# Patient Record
Sex: FEMALE | Race: WHITE | URBAN - NONMETROPOLITAN AREA
[De-identification: names, ages, dates, MRNs, and addresses within clinical notes are randomized per-mention and may not be internally consistent; named-entity substitution may affect disease eponyms.]

---

## 2019-09-30 ENCOUNTER — APPOINTMENT (RX ONLY)
Dept: URBAN - NONMETROPOLITAN AREA CLINIC 7 | Facility: CLINIC | Age: 69
Setting detail: DERMATOLOGY
End: 2019-09-30

## 2019-09-30 DIAGNOSIS — Z71.89 OTHER SPECIFIED COUNSELING: ICD-10-CM

## 2019-09-30 DIAGNOSIS — L82.1 OTHER SEBORRHEIC KERATOSIS: ICD-10-CM

## 2019-09-30 PROCEDURE — ? COUNSELING

## 2019-09-30 PROCEDURE — 99202 OFFICE O/P NEW SF 15 MIN: CPT

## 2019-09-30 PROCEDURE — ? EDUCATIONAL RESOURCES PROVIDED

## 2019-09-30 PROCEDURE — ? OBSERVATION

## 2019-09-30 PROCEDURE — ? SUNSCREEN RECOMMENDATIONS

## 2019-09-30 ASSESSMENT — LOCATION DETAILED DESCRIPTION DERM: LOCATION DETAILED: LEFT MEDIAL UPPER BACK

## 2019-09-30 ASSESSMENT — PAIN INTENSITY VAS: HOW INTENSE IS YOUR PAIN 0 BEING NO PAIN, 10 BEING THE MOST SEVERE PAIN POSSIBLE?: NO PAIN

## 2019-09-30 ASSESSMENT — LOCATION ZONE DERM: LOCATION ZONE: TRUNK

## 2019-09-30 ASSESSMENT — LOCATION SIMPLE DESCRIPTION DERM: LOCATION SIMPLE: LEFT UPPER BACK

## 2019-09-30 NOTE — PROCEDURE: MIPS QUALITY
Detail Level: Detailed
Quality 111:Pneumonia Vaccination Status For Older Adults: Pneumococcal Vaccination Previously Received
Quality 474: Zoster Vaccination Status: Shingrix Vaccination Administered or Previously Received
Quality 110: Preventive Care And Screening: Influenza Immunization: Influenza Immunization not Administered because Patient Refused.
Quality 137: Melanoma: Continuity Of Care - Recall System: Patient information entered into a recall system that includes: target date for the next exam specified AND a process to follow up with patients regarding missed or unscheduled appointments

## 2020-09-03 ENCOUNTER — APPOINTMENT (RX ONLY)
Dept: URBAN - NONMETROPOLITAN AREA CLINIC 7 | Facility: CLINIC | Age: 70
Setting detail: DERMATOLOGY
End: 2020-09-03

## 2020-09-03 DIAGNOSIS — D22 MELANOCYTIC NEVI: ICD-10-CM

## 2020-09-03 DIAGNOSIS — Z85.820 PERSONAL HISTORY OF MALIGNANT MELANOMA OF SKIN: ICD-10-CM

## 2020-09-03 DIAGNOSIS — Z71.89 OTHER SPECIFIED COUNSELING: ICD-10-CM

## 2020-09-03 PROBLEM — D48.5 NEOPLASM OF UNCERTAIN BEHAVIOR OF SKIN: Status: ACTIVE | Noted: 2020-09-03

## 2020-09-03 PROCEDURE — 99213 OFFICE O/P EST LOW 20 MIN: CPT | Mod: 25

## 2020-09-03 PROCEDURE — ? COUNSELING

## 2020-09-03 PROCEDURE — 11300 SHAVE SKIN LESION 0.5 CM/<: CPT

## 2020-09-03 PROCEDURE — ? EDUCATIONAL RESOURCES PROVIDED

## 2020-09-03 PROCEDURE — ? OBSERVATION

## 2020-09-03 PROCEDURE — ? SUNSCREEN RECOMMENDATIONS

## 2020-09-03 PROCEDURE — ? SHAVE REMOVAL

## 2020-09-03 ASSESSMENT — LOCATION SIMPLE DESCRIPTION DERM
LOCATION SIMPLE: RIGHT SHOULDER
LOCATION SIMPLE: RIGHT FOREARM

## 2020-09-03 ASSESSMENT — LOCATION DETAILED DESCRIPTION DERM
LOCATION DETAILED: RIGHT POSTERIOR SHOULDER
LOCATION DETAILED: RIGHT PROXIMAL ULNAR DORSAL FOREARM

## 2020-09-03 ASSESSMENT — LOCATION ZONE DERM: LOCATION ZONE: ARM

## 2020-09-03 NOTE — PROCEDURE: MIPS QUALITY
Detail Level: Detailed
Quality 111:Pneumonia Vaccination Status For Older Adults: Pneumococcal Vaccination Previously Received
Quality 474: Zoster Vaccination Status: Shingrix Vaccination Administered or Previously Received
Quality 110: Preventive Care And Screening: Influenza Immunization: Influenza Immunization not Administered because Patient Refused.

## 2021-11-15 ENCOUNTER — APPOINTMENT (RX ONLY)
Dept: URBAN - NONMETROPOLITAN AREA CLINIC 7 | Facility: CLINIC | Age: 71
Setting detail: DERMATOLOGY
End: 2021-11-15

## 2021-11-15 DIAGNOSIS — D18.0 HEMANGIOMA: ICD-10-CM

## 2021-11-15 DIAGNOSIS — Z71.89 OTHER SPECIFIED COUNSELING: ICD-10-CM

## 2021-11-15 DIAGNOSIS — L82.1 OTHER SEBORRHEIC KERATOSIS: ICD-10-CM

## 2021-11-15 DIAGNOSIS — Z85.820 PERSONAL HISTORY OF MALIGNANT MELANOMA OF SKIN: ICD-10-CM

## 2021-11-15 DIAGNOSIS — L65.8 OTHER SPECIFIED NONSCARRING HAIR LOSS: ICD-10-CM

## 2021-11-15 PROBLEM — D18.01 HEMANGIOMA OF SKIN AND SUBCUTANEOUS TISSUE: Status: ACTIVE | Noted: 2021-11-15

## 2021-11-15 PROCEDURE — ? EDUCATIONAL RESOURCES PROVIDED

## 2021-11-15 PROCEDURE — ? PRESCRIPTION

## 2021-11-15 PROCEDURE — ? MEDICATION COUNSELING

## 2021-11-15 PROCEDURE — ? TREATMENT REGIMEN

## 2021-11-15 PROCEDURE — ? COUNSELING

## 2021-11-15 PROCEDURE — ? SUNSCREEN RECOMMENDATIONS

## 2021-11-15 PROCEDURE — ? OBSERVATION

## 2021-11-15 PROCEDURE — 99213 OFFICE O/P EST LOW 20 MIN: CPT

## 2021-11-15 RX ORDER — PHARMACY COMPOUNDING ACCESSORY
1 EACH MISCELLANEOUS BID
Qty: 60 | Refills: 5 | Status: ERX | COMMUNITY
Start: 2021-11-15

## 2021-11-15 RX ADMIN — Medication 1: at 00:00

## 2021-11-15 ASSESSMENT — LOCATION DETAILED DESCRIPTION DERM
LOCATION DETAILED: LEFT MEDIAL UPPER BACK
LOCATION DETAILED: INFERIOR THORACIC SPINE
LOCATION DETAILED: RIGHT PROXIMAL ULNAR DORSAL FOREARM
LOCATION DETAILED: POSTERIOR MID-PARIETAL SCALP

## 2021-11-15 ASSESSMENT — LOCATION SIMPLE DESCRIPTION DERM
LOCATION SIMPLE: RIGHT FOREARM
LOCATION SIMPLE: POSTERIOR SCALP
LOCATION SIMPLE: UPPER BACK
LOCATION SIMPLE: LEFT UPPER BACK

## 2021-11-15 ASSESSMENT — LOCATION ZONE DERM
LOCATION ZONE: TRUNK
LOCATION ZONE: ARM
LOCATION ZONE: SCALP

## 2021-11-15 NOTE — PROCEDURE: TREATMENT REGIMEN
Detail Level: Zone
Initiate Treatment: Alopecia\\nMinoxidil 7% Finasteride 0.1%, Niacinaminde 5%, Azelaic Acid 5% solution bid

## 2021-11-15 NOTE — PROCEDURE: OBSERVATION
Size Of Lesion In Cm (Optional): 0
Body Location Override (Optional - Billing Will Still Be Based On Selected Body Map Location If Applicable): back
Detail Level: Zone
Detail Level: Simple

## 2021-11-15 NOTE — PROCEDURE: MIPS QUALITY
Quality 110: Preventive Care And Screening: Influenza Immunization: Influenza Immunization Administered during Influenza season
Quality 431: Preventive Care And Screening: Unhealthy Alcohol Use - Screening: Patient not identified as an unhealthy alcohol user when screened for unhealthy alcohol use using a systematic screening method
Detail Level: Detailed
Quality 111:Pneumonia Vaccination Status For Older Adults: Pneumococcal Vaccination Previously Received
Additional Notes: Has had covid shot

## 2021-11-15 NOTE — PROCEDURE: MEDICATION COUNSELING
Minocycline Counseling: Patient advised regarding possible photosensitivity and discoloration of the teeth, skin, lips, tongue and gums.  Patient instructed to avoid sunlight, if possible.  When exposed to sunlight, patients should wear protective clothing, sunglasses, and sunscreen.  The patient was instructed to call the office immediately if the following severe adverse effects occur:  hearing changes, easy bruising/bleeding, severe headache, or vision changes.  The patient verbalized understanding of the proper use and possible adverse effects of minocycline.  All of the patient's questions and concerns were addressed.
Ketoconazole Pregnancy And Lactation Text: This medication is Pregnancy Category C and it isn't know if it is safe during pregnancy. It is also excreted in breast milk and breast feeding isn't recommended.
Cosentyx Pregnancy And Lactation Text: This medication is Pregnancy Category B and is considered safe during pregnancy. It is unknown if this medication is excreted in breast milk.
Dapsone Pregnancy And Lactation Text: This medication is Pregnancy Category C and is not considered safe during pregnancy or breast feeding.
Topical Sulfur Applications Pregnancy And Lactation Text: This medication is Pregnancy Category C and has an unknown safety profile during pregnancy. It is unknown if this topical medication is excreted in breast milk.
Mirvaso Counseling: Mirvaso is a topical medication which can decrease superficial blood flow where applied. Side effects are uncommon and include stinging, redness and allergic reactions.
Carac Counseling:  I discussed with the patient the risks of Carac including but not limited to erythema, scaling, itching, weeping, crusting, and pain.
Include Pregnancy/Lactation Warning?: No
Thalidomide Counseling: I discussed with the patient the risks of thalidomide including but not limited to birth defects, anxiety, weakness, chest pain, dizziness, cough and severe allergy.
Cyclosporine Counseling:  I discussed with the patient the risks of cyclosporine including but not limited to hypertension, gingival hyperplasia,myelosuppression, immunosuppression, liver damage, kidney damage, neurotoxicity, lymphoma, and serious infections. The patient understands that monitoring is required including baseline blood pressure, CBC, CMP, lipid panel and uric acid, and then 1-2 times monthly CMP and blood pressure.
Niacinamide Counseling: I recommended taking niacin or niacinamide, also know as vitamin B3, twice daily. Recent evidence suggests that taking vitamin B3 (500 mg twice daily) can reduce the risk of actinic keratoses and non-melanoma skin cancers. Side effects of vitamin B3 include flushing and headache.
Skyrizi Pregnancy And Lactation Text: The risk during pregnancy and breastfeeding is uncertain with this medication.
Topical Sulfur Applications Counseling: Topical Sulfur Counseling: Patient counseled that this medication may cause skin irritation or allergic reactions.  In the event of skin irritation, the patient was advised to reduce the amount of the drug applied or use it less frequently.   The patient verbalized understanding of the proper use and possible adverse effects of topical sulfur application.  All of the patient's questions and concerns were addressed.
Minoxidil Pregnancy And Lactation Text: This medication has not been assigned a Pregnancy Risk Category but animal studies failed to show danger with the topical medication. It is unknown if the medication is excreted in breast milk.
Benzoyl Peroxide Pregnancy And Lactation Text: This medication is Pregnancy Category C. It is unknown if benzoyl peroxide is excreted in breast milk.
Sski Pregnancy And Lactation Text: This medication is Pregnancy Category D and isn't considered safe during pregnancy. It is excreted in breast milk.
Minocycline Pregnancy And Lactation Text: This medication is Pregnancy Category D and not consider safe during pregnancy. It is also excreted in breast milk.
Terbinafine Counseling: Patient counseling regarding adverse effects of terbinafine including but not limited to headache, diarrhea, rash, upset stomach, liver function test abnormalities, itching, taste/smell disturbance, nausea, abdominal pain, and flatulence.  There is a rare possibility of liver failure that can occur when taking terbinafine.  The patient understands that a baseline LFT and kidney function test may be required. The patient verbalized understanding of the proper use and possible adverse effects of terbinafine.  All of the patient's questions and concerns were addressed.
Skyrizi Counseling: I discussed with the patient the risks of risankizumab-rzaa including but not limited to immunosuppression, and serious infections.  The patient understands that monitoring is required including a PPD at baseline and must alert us or the primary physician if symptoms of infection or other concerning signs are noted.
Libtayo Pregnancy And Lactation Text: This medication is contraindicated in pregnancy and when breast feeding.
Cyclophosphamide Pregnancy And Lactation Text: This medication is Pregnancy Category D and it isn't considered safe during pregnancy. This medication is excreted in breast milk.
Dapsone Counseling: I discussed with the patient the risks of dapsone including but not limited to hemolytic anemia, agranulocytosis, rashes, methemoglobinemia, kidney failure, peripheral neuropathy, headaches, GI upset, and liver toxicity.  Patients who start dapsone require monitoring including baseline LFTs and weekly CBCs for the first month, then every month thereafter.  The patient verbalized understanding of the proper use and possible adverse effects of dapsone.  All of the patient's questions and concerns were addressed.
Cosentyx Counseling:  I discussed with the patient the risks of Cosentyx including but not limited to worsening of Crohn's disease, immunosuppression, allergic reactions and infections.  The patient understands that monitoring is required including a PPD at baseline and must alert us or the primary physician if symptoms of infection or other concerning signs are noted.
Topical Clindamycin Pregnancy And Lactation Text: This medication is Pregnancy Category B and is considered safe during pregnancy. It is unknown if it is excreted in breast milk.
Minoxidil Counseling: Minoxidil is a topical medication which can increase blood flow where it is applied. It is uncertain how this medication increases hair growth. Side effects are uncommon and include stinging and allergic reactions.
Quinolones Counseling:  I discussed with the patient the risks of fluoroquinolones including but not limited to GI upset, allergic reaction, drug rash, diarrhea, dizziness, photosensitivity, yeast infections, liver function test abnormalities, tendonitis/tendon rupture.
Cyclophosphamide Counseling:  I discussed with the patient the risks of cyclophosphamide including but not limited to hair loss, hormonal abnormalities, decreased fertility, abdominal pain, diarrhea, nausea and vomiting, bone marrow suppression and infection. The patient understands that monitoring is required while taking this medication.
Benzoyl Peroxide Counseling: Patient counseled that medicine may cause skin irritation and bleach clothing.  In the event of skin irritation, the patient was advised to reduce the amount of the drug applied or use it less frequently.   The patient verbalized understanding of the proper use and possible adverse effects of benzoyl peroxide.  All of the patient's questions and concerns were addressed.
Colchicine Pregnancy And Lactation Text: This medication is Pregnancy Category C and isn't considered safe during pregnancy. It is excreted in breast milk.
Terbinafine Pregnancy And Lactation Text: This medication is Pregnancy Category B and is considered safe during pregnancy. It is also excreted in breast milk and breast feeding isn't recommended.
Cimzia Pregnancy And Lactation Text: This medication crosses the placenta but can be considered safe in certain situations. Cimzia may be excreted in breast milk.
SSKI Counseling:  I discussed with the patient the risks of SSKI including but not limited to thyroid abnormalities, metallic taste, GI upset, fever, headache, acne, arthralgias, paraesthesias, lymphadenopathy, easy bleeding, arrhythmias, and allergic reaction.
Libtayo Counseling- I discussed with the patient the risks of Libtayo including but not limited to nausea, vomiting, diarrhea, and bone or muscle pain.  The patient verbalized understanding of the proper use and possible adverse effects of Libtayo.  All of the patient's questions and concerns were addressed.
Imiquimod Pregnancy And Lactation Text: This medication is Pregnancy Category C. It is unknown if this medication is excreted in breast milk.
Quinolones Pregnancy And Lactation Text: This medication is Pregnancy Category C and it isn't know if it is safe during pregnancy. It is also excreted in breast milk.
Cellcept Pregnancy And Lactation Text: This medication is Pregnancy Category D and isn't considered safe during pregnancy. It is unknown if this medication is excreted in breast milk.
Cimzia Counseling:  I discussed with the patient the risks of Cimzia including but not limited to immunosuppression, allergic reactions and infections.  The patient understands that monitoring is required including a PPD at baseline and must alert us or the primary physician if symptoms of infection or other concerning signs are noted.
Colchicine Counseling:  Patient counseled regarding adverse effects including but not limited to stomach upset (nausea, vomiting, stomach pain, or diarrhea).  Patient instructed to limit alcohol consumption while taking this medication.  Colchicine may reduce blood counts especially with prolonged use.  The patient understands that monitoring of kidney function and blood counts may be required, especially at baseline. The patient verbalized understanding of the proper use and possible adverse effects of colchicine.  All of the patient's questions and concerns were addressed.
Simponi Counseling:  I discussed with the patient the risks of golimumab including but not limited to myelosuppression, immunosuppression, autoimmune hepatitis, demyelinating diseases, lymphoma, and serious infections.  The patient understands that monitoring is required including a PPD at baseline and must alert us or the primary physician if symptoms of infection or other concerning signs are noted.
Spironolactone Pregnancy And Lactation Text: This medication can cause feminization of the male fetus and should be avoided during pregnancy. The active metabolite is also found in breast milk.
Hydroxychloroquine Pregnancy And Lactation Text: This medication has been shown to cause fetal harm but it isn't assigned a Pregnancy Risk Category. There are small amounts excreted in breast milk.
Imiquimod Counseling:  I discussed with the patient the risks of imiquimod including but not limited to erythema, scaling, itching, weeping, crusting, and pain.  Patient understands that the inflammatory response to imiquimod is variable from person to person and was educated regarded proper titration schedule.  If flu-like symptoms develop, patient knows to discontinue the medication and contact us.
Azithromycin Counseling:  I discussed with the patient the risks of azithromycin including but not limited to GI upset, allergic reaction, drug rash, diarrhea, and yeast infections.
Rifampin Counseling: I discussed with the patient the risks of rifampin including but not limited to liver damage, kidney damage, red-orange body fluids, nausea/vomiting and severe allergy.
High Dose Vitamin A Pregnancy And Lactation Text: High dose vitamin A therapy is contraindicated during pregnancy and breast feeding.
Cellcept Counseling:  I discussed with the patient the risks of mycophenolate mofetil including but not limited to infection/immunosuppression, GI upset, hypokalemia, hypercholesterolemia, bone marrow suppression, lymphoproliferative disorders, malignancy, GI ulceration/bleed/perforation, colitis, interstitial lung disease, kidney failure, progressive multifocal leukoencephalopathy, and birth defects.  The patient understands that monitoring is required including a baseline creatinine and regular CBC testing. In addition, patient must alert us immediately if symptoms of infection or other concerning signs are noted.
Cimetidine Counseling:  I discussed with the patient the risks of Cimetidine including but not limited to gynecomastia, headache, diarrhea, nausea, drowsiness, arrhythmias, pancreatitis, skin rashes, psychosis, bone marrow suppression and kidney toxicity.
Topical Clindamycin Counseling: Patient counseled that this medication may cause skin irritation or allergic reactions.  In the event of skin irritation, the patient was advised to reduce the amount of the drug applied or use it less frequently.   The patient verbalized understanding of the proper use and possible adverse effects of clindamycin.  All of the patient's questions and concerns were addressed.
Spironolactone Counseling: Patient advised regarding risks of diarrhea, abdominal pain, hyperkalemia, birth defects (for female patients), liver toxicity and renal toxicity. The patient may need blood work to monitor liver and kidney function and potassium levels while on therapy. The patient verbalized understanding of the proper use and possible adverse effects of spironolactone.  All of the patient's questions and concerns were addressed.
Hydroxychloroquine Counseling:  I discussed with the patient that a baseline ophthalmologic exam is needed at the start of therapy and every year thereafter while on therapy. A CBC may also be warranted for monitoring.  The side effects of this medication were discussed with the patient, including but not limited to agranulocytosis, aplastic anemia, seizures, rashes, retinopathy, and liver toxicity. Patient instructed to call the office should any adverse effect occur.  The patient verbalized understanding of the proper use and possible adverse effects of Plaquenil.  All the patient's questions and concerns were addressed.
Azithromycin Pregnancy And Lactation Text: This medication is considered safe during pregnancy and is also secreted in breast milk.
Rifampin Pregnancy And Lactation Text: This medication is Pregnancy Category C and it isn't know if it is safe during pregnancy. It is also excreted in breast milk and should not be used if you are breast feeding.
High Dose Vitamin A Counseling: Side effects reviewed, pt to contact office should one occur.
Clofazimine Counseling:  I discussed with the patient the risks of clofazimine including but not limited to skin and eye pigmentation, liver damage, nausea/vomiting, gastrointestinal bleeding and allergy.
Siliq Counseling:  I discussed with the patient the risks of Siliq including but not limited to new or worsening depression, suicidal thoughts and behavior, immunosuppression, malignancy, posterior leukoencephalopathy syndrome, and serious infections.  The patient understands that monitoring is required including a PPD at baseline and must alert us or the primary physician if symptoms of infection or other concerning signs are noted. There is also a special program designed to monitor depression which is required with Siliq.
Tazorac Pregnancy And Lactation Text: This medication is not safe during pregnancy. It is unknown if this medication is excreted in breast milk.
Birth Control Pills Pregnancy And Lactation Text: This medication should be avoided if pregnant and for the first 30 days post-partum.
Glycopyrrolate Pregnancy And Lactation Text: This medication is Pregnancy Category B and is considered safe during pregnancy. It is unknown if it is excreted breast milk.
Bactrim Counseling:  I discussed with the patient the risks of sulfa antibiotics including but not limited to GI upset, allergic reaction, drug rash, diarrhea, dizziness, photosensitivity, and yeast infections.  Rarely, more serious reactions can occur including but not limited to aplastic anemia, agranulocytosis, methemoglobinemia, blood dyscrasias, liver or kidney failure, lung infiltrates or desquamative/blistering drug rashes.
Isotretinoin Pregnancy And Lactation Text: This medication is Pregnancy Category X and is considered extremely dangerous during pregnancy. It is unknown if it is excreted in breast milk.
Sarecycline Counseling: Patient advised regarding possible photosensitivity and discoloration of the teeth, skin, lips, tongue and gums.  Patient instructed to avoid sunlight, if possible.  When exposed to sunlight, patients should wear protective clothing, sunglasses, and sunscreen.  The patient was instructed to call the office immediately if the following severe adverse effects occur:  hearing changes, easy bruising/bleeding, severe headache, or vision changes.  The patient verbalized understanding of the proper use and possible adverse effects of sarecycline.  All of the patient's questions and concerns were addressed.
Doxepin Counseling:  Patient advised that the medication is sedating and not to drive a car after taking this medication. Patient informed of potential adverse effects including but not limited to dry mouth, urinary retention, and blurry vision.  The patient verbalized understanding of the proper use and possible adverse effects of doxepin.  All of the patient's questions and concerns were addressed.
Tazorac Counseling:  Patient advised that medication is irritating and drying.  Patient may need to apply sparingly and wash off after an hour before eventually leaving it on overnight.  The patient verbalized understanding of the proper use and possible adverse effects of tazorac.  All of the patient's questions and concerns were addressed.
Hydroquinone Counseling:  Patient advised that medication may result in skin irritation, lightening (hypopigmentation), dryness, and burning.  In the event of skin irritation, the patient was advised to reduce the amount of the drug applied or use it less frequently.  Rarely, spots that are treated with hydroquinone can become darker (pseudoochronosis).  Should this occur, patient instructed to stop medication and call the office. The patient verbalized understanding of the proper use and possible adverse effects of hydroquinone.  All of the patient's questions and concerns were addressed.
Birth Control Pills Counseling: Birth Control Pill Counseling: I discussed with the patient the potential side effects of OCPs including but not limited to increased risk of stroke, heart attack, thrombophlebitis, deep venous thrombosis, hepatic adenomas, breast changes, GI upset, headaches, and depression.  The patient verbalized understanding of the proper use and possible adverse effects of OCPs. All of the patient's questions and concerns were addressed.
Glycopyrrolate Counseling:  I discussed with the patient the risks of glycopyrrolate including but not limited to skin rash, drowsiness, dry mouth, difficulty urinating, and blurred vision.
Azathioprine Counseling:  I discussed with the patient the risks of azathioprine including but not limited to myelosuppression, immunosuppression, hepatotoxicity, lymphoma, and infections.  The patient understands that monitoring is required including baseline LFTs, Creatinine, possible TPMP genotyping and weekly CBCs for the first month and then every 2 weeks thereafter.  The patient verbalized understanding of the proper use and possible adverse effects of azathioprine.  All of the patient's questions and concerns were addressed.
Arava Pregnancy And Lactation Text: This medication is Pregnancy Category X and is absolutely contraindicated during pregnancy. It is unknown if it is excreted in breast milk.
Rituxan Pregnancy And Lactation Text: This medication is Pregnancy Category C and it isn't know if it is safe during pregnancy. It is unknown if this medication is excreted in breast milk but similar antibodies are known to be excreted.
Doxepin Pregnancy And Lactation Text: This medication is Pregnancy Category C and it isn't known if it is safe during pregnancy. It is also excreted in breast milk and breast feeding isn't recommended.
Ivermectin Pregnancy And Lactation Text: This medication is Pregnancy Category C and it isn't known if it is safe during pregnancy. It is also excreted in breast milk.
Bactrim Pregnancy And Lactation Text: This medication is Pregnancy Category D and is known to cause fetal risk.  It is also excreted in breast milk.
Propranolol Pregnancy And Lactation Text: This medication is Pregnancy Category C and it isn't known if it is safe during pregnancy. It is excreted in breast milk.
Isotretinoin Counseling: Patient should get monthly blood tests, not donate blood, not drive at night if vision affected, not share medication, and not undergo elective surgery for 6 months after tx completed. Side effects reviewed, pt to contact office should one occur.
Rituxan Counseling:  I discussed with the patient the risks of Rituxan infusions. Side effects can include infusion reactions, severe drug rashes including mucocutaneous reactions, reactivation of latent hepatitis and other infections and rarely progressive multifocal leukoencephalopathy.  All of the patient's questions and concerns were addressed.
Arava Counseling:  Patient counseled regarding adverse effects of Arava including but not limited to nausea, vomiting, abnormalities in liver function tests. Patients may develop mouth sores, rash, diarrhea, and abnormalities in blood counts. The patient understands that monitoring is required including LFTs and blood counts.  There is a rare possibility of scarring of the liver and lung problems that can occur when taking methotrexate. Persistent nausea, loss of appetite, pale stools, dark urine, cough, and shortness of breath should be reported immediately. Patient advised to discontinue Arava treatment and consult with a physician prior to attempting conception. The patient will have to undergo a treatment to eliminate Arava from the body prior to conception.
Topical Retinoid counseling:  Patient advised to apply a pea-sized amount only at bedtime and wait 30 minutes after washing their face before applying.  If too drying, patient may add a non-comedogenic moisturizer. The patient verbalized understanding of the proper use and possible adverse effects of retinoids.  All of the patient's questions and concerns were addressed.
Cephalexin Counseling: I counseled the patient regarding use of cephalexin as an antibiotic for prophylactic and/or therapeutic purposes. Cephalexin (commonly prescribed under brand name Keflex) is a cephalosporin antibiotic which is active against numerous classes of bacteria, including most skin bacteria. Side effects may include nausea, diarrhea, gastrointestinal upset, rash, hives, yeast infections, and in rare cases, hepatitis, kidney disease, seizures, fever, confusion, neurologic symptoms, and others. Patients with severe allergies to penicillin medications are cautioned that there is about a 10% incidence of cross-reactivity with cephalosporins. When possible, patients with penicillin allergies should use alternatives to cephalosporins for antibiotic therapy.
Eucrisa Counseling: Patient may experience a mild burning sensation during topical application. Eucrisa is not approved in children less than 2 years of age.
Xolair Pregnancy And Lactation Text: This medication is Pregnancy Category B and is considered safe during pregnancy. This medication is excreted in breast milk.
Tetracycline Counseling: Patient counseled regarding possible photosensitivity and increased risk for sunburn.  Patient instructed to avoid sunlight, if possible.  When exposed to sunlight, patients should wear protective clothing, sunglasses, and sunscreen.  The patient was instructed to call the office immediately if the following severe adverse effects occur:  hearing changes, easy bruising/bleeding, severe headache, or vision changes.  The patient verbalized understanding of the proper use and possible adverse effects of tetracycline.  All of the patient's questions and concerns were addressed. Patient understands to avoid pregnancy while on therapy due to potential birth defects.
Bexarotene Pregnancy And Lactation Text: This medication is Pregnancy Category X and should not be given to women who are pregnant or may become pregnant. This medication should not be used if you are breast feeding.
Ivermectin Counseling:  Patient instructed to take medication on an empty stomach with a full glass of water.  Patient informed of potential adverse effects including but not limited to nausea, diarrhea, dizziness, itching, and swelling of the extremities or lymph nodes.  The patient verbalized understanding of the proper use and possible adverse effects of ivermectin.  All of the patient's questions and concerns were addressed.
Propranolol Counseling:  I discussed with the patient the risks of propranolol including but not limited to low heart rate, low blood pressure, low blood sugar, restlessness and increased cold sensitivity. They should call the office if they experience any of these side effects.
Solaraze Pregnancy And Lactation Text: This medication is Pregnancy Category B and is considered safe. There is some data to suggest avoiding during the third trimester. It is unknown if this medication is excreted in breast milk.
Cephalexin Pregnancy And Lactation Text: This medication is Pregnancy Category B and considered safe during pregnancy.  It is also excreted in breast milk but can be used safely for shorter doses.
Bexarotene Counseling:  I discussed with the patient the risks of bexarotene including but not limited to hair loss, dry lips/skin/eyes, liver abnormalities, hyperlipidemia, pancreatitis, depression/suicidal ideation, photosensitivity, drug rash/allergic reactions, hypothyroidism, anemia, leukopenia, infection, cataracts, and teratogenicity.  Patient understands that they will need regular blood tests to check lipid profile, liver function tests, white blood cell count, thyroid function tests and pregnancy test if applicable.
Infliximab Counseling:  I discussed with the patient the risks of infliximab including but not limited to myelosuppression, immunosuppression, autoimmune hepatitis, demyelinating diseases, lymphoma, and serious infections.  The patient understands that monitoring is required including a PPD at baseline and must alert us or the primary physician if symptoms of infection or other concerning signs are noted.
Xolair Counseling:  Patient informed of potential adverse effects including but not limited to fever, muscle aches, rash and allergic reactions.  The patient verbalized understanding of the proper use and possible adverse effects of Xolair.  All of the patient's questions and concerns were addressed.
Elidel Counseling: Patient may experience a mild burning sensation during topical application. Elidel is not approved in children less than 2 years of age. There have been case reports of hematologic and skin malignancies in patients using topical calcineurin inhibitors although causality is questionable.
Clindamycin Counseling: I counseled the patient regarding use of clindamycin as an antibiotic for prophylactic and/or therapeutic purposes. Clindamycin is active against numerous classes of bacteria, including skin bacteria. Side effects may include nausea, diarrhea, gastrointestinal upset, rash, hives, yeast infections, and in rare cases, colitis.
Xeldamariz Pregnancy And Lactation Text: This medication is Pregnancy Category D and is not considered safe during pregnancy.  The risk during breast feeding is also uncertain.
Acitretin Pregnancy And Lactation Text: This medication is Pregnancy Category X and should not be given to women who are pregnant or may become pregnant in the future. This medication is excreted in breast milk.
Solaraze Counseling:  I discussed with the patient the risks of Solaraze including but not limited to erythema, scaling, itching, weeping, crusting, and pain.
Albendazole Counseling:  I discussed with the patient the risks of albendazole including but not limited to cytopenia, kidney damage, nausea/vomiting and severe allergy.  The patient understands that this medication is being used in an off-label manner.
Oxybutynin Counseling:  I discussed with the patient the risks of oxybutynin including but not limited to skin rash, drowsiness, dry mouth, difficulty urinating, and blurred vision.
Acitretin Counseling:  I discussed with the patient the risks of acitretin including but not limited to hair loss, dry lips/skin/eyes, liver damage, hyperlipidemia, depression/suicidal ideation, photosensitivity.  Serious rare side effects can include but are not limited to pancreatitis, pseudotumor cerebri, bony changes, clot formation/stroke/heart attack.  Patient understands that alcohol is contraindicated since it can result in liver toxicity and significantly prolong the elimination of the drug by many years.
Clindamycin Pregnancy And Lactation Text: This medication can be used in pregnancy if certain situations. Clindamycin is also present in breast milk.
Fluconazole Counseling:  Patient counseled regarding adverse effects of fluconazole including but not limited to headache, diarrhea, nausea, upset stomach, liver function test abnormalities, taste disturbance, and stomach pain.  There is a rare possibility of liver failure that can occur when taking fluconazole.  The patient understands that monitoring of LFTs and kidney function test may be required, especially at baseline. The patient verbalized understanding of the proper use and possible adverse effects of fluconazole.  All of the patient's questions and concerns were addressed.
Xeljanz Counseling: I discussed with the patient the risks of Xeljanz therapy including increased risk of infection, liver issues, headache, diarrhea, or cold symptoms. Live vaccines should be avoided. They were instructed to call if they have any problems.
Opioid Pregnancy And Lactation Text: These medications can lead to premature delivery and should be avoided during pregnancy. These medications are also present in breast milk in small amounts.
Rhofade Pregnancy And Lactation Text: This medication has not been assigned a Pregnancy Risk Category. It is unknown if the medication is excreted in breast milk.
Ilumya Counseling: I discussed with the patient the risks of tildrakizumab including but not limited to immunosuppression, malignancy, posterior leukoencephalopathy syndrome, and serious infections.  The patient understands that monitoring is required including a PPD at baseline and must alert us or the primary physician if symptoms of infection or other concerning signs are noted.
Drysol Pregnancy And Lactation Text: This medication is considered safe during pregnancy and breast feeding.
Otezla Pregnancy And Lactation Text: This medication is Pregnancy Category C and it isn't known if it is safe during pregnancy. It is unknown if it is excreted in breast milk.
Doxycycline Counseling:  Patient counseled regarding possible photosensitivity and increased risk for sunburn.  Patient instructed to avoid sunlight, if possible.  When exposed to sunlight, patients should wear protective clothing, sunglasses, and sunscreen.  The patient was instructed to call the office immediately if the following severe adverse effects occur:  hearing changes, easy bruising/bleeding, severe headache, or vision changes.  The patient verbalized understanding of the proper use and possible adverse effects of doxycycline.  All of the patient's questions and concerns were addressed.
Opioid Counseling: I discussed with the patient the potential side effects of opioids including but not limited to addiction, altered mental status, and depression. I stressed avoiding alcohol, benzodiazepines, muscle relaxants and sleep aids unless specifically okayed by a physician. The patient verbalized understanding of the proper use and possible adverse effects of opioids. All of the patient's questions and concerns were addressed. They were instructed to flush the remaining pills down the toilet if they did not need them for pain.
Rhofade Counseling: Rhofade is a topical medication which can decrease superficial blood flow where applied. Side effects are uncommon and include stinging, redness and allergic reactions.
Hydroxyzine Pregnancy And Lactation Text: This medication is not safe during pregnancy and should not be taken. It is also excreted in breast milk and breast feeding isn't recommended.
Drysol Counseling:  I discussed with the patient the risks of drysol/aluminum chloride including but not limited to skin rash, itching, irritation, burning.
Otezla Counseling: The side effects of Otezla were discussed with the patient, including but not limited to worsening or new depression, weight loss, diarrhea, nausea, upper respiratory tract infection, and headache. Patient instructed to call the office should any adverse effect occur.  The patient verbalized understanding of the proper use and possible adverse effects of Otezla.  All the patient's questions and concerns were addressed.
5-Fu Pregnancy And Lactation Text: This medication is Pregnancy Category X and contraindicated in pregnancy and in women who may become pregnant. It is unknown if this medication is excreted in breast milk.
Protopic Pregnancy And Lactation Text: This medication is Pregnancy Category C. It is unknown if this medication is excreted in breast milk when applied topically.
Doxycycline Pregnancy And Lactation Text: This medication is Pregnancy Category D and not consider safe during pregnancy. It is also excreted in breast milk but is considered safe for shorter treatment courses.
Valtrex Pregnancy And Lactation Text: this medication is Pregnancy Category B and is considered safe during pregnancy. This medication is not directly found in breast milk but it's metabolite acyclovir is present.
Hydroxyzine Counseling: Patient advised that the medication is sedating and not to drive a car after taking this medication.  Patient informed of potential adverse effects including but not limited to dry mouth, urinary retention, and blurry vision.  The patient verbalized understanding of the proper use and possible adverse effects of hydroxyzine.  All of the patient's questions and concerns were addressed.
Tremfya Counseling: I discussed with the patient the risks of guselkumab including but not limited to immunosuppression, serious infections, worsening of inflammatory bowel disease and drug reactions.  The patient understands that monitoring is required including a PPD at baseline and must alert us or the primary physician if symptoms of infection or other concerning signs are noted.
Gabapentin Counseling: I discussed with the patient the risks of gabapentin including but not limited to dizziness, somnolence, fatigue and ataxia.
Griseofulvin Counseling:  I discussed with the patient the risks of griseofulvin including but not limited to photosensitivity, cytopenia, liver damage, nausea/vomiting and severe allergy.  The patient understands that this medication is best absorbed when taken with a fatty meal (e.g., ice cream or french fries).
Prednisone Pregnancy And Lactation Text: This medication is Pregnancy Category C and it isn't know if it is safe during pregnancy. This medication is excreted in breast milk.
Humira Counseling:  I discussed with the patient the risks of adalimumab including but not limited to myelosuppression, immunosuppression, autoimmune hepatitis, demyelinating diseases, lymphoma, and serious infections.  The patient understands that monitoring is required including a PPD at baseline and must alert us or the primary physician if symptoms of infection or other concerning signs are noted.
Protopic Counseling: Patient may experience a mild burning sensation during topical application. Protopic is not approved in children less than 2 years of age. There have been case reports of hematologic and skin malignancies in patients using topical calcineurin inhibitors although causality is questionable.
Prednisone Counseling:  I discussed with the patient the risks of prolonged use of prednisone including but not limited to weight gain, insomnia, osteoporosis, mood changes, diabetes, susceptibility to infection, glaucoma and high blood pressure.  In cases where prednisone use is prolonged, patients should be monitored with blood pressure checks, serum glucose levels and an eye exam.  Additionally, the patient may need to be placed on GI prophylaxis, PCP prophylaxis, and calcium and vitamin D supplementation and/or a bisphosphonate.  The patient verbalized understanding of the proper use and the possible adverse effects of prednisone.  All of the patient's questions and concerns were addressed.
Valtrex Counseling: I discussed with the patient the risks of valacyclovir including but not limited to kidney damage, nausea, vomiting and severe allergy.  The patient understands that if the infection seems to be worsening or is not improving, they are to call.
Erythromycin Counseling:  I discussed with the patient the risks of erythromycin including but not limited to GI upset, allergic reaction, drug rash, diarrhea, increase in liver enzymes, and yeast infections.
5-Fu Counseling: 5-Fluorouracil Counseling:  I discussed with the patient the risks of 5-fluorouracil including but not limited to erythema, scaling, itching, weeping, crusting, and pain.
Odomzo Counseling- I discussed with the patient the risks of Odomzo including but not limited to nausea, vomiting, diarrhea, constipation, weight loss, changes in the sense of taste, decreased appetite, muscle spasms, and hair loss.  The patient verbalized understanding of the proper use and possible adverse effects of Odomzo.  All of the patient's questions and concerns were addressed.
Griseofulvin Pregnancy And Lactation Text: This medication is Pregnancy Category X and is known to cause serious birth defects. It is unknown if this medication is excreted in breast milk but breast feeding should be avoided.
Finasteride Pregnancy And Lactation Text: This medication is absolutely contraindicated during pregnancy. It is unknown if it is excreted in breast milk.
Calcipotriene Pregnancy And Lactation Text: This medication has not been proven safe during pregnancy. It is unknown if this medication is excreted in breast milk.
Methotrexate Pregnancy And Lactation Text: This medication is Pregnancy Category X and is known to cause fetal harm. This medication is excreted in breast milk.
Erythromycin Pregnancy And Lactation Text: This medication is Pregnancy Category B and is considered safe during pregnancy. It is also excreted in breast milk.
Itraconazole Counseling:  I discussed with the patient the risks of itraconazole including but not limited to liver damage, nausea/vomiting, neuropathy, and severe allergy.  The patient understands that this medication is best absorbed when taken with acidic beverages such as non-diet cola or ginger ale.  The patient understands that monitoring is required including baseline LFTs and repeat LFTs at intervals.  The patient understands that they are to contact us or the primary physician if concerning signs are noted.
Finasteride Counseling:  I discussed with the patient the risks of use of finasteride including but not limited to decreased libido, decreased ejaculate volume, gynecomastia, and depression. Women should not handle medication.  All of the patient's questions and concerns were addressed.
Enbrel Counseling:  I discussed with the patient the risks of etanercept including but not limited to myelosuppression, immunosuppression, autoimmune hepatitis, demyelinating diseases, lymphoma, and infections.  The patient understands that monitoring is required including a PPD at baseline and must alert us or the primary physician if symptoms of infection or other concerning signs are noted.
Zyclara Counseling:  I discussed with the patient the risks of imiquimod including but not limited to erythema, scaling, itching, weeping, crusting, and pain.  Patient understands that the inflammatory response to imiquimod is variable from person to person and was educated regarded proper titration schedule.  If flu-like symptoms develop, patient knows to discontinue the medication and contact us.
Taltz Counseling: I discussed with the patient the risks of ixekizumab including but not limited to immunosuppression, serious infections, worsening of inflammatory bowel disease and drug reactions.  The patient understands that monitoring is required including a PPD at baseline and must alert us or the primary physician if symptoms of infection or other concerning signs are noted.
Tranexamic Acid Pregnancy And Lactation Text: It is unknown if this medication is safe during pregnancy or breast feeding.
Nsaids Pregnancy And Lactation Text: These medications are considered safe up to 30 weeks gestation. It is excreted in breast milk.
Calcipotriene Counseling:  I discussed with the patient the risks of calcipotriene including but not limited to erythema, scaling, itching, and irritation.
Detail Level: Simple
Metronidazole Counseling:  I discussed with the patient the risks of metronidazole including but not limited to seizures, nausea/vomiting, a metallic taste in the mouth, nausea/vomiting and severe allergy.
Methotrexate Counseling:  Patient counseled regarding adverse effects of methotrexate including but not limited to nausea, vomiting, abnormalities in liver function tests. Patients may develop mouth sores, rash, diarrhea, and abnormalities in blood counts. The patient understands that monitoring is required including LFT's and blood counts.  There is a rare possibility of scarring of the liver and lung problems that can occur when taking methotrexate. Persistent nausea, loss of appetite, pale stools, dark urine, cough, and shortness of breath should be reported immediately. Patient advised to discontinue methotrexate treatment at least three months before attempting to become pregnant.  I discussed the need for folate supplements while taking methotrexate.  These supplements can decrease side effects during methotrexate treatment. The patient verbalized understanding of the proper use and possible adverse effects of methotrexate.  All of the patient's questions and concerns were addressed.
Dupixent Pregnancy And Lactation Text: This medication likely crosses the placenta but the risk for the fetus is uncertain. This medication is excreted in breast milk.
Picato Counseling:  I discussed with the patient the risks of Picato including but not limited to erythema, scaling, itching, weeping, crusting, and pain.
Tranexamic Acid Counseling:  Patient advised of the small risk of bleeding problems with tranexamic acid. They were also instructed to call if they developed any nausea, vomiting or diarrhea. All of the patient's questions and concerns were addressed.
Nsaids Counseling: NSAID Counseling: I discussed with the patient that NSAIDs should be taken with food. Prolonged use of NSAIDs can result in the development of stomach ulcers.  Patient advised to stop taking NSAIDs if abdominal pain occurs.  The patient verbalized understanding of the proper use and possible adverse effects of NSAIDs.  All of the patient's questions and concerns were addressed.
Metronidazole Pregnancy And Lactation Text: This medication is Pregnancy Category B and considered safe during pregnancy.  It is also excreted in breast milk.
Erivedge Counseling- I discussed with the patient the risks of Erivedge including but not limited to nausea, vomiting, diarrhea, constipation, weight loss, changes in the sense of taste, decreased appetite, muscle spasms, and hair loss.  The patient verbalized understanding of the proper use and possible adverse effects of Erivedge.  All of the patient's questions and concerns were addressed.
Ketoconazole Counseling:   Patient counseled regarding improving absorption with orange juice.  Adverse effects include but are not limited to breast enlargement, headache, diarrhea, nausea, upset stomach, liver function test abnormalities, taste disturbance, and stomach pain.  There is a rare possibility of liver failure that can occur when taking ketoconazole. The patient understands that monitoring of LFTs may be required, especially at baseline. The patient verbalized understanding of the proper use and possible adverse effects of ketoconazole.  All of the patient's questions and concerns were addressed.
Stelara Counseling:  I discussed with the patient the risks of ustekinumab including but not limited to immunosuppression, malignancy, posterior leukoencephalopathy syndrome, and serious infections.  The patient understands that monitoring is required including a PPD at baseline and must alert us or the primary physician if symptoms of infection or other concerning signs are noted.
Wartpeel Counseling:  I discussed with the patient the risks of Wartpeel including but not limited to erythema, scaling, itching, weeping, crusting, and pain.
Dupixent Counseling: I discussed with the patient the risks of dupilumab including but not limited to eye infection and irritation, cold sores, injection site reactions, worsening of asthma, allergic reactions and increased risk of parasitic infection.  Live vaccines should be avoided while taking dupilumab. Dupilumab will also interact with certain medications such as warfarin and cyclosporine. The patient understands that monitoring is required and they must alert us or the primary physician if symptoms of infection or other concerning signs are noted.
Niacinamide Pregnancy And Lactation Text: These medications are considered safe during pregnancy.

## 2021-11-15 NOTE — PROCEDURE: COUNSELING
Detail Level: Detailed
When Should The Patient Follow-Up For Their Next Full-Body Skin Exam?: 1 Year
Quality 137: Melanoma: Continuity Of Care - Recall System: Patient information entered into a recall system that includes: target date for the next exam specified AND a process to follow up with patients regarding missed or unscheduled appointments
Detail Level: Zone

## 2022-04-24 ENCOUNTER — HOSPITAL ENCOUNTER (INPATIENT)
Facility: HOSPITAL | Age: 72
LOS: 7 days | Discharge: HOME | DRG: 326 | End: 2022-05-01
Attending: STUDENT IN AN ORGANIZED HEALTH CARE EDUCATION/TRAINING PROGRAM | Admitting: INTERNAL MEDICINE
Payer: MEDICAID

## 2022-04-24 DIAGNOSIS — Z46.59 ENCOUNTER FOR NASOGASTRIC TUBE PLACEMENT: ICD-10-CM

## 2022-04-24 DIAGNOSIS — R10.13 INTRACTABLE EPIGASTRIC ABDOMINAL PAIN: ICD-10-CM

## 2022-04-24 DIAGNOSIS — R11.2 NON-INTRACTABLE VOMITING WITH NAUSEA, UNSPECIFIED VOMITING TYPE: ICD-10-CM

## 2022-04-24 DIAGNOSIS — R07.9 CHEST PAIN: ICD-10-CM

## 2022-04-24 DIAGNOSIS — R00.0 TACHYCARDIA: ICD-10-CM

## 2022-04-24 DIAGNOSIS — I49.9 ARRHYTHMIA: ICD-10-CM

## 2022-04-24 DIAGNOSIS — K31.89 GASTRIC VOLVULUS: ICD-10-CM

## 2022-04-24 DIAGNOSIS — I48.92 ATRIAL FLUTTER: ICD-10-CM

## 2022-04-24 DIAGNOSIS — K31.1 GASTRIC OUTLET OBSTRUCTION: Primary | ICD-10-CM

## 2022-04-24 PROBLEM — E11.9 DIABETES MELLITUS TYPE II, CONTROLLED: Status: ACTIVE | Noted: 2022-04-24

## 2022-04-24 PROBLEM — K44.9 HIATAL HERNIA: Status: ACTIVE | Noted: 2022-04-24

## 2022-04-24 PROBLEM — I10 HTN (HYPERTENSION), BENIGN: Status: ACTIVE | Noted: 2022-04-24

## 2022-04-24 PROBLEM — E87.6 HYPOKALEMIA: Status: ACTIVE | Noted: 2022-04-24

## 2022-04-24 LAB
ALBUMIN SERPL BCP-MCNC: 4 G/DL (ref 3.5–5.2)
ALP SERPL-CCNC: 113 U/L (ref 55–135)
ALT SERPL W/O P-5'-P-CCNC: 17 U/L (ref 10–44)
ANION GAP SERPL CALC-SCNC: 10 MMOL/L (ref 8–16)
AST SERPL-CCNC: 18 U/L (ref 10–40)
BACTERIA #/AREA URNS HPF: NORMAL /HPF
BASOPHILS # BLD AUTO: 0.03 K/UL (ref 0–0.2)
BASOPHILS NFR BLD: 0.4 % (ref 0–1.9)
BILIRUB SERPL-MCNC: 0.5 MG/DL (ref 0.1–1)
BILIRUB UR QL STRIP: NEGATIVE
BNP SERPL-MCNC: 15 PG/ML (ref 0–99)
BUN SERPL-MCNC: 9 MG/DL (ref 8–23)
CALCIUM SERPL-MCNC: 9.7 MG/DL (ref 8.7–10.5)
CHLORIDE SERPL-SCNC: 106 MMOL/L (ref 95–110)
CLARITY UR: CLEAR
CO2 SERPL-SCNC: 25 MMOL/L (ref 23–29)
COLOR UR: YELLOW
CREAT SERPL-MCNC: 0.8 MG/DL (ref 0.5–1.4)
DIFFERENTIAL METHOD: ABNORMAL
EOSINOPHIL # BLD AUTO: 0.1 K/UL (ref 0–0.5)
EOSINOPHIL NFR BLD: 0.7 % (ref 0–8)
ERYTHROCYTE [DISTWIDTH] IN BLOOD BY AUTOMATED COUNT: 14.4 % (ref 11.5–14.5)
EST. GFR  (AFRICAN AMERICAN): >60 ML/MIN/1.73 M^2
EST. GFR  (NON AFRICAN AMERICAN): >60 ML/MIN/1.73 M^2
GLUCOSE SERPL-MCNC: 156 MG/DL (ref 70–110)
GLUCOSE UR QL STRIP: ABNORMAL
HCT VFR BLD AUTO: 38.1 % (ref 37–48.5)
HGB BLD-MCNC: 12.1 G/DL (ref 12–16)
HGB UR QL STRIP: NEGATIVE
HYALINE CASTS #/AREA URNS LPF: 0 /LPF
IMM GRANULOCYTES # BLD AUTO: 0.03 K/UL (ref 0–0.04)
IMM GRANULOCYTES NFR BLD AUTO: 0.4 % (ref 0–0.5)
KETONES UR QL STRIP: ABNORMAL
LEUKOCYTE ESTERASE UR QL STRIP: NEGATIVE
LIPASE SERPL-CCNC: 19 U/L (ref 4–60)
LYMPHOCYTES # BLD AUTO: 1.1 K/UL (ref 1–4.8)
LYMPHOCYTES NFR BLD: 12.8 % (ref 18–48)
MCH RBC QN AUTO: 26.8 PG (ref 27–31)
MCHC RBC AUTO-ENTMCNC: 31.8 G/DL (ref 32–36)
MCV RBC AUTO: 85 FL (ref 82–98)
MICROSCOPIC COMMENT: NORMAL
MONOCYTES # BLD AUTO: 0.4 K/UL (ref 0.3–1)
MONOCYTES NFR BLD: 4.4 % (ref 4–15)
NEUTROPHILS # BLD AUTO: 6.7 K/UL (ref 1.8–7.7)
NEUTROPHILS NFR BLD: 81.3 % (ref 38–73)
NITRITE UR QL STRIP: NEGATIVE
NRBC BLD-RTO: 0 /100 WBC
PH UR STRIP: 8 [PH] (ref 5–8)
PLATELET # BLD AUTO: 310 K/UL (ref 150–450)
PMV BLD AUTO: 10.5 FL (ref 9.2–12.9)
POCT GLUCOSE: 168 MG/DL (ref 70–110)
POCT GLUCOSE: 181 MG/DL (ref 70–110)
POCT GLUCOSE: 238 MG/DL (ref 70–110)
POTASSIUM SERPL-SCNC: 3.4 MMOL/L (ref 3.5–5.1)
PROT SERPL-MCNC: 8.3 G/DL (ref 6–8.4)
PROT UR QL STRIP: ABNORMAL
RBC # BLD AUTO: 4.51 M/UL (ref 4–5.4)
RBC #/AREA URNS HPF: 2 /HPF (ref 0–4)
SODIUM SERPL-SCNC: 141 MMOL/L (ref 136–145)
SP GR UR STRIP: >1.03 (ref 1–1.03)
TROPONIN I SERPL DL<=0.01 NG/ML-MCNC: <0.006 NG/ML (ref 0–0.03)
URN SPEC COLLECT METH UR: ABNORMAL
UROBILINOGEN UR STRIP-ACNC: NEGATIVE EU/DL
WBC # BLD AUTO: 8.22 K/UL (ref 3.9–12.7)
WBC #/AREA URNS HPF: 0 /HPF (ref 0–5)
YEAST URNS QL MICRO: NORMAL

## 2022-04-24 PROCEDURE — 93010 EKG 12-LEAD: ICD-10-PCS | Mod: ,,, | Performed by: INTERNAL MEDICINE

## 2022-04-24 PROCEDURE — 25500020 PHARM REV CODE 255: Performed by: EMERGENCY MEDICINE

## 2022-04-24 PROCEDURE — 93005 ELECTROCARDIOGRAM TRACING: CPT

## 2022-04-24 PROCEDURE — 96366 THER/PROPH/DIAG IV INF ADDON: CPT

## 2022-04-24 PROCEDURE — 63600175 PHARM REV CODE 636 W HCPCS: Performed by: EMERGENCY MEDICINE

## 2022-04-24 PROCEDURE — 80053 COMPREHEN METABOLIC PANEL: CPT | Performed by: STUDENT IN AN ORGANIZED HEALTH CARE EDUCATION/TRAINING PROGRAM

## 2022-04-24 PROCEDURE — 83690 ASSAY OF LIPASE: CPT | Performed by: STUDENT IN AN ORGANIZED HEALTH CARE EDUCATION/TRAINING PROGRAM

## 2022-04-24 PROCEDURE — 63600175 PHARM REV CODE 636 W HCPCS: Performed by: PHYSICIAN ASSISTANT

## 2022-04-24 PROCEDURE — 21400001 HC TELEMETRY ROOM

## 2022-04-24 PROCEDURE — 93010 ELECTROCARDIOGRAM REPORT: CPT | Mod: ,,, | Performed by: INTERNAL MEDICINE

## 2022-04-24 PROCEDURE — 63600175 PHARM REV CODE 636 W HCPCS: Performed by: INTERNAL MEDICINE

## 2022-04-24 PROCEDURE — 96365 THER/PROPH/DIAG IV INF INIT: CPT

## 2022-04-24 PROCEDURE — 99285 EMERGENCY DEPT VISIT HI MDM: CPT | Mod: 25

## 2022-04-24 PROCEDURE — C9113 INJ PANTOPRAZOLE SODIUM, VIA: HCPCS | Performed by: EMERGENCY MEDICINE

## 2022-04-24 PROCEDURE — 96375 TX/PRO/DX INJ NEW DRUG ADDON: CPT

## 2022-04-24 PROCEDURE — 83036 HEMOGLOBIN GLYCOSYLATED A1C: CPT | Performed by: STUDENT IN AN ORGANIZED HEALTH CARE EDUCATION/TRAINING PROGRAM

## 2022-04-24 PROCEDURE — 96376 TX/PRO/DX INJ SAME DRUG ADON: CPT

## 2022-04-24 PROCEDURE — 25000003 PHARM REV CODE 250: Performed by: INTERNAL MEDICINE

## 2022-04-24 PROCEDURE — 85025 COMPLETE CBC W/AUTO DIFF WBC: CPT | Performed by: STUDENT IN AN ORGANIZED HEALTH CARE EDUCATION/TRAINING PROGRAM

## 2022-04-24 PROCEDURE — 83880 ASSAY OF NATRIURETIC PEPTIDE: CPT | Performed by: STUDENT IN AN ORGANIZED HEALTH CARE EDUCATION/TRAINING PROGRAM

## 2022-04-24 PROCEDURE — 84484 ASSAY OF TROPONIN QUANT: CPT | Performed by: STUDENT IN AN ORGANIZED HEALTH CARE EDUCATION/TRAINING PROGRAM

## 2022-04-24 PROCEDURE — 81000 URINALYSIS NONAUTO W/SCOPE: CPT | Performed by: EMERGENCY MEDICINE

## 2022-04-24 PROCEDURE — 82962 GLUCOSE BLOOD TEST: CPT

## 2022-04-24 PROCEDURE — 25000003 PHARM REV CODE 250: Performed by: EMERGENCY MEDICINE

## 2022-04-24 RX ORDER — ENOXAPARIN SODIUM 100 MG/ML
40 INJECTION SUBCUTANEOUS EVERY 24 HOURS
Status: DISCONTINUED | OUTPATIENT
Start: 2022-04-24 | End: 2022-05-01 | Stop reason: HOSPADM

## 2022-04-24 RX ORDER — INSULIN ASPART 100 [IU]/ML
0-5 INJECTION, SOLUTION INTRAVENOUS; SUBCUTANEOUS EVERY 6 HOURS PRN
Status: DISCONTINUED | OUTPATIENT
Start: 2022-04-24 | End: 2022-05-01 | Stop reason: HOSPADM

## 2022-04-24 RX ORDER — SODIUM CHLORIDE 9 MG/ML
INJECTION, SOLUTION INTRAVENOUS CONTINUOUS
Status: DISCONTINUED | OUTPATIENT
Start: 2022-04-24 | End: 2022-04-28

## 2022-04-24 RX ORDER — HYDROMORPHONE HYDROCHLORIDE 2 MG/ML
0.5 INJECTION, SOLUTION INTRAMUSCULAR; INTRAVENOUS; SUBCUTANEOUS
Status: COMPLETED | OUTPATIENT
Start: 2022-04-24 | End: 2022-04-24

## 2022-04-24 RX ORDER — HYDROMORPHONE HYDROCHLORIDE 2 MG/ML
1 INJECTION, SOLUTION INTRAMUSCULAR; INTRAVENOUS; SUBCUTANEOUS
Status: COMPLETED | OUTPATIENT
Start: 2022-04-24 | End: 2022-04-24

## 2022-04-24 RX ORDER — MORPHINE SULFATE 4 MG/ML
2 INJECTION, SOLUTION INTRAMUSCULAR; INTRAVENOUS EVERY 4 HOURS PRN
Status: DISCONTINUED | OUTPATIENT
Start: 2022-04-24 | End: 2022-04-27

## 2022-04-24 RX ORDER — MAG HYDROX/ALUMINUM HYD/SIMETH 200-200-20
60 SUSPENSION, ORAL (FINAL DOSE FORM) ORAL
Status: COMPLETED | OUTPATIENT
Start: 2022-04-24 | End: 2022-04-24

## 2022-04-24 RX ORDER — ONDANSETRON 2 MG/ML
8 INJECTION INTRAMUSCULAR; INTRAVENOUS EVERY 6 HOURS PRN
Status: DISCONTINUED | OUTPATIENT
Start: 2022-04-24 | End: 2022-04-25

## 2022-04-24 RX ORDER — ONDANSETRON 2 MG/ML
4 INJECTION INTRAMUSCULAR; INTRAVENOUS ONCE
Status: COMPLETED | OUTPATIENT
Start: 2022-04-24 | End: 2022-04-24

## 2022-04-24 RX ORDER — HYDRALAZINE HYDROCHLORIDE 20 MG/ML
10 INJECTION INTRAMUSCULAR; INTRAVENOUS EVERY 6 HOURS PRN
Status: DISCONTINUED | OUTPATIENT
Start: 2022-04-24 | End: 2022-04-29

## 2022-04-24 RX ORDER — ONDANSETRON 2 MG/ML
8 INJECTION INTRAMUSCULAR; INTRAVENOUS
Status: COMPLETED | OUTPATIENT
Start: 2022-04-24 | End: 2022-04-24

## 2022-04-24 RX ORDER — DIPHENHYDRAMINE HYDROCHLORIDE 50 MG/ML
25 INJECTION INTRAMUSCULAR; INTRAVENOUS
Status: COMPLETED | OUTPATIENT
Start: 2022-04-24 | End: 2022-04-24

## 2022-04-24 RX ORDER — PANTOPRAZOLE SODIUM 40 MG/10ML
80 INJECTION, POWDER, LYOPHILIZED, FOR SOLUTION INTRAVENOUS
Status: COMPLETED | OUTPATIENT
Start: 2022-04-24 | End: 2022-04-24

## 2022-04-24 RX ORDER — HYDRALAZINE HYDROCHLORIDE 20 MG/ML
10 INJECTION INTRAMUSCULAR; INTRAVENOUS
Status: COMPLETED | OUTPATIENT
Start: 2022-04-24 | End: 2022-04-24

## 2022-04-24 RX ORDER — GLUCAGON 1 MG
1 KIT INJECTION
Status: DISCONTINUED | OUTPATIENT
Start: 2022-04-24 | End: 2022-05-01 | Stop reason: HOSPADM

## 2022-04-24 RX ADMIN — ONDANSETRON 8 MG: 2 INJECTION INTRAMUSCULAR; INTRAVENOUS at 08:04

## 2022-04-24 RX ADMIN — MORPHINE SULFATE 2 MG: 4 INJECTION, SOLUTION INTRAMUSCULAR; INTRAVENOUS at 08:04

## 2022-04-24 RX ADMIN — PROMETHAZINE HYDROCHLORIDE 12.5 MG: 25 INJECTION INTRAMUSCULAR; INTRAVENOUS at 11:04

## 2022-04-24 RX ADMIN — HYDRALAZINE HYDROCHLORIDE 10 MG: 20 INJECTION, SOLUTION INTRAMUSCULAR; INTRAVENOUS at 11:04

## 2022-04-24 RX ADMIN — HYDROMORPHONE HYDROCHLORIDE 0.5 MG: 2 INJECTION INTRAMUSCULAR; INTRAVENOUS; SUBCUTANEOUS at 11:04

## 2022-04-24 RX ADMIN — ONDANSETRON 8 MG: 2 INJECTION INTRAMUSCULAR; INTRAVENOUS at 04:04

## 2022-04-24 RX ADMIN — ENOXAPARIN SODIUM 40 MG: 40 INJECTION SUBCUTANEOUS at 04:04

## 2022-04-24 RX ADMIN — HYDRALAZINE HYDROCHLORIDE 10 MG: 20 INJECTION INTRAMUSCULAR; INTRAVENOUS at 06:04

## 2022-04-24 RX ADMIN — MORPHINE SULFATE 2 MG: 4 INJECTION, SOLUTION INTRAMUSCULAR; INTRAVENOUS at 04:04

## 2022-04-24 RX ADMIN — MAGNESIUM HYDROXIDE/ALUMINUM HYDROXICE/SIMETHICONE 60 ML: 120; 1200; 1200 SUSPENSION ORAL at 08:04

## 2022-04-24 RX ADMIN — SODIUM CHLORIDE: 0.9 INJECTION, SOLUTION INTRAVENOUS at 03:04

## 2022-04-24 RX ADMIN — IOHEXOL 75 ML: 350 INJECTION, SOLUTION INTRAVENOUS at 12:04

## 2022-04-24 RX ADMIN — DIPHENHYDRAMINE HYDROCHLORIDE 25 MG: 50 INJECTION INTRAMUSCULAR; INTRAVENOUS at 11:04

## 2022-04-24 RX ADMIN — ONDANSETRON 4 MG: 2 INJECTION INTRAMUSCULAR; INTRAVENOUS at 09:04

## 2022-04-24 RX ADMIN — PANTOPRAZOLE SODIUM 80 MG: 40 INJECTION, POWDER, FOR SOLUTION INTRAVENOUS at 08:04

## 2022-04-24 RX ADMIN — HYDROMORPHONE HYDROCHLORIDE 1 MG: 2 INJECTION INTRAMUSCULAR; INTRAVENOUS; SUBCUTANEOUS at 08:04

## 2022-04-24 NOTE — NURSING
Pt admitted to 3rd floor tele. Pt currently has ng tube to right nare. Awaiting Surgery for recommendation. Pt reports pain/nv. MD made aware and new orders placed. NS infusing at 75cc n hr. Plan of care explained, all questions answered. Safety precautions in place.

## 2022-04-24 NOTE — HPI
Mrs. Vazquez is a 71 yo F who presents with a CC of epigastric pain with N/V since this am. Somewhat of a poor historian.  She has a history of a hiatal hernia repair she thinks 10 years ago at [facility]. No problems until this am.  In the ED- CT imaging showed gastric outlet obstruction from large hiatal hernia. NG tube placed in the ED and surgery was consulted.  She is non-ill appearing. No old records here

## 2022-04-24 NOTE — PLAN OF CARE
Surgery note    Full consult to be written by Dr Cummings  For now, plan of care based on review and discussion is for patient to have NGT for decompression and an EGD. GI has been consulted and plan for EGD. In the meantime, the NGT was likely placed in the proximal esophagus/proximal stomach above the diaphragm due to hiatal hernia which is recurrent.  This should be placed by EGD guidance; GI agrees and will plan to do this.  No immediate surgical intervention.    Kirt Carver  General Surgery - [facility]  4/24/2022  6:32 PM

## 2022-04-24 NOTE — NURSING
Per surgery note, NG tube to be placed by EGD. Secure chat sent to PA for possible order to remove ng tube.

## 2022-04-24 NOTE — ED PROVIDER NOTES
Encounter Date: 4/24/2022       History     Chief Complaint   Patient presents with    Chest Pain     Patient comes in with CP, states started last night has gotten worse this morning in left chest into left breast, vomited in triage while obtain EKG     HPI   This is a 72-year-old black female presents to the emergency room complaining of epigastric abdominal pain that radiates to the low sternal chest since last night.  The patient does have pain radiating to the top of the left shoulder and left upper extremity.  There is no shortness of breath.  There is no precordial chest discomfort.  The patient has no history of cardiac disease.  She does have a history of surgical repair of a hiatal hernia and reports that she has gastroesophageal reflux disease.  There is no shortness of breath.  The patient is otherwise well without other injuries or problems.  The pain is severe.  Review of patient's allergies indicates:  No Known Allergies  No past medical history on file.  No past surgical history on file.  No family history on file.     Review of Systems  The patient was questioned specifically with regard to the following.  General: Fever, chills, sweats. Neuro: Headache. Eyes: eye problems. ENT: Ear pain, sore throat. Cardiovascular: Chest pain. Respiratory: Cough, shortness of breath. Gastrointestinal: Abdominal pain, vomiting, diarrhea. Genitourinary: Painful urination.  Musculoskeletal: Arm and leg problems. Skin: Rash.  The review of systems was negative except for the following:  Epigastric abdominal pain, left shoulder left upper extremity pain.  Physical Exam     Initial Vitals   BP Pulse Resp Temp SpO2   04/24/22 0806 04/24/22 0805 04/24/22 0806 04/24/22 0809 04/24/22 0806   (!) 140/86 86 20 98.6 °F (37 °C) 100 %      MAP       --                Physical Exam  The patient was examined specifically for the following:   General:No significant distress, Good color, Warm and dry. Head and neck:Scalp atraumatic,  Neck supple. Neurological:Appropriate conversation, Gross motor deficits. Eyes:Conjugate gaze, Clear corneas. ENT: No epistaxis. Cardiac: Regular rate and rhythm, Grossly normal heart tones. Pulmonary: Wheezing, Rales. Gastrointestinal: Abdominal tenderness, Abdominal distention. Musculoskeletal: Extremity deformity, Apparent pain with range of motion of the joints. Skin: Rash.   The findings on examination were normal except for the following:  Patient has moderate epigastric abdominal tenderness.  There is some minimal vague diffuse abdominal tenderness.  Lungs are clear and free of wheezing rales rubs and rhonchi.  Heart tones are normal.  The patient has a regular rate and rhythm.  There is no lower extremity swelling or tenderness.  There is no clinical evidence of respiratory distress.  Oxygen saturations are 100% the heart rate is 86.    ED Course   Procedures  Labs Reviewed   CBC W/ AUTO DIFFERENTIAL - Abnormal; Notable for the following components:       Result Value    MCH 26.8 (*)     MCHC 31.8 (*)     Gran % 81.3 (*)     Lymph % 12.8 (*)     All other components within normal limits   COMPREHENSIVE METABOLIC PANEL - Abnormal; Notable for the following components:    Potassium 3.4 (*)     Glucose 156 (*)     All other components within normal limits   URINALYSIS, REFLEX TO URINE CULTURE - Abnormal; Notable for the following components:    Specific Gravity, UA >1.030 (*)     Protein, UA 1+ (*)     Glucose, UA 3+ (*)     Ketones, UA 2+ (*)     All other components within normal limits    Narrative:     Specimen Source->Urine   POCT GLUCOSE - Abnormal; Notable for the following components:    POCT Glucose 168 (*)     All other components within normal limits   TROPONIN I   B-TYPE NATRIURETIC PEPTIDE   LIPASE   LIPASE   URINALYSIS MICROSCOPIC    Narrative:     Specimen Source->Urine   HEMOGLOBIN A1C   HEMOGLOBIN A1C   POCT GLUCOSE MONITORING CONTINUOUS     EKG Readings: (Independently Interpreted)   This  patient is in a sinus tachycardia with a heart rate of 104.  There are no significant ST segment or T-wave changes.  There is no evidence of acute myocardial infarction or malignant arrhythmia.  The patient has an occasional PVC.     ECG Results          EKG 12-lead (Final result)  Result time 04/24/22 12:15:52    Final result by Interface, Lab In [facility] (04/24/22 12:15:52)                 Narrative:    Test Reason : R07.9,    Vent. Rate : 104 BPM     Atrial Rate : 104 BPM     P-R Int : 132 ms          QRS Dur : 082 ms      QT Int : 364 ms       P-R-T Axes : 056 070 045 degrees     QTc Int : 478 ms    Sinus tachycardia with occasional Premature ventricular complexes  Nonspecific T wave abnormality  Abnormal ECG  No previous ECGs available  Confirmed by Dionicio Starr MD ([id number]) on 4/24/2022 12:15:46 PM    Referred By: [email or web address]   SELF           Confirmed By:Dionicio Starr MD                            Imaging Results          X-ray Abdomen for NG Tube Placement (Nursing should notify Radiology after placement) (In process)                 CT Abdomen Pelvis With Contrast (Edited Result - FINAL)  Result time 04/24/22 13:09:46    Addendum 1 of 1 by Ayden Garcia MD (04/24/22 13:09:46)      Impression #6: Right lower lobe 6 mm solid nodule.  For a solid nodule 6-8 mm, Fleischner Society 2017 guidelines recommend follow up with non-contrast chest CT at 6-12 months and 18-24 months after discovery.    Impression #7:  left lower lobe 1.6 x 1.8 cm somewhat lobular soft tissue density, while nonspecific is concerning for neoplastic process.  Clinical correlation and with any prior imaging from outside facility to assess for stability if/when available is recommended.  Further evaluation with PET-CT and/or biopsy on a nonemergent basis after acute illness has resolved may be warranted.    This report was flagged in Epic as abnormal.      Electronically signed by: Ayden Garcia MD  Date:    04/24/2022  Time:    13:09                  Final result by Ayden Garcia MD (04/24/22 13:03:23)                 Impression:      1. Findings are highly concerning for gastric outlet obstruction, at least partial, likely secondary to a moderate to large-sized paraesophageal hiatal hernia which likely has associated inflammatory change with mass effect upon the distal portion of the stomach and potential developing closed loop of part of the distal stomach, as outlined above.  A superimposed distal gastritis is also suspected.  Urgent gastric decompression is advised and surgical consultation may be warranted.  2. Left upper quadrant and left pericolic gutter trace ascites, likely reactive.  3. Bibasilar pulmonary ground-glass opacities while nonspecific may represent sequela of mild pulmonary edema potentially cardiogenic given the mass effect upon the base of the heart from moderate to large hiatal hernia.  4. Diverticulosis coli without acute diverticulitis.  5. Few additional findings as above.  This report was flagged in Epic as abnormal.    COMMUNICATION  This critical result was discovered/received at 12:30 hours.  The critical information above was relayed directly by me by telephone to Dr. Serrano in the emergency department on 04/24/2022 at 12:35 hours.      Electronically signed by: Ayden Garcia MD  Date:    04/24/2022  Time:    13:03             Narrative:    EXAMINATION:  CT ABDOMEN PELVIS WITH CONTRAST    CLINICAL HISTORY:  Epigastric pain;    TECHNIQUE:  Low dose axial images, sagittal and coronal reformations were obtained from the lung bases to the pubic symphysis following the IV administration of 75 mL of Omnipaque 350 .  Oral contrast was not given.    COMPARISON:  None.    FINDINGS:  Imaged lung bases show mild dependent atelectasis with some patchy ground-glass opacities throughout both lower lobes, few scattered bandlike opacities consistent with platelike scarring versus atelectasis, 6 mm solid nodule at the lateral right lower  lobe and 1.6 x 1.8 cm slightly lobulated soft tissue density within the left lower lobe.    The base of the heart is upper limits of normal in size without significant pericardial fluid noting mild mass effect upon the posterior aspect of the left atrium from a moderate to large-sized hiatal hernia as well as some non dependent gas within the right ventricle presumably related to air introduced via and indwelling catheter.    Majority of the stomach is markedly distended without wall thickening.  There is trace amount of free fluid within the left upper quadrant about the posterolateral aspect of the stomach.  There is a partially imaged moderate to large-sized paraesophageal hiatal hernia noting its superior most apex not excluded at upper most image of acquisition.  There is mass effect upon the partially imaged distal esophagus which is displaced towards the posterior right aspect of the lower mediastinum.  There is additional overall deformed appearance of the GE junction region which could be related to prominent tethering from the hiatal hernia and/or any potential prior surgery altering the anatomy.  There is mild circumferential wall thickening of the hernia sac with surrounding non loculated fluid and mild fat stranding throughout the imaged lower mediastinum.  The mid to distal portion of presumed gastric body appears tethered superiorly towards the lower right aspect of the hiatal hernia sac  region at the GE junction with small focus of distension at that location and then collapsed appearance of the majority of the distal stomach.  These findings are best appreciated on coronal images 67 through 97 of series 601.  There is mild circumferential wall thickening of the distal stomach/antrum with subtle adjacent fat stranding concerning for a superimposed distal gastritis.  Duodenum and majority of the small bowel loops throughout the remainder of the abdomen are relatively decompressed and otherwise within  normal limits.    Appendix and terminal ileum are within normal limits.  Mild amount of fecal material within the cecum and rectosigmoid colon and rectum.  The remainder of the colon is relatively decompressed.  Several scattered diverticula of the descending and sigmoid colon without acute diverticulitis.  No evidence small or large bowel obstruction or acute inflammation.  No pneumatosis or portal venous gas.    Suspected Phrygian cap formation of the gallbladder without acute cholecystitis.  Main portal vein appears patent.  Liver, spleen and bilateral adrenal glands are within normal limits.  Pancreas appears somewhat atrophic without focal process or adjacent inflammatory change.  No biliary ductal dilatation.    Bilateral kidneys appear normal in overall size, shape and location with relatively symmetric normal enhancement.  No hydronephrosis.  Ureters are nondilated.  Urinary bladder is within normal limits.  Uterus and bilateral adnexal regions are grossly within normal limits.  Pelvic phleboliths noted.    No significant aortic atherosclerosis.  No aortic aneurysm or dissection.    There is trace volume free fluid tracking along the left pericolic gutter, likely reactive.  No free air or lymphadenopathy by CT criteria.    Tiny fat containing umbilical hernia.    5 mm sclerotic focus with angular margins at the right iliac body suggestive of a bone island.  Age-related degenerative change most prominent at the lower thoracic and lower lumbar spine.  Chronic appearing multilevel minimal to mild anterior wedge deformity of several mid to lower thoracic vertebral bodies.  Suspected prominent Schmorl's node at the superior endplate of L4.  There is minimal degenerative related grade 1 anterolisthesis of L4 on 5.  No acute or destructive osseous process seen.                               X-Ray Chest AP Portable (Final result)  Result time 04/24/22 08:20:34    Final result by Nilson Marquez MD (04/24/22  08:20:34)                 Impression:      No convincing evidence acute cardiopulmonary disease.      Electronically signed by: Nilson Marquez  Date:    04/24/2022  Time:    08:20             Narrative:    EXAMINATION:  XR CHEST AP PORTABLE    CLINICAL HISTORY:  Chest Pain;    TECHNIQUE:  Single frontal view of the chest was performed.    COMPARISON:  None    FINDINGS:  Monitoring leads overlie the chest.  Large hiatal hernia suggested.  Cardiomediastinal contours otherwise grossly unremarkable.  Background coarse interstitial increased attenuation may reflect chronic fibrotic change.    No definite pneumothorax or large volume pleural effusion.  No acute findings identified in the visualized abdomen.  Osseous soft tissue structures appear without definite acute abnormality.                              Medical decision making:  Given the above this patient had intractable epigastric abdominal pain as well as vomiting in the emergency room.  CT shows gastric distension with a decompressed small bowel, concerning for gastric outlet obstruction.  Consultation was obtained with Gastroenterology and General surgery, all agree that NG tube would be the place to start.  This patient will be admitted to the hospital for further evaluation and treatment.  Neoplasm peptic ulcer disease and adhesions are considered.  We were not able to get any drainage from an nasogastric tube.  General surgery and gastroenterology were made aware.  The decision was made leave the tube in its current position.  We feel we are just short of the fundus of the stomach but we cannot advanced the tube further.   suggests that we leave the tube in the current position.         Medications   enoxaparin injection 40 mg (has no administration in time range)   0.9%  NaCl infusion (has no administration in time range)   dextrose 50% injection 12.5 g (has no administration in time range)   glucagon (human recombinant) injection 1 mg (has no  administration in time range)   insulin aspart U-100 pen 0-5 Units (has no administration in time range)   hydrALAZINE injection 10 mg (has no administration in time range)   ondansetron injection 8 mg (has no administration in time range)   pantoprazole injection 80 mg (80 mg Intravenous Given 4/24/22 0852)   aluminum-magnesium hydroxide-simethicone 200-200-20 mg/5 mL suspension 60 mL (60 mLs Oral Given 4/24/22 0851)   HYDROmorphone (PF) injection 1 mg (1 mg Intravenous Given 4/24/22 0846)   ondansetron injection 8 mg (8 mg Intravenous Given 4/24/22 0849)   promethazine (PHENERGAN) 12.5 mg in dextrose 5 % 50 mL IVPB (12.5 mg Intravenous New Bag 4/24/22 1146)   diphenhydrAMINE injection 25 mg (25 mg Intravenous Given 4/24/22 1142)   HYDROmorphone (PF) injection 0.5 mg (0.5 mg Intravenous Given 4/24/22 1139)   hydrALAZINE injection 10 mg (10 mg Intravenous Given 4/24/22 1143)   iohexoL (OMNIPAQUE 350) injection 75 mL (75 mLs Intravenous Given 4/24/22 1204)                          Clinical Impression:   Final diagnoses:  [R07.9] Chest pain  [K31.1] Gastric outlet obstruction (Primary)  [R10.13] Intractable epigastric abdominal pain  [R11.2] Non-intractable vomiting with nausea, unspecified vomiting type          ED Disposition Condition    Admit               Panda Serrano MD  04/24/22 1276       Panda Serrano MD  04/24/22 6267

## 2022-04-24 NOTE — ED NOTES
NG tube placed, gastric risidual noted however meeting resistance to advance further. X-ray confirm placement. MD Maggie order to keep NG tube in place until GI consult.

## 2022-04-24 NOTE — H&P
[facility] [place] [facility]  [facility]  History & Physical    Patient Name: Vidya Vazquez  MRN: [id number]  Patient Class: IP- Inpatient  Admission Date: 4/24/2022  Attending Physician: Matthew العراقي MD  Primary Care Provider: No primary care provider on file.         Patient information was obtained from patient and ER records.     Subjective:     Principal Problem:Gastric outlet obstruction    Chief Complaint:   Chief Complaint   Patient presents with    Chest Pain     Patient comes in with CP, states started last night has gotten worse this morning in left chest into left breast, vomited in triage while obtain EKG        HPI: Mrs. Vazquez is a 71 yo F who presents with a CC of epigastric pain with N/V since this am. Somewhat of a poor historian.  She has a history of a hiatal hernia repair she thinks 10 years ago at [facility]. No problems until this am.  In the ED- CT imaging showed gastric outlet obstruction from large hiatal hernia. NG tube placed in the ED and surgery was consulted.  She is non-ill appearing. No old records here       No past medical history on file.    No past surgical history on file.    Review of patient's allergies indicates:  No Known Allergies    No current facility-administered medications on file prior to encounter.     No current outpatient medications on file prior to encounter.     Family History    None       Tobacco Use    Smoking status: Not on file    Smokeless tobacco: Not on file   Substance and Sexual Activity    Alcohol use: Not on file    Drug use: Not on file    Sexual activity: Not on file     Review of Systems   Constitutional:  Negative for activity change, appetite change, chills, diaphoresis and fatigue.   HENT:  Negative for congestion, dental problem and facial swelling.    Eyes:  Negative for discharge and itching.   Respiratory:  Negative for apnea, cough, choking, chest tightness, shortness of breath, wheezing and stridor.    Cardiovascular:   Negative for chest pain and leg swelling.   Gastrointestinal:  Positive for abdominal distention, abdominal pain, nausea and vomiting.   Genitourinary:  Negative for difficulty urinating and dyspareunia.   Musculoskeletal:  Negative for arthralgias.   Skin:  Negative for color change.   Neurological:  Negative for dizziness and facial asymmetry.   Psychiatric/Behavioral:  Negative for agitation and behavioral problems.    Objective:     Vital Signs (Most Recent):  Temp: 98.6 °F (37 °C) (04/24/22 0809)  Pulse: 86 (04/24/22 1302)  Resp: (!) 26 (04/24/22 1302)  BP: (!) 159/84 (04/24/22 1302)  SpO2: 96 % (04/24/22 1302)   Vital Signs (24h Range):  Temp:  [98.6 °F (37 °C)] 98.6 °F (37 °C)  Pulse:  [78-90] 86  Resp:  [18-32] 26  SpO2:  [95 %-100 %] 96 %  BP: (140-178)/(84-98) 159/84     Weight: 85.7 kg (189 lb)  Body mass index is 30.51 kg/m².    Physical Exam  Vitals and nursing note reviewed.   Constitutional:       General: She is not in acute distress.     Appearance: Normal appearance. She is normal weight. She is not ill-appearing, toxic-appearing or diaphoretic.   HENT:      Head: Normocephalic and atraumatic.   Eyes:      Conjunctiva/sclera: Conjunctivae normal.   Cardiovascular:      Rate and Rhythm: Normal rate and regular rhythm.      Heart sounds:     No gallop.   Pulmonary:      Effort: Pulmonary effort is normal. No respiratory distress.      Breath sounds: No wheezing.   Abdominal:      General: Bowel sounds are normal. There is distension.      Tenderness: There is no abdominal tenderness. There is no guarding.   Skin:     General: Skin is warm and dry.   Neurological:      Mental Status: She is alert and oriented to person, place, and time.   Psychiatric:         Mood and Affect: Mood normal.         Behavior: Behavior normal.         Thought Content: Thought content normal.           Significant Labs: All pertinent labs within the past 24 hours have been reviewed.  BMP:   Recent Labs   Lab 04/24/22  4648    *      K 3.4*      CO2 25   BUN 9   CREATININE 0.8   CALCIUM 9.7     CBC:   Recent Labs   Lab 04/24/22  0825   WBC 8.22   HGB 12.1   HCT 38.1          Significant Imaging:  CT abdomen as in HPI       Assessment/Plan:     * Gastric outlet obstruction  From Hiatal hernia.  Surgery consulted. NG tube for now. NPO status and IV fluids       HTN (hypertension), benign  Patient stated she was not aware of this diagnosis.      Diabetes mellitus type II, controlled  Was on Metformin at home. Sliding scale now. A1c  No known manifestations       Hypokalemia  Will follow       Hiatal hernia  As above         VTE Risk Mitigation (From admission, onward)         Ordered     enoxaparin injection 40 mg  Daily         04/24/22 1351                   Matthew العراقي MD  Department of Hospital Medicine   [facility] - Emergency Dept

## 2022-04-24 NOTE — SUBJECTIVE & OBJECTIVE
No past medical history on file.    No past surgical history on file.    Review of patient's allergies indicates:  No Known Allergies    No current facility-administered medications on file prior to encounter.     No current outpatient medications on file prior to encounter.     Family History    None       Tobacco Use    Smoking status: Not on file    Smokeless tobacco: Not on file   Substance and Sexual Activity    Alcohol use: Not on file    Drug use: Not on file    Sexual activity: Not on file     Review of Systems   Constitutional:  Negative for activity change, appetite change, chills, diaphoresis and fatigue.   HENT:  Negative for congestion, dental problem and facial swelling.    Eyes:  Negative for discharge and itching.   Respiratory:  Negative for apnea, cough, choking, chest tightness, shortness of breath, wheezing and stridor.    Cardiovascular:  Negative for chest pain and leg swelling.   Gastrointestinal:  Positive for abdominal distention, abdominal pain, nausea and vomiting.   Genitourinary:  Negative for difficulty urinating and dyspareunia.   Musculoskeletal:  Negative for arthralgias.   Skin:  Negative for color change.   Neurological:  Negative for dizziness and facial asymmetry.   Psychiatric/Behavioral:  Negative for agitation and behavioral problems.    Objective:     Vital Signs (Most Recent):  Temp: 98.6 °F (37 °C) (04/24/22 0809)  Pulse: 86 (04/24/22 1302)  Resp: (!) 26 (04/24/22 1302)  BP: (!) 159/84 (04/24/22 1302)  SpO2: 96 % (04/24/22 1302)   Vital Signs (24h Range):  Temp:  [98.6 °F (37 °C)] 98.6 °F (37 °C)  Pulse:  [78-90] 86  Resp:  [18-32] 26  SpO2:  [95 %-100 %] 96 %  BP: (140-178)/(84-98) 159/84     Weight: 85.7 kg (189 lb)  Body mass index is 30.51 kg/m².    Physical Exam  Vitals and nursing note reviewed.   Constitutional:       General: She is not in acute distress.     Appearance: Normal appearance. She is normal weight. She is not ill-appearing, toxic-appearing or  diaphoretic.   HENT:      Head: Normocephalic and atraumatic.   Eyes:      Conjunctiva/sclera: Conjunctivae normal.   Cardiovascular:      Rate and Rhythm: Normal rate and regular rhythm.      Heart sounds:     No gallop.   Pulmonary:      Effort: Pulmonary effort is normal. No respiratory distress.      Breath sounds: No wheezing.   Abdominal:      General: Bowel sounds are normal. There is distension.      Tenderness: There is no abdominal tenderness. There is no guarding.   Skin:     General: Skin is warm and dry.   Neurological:      Mental Status: She is alert and oriented to person, place, and time.   Psychiatric:         Mood and Affect: Mood normal.         Behavior: Behavior normal.         Thought Content: Thought content normal.           Significant Labs: All pertinent labs within the past 24 hours have been reviewed.  BMP:   Recent Labs   Lab 04/24/22  0825   *      K 3.4*      CO2 25   BUN 9   CREATININE 0.8   CALCIUM 9.7     CBC:   Recent Labs   Lab 04/24/22  0825   WBC 8.22   HGB 12.1   HCT 38.1          Significant Imaging:  CT abdomen as in HPI

## 2022-04-24 NOTE — ED TRIAGE NOTES
"Pt presents to ED via personal transportation with complaints of chest pain. Pt states the pain is located epigastrically but radiates to the left anterior chest through the left arm. Pt states the pain began last night "but kind of went away" then returned around 3 AM. Pt reports not being able to sleep. Pt reports SOB and appears to have labored breathing and tachypnic. Pt states the pain happened 2 weeks ago but she thought "it was the stomach flu because she kept throwing up." Pt did have active vomiting in triage. Pt denies cough, fever, diarrhea. Pt does report "having real real bad acid reflux and had surgery a few years ago." Pt appears stable currently, placed on cardiac monitor and continuous pulse ox. Pt AAOX4, all vitals WNL.   "

## 2022-04-25 ENCOUNTER — ANESTHESIA (OUTPATIENT)
Dept: ENDOSCOPY | Facility: HOSPITAL | Age: 72
DRG: 326 | End: 2022-04-25
Payer: MEDICAID

## 2022-04-25 ENCOUNTER — ANESTHESIA EVENT (OUTPATIENT)
Dept: ENDOSCOPY | Facility: HOSPITAL | Age: 72
DRG: 326 | End: 2022-04-25
Payer: MEDICAID

## 2022-04-25 ENCOUNTER — ANESTHESIA EVENT (OUTPATIENT)
Dept: SURGERY | Facility: HOSPITAL | Age: 72
DRG: 326 | End: 2022-04-25
Payer: MEDICAID

## 2022-04-25 DIAGNOSIS — K44.9 HIATAL HERNIA: Primary | ICD-10-CM

## 2022-04-25 LAB
ANION GAP SERPL CALC-SCNC: 13 MMOL/L (ref 8–16)
BUN SERPL-MCNC: 10 MG/DL (ref 8–23)
CALCIUM SERPL-MCNC: 9.5 MG/DL (ref 8.7–10.5)
CHLORIDE SERPL-SCNC: 104 MMOL/L (ref 95–110)
CO2 SERPL-SCNC: 26 MMOL/L (ref 23–29)
CREAT SERPL-MCNC: 0.8 MG/DL (ref 0.5–1.4)
EST. GFR  (AFRICAN AMERICAN): >60 ML/MIN/1.73 M^2
EST. GFR  (NON AFRICAN AMERICAN): >60 ML/MIN/1.73 M^2
ESTIMATED AVG GLUCOSE: 137 MG/DL (ref 68–131)
GLUCOSE SERPL-MCNC: 141 MG/DL (ref 70–110)
HBA1C MFR BLD: 6.4 % (ref 4–5.6)
POCT GLUCOSE: 117 MG/DL (ref 70–110)
POCT GLUCOSE: 126 MG/DL (ref 70–110)
POCT GLUCOSE: 135 MG/DL (ref 70–110)
POCT GLUCOSE: 145 MG/DL (ref 70–110)
POTASSIUM SERPL-SCNC: 3.1 MMOL/L (ref 3.5–5.1)
SODIUM SERPL-SCNC: 143 MMOL/L (ref 136–145)

## 2022-04-25 PROCEDURE — 25000003 PHARM REV CODE 250: Performed by: STUDENT IN AN ORGANIZED HEALTH CARE EDUCATION/TRAINING PROGRAM

## 2022-04-25 PROCEDURE — 63600175 PHARM REV CODE 636 W HCPCS: Performed by: INTERNAL MEDICINE

## 2022-04-25 PROCEDURE — 43235 PR EGD, FLEX, DIAGNOSTIC: ICD-10-PCS | Mod: 52,,, | Performed by: STUDENT IN AN ORGANIZED HEALTH CARE EDUCATION/TRAINING PROGRAM

## 2022-04-25 PROCEDURE — 99223 PR INITIAL HOSPITAL CARE,LEVL III: ICD-10-PCS | Mod: ,,, | Performed by: SURGERY

## 2022-04-25 PROCEDURE — 99223 1ST HOSP IP/OBS HIGH 75: CPT | Mod: 25,,, | Performed by: STUDENT IN AN ORGANIZED HEALTH CARE EDUCATION/TRAINING PROGRAM

## 2022-04-25 PROCEDURE — 43235 EGD DIAGNOSTIC BRUSH WASH: CPT | Mod: 74 | Performed by: STUDENT IN AN ORGANIZED HEALTH CARE EDUCATION/TRAINING PROGRAM

## 2022-04-25 PROCEDURE — 80048 BASIC METABOLIC PNL TOTAL CA: CPT | Performed by: INTERNAL MEDICINE

## 2022-04-25 PROCEDURE — 94761 N-INVAS EAR/PLS OXIMETRY MLT: CPT

## 2022-04-25 PROCEDURE — D9220A PRA ANESTHESIA: ICD-10-PCS | Mod: CRNA,,, | Performed by: STUDENT IN AN ORGANIZED HEALTH CARE EDUCATION/TRAINING PROGRAM

## 2022-04-25 PROCEDURE — 63600175 PHARM REV CODE 636 W HCPCS: Performed by: NURSE PRACTITIONER

## 2022-04-25 PROCEDURE — D9220A PRA ANESTHESIA: Mod: CRNA,,, | Performed by: STUDENT IN AN ORGANIZED HEALTH CARE EDUCATION/TRAINING PROGRAM

## 2022-04-25 PROCEDURE — 20600001 HC STEP DOWN PRIVATE ROOM

## 2022-04-25 PROCEDURE — D9220A PRA ANESTHESIA: Mod: ANES,,, | Performed by: ANESTHESIOLOGY

## 2022-04-25 PROCEDURE — 99223 1ST HOSP IP/OBS HIGH 75: CPT | Mod: ,,, | Performed by: SURGERY

## 2022-04-25 PROCEDURE — 25000003 PHARM REV CODE 250: Performed by: INTERNAL MEDICINE

## 2022-04-25 PROCEDURE — 37000008 HC ANESTHESIA 1ST 15 MINUTES: Performed by: STUDENT IN AN ORGANIZED HEALTH CARE EDUCATION/TRAINING PROGRAM

## 2022-04-25 PROCEDURE — 37000009 HC ANESTHESIA EA ADD 15 MINS: Performed by: STUDENT IN AN ORGANIZED HEALTH CARE EDUCATION/TRAINING PROGRAM

## 2022-04-25 PROCEDURE — 27000221 HC OXYGEN, UP TO 24 HOURS

## 2022-04-25 PROCEDURE — D9220A PRA ANESTHESIA: ICD-10-PCS | Mod: ANES,,, | Performed by: ANESTHESIOLOGY

## 2022-04-25 PROCEDURE — 36415 COLL VENOUS BLD VENIPUNCTURE: CPT | Performed by: INTERNAL MEDICINE

## 2022-04-25 PROCEDURE — 63600175 PHARM REV CODE 636 W HCPCS: Performed by: STUDENT IN AN ORGANIZED HEALTH CARE EDUCATION/TRAINING PROGRAM

## 2022-04-25 PROCEDURE — 99900035 HC TECH TIME PER 15 MIN (STAT)

## 2022-04-25 PROCEDURE — 43235 EGD DIAGNOSTIC BRUSH WASH: CPT | Mod: 52,,, | Performed by: STUDENT IN AN ORGANIZED HEALTH CARE EDUCATION/TRAINING PROGRAM

## 2022-04-25 PROCEDURE — 99223 PR INITIAL HOSPITAL CARE,LEVL III: ICD-10-PCS | Mod: 25,,, | Performed by: STUDENT IN AN ORGANIZED HEALTH CARE EDUCATION/TRAINING PROGRAM

## 2022-04-25 RX ORDER — MIDAZOLAM HYDROCHLORIDE 1 MG/ML
INJECTION INTRAMUSCULAR; INTRAVENOUS
Status: COMPLETED
Start: 2022-04-25 | End: 2022-04-25

## 2022-04-25 RX ORDER — POTASSIUM CHLORIDE 7.45 MG/ML
10 INJECTION INTRAVENOUS
Status: COMPLETED | OUTPATIENT
Start: 2022-04-25 | End: 2022-04-25

## 2022-04-25 RX ORDER — LIDOCAINE HYDROCHLORIDE 20 MG/ML
INJECTION, SOLUTION EPIDURAL; INFILTRATION; INTRACAUDAL; PERINEURAL
Status: DISPENSED
Start: 2022-04-25 | End: 2022-04-26

## 2022-04-25 RX ORDER — PROPOFOL 10 MG/ML
VIAL (ML) INTRAVENOUS
Status: DISCONTINUED | OUTPATIENT
Start: 2022-04-25 | End: 2022-04-25

## 2022-04-25 RX ORDER — ONDANSETRON 2 MG/ML
8 INJECTION INTRAMUSCULAR; INTRAVENOUS ONCE
Status: COMPLETED | OUTPATIENT
Start: 2022-04-25 | End: 2022-04-25

## 2022-04-25 RX ORDER — PROPOFOL 10 MG/ML
INJECTION, EMULSION INTRAVENOUS
Status: DISPENSED
Start: 2022-04-25 | End: 2022-04-26

## 2022-04-25 RX ORDER — ONDANSETRON 2 MG/ML
8 INJECTION INTRAMUSCULAR; INTRAVENOUS EVERY 4 HOURS PRN
Status: DISCONTINUED | OUTPATIENT
Start: 2022-04-25 | End: 2022-04-26

## 2022-04-25 RX ORDER — ONDANSETRON HYDROCHLORIDE 4 MG/5ML
8 SOLUTION ORAL ONCE
Status: DISCONTINUED | OUTPATIENT
Start: 2022-04-25 | End: 2022-04-25

## 2022-04-25 RX ORDER — LIDOCAINE HYDROCHLORIDE 20 MG/ML
INJECTION INTRAVENOUS
Status: DISCONTINUED | OUTPATIENT
Start: 2022-04-25 | End: 2022-04-25

## 2022-04-25 RX ORDER — SUCCINYLCHOLINE CHLORIDE 20 MG/ML
INJECTION INTRAMUSCULAR; INTRAVENOUS
Status: DISCONTINUED | OUTPATIENT
Start: 2022-04-25 | End: 2022-04-25

## 2022-04-25 RX ORDER — PHENYLEPHRINE HYDROCHLORIDE 10 MG/ML
INJECTION INTRAVENOUS
Status: DISCONTINUED | OUTPATIENT
Start: 2022-04-25 | End: 2022-04-25

## 2022-04-25 RX ORDER — MIDAZOLAM HYDROCHLORIDE 1 MG/ML
INJECTION INTRAMUSCULAR; INTRAVENOUS
Status: DISCONTINUED | OUTPATIENT
Start: 2022-04-25 | End: 2022-04-25

## 2022-04-25 RX ADMIN — MORPHINE SULFATE 2 MG: 4 INJECTION, SOLUTION INTRAMUSCULAR; INTRAVENOUS at 08:04

## 2022-04-25 RX ADMIN — POTASSIUM CHLORIDE 10 MEQ: 7.46 INJECTION, SOLUTION INTRAVENOUS at 03:04

## 2022-04-25 RX ADMIN — ONDANSETRON 8 MG: 2 INJECTION INTRAMUSCULAR; INTRAVENOUS at 06:04

## 2022-04-25 RX ADMIN — POTASSIUM CHLORIDE 10 MEQ: 7.46 INJECTION, SOLUTION INTRAVENOUS at 02:04

## 2022-04-25 RX ADMIN — PROPOFOL 130 MG: 10 INJECTION, EMULSION INTRAVENOUS at 11:04

## 2022-04-25 RX ADMIN — SODIUM CHLORIDE: 0.9 INJECTION, SOLUTION INTRAVENOUS at 03:04

## 2022-04-25 RX ADMIN — SUCCINYLCHOLINE CHLORIDE 120 MG: 20 INJECTION, SOLUTION INTRAMUSCULAR; INTRAVENOUS at 11:04

## 2022-04-25 RX ADMIN — POTASSIUM CHLORIDE 10 MEQ: 7.46 INJECTION, SOLUTION INTRAVENOUS at 04:04

## 2022-04-25 RX ADMIN — PROPOFOL 20 MG: 10 INJECTION, EMULSION INTRAVENOUS at 11:04

## 2022-04-25 RX ADMIN — LIDOCAINE HYDROCHLORIDE 40 MG: 20 INJECTION, SOLUTION INTRAVENOUS at 11:04

## 2022-04-25 RX ADMIN — SODIUM CHLORIDE: 0.9 INJECTION, SOLUTION INTRAVENOUS at 02:04

## 2022-04-25 RX ADMIN — ONDANSETRON 8 MG: 2 INJECTION INTRAMUSCULAR; INTRAVENOUS at 03:04

## 2022-04-25 RX ADMIN — PHENYLEPHRINE HYDROCHLORIDE 200 MCG: 10 INJECTION INTRAVENOUS at 12:04

## 2022-04-25 RX ADMIN — MIDAZOLAM HYDROCHLORIDE 1 MG: 1 INJECTION, SOLUTION INTRAMUSCULAR; INTRAVENOUS at 11:04

## 2022-04-25 RX ADMIN — LIDOCAINE HYDROCHLORIDE 60 MG: 20 INJECTION, SOLUTION INTRAVENOUS at 11:04

## 2022-04-25 RX ADMIN — MORPHINE SULFATE 2 MG: 4 INJECTION, SOLUTION INTRAMUSCULAR; INTRAVENOUS at 03:04

## 2022-04-25 RX ADMIN — POTASSIUM CHLORIDE 10 MEQ: 7.46 INJECTION, SOLUTION INTRAVENOUS at 05:04

## 2022-04-25 RX ADMIN — ONDANSETRON 8 MG: 2 INJECTION INTRAMUSCULAR; INTRAVENOUS at 08:04

## 2022-04-25 NOTE — ANESTHESIA PREPROCEDURE EVALUATION
04/25/2022  Vidya Vazquez is a 72 y.o., female.      Pre-op Assessment     I have reviewed the Nursing Notes.       Review of Systems  Anesthesia Hx:  No problems with previous Anesthesia    Social:  Non-Smoker    Cardiovascular:   Denies Pacemaker. Hypertension     Pulmonary:  Pulmonary Normal    Renal/:  Renal/ Normal     Hepatic/GI:   No Bowel Prep. Denies PUD. Hiatal Hernia, Denies Liver Disease. Denies Hepatitis. Likely gastric outlet obstruction   Neurological:  Neurology Normal    Endocrine:   Diabetes, type 2 Denies Hypothyroidism. Denies Hyperthyroidism.  Obesity / BMI > 30      Physical Exam  General: Well nourished, Cooperative, Alert and Oriented    Airway:  Mallampati: III   Mouth Opening: Normal  TM Distance: Normal  Tongue: Normal  Neck ROM: Normal ROM    Dental:  Edentulous        Anesthesia Plan  Type of Anesthesia, risks & benefits discussed:    Anesthesia Type: Gen Natural Airway  Intra-op Monitoring Plan: Standard ASA Monitors  Induction:  IV  Informed Consent: Informed consent signed with the Patient and all parties understand the risks and agree with anesthesia plan.  All questions answered.   ASA Score: 3  Day of Surgery Review of History & Physical: H&P Update referred to the surgeon/provider.  Anesthesia Plan Notes: Gastric outlet obstruction without NG tube. Will discuss with proceduralist the risk of emesis/aspiration and if high will re-place NG prior to procedure    Ready For Surgery From Anesthesia Perspective.     .

## 2022-04-25 NOTE — NURSING
Received call from [facility] with transfer center. Patient will be transferring to [facility] room 1006. Call report information ([phone]) provided by transfer center. [facility] Primary Nurse, Linsey, provided call report information.

## 2022-04-25 NOTE — CONSULTS
Vidya Vazquez  [id number]    Consultant: ED    CC/Reason for Consultation: Paraesophageal hernia w/ gastric outlet obstruction    HPI:  72 y.o. female that presents with 2 weeks of worsening epigastric abdominal pain associated with nausea vomiting. CT shows significant gastric distention and gastric outlet obstruction 2/2 paraesophageal hernia  Patient reports a history of repair roughly 10 years ago at [place]. I have [place] [facility] [place] can't find the documentation   Surgery consulted for this  Currently she is in significant pain    History reviewed. No pertinent past medical history.    History reviewed. No pertinent surgical history.    Social History     Socioeconomic History    Marital status:        History reviewed. No pertinent family history.    Review of patient's allergies indicates:  No Known Allergies    ROS - 12pt ROS negative except those mentioned in the HPI    Objective    Vitals:    04/25/22 0825   BP: (!) 169/87   Pulse: 86   Resp: 20   Temp: 98.2 °F (36.8 °C)       CBC   Recent Labs   Lab 04/24/22  0825   WBC 8.22   HGB 12.1   HCT 38.1          CHEM   Recent Labs   Lab 04/24/22 0825 04/25/22  0447    143   K 3.4* 3.1*    104   CO2 25 26   BUN 9 10   CREATININE 0.8 0.8   * 141*       GEN: NAD, laying comfortably in bed   HEENT: NCAT  RESP: Normal WOB, no distress  CV: RRR  ABD: Soft, epigastric ttp and fullness  EXT: WWP, no edema  SKIN: warm, dry  NEURO: alert, normal speech  PSYCH: normal mood and affect    Imaging Results          X-Ray Abdomen Portable (Final result)  Result time 04/24/22 15:58:02    Final result by Hien Reese MD (04/24/22 15:58:02)                 Impression:      The side port and tip of an enteric tube are in the intrathoracic portion of a 10 cm hiatal hernia.  There main of the stomach appears markedly distended with gas.      Electronically signed by: Hien Reese  Date:    04/24/2022  Time:    15:58              Narrative:    EXAMINATION:  XR ABDOMEN PORTABLE    CLINICAL HISTORY:  Encounter for fitting and adjustment of other gastrointestinal appliance and device    TECHNIQUE:  AP View(s) of the abdomen was performed.    COMPARISON:  04/24/2022    FINDINGS:  The side port and tip of an enteric tube appear to be within the intrathoracic portion of a 10 cm hiatal hernia.  The distal stomach appears distended with gas.  The heart is normal in size, there is mild venous congestion.                                X-ray Abdomen for NG Tube Placement (Nursing should notify Radiology after placement) (Final result)  Result time 04/24/22 15:24:37    Final result by Chinedu Kang MD (04/24/22 15:24:37)                 Impression:      The esophagogastric catheter loops in the stomach above the diaphragm, and the catheter courses retrograde with the tip out of the field of view.  Side hole projects at about the level of the GE junction which is located at about the level of the heart.    Recommend repositioning, as clinically indicated.    This report was flagged in Epic as abnormal.      Electronically signed by: Chinedu Kang MD  Date:    04/24/2022  Time:    15:24             Narrative:    EXAMINATION:  XR NON-RADIOLOGIST PERFORMED NG/GASTRIC TUBE CHECK    CLINICAL HISTORY:  NG tube placement.;    TECHNIQUE:  AP abdomen    COMPARISON:  CT April 24, 2022    FINDINGS:  There is a hiatal hernia.  The esophagogastric catheter loops in the stomach above the diaphragm, and the catheter courses retrograde with the tip out of the field of view.  The side hole projects in the GE junction at about the level of the heart.                                CT Abdomen Pelvis With Contrast (Edited Result - FINAL)  Result time 04/24/22 13:09:46    Addendum 1 of 1 by Ayden Garcia MD (04/24/22 13:09:46)      Impression #6: Right lower lobe 6 mm solid nodule.  For a solid nodule 6-8 mm, Fleischner Society 2017 guidelines recommend follow up  with non-contrast chest CT at 6-12 months and 18-24 months after discovery.    Impression #7:  left lower lobe 1.6 x 1.8 cm somewhat lobular soft tissue density, while nonspecific is concerning for neoplastic process.  Clinical correlation and with any prior imaging from outside facility to assess for stability if/when available is recommended.  Further evaluation with PET-CT and/or biopsy on a nonemergent basis after acute illness has resolved may be warranted.    This report was flagged in Epic as abnormal.      Electronically signed by: Ayden Garcia MD  Date:    04/24/2022  Time:    13:09                 Final result by Ayden Garcia MD (04/24/22 13:03:23)                 Impression:      1. Findings are highly concerning for gastric outlet obstruction, at least partial, likely secondary to a moderate to large-sized paraesophageal hiatal hernia which likely has associated inflammatory change with mass effect upon the distal portion of the stomach and potential developing closed loop of part of the distal stomach, as outlined above.  A superimposed distal gastritis is also suspected.  Urgent gastric decompression is advised and surgical consultation may be warranted.  2. Left upper quadrant and left pericolic gutter trace ascites, likely reactive.  3. Bibasilar pulmonary ground-glass opacities while nonspecific may represent sequela of mild pulmonary edema potentially cardiogenic given the mass effect upon the base of the heart from moderate to large hiatal hernia.  4. Diverticulosis coli without acute diverticulitis.  5. Few additional findings as above.  This report was flagged in Epic as abnormal.    COMMUNICATION  This critical result was discovered/received at 12:30 hours.  The critical information above was relayed directly by me by telephone to Dr. Serrano in the emergency department on 04/24/2022 at 12:35 hours.      Electronically signed by: Ayden Garcia MD  Date:    04/24/2022  Time:    13:03              Narrative:    EXAMINATION:  CT ABDOMEN PELVIS WITH CONTRAST    CLINICAL HISTORY:  Epigastric pain;    TECHNIQUE:  Low dose axial images, sagittal and coronal reformations were obtained from the lung bases to the pubic symphysis following the IV administration of 75 mL of Omnipaque 350 .  Oral contrast was not given.    COMPARISON:  None.    FINDINGS:  Imaged lung bases show mild dependent atelectasis with some patchy ground-glass opacities throughout both lower lobes, few scattered bandlike opacities consistent with platelike scarring versus atelectasis, 6 mm solid nodule at the lateral right lower lobe and 1.6 x 1.8 cm slightly lobulated soft tissue density within the left lower lobe.    The base of the heart is upper limits of normal in size without significant pericardial fluid noting mild mass effect upon the posterior aspect of the left atrium from a moderate to large-sized hiatal hernia as well as some non dependent gas within the right ventricle presumably related to air introduced via and indwelling catheter.    Majority of the stomach is markedly distended without wall thickening.  There is trace amount of free fluid within the left upper quadrant about the posterolateral aspect of the stomach.  There is a partially imaged moderate to large-sized paraesophageal hiatal hernia noting its superior most apex not excluded at upper most image of acquisition.  There is mass effect upon the partially imaged distal esophagus which is displaced towards the posterior right aspect of the lower mediastinum.  There is additional overall deformed appearance of the GE junction region which could be related to prominent tethering from the hiatal hernia and/or any potential prior surgery altering the anatomy.  There is mild circumferential wall thickening of the hernia sac with surrounding non loculated fluid and mild fat stranding throughout the imaged lower mediastinum.  The mid to distal portion of presumed gastric  body appears tethered superiorly towards the lower right aspect of the hiatal hernia sac  region at the GE junction with small focus of distension at that location and then collapsed appearance of the majority of the distal stomach.  These findings are best appreciated on coronal images 67 through 97 of series 601.  There is mild circumferential wall thickening of the distal stomach/antrum with subtle adjacent fat stranding concerning for a superimposed distal gastritis.  Duodenum and majority of the small bowel loops throughout the remainder of the abdomen are relatively decompressed and otherwise within normal limits.    Appendix and terminal ileum are within normal limits.  Mild amount of fecal material within the cecum and rectosigmoid colon and rectum.  The remainder of the colon is relatively decompressed.  Several scattered diverticula of the descending and sigmoid colon without acute diverticulitis.  No evidence small or large bowel obstruction or acute inflammation.  No pneumatosis or portal venous gas.    Suspected Phrygian cap formation of the gallbladder without acute cholecystitis.  Main portal vein appears patent.  Liver, spleen and bilateral adrenal glands are within normal limits.  Pancreas appears somewhat atrophic without focal process or adjacent inflammatory change.  No biliary ductal dilatation.    Bilateral kidneys appear normal in overall size, shape and location with relatively symmetric normal enhancement.  No hydronephrosis.  Ureters are nondilated.  Urinary bladder is within normal limits.  Uterus and bilateral adnexal regions are grossly within normal limits.  Pelvic phleboliths noted.    No significant aortic atherosclerosis.  No aortic aneurysm or dissection.    There is trace volume free fluid tracking along the left pericolic gutter, likely reactive.  No free air or lymphadenopathy by CT criteria.    Tiny fat containing umbilical hernia.    5 mm sclerotic focus with angular margins at  the right iliac body suggestive of a bone island.  Age-related degenerative change most prominent at the lower thoracic and lower lumbar spine.  Chronic appearing multilevel minimal to mild anterior wedge deformity of several mid to lower thoracic vertebral bodies.  Suspected prominent Schmorl's node at the superior endplate of L4.  There is minimal degenerative related grade 1 anterolisthesis of L4 on 5.  No acute or destructive osseous process seen.                               X-Ray Chest AP Portable (Final result)  Result time 04/24/22 08:20:34    Final result by Nilson Marquez MD (04/24/22 08:20:34)                 Impression:      No convincing evidence acute cardiopulmonary disease.      Electronically signed by: Nilson Marquez  Date:    04/24/2022  Time:    08:20             Narrative:    EXAMINATION:  XR CHEST AP PORTABLE    CLINICAL HISTORY:  Chest Pain;    TECHNIQUE:  Single frontal view of the chest was performed.    COMPARISON:  None    FINDINGS:  Monitoring leads overlie the chest.  Large hiatal hernia suggested.  Cardiomediastinal contours otherwise grossly unremarkable.  Background coarse interstitial increased attenuation may reflect chronic fibrotic change.    No definite pneumothorax or large volume pleural effusion.  No acute findings identified in the visualized abdomen.  Osseous soft tissue structures appear without definite acute abnormality.                                  A/P: 72 y.o. female with recurrent paraesophageal hernia causing obstruction.     - No signs of gastric compromise requiring immediate operative intervention   - Going for EGD today with GI for evaluation and to have NG placed for decompression, will follow up results      Mick Emiliano  Surgery PGY V

## 2022-04-25 NOTE — SUBJECTIVE & OBJECTIVE
Interval History: No new issues     Review of Systems   Constitutional:  Negative for activity change, appetite change, chills, diaphoresis and fatigue.   HENT:  Negative for congestion, dental problem and facial swelling.    Eyes:  Negative for discharge and itching.   Respiratory:  Negative for apnea, cough, choking, chest tightness, shortness of breath, wheezing and stridor.    Cardiovascular:  Negative for chest pain and leg swelling.   Gastrointestinal:  Positive for abdominal distention, abdominal pain, nausea and vomiting.   Genitourinary:  Negative for difficulty urinating and dyspareunia.   Musculoskeletal:  Negative for arthralgias.   Skin:  Negative for color change.   Neurological:  Negative for dizziness and facial asymmetry.   Psychiatric/Behavioral:  Negative for agitation and behavioral problems.    Objective:     Vital Signs (Most Recent):  Temp: 98.8 °F (37.1 °C) (04/25/22 0341)  Pulse: 98 (04/25/22 0341)  Resp: 20 (04/25/22 0825)  BP: (!) 148/91 (04/25/22 0341)  SpO2: 96 % (04/25/22 0341)   Vital Signs (24h Range):  Temp:  [97.7 °F (36.5 °C)-99.6 °F (37.6 °C)] 98.8 °F (37.1 °C)  Pulse:  [86-98] 98  Resp:  [18-26] 20  SpO2:  [94 %-98 %] 96 %  BP: (142-178)/() 148/91     Weight: 86.3 kg (190 lb 4.1 oz)  Body mass index is 30.71 kg/m².    Intake/Output Summary (Last 24 hours) at 4/25/2022 1012  Last data filed at 4/24/2022 1433  Gross per 24 hour   Intake 50 ml   Output --   Net 50 ml      Physical Exam  Vitals and nursing note reviewed.   Constitutional:       General: She is not in acute distress.     Appearance: Normal appearance. She is normal weight. She is not ill-appearing, toxic-appearing or diaphoretic.   HENT:      Head: Normocephalic and atraumatic.   Eyes:      Conjunctiva/sclera: Conjunctivae normal.   Cardiovascular:      Rate and Rhythm: Normal rate and regular rhythm.      Heart sounds:     No gallop.   Pulmonary:      Effort: Pulmonary effort is normal. No respiratory distress.       Breath sounds: No wheezing.   Abdominal:      General: Bowel sounds are normal. There is distension.      Tenderness: There is no abdominal tenderness. There is no guarding.   Skin:     General: Skin is warm and dry.   Neurological:      Mental Status: She is alert and oriented to person, place, and time.   Psychiatric:         Mood and Affect: Mood normal.         Behavior: Behavior normal.         Thought Content: Thought content normal.       Significant Labs: All pertinent labs within the past 24 hours have been reviewed.  BMP:   Recent Labs   Lab 04/25/22  0447   *      K 3.1*      CO2 26   BUN 10   CREATININE 0.8   CALCIUM 9.5     CBC:   Recent Labs   Lab 04/24/22  0825   WBC 8.22   HGB 12.1   HCT 38.1          Significant Imaging:

## 2022-04-25 NOTE — PLAN OF CARE
Problem: Adult Inpatient Plan of Care  Goal: Plan of Care Review  Outcome: Ongoing, Progressing  Flowsheets (Taken 4/25/2022 7986)  Plan of Care Reviewed With:   patient   sibling  Goal: Patient-Specific Goal (Individualized)  Outcome: Ongoing, Progressing  Goal: Absence of Hospital-Acquired Illness or Injury  Outcome: Ongoing, Progressing  Goal: Optimal Comfort and Wellbeing  Outcome: Ongoing, Progressing  Goal: Readiness for Transition of Care  Outcome: Ongoing, Progressing     Problem: Diabetes Comorbidity  Goal: Blood Glucose Level Within Targeted Range  Outcome: Ongoing, Progressing

## 2022-04-25 NOTE — PLAN OF CARE
[facility] - Telemetry  Initial Discharge Assessment       Primary Care Provider: Primary Doctor No    Admission Diagnosis: Gastric outlet obstruction [K31.1]  Chest pain [R07.9]  Encounter for nasogastric tube placement [Z46.59]  Intractable epigastric abdominal pain [R10.13]  Non-intractable vomiting with nausea, unspecified vomiting type [R11.2]    Admission Date: 4/24/2022  Expected Discharge Date: TBD    Discharge Barriers Identified: None    Payor: MEDICAID / Plan: [facility]) / Product Type: Managed Medicaid /     Extended Emergency Contact Information  Primary Emergency Contact: rosales arguello  Mobile Phone: [phone]  Relation: Daughter   needed? No    Discharge Plan A: Home with family  Discharge Plan B: Other (TBD)      [facility] [employer] [address] [place] [address] [address] [facility]  [address] [facility] [address]  Phone: [phone] Fax: [phone]      Initial Assessment (most recent)       Adult Discharge Assessment - 04/25/22 1307          Discharge Assessment    Assessment Type Discharge Planning Assessment     Confirmed/corrected address, phone number and insurance Yes     Confirmed Demographics Correct on Facesheet     Source of Information family     If unable to respond/provide information was family/caregiver contacted? Yes     Contact Name/Number rosales arguello (Daughter)   [phone]     When was your last doctors appointment? --   January 2022    Communicated BRUNILDA with patient/caregiver Date not available/Unable to determine     Reason For Admission nausea/vomiting     Lives With child(morteza), adult     Do you have help at home or someone to help you manage your care at home? Yes     Who are your caregiver(s) and their phone number(s)? rosales arguello (Daughter)   [phone]     Prior to hospitilization cognitive status: Alert/Oriented     Current cognitive status: Unable to Assess     Walking or Climbing Stairs Difficulty none      Dressing/Bathing Difficulty none     Equipment Currently Used at Home none     Readmission within 30 days? No     Patient currently being followed by outpatient case management? No     Do you currently have service(s) that help you manage your care at home? No     Do you take prescription medications? Yes     Do you have prescription coverage? Yes     Coverage Medicaid - [employer]     Do you have any problems affording any of your prescribed medications? No     Is the patient taking medications as prescribed? yes     Who is going to help you get home at discharge? nilo [email or web address] (Daughter)   [phone]     How do you get to doctors appointments? car, drives self     Are you on dialysis? No     Do you take coumadin? No     Discharge Plan A Home with family     Discharge Plan B Other   TBD    DME Needed Upon Discharge  none     Discharge Plan discussed with: Adult children     Discharge Barriers Identified None        Relationship/Environment    Name(s) of Who Lives With Patient Trena Vazquez (Daughter)                      ZAMZAM spoke with pt's daughter, Trena via telephone [phone], due to pt currently post-op. Pt's daughter stated that pt lives with her, Trena is her help at home if needed. Pt is independent at home, drives herself to doctor appointments and does not use any DME. Pt's daughter is unsure of pt's PCP's name, but informed SW that she has the name written down on a paper and will provide to CM team before discharge.

## 2022-04-25 NOTE — NURSING
Report received from off going nurse, JODY Rico. Patient AAO. C/o 8/10 pain and nausea. Call light in reach. Bed low and locked. Will continue plan of care. NPO status in place. No NGT in place

## 2022-04-25 NOTE — PROGRESS NOTES
[facility]  Gastroenterology  Consult Note    Patient Name: Vidya Vazquez  MRN: [id number]  Admission Date: 4/24/2022  Hospital Length of Stay: 1 days  Code Status: No Order   Attending Provider: Matthew Ariza MD   Consulting Provider: Cynthia Sumner MD  Primary Care Physician: Primary Doctor No  Principal Problem:Gastric outlet obstruction    Consults  Subjective:     HPI: Vidya Vazquez is a 72 y.o. female who is admitted with chest and epigastric pain. She had a history of hiatal hernia repair several years ago. CT showed gastric outlet obstruction from a large paraesophageal hernia. NGT was placed in ED but not on correct position and subsequently removed. GI consulted for endoscopic placement of NGT. Surgery also following along. Patient is afebrile and hemodynamically stable. Blood work unremarkable.       History reviewed. No pertinent past medical history.    History reviewed. No pertinent surgical history.    History reviewed. No pertinent family history.    Social History     Socioeconomic History    Marital status:        No current facility-administered medications on file prior to encounter.     No current outpatient medications on file prior to encounter.       Review of patient's allergies indicates:  No Known Allergies    ROS     Objective:     Vitals:    04/25/22 0348   BP:    Pulse:    Resp: 20   Temp:          Constitutional:  not in acute distress and well developed  HENT: Head: Normal, normocephalic, atraumatic.  Eyes: conjunctiva clear and sclera nonicteric  Cardiovascular: regular rate and rhythm and no murmur  Respiratory: normal chest expansion & respiratory effort   and no accessory muscle use  GI: soft, non distended, bowel sounds appreciated. TTP in epigastrium   Musculoskeletal: no muscular tenderness noted  Skin: normal color  Neurological: alert, oriented x3         Significant Labs:  Recent Labs   Lab 04/24/22  0825   HGB 12.1       Lab Results    Component Value Date    WBC 8.22 04/24/2022    HGB 12.1 04/24/2022    HCT 38.1 04/24/2022    MCV 85 04/24/2022     04/24/2022       Lab Results   Component Value Date     04/25/2022    K 3.1 (L) 04/25/2022     04/25/2022    CO2 26 04/25/2022    BUN 10 04/25/2022    CREATININE 0.8 04/25/2022    CALCIUM 9.5 04/25/2022    ANIONGAP 13 04/25/2022    ESTGFRAFRICA >60 04/25/2022    EGFRNONAA >60 04/25/2022       Lab Results   Component Value Date    ALT 17 04/24/2022    AST 18 04/24/2022    ALKPHOS 113 04/24/2022    BILITOT 0.5 04/24/2022       No results found for: INR    Significant Imaging:    CT A/P   Impression:     1. Findings are highly concerning for gastric outlet obstruction, at least partial, likely secondary to a moderate to large-sized paraesophageal hiatal hernia which likely has associated inflammatory change with mass effect upon the distal portion of the stomach and potential developing closed loop of part of the distal stomach, as outlined above.  A superimposed distal gastritis is also suspected.  Urgent gastric decompression is advised and surgical consultation may be warranted.  2. Left upper quadrant and left pericolic gutter trace ascites, likely reactive.  3. Bibasilar pulmonary ground-glass opacities while nonspecific may represent sequela of mild pulmonary edema potentially cardiogenic given the mass effect upon the base of the heart from moderate to large hiatal hernia.  4. Diverticulosis coli without acute diverticulitis.  5. Few additional findings as above.  This report was flagged in Epic as abnormal.      Assessment/Plan:     Vidya Vazquez is a 72 y.o. female admitted with abdominal pain found to have large paraesophageal hernia causing gastric outlet obstruction    Recommendations:    - EGD today for endoscopic placement of NGT   - Keep NPO   - Hold anticoagulation     Thank you for involving us in the care of Vidya Vazquez. Please call with any additional  questions, concerns or changes in the patient's clinical status. We will continue to follow.     Cynthia Sumner   Gastroenterology     [facility]

## 2022-04-25 NOTE — NURSING
Patient has returned to unit, denies any distress at this moment. K riders started along with normal saline. NGT to left nare, connected back to low intermittent wall suction per orders. Will continue plan of care. o2 therapy in place at 3 L per NC.    3:22 Patient now on room air, o2 sat at 95%, will continue to monitor,.

## 2022-04-25 NOTE — NURSING
Pt dry heaving and NG tube disloged and hanging half way out. Contacted Junior Tristan PA-C via secure chat, he ok'd to remove it. Tube intact when removed. Pt tolerated well. Will continue to monitor.

## 2022-04-25 NOTE — TREATMENT PLAN
GI treatment plan     EGD today showed large amount of retained gastric contents (over 3L) which was suctioned. Gastric body appeared torsed on itself concerning for vovulus. Surrounding mucosa was not ischemic appearing but was erythematous. Unable to pass pylorus with the gastroscope due to looping of the scope. Dr Boston and myself both attempted to pass through pylorus were unsuccessful. Visualization was difficult as there was still a significant amount of solid food in stomach. Placed NGT in stomach and confirmed placement endoscopically. Recommend surgery and I discussed with Dr Tejada who recommends transfer to [facility] for surgical evaluation.     Cynthia Sumner  GI

## 2022-04-25 NOTE — ASSESSMENT & PLAN NOTE
From Hiatal hernia.  Surgery consulted. NG tube for now. NPO status and IV fluids     Gi and surgery consulted. EGD today for NG tube placement

## 2022-04-25 NOTE — PROVATION PATIENT INSTRUCTIONS
Discharge Summary/Instructions after an Endoscopic Procedure  Patient Name: Vidya Vazquez  Patient MRN: [id number]  Patient YOB: 1950 Monday, April 25, 2022  Cynthia Sumner MD  Dear patient,  As a result of recent federal legislation (The Federal Cures Act), you may   receive lab or pathology results from your procedure in your [facility]   account before your physician is able to contact you. Your physician or   their representative will relay the results to you with their   recommendations at their soonest availability.  Thank you,  RESTRICTIONS:  During your procedure today, you received medications for sedation.  These   medications may affect your judgment, balance and coordination.  Therefore,   for 24 hours, you have the following restrictions:   - DO NOT drive a car, operate machinery, make legal/financial decisions,   sign important papers or drink alcohol.    ACTIVITY:  Today: no heavy lifting, straining or running due to procedural   sedation/anesthesia.  The following day: return to full activity including work.  DIET:  Eat and drink normally unless instructed otherwise.     TREATMENT FOR COMMON SIDE EFFECTS:  - Mild abdominal pain, nausea, belching, bloating or excessive gas:  rest,   eat lightly and use a heating pad.  - Sore Throat: treat with throat lozenges and/or gargle with warm salt   water.  - Because air was used during the procedure, expelling large amounts of air   from your rectum or belching is normal.  - If a bowel prep was taken, you may not have a bowel movement for 1-3 days.    This is normal.  SYMPTOMS TO WATCH FOR AND REPORT TO YOUR PHYSICIAN:  1. Abdominal pain or bloating, other than gas cramps.  2. Chest pain.  3. Back pain.  4. Signs of infection such as: chills or fever occurring within 24 hours   after the procedure.  5. Rectal bleeding, which would show as bright red, maroon, or black stools.   (A tablespoon of blood from the rectum is not serious, especially if    hemorrhoids are present.)  6. Vomiting.  7. Weakness or dizziness.  GO DIRECTLY TO THE NEAREST EMERGENCY ROOM IF YOU HAVE ANY OF THE FOLLOWING:      Difficulty breathing              Chills and/or fever over 101 F   Persistent vomiting and/or vomiting blood   Severe abdominal pain   Severe chest pain   Black, tarry stools   Bleeding- more than one tablespoon   Any other symptom or condition that you feel may need urgent attention  Your doctor recommends these additional instructions:  If any biopsies were taken, your doctors clinic will contact you in 1 to 2   weeks with any results.  - Return patient to hospital [facility] for ongoing care.   - NPO.   - NGT to intermittent suction   - Recommend surgical intervention due to concern for gastric volvulus  For questions, problems or results please call your physician - Cynthia Sumner MD at Work:  [phone].  [facility] can be reached at [phone]     IF A COMPLICATION OR EMERGENCY SITUATION ARISES AND YOU ARE UNABLE TO REACH   YOUR PHYSICIAN - GO DIRECTLY TO THE EMERGENCY ROOM.  Cynthia Sumner MD  4/25/2022 12:22:16 PM  This report has been verified and signed electronically.  Dear patient,  As a result of recent federal legislation (The Federal Cures Act), you may   receive lab or pathology results from your procedure in your [facility]   account before your physician is able to contact you. Your physician or   their representative will relay the results to you with their   recommendations at their soonest availability.  Thank you,  PROVATION

## 2022-04-25 NOTE — PROGRESS NOTES
[facility] Medicine  Progress Note    Patient Name: Vidya Vazquez  MRN: [id number]  Patient Class: IP- Inpatient   Admission Date: 4/24/2022  Length of Stay: 1 days  Attending Physician: Matthew Ariza MD  Primary Care Provider: Primary Doctor No        Subjective:     Principal Problem:Gastric outlet obstruction        HPI:  Mrs. Vazquez is a 73 yo F who presents with a CC of epigastric pain with N/V since this am. Somewhat of a poor historian.  She has a history of a hiatal hernia repair she thinks 10 years ago at [facility]. No problems until this am.  In the ED- CT imaging showed gastric outlet obstruction from large hiatal hernia. NG tube placed in the ED and surgery was consulted.  She is non-ill appearing. No old records here       Overview/Hospital Course:  Patient admitted to the hospital for eval and treatment of gastric outlet obstruction. GI and surgery consulted. Patient went for an EGD for NG tube placement.       Interval History: No new issues     Review of Systems   Constitutional:  Negative for activity change, appetite change, chills, diaphoresis and fatigue.   HENT:  Negative for congestion, dental problem and facial swelling.    Eyes:  Negative for discharge and itching.   Respiratory:  Negative for apnea, cough, choking, chest tightness, shortness of breath, wheezing and stridor.    Cardiovascular:  Negative for chest pain and leg swelling.   Gastrointestinal:  Positive for abdominal distention, abdominal pain, nausea and vomiting.   Genitourinary:  Negative for difficulty urinating and dyspareunia.   Musculoskeletal:  Negative for arthralgias.   Skin:  Negative for color change.   Neurological:  Negative for dizziness and facial asymmetry.   Psychiatric/Behavioral:  Negative for agitation and behavioral problems.    Objective:     Vital Signs (Most Recent):  Temp: 98.8 °F (37.1 °C) (04/25/22 0341)  Pulse: 98 (04/25/22 0341)  Resp: 20 (04/25/22 0825)  BP: (!) 148/91  (04/25/22 0341)  SpO2: 96 % (04/25/22 0341)   Vital Signs (24h Range):  Temp:  [97.7 °F (36.5 °C)-99.6 °F (37.6 °C)] 98.8 °F (37.1 °C)  Pulse:  [86-98] 98  Resp:  [18-26] 20  SpO2:  [94 %-98 %] 96 %  BP: (142-178)/() 148/91     Weight: 86.3 kg (190 lb 4.1 oz)  Body mass index is 30.71 kg/m².    Intake/Output Summary (Last 24 hours) at 4/25/2022 1012  Last data filed at 4/24/2022 1433  Gross per 24 hour   Intake 50 ml   Output --   Net 50 ml      Physical Exam  Vitals and nursing note reviewed.   Constitutional:       General: She is not in acute distress.     Appearance: Normal appearance. She is normal weight. She is not ill-appearing, toxic-appearing or diaphoretic.   HENT:      Head: Normocephalic and atraumatic.   Eyes:      Conjunctiva/sclera: Conjunctivae normal.   Cardiovascular:      Rate and Rhythm: Normal rate and regular rhythm.      Heart sounds:     No gallop.   Pulmonary:      Effort: Pulmonary effort is normal. No respiratory distress.      Breath sounds: No wheezing.   Abdominal:      General: Bowel sounds are normal. There is distension.      Tenderness: There is no abdominal tenderness. There is no guarding.   Skin:     General: Skin is warm and dry.   Neurological:      Mental Status: She is alert and oriented to person, place, and time.   Psychiatric:         Mood and Affect: Mood normal.         Behavior: Behavior normal.         Thought Content: Thought content normal.       Significant Labs: All pertinent labs within the past 24 hours have been reviewed.  BMP:   Recent Labs   Lab 04/25/22  0447   *      K 3.1*      CO2 26   BUN 10   CREATININE 0.8   CALCIUM 9.5     CBC:   Recent Labs   Lab 04/24/22  0825   WBC 8.22   HGB 12.1   HCT 38.1          Significant Imaging:       Assessment/Plan:      * Gastric outlet obstruction  From Hiatal hernia.  Surgery consulted. NG tube for now. NPO status and IV fluids     Gi and surgery consulted. EGD today for NG tube  placement      HTN (hypertension), benign  Patient stated she was not aware of this diagnosis.      Diabetes mellitus type II, controlled  Was on Metformin at home. Sliding scale now. A1c  No known manifestations       Hypokalemia  Will follow       Hiatal hernia  As above       Obesity Body mass index is 30.71 kg/m².  Weight loss as out patient  VTE Risk Mitigation (From admission, onward)         Ordered     enoxaparin injection 40 mg  Daily         04/24/22 1351                Discharge Planning   BRUNILDA:      Code Status: Not on file   Is the patient medically ready for discharge?:     Reason for patient still in hospital (select all that apply): Patient unstable                     Matthew العراقي MD  Department of Hospital Medicine   [facility] [place]

## 2022-04-25 NOTE — ANESTHESIA PROCEDURE NOTES
Intubation    Date/Time: 4/25/2022 11:37 AM  Performed by: Naomi ARIZA Do, CRNA  Authorized by: Rocky Yanez MD     Intubation:     Induction:  Rapid sequence induction    Intubated:  Postinduction    Mask Ventilation:  N/a    Attempts:  1    Attempted By:  CRNA    Method of Intubation:  Video laryngoscopy    Blade:  Encinas 3    Laryngeal View Grade: Grade I - full view of cords      Difficult Airway Encountered?: No      Complications:  None    Airway Device:  Oral endotracheal tube    Airway Device Size:  7.0    Style/Cuff Inflation:  Cuffed (inflated to minimal occlusive pressure)    Tube secured:  20    Secured at:  The lips    Complicating Factors:  None    Findings Post-Intubation:  BS equal bilateral and atraumatic/condition of teeth unchanged

## 2022-04-25 NOTE — NURSING
Patient will be transferred to [facility] for further eval. Attempted to call report x2 to [phone], no answer    6:25p Attempted to call report to again x2 to the same number that was provided by the transfer center. Will inform charge     7:05p Attempted to call report h a new number that was given by transfer center, [phone], once again wrong number, was transferred over to ICU where room 1006 is, nurse states to call back because she is in the middle of getting report. Transport has been assigned for

## 2022-04-25 NOTE — TRANSFER OF CARE
"Anesthesia Transfer of Care Note    Patient: Vidya Vazquez    Procedure(s) Performed: Procedure(s) (LRB):  EGD (ESOPHAGOGASTRODUODENOSCOPY) (N/A)    Patient location: GI    Anesthesia Type: general    Transport from OR: Transported from OR on room air with adequate spontaneous ventilation    Post pain: adequate analgesia    Post assessment: no apparent anesthetic complications and tolerated procedure well    Post vital signs: stable    Level of consciousness: awake    Nausea/Vomiting: no nausea/vomiting    Complications: none    Transfer of care protocol was followed      Last vitals:   Visit Vitals  BP (!) 148/82   Pulse 96   Temp 37.2 °C (99 °F) (Oral)   Resp 18   Ht 5' 6" (1.676 m)   Wt 86.3 kg (190 lb 4.1 oz)   SpO2 96%   Breastfeeding No   BMI 30.71 kg/m²     "

## 2022-04-25 NOTE — HOSPITAL COURSE
Patient admitted to the hospital for eval and treatment of gastric outlet obstruction. GI and surgery consulted. Patient went for an EGD for NG tube placement.

## 2022-04-26 ENCOUNTER — ANESTHESIA (OUTPATIENT)
Dept: SURGERY | Facility: HOSPITAL | Age: 72
DRG: 326 | End: 2022-04-26
Payer: MEDICAID

## 2022-04-26 PROBLEM — K31.89 GASTRIC VOLVULUS: Status: ACTIVE | Noted: 2022-04-26

## 2022-04-26 LAB
ALBUMIN SERPL BCP-MCNC: 3.3 G/DL (ref 3.5–5.2)
ALP SERPL-CCNC: 90 U/L (ref 55–135)
ALT SERPL W/O P-5'-P-CCNC: 15 U/L (ref 10–44)
ANION GAP SERPL CALC-SCNC: 11 MMOL/L (ref 8–16)
ANION GAP SERPL CALC-SCNC: 14 MMOL/L (ref 8–16)
AST SERPL-CCNC: 25 U/L (ref 10–40)
BASOPHILS # BLD AUTO: 0.05 K/UL (ref 0–0.2)
BASOPHILS NFR BLD: 0.7 % (ref 0–1.9)
BILIRUB SERPL-MCNC: 1.1 MG/DL (ref 0.1–1)
BUN SERPL-MCNC: 13 MG/DL (ref 8–23)
BUN SERPL-MCNC: 13 MG/DL (ref 8–23)
CALCIUM SERPL-MCNC: 9.2 MG/DL (ref 8.7–10.5)
CALCIUM SERPL-MCNC: 9.2 MG/DL (ref 8.7–10.5)
CHLORIDE SERPL-SCNC: 102 MMOL/L (ref 95–110)
CHLORIDE SERPL-SCNC: 104 MMOL/L (ref 95–110)
CO2 SERPL-SCNC: 29 MMOL/L (ref 23–29)
CO2 SERPL-SCNC: 30 MMOL/L (ref 23–29)
CREAT SERPL-MCNC: 0.8 MG/DL (ref 0.5–1.4)
CREAT SERPL-MCNC: 0.8 MG/DL (ref 0.5–1.4)
DIFFERENTIAL METHOD: ABNORMAL
EOSINOPHIL # BLD AUTO: 0.1 K/UL (ref 0–0.5)
EOSINOPHIL NFR BLD: 1.5 % (ref 0–8)
ERYTHROCYTE [DISTWIDTH] IN BLOOD BY AUTOMATED COUNT: 15 % (ref 11.5–14.5)
EST. GFR  (AFRICAN AMERICAN): >60 ML/MIN/1.73 M^2
EST. GFR  (AFRICAN AMERICAN): >60 ML/MIN/1.73 M^2
EST. GFR  (NON AFRICAN AMERICAN): >60 ML/MIN/1.73 M^2
EST. GFR  (NON AFRICAN AMERICAN): >60 ML/MIN/1.73 M^2
GLUCOSE SERPL-MCNC: 109 MG/DL (ref 70–110)
GLUCOSE SERPL-MCNC: 110 MG/DL (ref 70–110)
HCT VFR BLD AUTO: 38.9 % (ref 37–48.5)
HGB BLD-MCNC: 12.2 G/DL (ref 12–16)
IMM GRANULOCYTES # BLD AUTO: 0.02 K/UL (ref 0–0.04)
IMM GRANULOCYTES NFR BLD AUTO: 0.3 % (ref 0–0.5)
LYMPHOCYTES # BLD AUTO: 1.4 K/UL (ref 1–4.8)
LYMPHOCYTES NFR BLD: 18.4 % (ref 18–48)
MAGNESIUM SERPL-MCNC: 1.7 MG/DL (ref 1.6–2.6)
MCH RBC QN AUTO: 26.9 PG (ref 27–31)
MCHC RBC AUTO-ENTMCNC: 31.4 G/DL (ref 32–36)
MCV RBC AUTO: 86 FL (ref 82–98)
MONOCYTES # BLD AUTO: 0.6 K/UL (ref 0.3–1)
MONOCYTES NFR BLD: 7.5 % (ref 4–15)
NEUTROPHILS # BLD AUTO: 5.3 K/UL (ref 1.8–7.7)
NEUTROPHILS NFR BLD: 71.6 % (ref 38–73)
NRBC BLD-RTO: 0 /100 WBC
PHOSPHATE SERPL-MCNC: 2.6 MG/DL (ref 2.7–4.5)
PLATELET # BLD AUTO: 314 K/UL (ref 150–450)
PMV BLD AUTO: 10.8 FL (ref 9.2–12.9)
POCT GLUCOSE: 112 MG/DL (ref 70–110)
POCT GLUCOSE: 122 MG/DL (ref 70–110)
POCT GLUCOSE: 139 MG/DL (ref 70–110)
POTASSIUM SERPL-SCNC: 3.4 MMOL/L (ref 3.5–5.1)
POTASSIUM SERPL-SCNC: 3.5 MMOL/L (ref 3.5–5.1)
PROT SERPL-MCNC: 6.8 G/DL (ref 6–8.4)
RBC # BLD AUTO: 4.54 M/UL (ref 4–5.4)
SODIUM SERPL-SCNC: 145 MMOL/L (ref 136–145)
SODIUM SERPL-SCNC: 145 MMOL/L (ref 136–145)
WBC # BLD AUTO: 7.34 K/UL (ref 3.9–12.7)

## 2022-04-26 PROCEDURE — 27000221 HC OXYGEN, UP TO 24 HOURS

## 2022-04-26 PROCEDURE — 83735 ASSAY OF MAGNESIUM: CPT | Performed by: STUDENT IN AN ORGANIZED HEALTH CARE EDUCATION/TRAINING PROGRAM

## 2022-04-26 PROCEDURE — 25000003 PHARM REV CODE 250: Performed by: NURSE ANESTHETIST, CERTIFIED REGISTERED

## 2022-04-26 PROCEDURE — 25000003 PHARM REV CODE 250: Performed by: STUDENT IN AN ORGANIZED HEALTH CARE EDUCATION/TRAINING PROGRAM

## 2022-04-26 PROCEDURE — 36000710: Performed by: SURGERY

## 2022-04-26 PROCEDURE — 27201423 OPTIME MED/SURG SUP & DEVICES STERILE SUPPLY: Performed by: SURGERY

## 2022-04-26 PROCEDURE — 88302 PR  SURG PATH,LEVEL II: ICD-10-PCS | Mod: 26,,, | Performed by: PATHOLOGY

## 2022-04-26 PROCEDURE — 25000003 PHARM REV CODE 250: Performed by: INTERNAL MEDICINE

## 2022-04-26 PROCEDURE — 20600001 HC STEP DOWN PRIVATE ROOM

## 2022-04-26 PROCEDURE — 94761 N-INVAS EAR/PLS OXIMETRY MLT: CPT

## 2022-04-26 PROCEDURE — 63600175 PHARM REV CODE 636 W HCPCS: Performed by: STUDENT IN AN ORGANIZED HEALTH CARE EDUCATION/TRAINING PROGRAM

## 2022-04-26 PROCEDURE — C1781 MESH (IMPLANTABLE): HCPCS | Performed by: SURGERY

## 2022-04-26 PROCEDURE — 71000033 HC RECOVERY, INTIAL HOUR: Performed by: SURGERY

## 2022-04-26 PROCEDURE — 37000009 HC ANESTHESIA EA ADD 15 MINS: Performed by: SURGERY

## 2022-04-26 PROCEDURE — 63600175 PHARM REV CODE 636 W HCPCS: Performed by: NURSE ANESTHETIST, CERTIFIED REGISTERED

## 2022-04-26 PROCEDURE — 99900035 HC TECH TIME PER 15 MIN (STAT)

## 2022-04-26 PROCEDURE — 71000015 HC POSTOP RECOV 1ST HR: Performed by: SURGERY

## 2022-04-26 PROCEDURE — D9220A PRA ANESTHESIA: ICD-10-PCS | Mod: ANES,,, | Performed by: ANESTHESIOLOGY

## 2022-04-26 PROCEDURE — 84100 ASSAY OF PHOSPHORUS: CPT | Performed by: STUDENT IN AN ORGANIZED HEALTH CARE EDUCATION/TRAINING PROGRAM

## 2022-04-26 PROCEDURE — D9220A PRA ANESTHESIA: Mod: ANES,,, | Performed by: ANESTHESIOLOGY

## 2022-04-26 PROCEDURE — 85025 COMPLETE CBC W/AUTO DIFF WBC: CPT | Performed by: STUDENT IN AN ORGANIZED HEALTH CARE EDUCATION/TRAINING PROGRAM

## 2022-04-26 PROCEDURE — 43282 LAP PARAESOPH HER RPR W/MESH: CPT | Mod: 22,,, | Performed by: SURGERY

## 2022-04-26 PROCEDURE — D9220A PRA ANESTHESIA: Mod: CRNA,,, | Performed by: NURSE ANESTHETIST, CERTIFIED REGISTERED

## 2022-04-26 PROCEDURE — 88302 TISSUE EXAM BY PATHOLOGIST: CPT | Performed by: PATHOLOGY

## 2022-04-26 PROCEDURE — 25000003 PHARM REV CODE 250: Performed by: SURGERY

## 2022-04-26 PROCEDURE — 43282 PR LAP, REPAIR PARAESOPHAGEAL HERNIA, INCL FUNDOPLASTY W/ MESH: ICD-10-PCS | Mod: 22,,, | Performed by: SURGERY

## 2022-04-26 PROCEDURE — 88302 TISSUE EXAM BY PATHOLOGIST: CPT | Mod: 26,,, | Performed by: PATHOLOGY

## 2022-04-26 PROCEDURE — D9220A PRA ANESTHESIA: ICD-10-PCS | Mod: CRNA,,, | Performed by: NURSE ANESTHETIST, CERTIFIED REGISTERED

## 2022-04-26 PROCEDURE — 36000711: Performed by: SURGERY

## 2022-04-26 PROCEDURE — 80053 COMPREHEN METABOLIC PANEL: CPT | Performed by: STUDENT IN AN ORGANIZED HEALTH CARE EDUCATION/TRAINING PROGRAM

## 2022-04-26 PROCEDURE — 36415 COLL VENOUS BLD VENIPUNCTURE: CPT | Performed by: INTERNAL MEDICINE

## 2022-04-26 PROCEDURE — 80048 BASIC METABOLIC PNL TOTAL CA: CPT | Mod: XB | Performed by: INTERNAL MEDICINE

## 2022-04-26 PROCEDURE — 37000008 HC ANESTHESIA 1ST 15 MINUTES: Performed by: SURGERY

## 2022-04-26 DEVICE — REINFORCEMENT BIO TISSUE: Type: IMPLANTABLE DEVICE | Site: ABDOMEN | Status: FUNCTIONAL

## 2022-04-26 RX ORDER — PROPOFOL 10 MG/ML
VIAL (ML) INTRAVENOUS
Status: DISCONTINUED | OUTPATIENT
Start: 2022-04-26 | End: 2022-04-26

## 2022-04-26 RX ORDER — PHENYLEPHRINE HYDROCHLORIDE 10 MG/ML
INJECTION INTRAVENOUS
Status: DISCONTINUED | OUTPATIENT
Start: 2022-04-26 | End: 2022-04-26

## 2022-04-26 RX ORDER — LIDOCAINE HYDROCHLORIDE 20 MG/ML
INJECTION INTRAVENOUS
Status: DISCONTINUED | OUTPATIENT
Start: 2022-04-26 | End: 2022-04-26

## 2022-04-26 RX ORDER — DEXAMETHASONE SODIUM PHOSPHATE 4 MG/ML
INJECTION, SOLUTION INTRA-ARTICULAR; INTRALESIONAL; INTRAMUSCULAR; INTRAVENOUS; SOFT TISSUE
Status: DISCONTINUED | OUTPATIENT
Start: 2022-04-26 | End: 2022-04-26

## 2022-04-26 RX ORDER — NEOSTIGMINE METHYLSULFATE 0.5 MG/ML
INJECTION, SOLUTION INTRAVENOUS
Status: DISCONTINUED | OUTPATIENT
Start: 2022-04-26 | End: 2022-04-26

## 2022-04-26 RX ORDER — HYDROMORPHONE HCL IN 0.9% NACL 6 MG/30 ML
PATIENT CONTROLLED ANALGESIA SYRINGE INTRAVENOUS CONTINUOUS
Status: DISCONTINUED | OUTPATIENT
Start: 2022-04-26 | End: 2022-04-28

## 2022-04-26 RX ORDER — ONDANSETRON 2 MG/ML
INJECTION INTRAMUSCULAR; INTRAVENOUS
Status: DISCONTINUED | OUTPATIENT
Start: 2022-04-26 | End: 2022-04-26

## 2022-04-26 RX ORDER — ACETAMINOPHEN 10 MG/ML
INJECTION, SOLUTION INTRAVENOUS
Status: DISCONTINUED | OUTPATIENT
Start: 2022-04-26 | End: 2022-04-26

## 2022-04-26 RX ORDER — ONDANSETRON 2 MG/ML
4 INJECTION INTRAMUSCULAR; INTRAVENOUS EVERY 6 HOURS
Status: DISCONTINUED | OUTPATIENT
Start: 2022-04-26 | End: 2022-05-01 | Stop reason: HOSPADM

## 2022-04-26 RX ORDER — NALOXONE HCL 0.4 MG/ML
0.02 VIAL (ML) INJECTION
Status: DISCONTINUED | OUTPATIENT
Start: 2022-04-26 | End: 2022-04-28

## 2022-04-26 RX ORDER — PROCHLORPERAZINE EDISYLATE 5 MG/ML
5 INJECTION INTRAMUSCULAR; INTRAVENOUS EVERY 6 HOURS PRN
Status: DISCONTINUED | OUTPATIENT
Start: 2022-04-26 | End: 2022-05-01 | Stop reason: HOSPADM

## 2022-04-26 RX ORDER — SODIUM CHLORIDE 0.9 % (FLUSH) 0.9 %
3 SYRINGE (ML) INJECTION
Status: DISCONTINUED | OUTPATIENT
Start: 2022-04-26 | End: 2022-05-01 | Stop reason: HOSPADM

## 2022-04-26 RX ORDER — SCOLOPAMINE TRANSDERMAL SYSTEM 1 MG/1
1 PATCH, EXTENDED RELEASE TRANSDERMAL
Status: DISCONTINUED | OUTPATIENT
Start: 2022-04-26 | End: 2022-05-01 | Stop reason: HOSPADM

## 2022-04-26 RX ORDER — BUPIVACAINE HYDROCHLORIDE AND EPINEPHRINE 2.5; 5 MG/ML; UG/ML
INJECTION, SOLUTION EPIDURAL; INFILTRATION; INTRACAUDAL; PERINEURAL
Status: DISCONTINUED | OUTPATIENT
Start: 2022-04-26 | End: 2022-04-26 | Stop reason: HOSPADM

## 2022-04-26 RX ORDER — LIDOCAINE HYDROCHLORIDE 10 MG/ML
INJECTION, SOLUTION EPIDURAL; INFILTRATION; INTRACAUDAL; PERINEURAL
Status: DISCONTINUED | OUTPATIENT
Start: 2022-04-26 | End: 2022-04-26 | Stop reason: HOSPADM

## 2022-04-26 RX ORDER — CEFAZOLIN SODIUM 1 G/3ML
INJECTION, POWDER, FOR SOLUTION INTRAMUSCULAR; INTRAVENOUS
Status: DISCONTINUED | OUTPATIENT
Start: 2022-04-26 | End: 2022-04-26

## 2022-04-26 RX ORDER — DIPHENHYDRAMINE HYDROCHLORIDE 50 MG/ML
12.5 INJECTION INTRAMUSCULAR; INTRAVENOUS EVERY 4 HOURS PRN
Status: DISCONTINUED | OUTPATIENT
Start: 2022-04-26 | End: 2022-04-28

## 2022-04-26 RX ORDER — ROCURONIUM BROMIDE 10 MG/ML
INJECTION, SOLUTION INTRAVENOUS
Status: DISCONTINUED | OUTPATIENT
Start: 2022-04-26 | End: 2022-04-26

## 2022-04-26 RX ORDER — MIDAZOLAM HYDROCHLORIDE 1 MG/ML
INJECTION, SOLUTION INTRAMUSCULAR; INTRAVENOUS
Status: DISCONTINUED | OUTPATIENT
Start: 2022-04-26 | End: 2022-04-26

## 2022-04-26 RX ORDER — SUCCINYLCHOLINE CHLORIDE 20 MG/ML
INJECTION INTRAMUSCULAR; INTRAVENOUS
Status: DISCONTINUED | OUTPATIENT
Start: 2022-04-26 | End: 2022-04-26

## 2022-04-26 RX ORDER — FENTANYL CITRATE 50 UG/ML
INJECTION, SOLUTION INTRAMUSCULAR; INTRAVENOUS
Status: DISCONTINUED | OUTPATIENT
Start: 2022-04-26 | End: 2022-04-26

## 2022-04-26 RX ADMIN — Medication: at 05:04

## 2022-04-26 RX ADMIN — ROCURONIUM BROMIDE 10 MG: 10 INJECTION, SOLUTION INTRAVENOUS at 02:04

## 2022-04-26 RX ADMIN — SODIUM CHLORIDE: 0.9 INJECTION, SOLUTION INTRAVENOUS at 01:04

## 2022-04-26 RX ADMIN — PROPOFOL 150 MG: 10 INJECTION, EMULSION INTRAVENOUS at 01:04

## 2022-04-26 RX ADMIN — PHENYLEPHRINE HYDROCHLORIDE 100 MCG: 10 INJECTION INTRAVENOUS at 04:04

## 2022-04-26 RX ADMIN — FENTANYL CITRATE 50 MCG: 50 INJECTION, SOLUTION INTRAMUSCULAR; INTRAVENOUS at 01:04

## 2022-04-26 RX ADMIN — FENTANYL CITRATE 50 MCG: 50 INJECTION, SOLUTION INTRAMUSCULAR; INTRAVENOUS at 04:04

## 2022-04-26 RX ADMIN — ONDANSETRON HYDROCHLORIDE 4 MG: 2 INJECTION INTRAMUSCULAR; INTRAVENOUS at 05:04

## 2022-04-26 RX ADMIN — ROCURONIUM BROMIDE 30 MG: 10 INJECTION, SOLUTION INTRAVENOUS at 02:04

## 2022-04-26 RX ADMIN — FENTANYL CITRATE 50 MCG: 50 INJECTION, SOLUTION INTRAMUSCULAR; INTRAVENOUS at 03:04

## 2022-04-26 RX ADMIN — PHENYLEPHRINE HYDROCHLORIDE 200 MCG: 10 INJECTION INTRAVENOUS at 03:04

## 2022-04-26 RX ADMIN — GLYCOPYRROLATE 0.4 MG: 0.2 INJECTION, SOLUTION INTRAMUSCULAR; INTRAVITREAL at 05:04

## 2022-04-26 RX ADMIN — CEFAZOLIN 2 G: 330 INJECTION, POWDER, FOR SOLUTION INTRAMUSCULAR; INTRAVENOUS at 01:04

## 2022-04-26 RX ADMIN — SUCCINYLCHOLINE CHLORIDE 120 MG: 20 INJECTION, SOLUTION INTRAMUSCULAR; INTRAVENOUS at 01:04

## 2022-04-26 RX ADMIN — SODIUM CHLORIDE: 0.9 INJECTION, SOLUTION INTRAVENOUS at 05:04

## 2022-04-26 RX ADMIN — NEOSTIGMINE METHYLSULFATE 4 MG: 0.5 INJECTION INTRAVENOUS at 05:04

## 2022-04-26 RX ADMIN — ROCURONIUM BROMIDE 10 MG: 10 INJECTION, SOLUTION INTRAVENOUS at 04:04

## 2022-04-26 RX ADMIN — ACETAMINOPHEN 1000 MG: 10 INJECTION INTRAVENOUS at 04:04

## 2022-04-26 RX ADMIN — ROCURONIUM BROMIDE 10 MG: 10 INJECTION, SOLUTION INTRAVENOUS at 03:04

## 2022-04-26 RX ADMIN — ROCURONIUM BROMIDE 10 MG: 10 INJECTION, SOLUTION INTRAVENOUS at 01:04

## 2022-04-26 RX ADMIN — PHENYLEPHRINE HYDROCHLORIDE 200 MCG: 10 INJECTION INTRAVENOUS at 02:04

## 2022-04-26 RX ADMIN — DEXAMETHASONE SODIUM PHOSPHATE 4 MG: 4 INJECTION, SOLUTION INTRAMUSCULAR; INTRAVENOUS at 05:04

## 2022-04-26 RX ADMIN — SCOPALAMINE 1 PATCH: 1 PATCH, EXTENDED RELEASE TRANSDERMAL at 09:04

## 2022-04-26 RX ADMIN — SODIUM CHLORIDE: 0.9 INJECTION, SOLUTION INTRAVENOUS at 03:04

## 2022-04-26 RX ADMIN — ONDANSETRON 4 MG: 2 INJECTION INTRAMUSCULAR; INTRAVENOUS at 06:04

## 2022-04-26 RX ADMIN — SODIUM CHLORIDE, SODIUM GLUCONATE, SODIUM ACETATE, POTASSIUM CHLORIDE, MAGNESIUM CHLORIDE, SODIUM PHOSPHATE, DIBASIC, AND POTASSIUM PHOSPHATE: .53; .5; .37; .037; .03; .012; .00082 INJECTION, SOLUTION INTRAVENOUS at 03:04

## 2022-04-26 RX ADMIN — MIDAZOLAM HYDROCHLORIDE 1 MG: 1 INJECTION, SOLUTION INTRAMUSCULAR; INTRAVENOUS at 01:04

## 2022-04-26 RX ADMIN — LIDOCAINE HYDROCHLORIDE 80 MG: 20 INJECTION, SOLUTION INTRAVENOUS at 01:04

## 2022-04-26 NOTE — NURSING
Patient escorted by [facility] EMS service for transfer to [facility]. Patient accompanied by daughter. No apparent distress noted.

## 2022-04-26 NOTE — HPI
"Patient is 73 yo F with hx of GERD, DM2 and past hiatal hernia repair approx 10 yrs ago who presented to OSH on 4/24 with worsening abd pain, nausea, and emesis and was eventually found to have a gastric volvulus, and was transferred to [facility] for repair. She notes she had worsening reflux and some emesis on and off for the 2 weeks prior to presentation, but they all were short lived and resolved. She then developed severe epigastric pain on 4/24, and "just didn't feel well" and presented ot ED. She was found on CT scan to have concern for GOO. An EGD was performed on 4/25 which showed a tortuous stomach consistent with volvulus, and no signs of necrosis. An NGT was left, and she felt much better after completion. She was then transferred here for surgery.  "

## 2022-04-26 NOTE — OP NOTE
DATE OF PROCEDURE: 04/26/2022   SERVICE: Bariatric Surgery.   Surgeon(s) and Role:     * David Mike Jr., MD - Primary     * Oleksandr Teixeira MD - Resident - Assisting  PREOPERATIVE DIAGNOSES: Type III Incarcerated Hiatal Hernia and significant gastroesophageal reflux disease and Volvulus  POSTOPERATIVE DIAGNOSES: Type III Incarcerated Hiatal Hernia and significant gastroesophageal reflux disease and Volvulus  PROCEDURE: Laparoscopic repair of recurrent incarcerated type III hiatal hernia with mesh and David fundoplication and EGD. (Mod 22)  ANESTHESIA: General endotracheal and local.   DESCRIPTION OF PROCEDURE: The patient was taken to the Operating Room and   placed under general anesthesia and prepped and draped in sterile fashion. At   this time, an incision was made 15 cm from the xiphoid, 5 cm to the left of   midline after infiltrating with local anesthetic. Using the Optiview trocar,   intraabdominal access was obtained under direct visualization without   difficulty. Pneumoperitoneum was obtained. Further ports were then placed   including bilateral anterior axillary subcostal 5-mm ports, a midclavicular   subcostal port on the right. These were all 5-mm ports and a second 12-mm port   approximately 15 cm from the xiphoid just to the right of midline. These were   all placed under direct visualization after infiltrating with local anesthetic.  A liver retractor was placed and the patient was placed in a steep reverse Trendelenburg and we began dissection. The distal stomach was noted to be volvulized and incarcerated in the hiatus.  This part was able to be easily reduced and a large hiatal hernia was noted.  The stomach was in good condition.  We began at the pars flaccida and opened the to the right gino.  There were dense adhesions.    We dissected the right gino and continued anterior dissection including the phrenoesophageal ligament with Harmonic scalpel and dissected anteriorly around the gino  and laterally as well on the left side.  The hernia sac was thickened and inflamed and difficult to dissect.  The short gastrics had previously been taken for the patients previous wrap.  We dissected the hiatal opening posteriorly again with Harmonic scalpel. The posterior was noted again to have dense adhesions and dissection was tedious.  We carefully dissected until we circumferential dissection.  We proceeded to carefully dissect this until the stomach was completely reduced.  We then took down what appeared to be a previous anterior wrap carefully.  Once we completed reduction of the hernia and noted that there was significant   intraabdominal length of the esophagus with no tension pulling this into the   abdominal cavity and approximately 3 to 4 cm of   intra-abdominal esophagus. We then proceeded with the closure of the hiatal hernia. This was done   with 2-0 Surgidac suture on an EndoStitch device and with Bio-A pledgets and   closing the posterior aspect with 4 sutures and anteriorly with 2 sutures.  Once this was closed, we proceeded with placement of the mesh. A piece of BIO-A   hiatal hernia mesh was placed retro-esophageally after being cut to size and was   tacked in place using 2-0 Surgidac suture x 3 on the gino on either side and   posteriorly to cover the hiatal closure. This completed reduction of the hernia, closure of the hiatus and placement of mesh.  At this time we proceed with a David fundoplication, the greater curve was previously freed.  We then pulled the fundus anteriorly. We tacked  from the fundus to the esophagus and to the crura on the left side and then again   fundus to gino to esophagus on the right side and similarly on the left. Two further sutures were placed on  either side from fundus to esophagus to complete a David wrap. Once   we completed, the wrap appeared to be in very good condition and we proceeded   with an EGD. The scope was placed in the oropharynx, got down into  the   esophagus into the stomach through the wrap. We noted that the GE   junction was able to be easily traversed without significant tightness around   the scope. There was significant food still in the stomach.  A retroflexion view showed a good wrap in good condition. Once this   was done, we suctioned the air from the stomach and reinspected   laparoscopically. The wrap was in good condition from both the EGD and   laparoscopic view and we proceeded with closure. At this time, the liver   retractor was removed.  The ports were removed and the skin of all five port sites were closed with 4-0 Monocryl suture in a subcuticular fashion. Mastisol and Steri-Strips were   placed. The patient was allowed to awake from general anesthesia and   transferred to bed for transfer to Recovery.   COMPLICATIONS: None.   SPONGE COUNT: Correct.   BLOOD LOSS: 15 mL.   FLUIDS: Per Anesthesia.   BLOOD GIVEN: None.   DRAINS: None.   SPECIMENS: Hernia Sac.  CONDITION OF PATIENT: Good.   I was present for the entire procedure.

## 2022-04-26 NOTE — BRIEF OP NOTE
[facility]  Brief Operative Note    SUMMARY     Surgery Date: 4/26/2022     Surgeon(s) and Role:     * David Mike Jr., MD - Primary     * Oleksandr Teixeira MD - Resident - Assisting     * Conor Denis MD - Consult    Pre-op Diagnosis:  Hiatal hernia [K44.9]    Post-op Diagnosis:  Post-Op Diagnosis Codes:     * Hiatal hernia [K44.9]    Procedure(s) (LRB):  REPAIR, HERNIA, HIATAL, LAPAROSCOPIC (N/A)  EGD (ESOPHAGOGASTRODUODENOSCOPY) (N/A)  FUNDOPLICATION, LAPAROSCOPIC, TOUPET (N/A)    Anesthesia: General    Operative Findings: Large hiatal hernia repair and gastric volvulus.  Closed crura and fadi fundoplication.  EGD passed easily and wrap looked good.  See op note for details.    Estimated Blood Loss: Minimal    Estimated Blood Loss has been documented.         Specimens:   Specimen (24h ago, onward)             Start     Ordered    04/26/22 1718  Specimen to Pathology, Surgery General Surgery  Once        Comments: Pre-op Diagnosis: Hiatal hernia [K44.9]Procedure(s):REPAIR, HERNIA, HIATAL, LAPAROSCOPIC Number of specimens: 1Name of specimens: 1. Hernia sac     References:    Click here for ordering Quick Tip   Question Answer Comment   Procedure Type: General Surgery    Specimen Class: Routine/Screening    Release to patient Immediate        04/26/22 3302                [id number]

## 2022-04-26 NOTE — ANESTHESIA PROCEDURE NOTES
Intubation    Date/Time: 4/26/2022 1:42 PM  Performed by: Khai Stanton CRNA  Authorized by: Leonardo Koehler MD     Intubation:     Induction:  Intravenous    Intubated:  Postinduction    Mask Ventilation:  N/a    Attempts:  1    Attempted By:  CRNA    Method of Intubation:  Video laryngoscopy    Blade:  Encinas 3    Laryngeal View Grade: Grade I - full view of cords      Difficult Airway Encountered?: No      Complications:  None    Airway Device:  Oral endotracheal tube    Airway Device Size:  7.5    Style/Cuff Inflation:  Cuffed (inflated to minimal occlusive pressure)    Tube secured:  21    Secured at:  The lips    Placement Verified By:  Capnometry    Complicating Factors:  None    Findings Post-Intubation:  BS equal bilateral and atraumatic/condition of teeth unchanged

## 2022-04-26 NOTE — NURSING
Report given to nurse, Alaina, at [facility] that will be assuming care for this patient. [facility]

## 2022-04-26 NOTE — H&P
"[facility]  General Surgery  History & Physical    Patient Name: Vidya Vazquez  MRN: [id number]  Admission Date: 4/24/2022  Attending Physician: David Mike Jr.,*   Primary Care Provider: Primary Doctor No    Patient information was obtained from patient, relative(s), past medical records and ER records.     Subjective:     Chief Complaint/Reason for Admission: Gastric Volvulus    History of Present Illness: Patient is 73 yo F with hx of GERD, DM2 and past hiatal hernia repair approx 10 yrs ago who presented to OSH on 4/24 with worsening abd pain, nausea, and emesis and was eventually found to have a gastric volvulus, and [place] [facility] [place]. She notes she had worsening reflux and some emesis on and off for the 2 weeks prior to presentation, but they all were short lived and resolved. She then developed severe epigastric pain on 4/24, and "just didn't feel well" and presented ot ED. She was found on CT scan to have concern for GOO. An EGD was performed on 4/25 which showed a tortuous stomach consistent with volvulus, and no signs of necrosis. An NGT was left, and she felt much better after completion. She was then transferred here for surgery.      No current facility-administered medications on file prior to encounter.     No current outpatient medications on file prior to encounter.       Review of patient's allergies indicates:  No Known Allergies    History reviewed. No pertinent past medical history.  Past Surgical History:   Procedure Laterality Date    ESOPHAGOGASTRODUODENOSCOPY N/A 4/25/2022    Procedure: EGD (ESOPHAGOGASTRODUODENOSCOPY);  Surgeon: Cynthia Sumner MD;  [facility]: [facility];  Service: Gastroenterology;  Laterality: N/A;     Family History    None       Tobacco Use    Smoking status: Not on file    Smokeless tobacco: Not on file   Substance and Sexual Activity    Alcohol use: Not on file    Drug use: Not on file    Sexual activity: Not on file     Review of " Systems   Constitutional:  Negative for chills and fever.   HENT:  Negative for sore throat and trouble swallowing.    Eyes:  Negative for discharge and redness.   Respiratory:  Negative for chest tightness and shortness of breath.    Cardiovascular:  Negative for chest pain.   Gastrointestinal:  Positive for abdominal pain, nausea and vomiting. Negative for constipation and diarrhea.   Genitourinary:  Negative for dysuria.   Musculoskeletal:  Negative for back pain and neck pain.   Skin:  Negative for color change.   Allergic/Immunologic: Negative for immunocompromised state.   Neurological:  Negative for dizziness and headaches.   Hematological:  Negative for adenopathy.   Objective:     Vital Signs (Most Recent):  Temp: 98.6 °F (37 °C) (04/26/22 0735)  Pulse: 79 (04/26/22 0735)  Resp: 17 (04/26/22 0735)  BP: 133/68 (04/26/22 0735)  SpO2: 95 % (04/26/22 0735) Vital Signs (24h Range):  Temp:  [96.8 °F (36 °C)-99.3 °F (37.4 °C)] 98.6 °F (37 °C)  Pulse:  [] 79  Resp:  [15-20] 17  SpO2:  [91 %-99 %] 95 %  BP: (114-178)/(68-93) 133/68     Weight: 86.3 kg (190 lb 4.1 oz)  Body mass index is 30.71 kg/m².    Physical Exam  Vitals and nursing note reviewed.   Constitutional:       General: She is not in acute distress.     Appearance: She is well-developed. She is not diaphoretic.   HENT:      Head: Normocephalic and atraumatic.      Nose:      Comments: NGT in place draining NBNB thin gastric contents  Eyes:      Pupils: Pupils are equal, round, and reactive to light.   Cardiovascular:      Rate and Rhythm: Normal rate and regular rhythm.   Pulmonary:      Effort: Pulmonary effort is normal. No respiratory distress.   Abdominal:      General: There is no distension.      Palpations: Abdomen is soft.      Tenderness: There is no abdominal tenderness. There is no guarding.      Comments: Abd soft, non tender, previous laparoscopic incisions well healed   Musculoskeletal:         General: Normal range of motion.       Cervical back: Normal range of motion and neck supple.   Skin:     General: Skin is warm and dry.   Neurological:      Mental Status: She is alert and oriented to person, place, and time.   Psychiatric:         Behavior: Behavior normal.         Thought Content: Thought content normal.         Judgment: Judgment normal.       Significant Labs:  CBC:   Recent Labs   Lab 04/24/22  0825   WBC 8.22   RBC 4.51   HGB 12.1   HCT 38.1      MCV 85   MCH 26.8*   MCHC 31.8*     CMP:   Recent Labs   Lab 04/24/22 0825 04/25/22  0447   * 141*   CALCIUM 9.7 9.5   ALBUMIN 4.0  --    PROT 8.3  --     143   K 3.4* 3.1*   CO2 25 26    104   BUN 9 10   CREATININE 0.8 0.8   ALKPHOS 113  --    ALT 17  --    AST 18  --    BILITOT 0.5  --        Significant Diagnostics:  I have reviewed all pertinent imaging results/findings within the past 24 hours.  CT shows a large dilated stomach with a GOO 2/2 gastric volvulus    Assessment/Plan:     * Gastric volvulus  Patient is 73 yo F with GOO 2/2 gastric volvulus    Will plan for laparoscopic reduction and HH repair today  Consent obtained  Daily labs  NPO  NGT to LIWS  IVF  PRN pain and nausea medications  DVT ppx      VTE Risk Mitigation (From admission, onward)         Ordered     enoxaparin injection 40 mg  Daily         04/24/22 3981                Timmy Farias MD  General Surgery  Don Rodriguez  JOE

## 2022-04-26 NOTE — PLAN OF CARE
Problem: Diabetes Comorbidity  Goal: Blood Glucose Level Within Targeted Range  Outcome: Ongoing, Progressing     Problem: Pain Acute  Goal: Acceptable Pain Control and Functional Ability  Outcome: Ongoing, Progressing     Problem: Nausea and Vomiting  Goal: Fluid and Electrolyte Balance  Outcome: Ongoing, Progressing

## 2022-04-26 NOTE — PLAN OF CARE
Patient involved in care. VS wnl and stable. Incisions x 5 steri strips CDI. AxO. Pain pump in use + IVF. NGT to [facility]. Ready for transfer.

## 2022-04-26 NOTE — SUBJECTIVE & OBJECTIVE
No current facility-administered medications on file prior to encounter.     No current outpatient medications on file prior to encounter.       Review of patient's allergies indicates:  No Known Allergies    History reviewed. No pertinent past medical history.  Past Surgical History:   Procedure Laterality Date    ESOPHAGOGASTRODUODENOSCOPY N/A 4/25/2022    Procedure: EGD (ESOPHAGOGASTRODUODENOSCOPY);  Surgeon: Cynthia Sumner MD;  Location: [facility];  Service: Gastroenterology;  Laterality: N/A;     Family History    None       Tobacco Use    Smoking status: Not on file    Smokeless tobacco: Not on file   Substance and Sexual Activity    Alcohol use: Not on file    Drug use: Not on file    Sexual activity: Not on file     Review of Systems   Constitutional:  Negative for chills and fever.   HENT:  Negative for sore throat and trouble swallowing.    Eyes:  Negative for discharge and redness.   Respiratory:  Negative for chest tightness and shortness of breath.    Cardiovascular:  Negative for chest pain.   Gastrointestinal:  Positive for abdominal pain, nausea and vomiting. Negative for constipation and diarrhea.   Genitourinary:  Negative for dysuria.   Musculoskeletal:  Negative for back pain and neck pain.   Skin:  Negative for color change.   Allergic/Immunologic: Negative for immunocompromised state.   Neurological:  Negative for dizziness and headaches.   Hematological:  Negative for adenopathy.   Objective:     Vital Signs (Most Recent):  Temp: 98.6 °F (37 °C) (04/26/22 0735)  Pulse: 79 (04/26/22 0735)  Resp: 17 (04/26/22 0735)  BP: 133/68 (04/26/22 0735)  SpO2: 95 % (04/26/22 0735) Vital Signs (24h Range):  Temp:  [96.8 °F (36 °C)-99.3 °F (37.4 °C)] 98.6 °F (37 °C)  Pulse:  [] 79  Resp:  [15-20] 17  SpO2:  [91 %-99 %] 95 %  BP: (114-178)/(68-93) 133/68     Weight: 86.3 kg (190 lb 4.1 oz)  Body mass index is 30.71 kg/m².    Physical Exam  Vitals and nursing note reviewed.   Constitutional:        General: She is not in acute distress.     Appearance: She is well-developed. She is not diaphoretic.   HENT:      Head: Normocephalic and atraumatic.      Nose:      Comments: NGT in place draining NBNB thin gastric contents  Eyes:      Pupils: Pupils are equal, round, and reactive to light.   Cardiovascular:      Rate and Rhythm: Normal rate and regular rhythm.   Pulmonary:      Effort: Pulmonary effort is normal. No respiratory distress.   Abdominal:      General: There is no distension.      Palpations: Abdomen is soft.      Tenderness: There is no abdominal tenderness. There is no guarding.      Comments: Abd soft, non tender, previous laparoscopic incisions well healed   Musculoskeletal:         General: Normal range of motion.      Cervical back: Normal range of motion and neck supple.   Skin:     General: Skin is warm and dry.   Neurological:      Mental Status: She is alert and oriented to person, place, and time.   Psychiatric:         Behavior: Behavior normal.         Thought Content: Thought content normal.         Judgment: Judgment normal.       Significant Labs:  CBC:   Recent Labs   Lab 04/24/22  0825   WBC 8.22   RBC 4.51   HGB 12.1   HCT 38.1      MCV 85   MCH 26.8*   MCHC 31.8*     CMP:   Recent Labs   Lab 04/24/22  0825 04/25/22  0447   * 141*   CALCIUM 9.7 9.5   ALBUMIN 4.0  --    PROT 8.3  --     143   K 3.4* 3.1*   CO2 25 26    104   BUN 9 10   CREATININE 0.8 0.8   ALKPHOS 113  --    ALT 17  --    AST 18  --    BILITOT 0.5  --        Significant Diagnostics:  I have reviewed all pertinent imaging results/findings within the past 24 hours.  CT shows a large dilated stomach with a GOO 2/2 gastric volvulus

## 2022-04-26 NOTE — NURSING TRANSFER
Nursing Transfer Note      4/26/2022     Reason patient is being transferred: inpatient    Transfer To: 1006    Transfer via stretcher    Transfer with ivf & cardiac monitoring    Transported by pct    Medicines sent: n/a    Any special needs or follow-up needed: n/a    Chart send with patient: Yes    Notified: daughter    Patient reassessed at: 4/26/22

## 2022-04-26 NOTE — TRANSFER OF CARE
"Anesthesia Transfer of Care Note    Patient: Vidya Vazquez    Procedure(s) Performed: Procedure(s) (LRB):  REPAIR, HERNIA, HIATAL, LAPAROSCOPIC (N/A)  EGD (ESOPHAGOGASTRODUODENOSCOPY) (N/A)  FUNDOPLICATION, LAPAROSCOPIC, TOUPET (N/A)    Patient location: PACU    Anesthesia Type: general    Transport from OR: Transported from OR on 6-10 L/min O2 by face mask with adequate spontaneous ventilation    Post pain: adequate analgesia    Post assessment: no apparent anesthetic complications and tolerated procedure well    Post vital signs: stable    Level of consciousness: awake and alert    Nausea/Vomiting: no nausea/vomiting    Complications: none    Transfer of care protocol was followed      Last vitals:   Visit Vitals  BP (!) 152/87 (BP Location: Left arm, Patient Position: Lying)   Pulse 83   Temp 37 °C (98.6 °F) (Oral)   Resp 18   Ht 5' 6" (1.676 m)   Wt 86.3 kg (190 lb 4.1 oz)   SpO2 98%   Breastfeeding No   BMI 30.71 kg/m²     "

## 2022-04-26 NOTE — ANESTHESIA PREPROCEDURE EVALUATION
[facility]  Anesthesia Pre-Operative Evaluation         Patient Name: Vidya Vazquez  YOB: 1950  MRN: [id number]    SUBJECTIVE:     Pre-operative evaluation for Procedure(s) (LRB):  REPAIR, HERNIA, HIATAL, LAPAROSCOPIC (N/A)     04/25/2022    Vidya Vazquez is a 72 y.o. female w/ a significant PMHx of HTN, DM2, and hiatal hernia. Currently being followed by GI.     EGD today showed large amount of retained gastric contents (over 3L) which was suctioned. Gastric body appeared torsed on itself concerning for vovulus. Surrounding mucosa was not ischemic appearing but was erythematous. Unable to pass pylorus with the gastroscope due to looping of the scope. NGT placed in stomach and confirmed placement endoscopically.    Patient now presents for the above procedure(s).      LDA: None documented.       Peripheral IV - Single Lumen 04/24/22 0826 20 G Left Antecubital (Active)   Site Assessment Clean;Dry;Intact;No redness;No swelling 04/25/22 1939   Extremity Assessment Distal to IV Warm;Dry 04/25/22 1939   Line Status Infusing 04/25/22 1939   Dressing Status Clean;Dry;Intact 04/25/22 1939   Dressing Intervention Integrity maintained 04/25/22 1939   Reason Not Rotated Not due 04/25/22 1939   Number of days: 1            NG/OG Tube 04/25/22 1200 Left nostril (Active)   Tolerance no signs/symptoms of discomfort 04/25/22 1939   Securement secured to nostril center 04/25/22 1939   Clamp Status/Tolerance unclamped 04/25/22 1221   Suction Setting/Drainage Method suction at;low;intermittent setting 04/25/22 1939   Insertion Site Appearance no redness, warmth, tenderness, skin breakdown, drainage 04/25/22 1939   Number of days: 0       Prev airway: None documented.    Drips: None documented.      Patient Active Problem List   Diagnosis    Gastric outlet obstruction    Hiatal hernia    Hypokalemia    Diabetes mellitus type II, controlled    HTN (hypertension), benign        Review of patient's allergies indicates:  No Known Allergies    Current Inpatient Medications:      Current Facility-Administered Medications on File Prior to Encounter   Medication Dose Route Frequency Provider Last Rate Last Admin    0.9%  NaCl infusion   Intravenous Continuous Matthew Ariza MD 75 mL/hr at 04/25/22 1423 New Bag at 04/25/22 1423    dextrose 50% injection 12.5 g  12.5 g Intravenous PRN Matthew Ariza MD        enoxaparin injection 40 mg  40 mg Subcutaneous Daily Matthew Ariza MD   40 mg at 04/24/22 1647    glucagon (human recombinant) injection 1 mg  1 mg Intramuscular PRN Matthew Ariza MD        hydrALAZINE injection 10 mg  10 mg Intravenous Q6H PRN Matthew Ariza MD   10 mg at 04/24/22 1822    insulin aspart U-100 pen 0-5 Units  0-5 Units Subcutaneous Q6H PRN Matthew Ariza MD        LIDOcaine (PF) 20 mg/mL (2%) 20 mg/mL (2 %) injection             morphine injection 2 mg  2 mg Intravenous Q4H PRN Matthew Ariza MD   2 mg at 04/25/22 0825    ondansetron injection 8 mg  8 mg Intravenous Q4H PRN MARLA Carney, ANP   8 mg at 04/25/22 0832    propofoL (DIPRIVAN) 10 mg/mL infusion              No current outpatient medications on file prior to encounter.       No past surgical history on file.    Social History     Socioeconomic History    Marital status:        OBJECTIVE:     Vital Signs Range (Last 24H):  Temp:  [36.7 °C (98.1 °F)-37.4 °C (99.3 °F)]   Pulse:  [82-99]   Resp:  [15-20]   BP: (133-178)/(72-93)   SpO2:  [91 %-99 %]       Significant Labs:  Lab Results   Component Value Date    WBC 8.22 04/24/2022    HGB 12.1 04/24/2022    HCT 38.1 04/24/2022     04/24/2022    ALT 17 04/24/2022    AST 18 04/24/2022     04/25/2022    K 3.1 (L) 04/25/2022     04/25/2022    CREATININE 0.8 04/25/2022    BUN 10 04/25/2022    CO2 26 04/25/2022    HGBA1C 6.4 (H) 04/24/2022       Diagnostic Studies: No relevant  studies.    EKG:     Results for orders placed or performed during the hospital encounter of 04/24/22   EKG 12-lead    Collection Time: 04/24/22  7:51 AM    Narrative    Test Reason : R07.9,    Vent. Rate : 104 BPM     Atrial Rate : 104 BPM     P-R Int : 132 ms          QRS Dur : 082 ms      QT Int : 364 ms       P-R-T Axes : 056 070 045 degrees     QTc Int : 478 ms    Sinus tachycardia with occasional Premature ventricular complexes  Nonspecific T wave abnormality  Abnormal ECG  No previous ECGs available  Confirmed by Dionicio Starr MD ([id number]) on 4/24/2022 12:15:46 PM    Referred By: PENELOPE   SELF           Confirmed By:Dionicio Starr MD         2D ECHO:  No results found for this or any previous visit.      ASSESSMENT/PLAN:         Pre-op Assessment     I have reviewed the Nursing Notes.       Review of Systems  Anesthesia Hx:  No problems with previous Anesthesia  History of prior surgery of interest to airway management or planning: Denies Family Hx of Anesthesia complications.   Denies Personal Hx of Anesthesia complications.   Social:  Non-Smoker    Cardiovascular:   Denies Pacemaker. Hypertension     Pulmonary:  Pulmonary Normal    Renal/:  Renal/ Normal     Hepatic/GI:   No Bowel Prep. Denies PUD. Hiatal Hernia, Denies Liver Disease. Denies Hepatitis. Likely gastric outlet obstruction   Neurological:  Neurology Normal    Endocrine:   Diabetes, type 2 Denies Hypothyroidism. Denies Hyperthyroidism.  Obesity / BMI > 30      Physical Exam  General: Well nourished, Cooperative, Alert and Oriented    Airway:  Mallampati: III   Mouth Opening: Normal  TM Distance: Normal  Tongue: Normal  Neck ROM: Normal ROM    Dental:  Edentulous    Chest/Lungs:  Clear to auscultation    Heart:  Rate: Normal        Anesthesia Plan  Type of Anesthesia, risks & benefits discussed:    Anesthesia Type: Gen ETT  Intra-op Monitoring Plan: Standard ASA Monitors  Post Op Pain Control Plan: multimodal analgesia  Induction:  IV  Airway  Plan: Direct and Video  Informed Consent: Informed consent signed with the Patient and all parties understand the risks and agree with anesthesia plan.  All questions answered.   ASA Score: 3    Ready For Surgery From Anesthesia Perspective.     .

## 2022-04-26 NOTE — ASSESSMENT & PLAN NOTE
Patient is 71 yo F with GOO 2/2 gastric volvulus    Will plan for laparoscopic reduction and HH repair today  Consent obtained  Daily labs  NPO  NGT to LIWS  IVF  PRN pain and nausea medications  DVT ppx

## 2022-04-27 PROBLEM — I48.92 ATRIAL FLUTTER: Status: ACTIVE | Noted: 2022-04-27

## 2022-04-27 LAB
ALBUMIN SERPL BCP-MCNC: 3 G/DL (ref 3.5–5.2)
ALBUMIN SERPL BCP-MCNC: 3.3 G/DL (ref 3.5–5.2)
ALP SERPL-CCNC: 85 U/L (ref 55–135)
ALP SERPL-CCNC: 85 U/L (ref 55–135)
ALT SERPL W/O P-5'-P-CCNC: 23 U/L (ref 10–44)
ALT SERPL W/O P-5'-P-CCNC: 29 U/L (ref 10–44)
ANION GAP SERPL CALC-SCNC: 10 MMOL/L (ref 8–16)
ANION GAP SERPL CALC-SCNC: 13 MMOL/L (ref 8–16)
ANION GAP SERPL CALC-SCNC: 13 MMOL/L (ref 8–16)
ANION GAP SERPL CALC-SCNC: 15 MMOL/L (ref 8–16)
AST SERPL-CCNC: 35 U/L (ref 10–40)
AST SERPL-CCNC: 51 U/L (ref 10–40)
BASOPHILS # BLD AUTO: 0.01 K/UL (ref 0–0.2)
BASOPHILS NFR BLD: 0.1 % (ref 0–1.9)
BILIRUB SERPL-MCNC: 0.5 MG/DL (ref 0.1–1)
BILIRUB SERPL-MCNC: 0.7 MG/DL (ref 0.1–1)
BNP SERPL-MCNC: 65 PG/ML (ref 0–99)
BUN SERPL-MCNC: 15 MG/DL (ref 8–23)
BUN SERPL-MCNC: 17 MG/DL (ref 8–23)
BUN SERPL-MCNC: 20 MG/DL (ref 8–23)
BUN SERPL-MCNC: 20 MG/DL (ref 8–23)
CALCIUM SERPL-MCNC: 8.8 MG/DL (ref 8.7–10.5)
CALCIUM SERPL-MCNC: 9.4 MG/DL (ref 8.7–10.5)
CALCIUM SERPL-MCNC: 9.4 MG/DL (ref 8.7–10.5)
CALCIUM SERPL-MCNC: 9.6 MG/DL (ref 8.7–10.5)
CHLORIDE SERPL-SCNC: 102 MMOL/L (ref 95–110)
CHLORIDE SERPL-SCNC: 103 MMOL/L (ref 95–110)
CHLORIDE SERPL-SCNC: 104 MMOL/L (ref 95–110)
CHLORIDE SERPL-SCNC: 105 MMOL/L (ref 95–110)
CO2 SERPL-SCNC: 28 MMOL/L (ref 23–29)
CO2 SERPL-SCNC: 28 MMOL/L (ref 23–29)
CO2 SERPL-SCNC: 30 MMOL/L (ref 23–29)
CO2 SERPL-SCNC: 30 MMOL/L (ref 23–29)
CREAT SERPL-MCNC: 0.7 MG/DL (ref 0.5–1.4)
CREAT SERPL-MCNC: 0.8 MG/DL (ref 0.5–1.4)
CREAT SERPL-MCNC: 0.9 MG/DL (ref 0.5–1.4)
CREAT SERPL-MCNC: 0.9 MG/DL (ref 0.5–1.4)
DIFFERENTIAL METHOD: ABNORMAL
EOSINOPHIL # BLD AUTO: 0 K/UL (ref 0–0.5)
EOSINOPHIL NFR BLD: 0 % (ref 0–8)
ERYTHROCYTE [DISTWIDTH] IN BLOOD BY AUTOMATED COUNT: 14.9 % (ref 11.5–14.5)
EST. GFR  (AFRICAN AMERICAN): >60 ML/MIN/1.73 M^2
EST. GFR  (NON AFRICAN AMERICAN): >60 ML/MIN/1.73 M^2
GLUCOSE SERPL-MCNC: 138 MG/DL (ref 70–110)
GLUCOSE SERPL-MCNC: 140 MG/DL (ref 70–110)
GLUCOSE SERPL-MCNC: 141 MG/DL (ref 70–110)
GLUCOSE SERPL-MCNC: 227 MG/DL (ref 70–110)
HCT VFR BLD AUTO: 38.6 % (ref 37–48.5)
HGB BLD-MCNC: 12 G/DL (ref 12–16)
IMM GRANULOCYTES # BLD AUTO: 0.03 K/UL (ref 0–0.04)
IMM GRANULOCYTES NFR BLD AUTO: 0.3 % (ref 0–0.5)
LYMPHOCYTES # BLD AUTO: 0.9 K/UL (ref 1–4.8)
LYMPHOCYTES NFR BLD: 7.7 % (ref 18–48)
MAGNESIUM SERPL-MCNC: 2 MG/DL (ref 1.6–2.6)
MCH RBC QN AUTO: 26.4 PG (ref 27–31)
MCHC RBC AUTO-ENTMCNC: 31.1 G/DL (ref 32–36)
MCV RBC AUTO: 85 FL (ref 82–98)
MONOCYTES # BLD AUTO: 0.6 K/UL (ref 0.3–1)
MONOCYTES NFR BLD: 5.5 % (ref 4–15)
NEUTROPHILS # BLD AUTO: 9.5 K/UL (ref 1.8–7.7)
NEUTROPHILS NFR BLD: 86.4 % (ref 38–73)
NRBC BLD-RTO: 0 /100 WBC
PHOSPHATE SERPL-MCNC: 2.2 MG/DL (ref 2.7–4.5)
PHOSPHATE SERPL-MCNC: 4 MG/DL (ref 2.7–4.5)
PHOSPHATE SERPL-MCNC: 7.5 MG/DL (ref 2.7–4.5)
PLATELET # BLD AUTO: 292 K/UL (ref 150–450)
PMV BLD AUTO: 10.5 FL (ref 9.2–12.9)
POCT GLUCOSE: 134 MG/DL (ref 70–110)
POCT GLUCOSE: 134 MG/DL (ref 70–110)
POCT GLUCOSE: 136 MG/DL (ref 70–110)
POTASSIUM SERPL-SCNC: 3.6 MMOL/L (ref 3.5–5.1)
POTASSIUM SERPL-SCNC: 3.8 MMOL/L (ref 3.5–5.1)
POTASSIUM SERPL-SCNC: 3.9 MMOL/L (ref 3.5–5.1)
POTASSIUM SERPL-SCNC: 5.9 MMOL/L (ref 3.5–5.1)
PROT SERPL-MCNC: 7.2 G/DL (ref 6–8.4)
PROT SERPL-MCNC: 7.9 G/DL (ref 6–8.4)
RBC # BLD AUTO: 4.54 M/UL (ref 4–5.4)
SODIUM SERPL-SCNC: 143 MMOL/L (ref 136–145)
SODIUM SERPL-SCNC: 145 MMOL/L (ref 136–145)
SODIUM SERPL-SCNC: 146 MMOL/L (ref 136–145)
SODIUM SERPL-SCNC: 147 MMOL/L (ref 136–145)
WBC # BLD AUTO: 11 K/UL (ref 3.9–12.7)

## 2022-04-27 PROCEDURE — 36415 COLL VENOUS BLD VENIPUNCTURE: CPT

## 2022-04-27 PROCEDURE — 80048 BASIC METABOLIC PNL TOTAL CA: CPT | Mod: 91,XB | Performed by: SURGERY

## 2022-04-27 PROCEDURE — 93010 EKG 12-LEAD: ICD-10-PCS | Mod: 59,,, | Performed by: INTERNAL MEDICINE

## 2022-04-27 PROCEDURE — 93010 ELECTROCARDIOGRAM REPORT: CPT | Mod: 59,76,, | Performed by: INTERNAL MEDICINE

## 2022-04-27 PROCEDURE — 84100 ASSAY OF PHOSPHORUS: CPT | Performed by: STUDENT IN AN ORGANIZED HEALTH CARE EDUCATION/TRAINING PROGRAM

## 2022-04-27 PROCEDURE — 85025 COMPLETE CBC W/AUTO DIFF WBC: CPT | Performed by: STUDENT IN AN ORGANIZED HEALTH CARE EDUCATION/TRAINING PROGRAM

## 2022-04-27 PROCEDURE — 84100 ASSAY OF PHOSPHORUS: CPT | Mod: 91 | Performed by: SURGERY

## 2022-04-27 PROCEDURE — 80053 COMPREHEN METABOLIC PANEL: CPT | Mod: 91

## 2022-04-27 PROCEDURE — 80048 BASIC METABOLIC PNL TOTAL CA: CPT | Mod: XB | Performed by: INTERNAL MEDICINE

## 2022-04-27 PROCEDURE — 93005 ELECTROCARDIOGRAM TRACING: CPT

## 2022-04-27 PROCEDURE — 63600175 PHARM REV CODE 636 W HCPCS: Performed by: INTERNAL MEDICINE

## 2022-04-27 PROCEDURE — 27000221 HC OXYGEN, UP TO 24 HOURS

## 2022-04-27 PROCEDURE — 94761 N-INVAS EAR/PLS OXIMETRY MLT: CPT

## 2022-04-27 PROCEDURE — 25000003 PHARM REV CODE 250: Performed by: INTERNAL MEDICINE

## 2022-04-27 PROCEDURE — 25000003 PHARM REV CODE 250: Performed by: SURGERY

## 2022-04-27 PROCEDURE — 63600175 PHARM REV CODE 636 W HCPCS: Performed by: STUDENT IN AN ORGANIZED HEALTH CARE EDUCATION/TRAINING PROGRAM

## 2022-04-27 PROCEDURE — 84100 ASSAY OF PHOSPHORUS: CPT | Mod: 91

## 2022-04-27 PROCEDURE — 93010 ELECTROCARDIOGRAM REPORT: CPT | Mod: ,,, | Performed by: INTERNAL MEDICINE

## 2022-04-27 PROCEDURE — 99900035 HC TECH TIME PER 15 MIN (STAT)

## 2022-04-27 PROCEDURE — 20000000 HC ICU ROOM

## 2022-04-27 PROCEDURE — 83735 ASSAY OF MAGNESIUM: CPT | Performed by: STUDENT IN AN ORGANIZED HEALTH CARE EDUCATION/TRAINING PROGRAM

## 2022-04-27 PROCEDURE — 83735 ASSAY OF MAGNESIUM: CPT | Mod: 91 | Performed by: SURGERY

## 2022-04-27 PROCEDURE — 80053 COMPREHEN METABOLIC PANEL: CPT | Performed by: STUDENT IN AN ORGANIZED HEALTH CARE EDUCATION/TRAINING PROGRAM

## 2022-04-27 PROCEDURE — 25000003 PHARM REV CODE 250: Performed by: STUDENT IN AN ORGANIZED HEALTH CARE EDUCATION/TRAINING PROGRAM

## 2022-04-27 PROCEDURE — 83735 ASSAY OF MAGNESIUM: CPT | Mod: 91

## 2022-04-27 PROCEDURE — 93010 EKG 12-LEAD: ICD-10-PCS | Mod: ,,, | Performed by: INTERNAL MEDICINE

## 2022-04-27 PROCEDURE — 36415 COLL VENOUS BLD VENIPUNCTURE: CPT | Performed by: INTERNAL MEDICINE

## 2022-04-27 PROCEDURE — 83880 ASSAY OF NATRIURETIC PEPTIDE: CPT

## 2022-04-27 PROCEDURE — 93010 ELECTROCARDIOGRAM REPORT: CPT | Mod: 59,,, | Performed by: INTERNAL MEDICINE

## 2022-04-27 RX ORDER — DILTIAZEM HCL 1 MG/ML
0-15 INJECTION, SOLUTION INTRAVENOUS CONTINUOUS
Status: DISCONTINUED | OUTPATIENT
Start: 2022-04-27 | End: 2022-04-28

## 2022-04-27 RX ORDER — POTASSIUM CHLORIDE 7.45 MG/ML
40 INJECTION INTRAVENOUS
Status: DISCONTINUED | OUTPATIENT
Start: 2022-04-27 | End: 2022-05-01 | Stop reason: HOSPADM

## 2022-04-27 RX ORDER — MAGNESIUM SULFATE HEPTAHYDRATE 40 MG/ML
4 INJECTION, SOLUTION INTRAVENOUS
Status: DISCONTINUED | OUTPATIENT
Start: 2022-04-27 | End: 2022-05-01 | Stop reason: HOSPADM

## 2022-04-27 RX ORDER — DILTIAZEM HYDROCHLORIDE 5 MG/ML
5 INJECTION INTRAVENOUS EVERY 6 HOURS
Status: DISCONTINUED | OUTPATIENT
Start: 2022-04-27 | End: 2022-04-29

## 2022-04-27 RX ORDER — ACETAMINOPHEN 650 MG/1
650 SUPPOSITORY RECTAL EVERY 6 HOURS PRN
Status: DISCONTINUED | OUTPATIENT
Start: 2022-04-27 | End: 2022-05-01 | Stop reason: HOSPADM

## 2022-04-27 RX ORDER — POTASSIUM CHLORIDE 7.45 MG/ML
10 INJECTION INTRAVENOUS
Status: COMPLETED | OUTPATIENT
Start: 2022-04-27 | End: 2022-04-27

## 2022-04-27 RX ORDER — ADENOSINE 3 MG/ML
INJECTION, SOLUTION INTRAVENOUS
Status: DISCONTINUED
Start: 2022-04-27 | End: 2022-04-27 | Stop reason: WASHOUT

## 2022-04-27 RX ORDER — MUPIROCIN 20 MG/G
OINTMENT TOPICAL 2 TIMES DAILY
Status: DISCONTINUED | OUTPATIENT
Start: 2022-04-27 | End: 2022-05-01 | Stop reason: HOSPADM

## 2022-04-27 RX ORDER — CALCIUM GLUCONATE 20 MG/ML
2 INJECTION, SOLUTION INTRAVENOUS
Status: DISCONTINUED | OUTPATIENT
Start: 2022-04-27 | End: 2022-05-01 | Stop reason: HOSPADM

## 2022-04-27 RX ORDER — DILTIAZEM HYDROCHLORIDE 5 MG/ML
INJECTION INTRAVENOUS
Status: DISPENSED
Start: 2022-04-27 | End: 2022-04-28

## 2022-04-27 RX ORDER — METOPROLOL TARTRATE 1 MG/ML
5 INJECTION, SOLUTION INTRAVENOUS EVERY 6 HOURS
Status: DISCONTINUED | OUTPATIENT
Start: 2022-04-27 | End: 2022-04-27

## 2022-04-27 RX ORDER — CALCIUM GLUCONATE 20 MG/ML
3 INJECTION, SOLUTION INTRAVENOUS
Status: DISCONTINUED | OUTPATIENT
Start: 2022-04-27 | End: 2022-05-01 | Stop reason: HOSPADM

## 2022-04-27 RX ORDER — CALCIUM GLUCONATE 20 MG/ML
1 INJECTION, SOLUTION INTRAVENOUS
Status: DISCONTINUED | OUTPATIENT
Start: 2022-04-27 | End: 2022-05-01 | Stop reason: HOSPADM

## 2022-04-27 RX ORDER — MAGNESIUM SULFATE HEPTAHYDRATE 40 MG/ML
2 INJECTION, SOLUTION INTRAVENOUS
Status: DISCONTINUED | OUTPATIENT
Start: 2022-04-27 | End: 2022-05-01 | Stop reason: HOSPADM

## 2022-04-27 RX ORDER — METOPROLOL TARTRATE 1 MG/ML
5 INJECTION, SOLUTION INTRAVENOUS EVERY 6 HOURS
Status: DISCONTINUED | OUTPATIENT
Start: 2022-04-28 | End: 2022-04-27

## 2022-04-27 RX ORDER — DILTIAZEM HYDROCHLORIDE 5 MG/ML
10 INJECTION INTRAVENOUS ONCE
Status: COMPLETED | OUTPATIENT
Start: 2022-04-27 | End: 2022-04-27

## 2022-04-27 RX ADMIN — HYDRALAZINE HYDROCHLORIDE 10 MG: 20 INJECTION INTRAMUSCULAR; INTRAVENOUS at 09:04

## 2022-04-27 RX ADMIN — ONDANSETRON 4 MG: 2 INJECTION INTRAMUSCULAR; INTRAVENOUS at 06:04

## 2022-04-27 RX ADMIN — MUPIROCIN: 20 OINTMENT TOPICAL at 11:04

## 2022-04-27 RX ADMIN — DILTIAZEM HYDROCHLORIDE 10 MG: 5 INJECTION INTRAVENOUS at 09:04

## 2022-04-27 RX ADMIN — ONDANSETRON 4 MG: 2 INJECTION INTRAMUSCULAR; INTRAVENOUS at 05:04

## 2022-04-27 RX ADMIN — POTASSIUM PHOSPHATE, MONOBASIC AND POTASSIUM PHOSPHATE, DIBASIC 30 MMOL: 224; 236 INJECTION, SOLUTION, CONCENTRATE INTRAVENOUS at 09:04

## 2022-04-27 RX ADMIN — SODIUM CHLORIDE: 0.9 INJECTION, SOLUTION INTRAVENOUS at 11:04

## 2022-04-27 RX ADMIN — ONDANSETRON 4 MG: 2 INJECTION INTRAMUSCULAR; INTRAVENOUS at 12:04

## 2022-04-27 RX ADMIN — SODIUM CHLORIDE: 0.9 INJECTION, SOLUTION INTRAVENOUS at 06:04

## 2022-04-27 RX ADMIN — POTASSIUM CHLORIDE 10 MEQ: 7.46 INJECTION, SOLUTION INTRAVENOUS at 01:04

## 2022-04-27 RX ADMIN — ONDANSETRON 4 MG: 2 INJECTION INTRAMUSCULAR; INTRAVENOUS at 01:04

## 2022-04-27 RX ADMIN — POTASSIUM CHLORIDE 10 MEQ: 7.46 INJECTION, SOLUTION INTRAVENOUS at 10:04

## 2022-04-27 RX ADMIN — ONDANSETRON 4 MG: 2 INJECTION INTRAMUSCULAR; INTRAVENOUS at 11:04

## 2022-04-27 RX ADMIN — ENOXAPARIN SODIUM 40 MG: 40 INJECTION SUBCUTANEOUS at 05:04

## 2022-04-27 RX ADMIN — Medication: at 09:04

## 2022-04-27 NOTE — SUBJECTIVE & OBJECTIVE
Interval History: NAEON.  Pain well controlled this morning.  NG with 250cc out overnight.  Slightly bilious however mostly gastric.  No nausea.  HDS.  Afebrile.    Medications:  Continuous Infusions:   sodium chloride 0.9% 75 mL/hr at 04/27/22 0630    hydromorphone in 0.9 % NaCl 6 mg/30 ml       Scheduled Meds:   enoxaparin  40 mg Subcutaneous Daily    ondansetron  4 mg Intravenous Q6H    scopolamine  1 patch Transdermal Q3 Days     PRN Meds:dextrose 50%, diphenhydrAMINE, glucagon (human recombinant), hydrALAZINE, insulin aspart U-100, morphine, naloxone, prochlorperazine, promethazine (PHENERGAN) IVPB, sodium chloride 0.9%     Review of patient's allergies indicates:  No Known Allergies  Objective:     Vital Signs (Most Recent):  Temp: 97.8 °F (36.6 °C) (04/27/22 0744)  Pulse: 85 (04/27/22 0744)  Resp: 18 (04/27/22 0744)  BP: (!) 170/85 (04/27/22 0744)  SpO2: 98 % (04/27/22 0744)   Vital Signs (24h Range):  Temp:  [97.3 °F (36.3 °C)-98.7 °F (37.1 °C)] 97.8 °F (36.6 °C)  Pulse:  [71-97] 85  Resp:  [15-25] 18  SpO2:  [90 %-100 %] 98 %  BP: (124-170)/(64-87) 170/85     Weight: 86.3 kg (190 lb 4.1 oz)  Body mass index is 30.71 kg/m².    Intake/Output - Last 3 Shifts         04/25 0700 04/26 0659 04/26 0700 04/27 0659 04/27 0700 04/28 0659    I.V. (mL/kg) 900 (10.4) 1000 (11.6)     IV Piggyback  1400     Total Intake(mL/kg) 900 (10.4) 2400 (27.8)     Urine (mL/kg/hr) 1 (0) 1 (0)     Drains 500 450     Total Output 501 451     Net +399 +1949            Urine Occurrence  2 x             Physical Exam  Constitutional:       General: She is not in acute distress.     Appearance: Normal appearance. She is not ill-appearing or toxic-appearing.   HENT:      Head: Normocephalic and atraumatic.      Nose:      Comments: NG in place  Eyes:      Extraocular Movements: Extraocular movements intact.   Cardiovascular:      Rate and Rhythm: Normal rate and regular rhythm.   Pulmonary:      Effort: Pulmonary effort is normal. No  respiratory distress.   Abdominal:      General: There is no distension.      Palpations: Abdomen is soft.      Tenderness: There is abdominal tenderness (appropriate, incisional).      Comments: Incisions c/d/i   Musculoskeletal:         General: Normal range of motion.      Cervical back: Normal range of motion.   Skin:     General: Skin is warm and dry.   Neurological:      General: No focal deficit present.      Mental Status: She is alert and oriented to person, place, and time.   Psychiatric:         Mood and Affect: Mood normal.         Behavior: Behavior normal.       Significant Labs:  I have reviewed all pertinent lab results within the past 24 hours.  CBC:   Recent Labs   Lab 04/27/22 0248   WBC 11.00   RBC 4.54   HGB 12.0   HCT 38.6      MCV 85   MCH 26.4*   MCHC 31.1*     BMP:   Recent Labs   Lab 04/27/22 0248   *  141*   *  145   K 3.8  3.9     105   CO2 30*  30*   BUN 20  20   CREATININE 0.9  0.9   CALCIUM 9.4  9.4   MG 2.0       Significant Diagnostics:  I have reviewed all pertinent imaging results/findings within the past 24 hours.

## 2022-04-27 NOTE — CODE/ RAPID DOCUMENTATION
RAPID RESPONSE NURSE NOTE        Admit Date: 2022  LOS: 3  Code Status: No Order   Date of Consult: 2022  : 1950  Age: 72 y.o.  Weight:   Wt Readings from Last 1 Encounters:   22 86.3 kg (190 lb 4.1 oz)     Sex: female  Race: Black or    Bed: [address] [place] [address] A:   MRN: [id number]  Time Rapid Response Team page Received:   Time Rapid Response Team at Bedside:   Time Rapid Response Team left Bedside:   Was the patient discharged from an ICU this admission? No   Was the patient discharged from a PACU within last 24 hours? No   Did the patient receive conscious sedation/general anesthesia in last 24 hours? No  Was the patient in the ED within the past 24 hours? No  Was the patient on NIPPV within the past 24 hours? No   Did this progress into an ARC or CPA: no  Attending Physician: David Mike Jr.,*  Primary Service: General Surgery,Bariatrics       SITUATION    Notified by bedside RN via phone call.  Reason for alert: Tachycardia  Called to evaluate the patient for Dysrythmia    BACKGROUND     Why is the patient in the hospital?: Gastric volvulus    Patient has no past medical history on file.    Last Vitals:  Temp: 98 °F (36.7 °C) (1604)  Pulse: 211 (1812)  Resp: 18 ( 160)  BP: 116/70 (1812)  SpO2: 95 % (1812)    24 Hours Vitals Range:  Temp:  [97.4 °F (36.3 °C)-98.7 °F (37.1 °C)]   Pulse:  []   Resp:  [15-18]   BP: (116-174)/(64-86)   SpO2:  [92 %-99 %]     Labs:  Recent Labs     22  0857 22  0248   WBC 7.34 11.00   HGB 12.2 12.0   HCT 38.9 38.6    292       Recent Labs     22  0447 22  0857 22  0248    145  145 147*  145   K 3.1* 3.4*  3.5 3.8  3.9    104  102 104  105   CO2 26 30*  29 30*  30*   CREATININE 0.8 0.8  0.8 0.9  0.9   * 110  109 140*  141*   PHOS  --  2.6* 4.0   MG  --  1.7 2.0        ASSESSMENT    Physical Exam  Constitutional:       General: She is  not in acute distress.  Cardiovascular:      Rate and Rhythm: Tachycardia present.   Neurological:      Mental Status: She is alert and oriented to person, place, and time. Mental status is at baseline.   Psychiatric:         Behavior: Behavior is cooperative.       INTERVENTIONS    The patient was seen for a Cardiac problem. Staff concerns included tachycardia. The following interventions were performed: continuous cardiac monitoring, EKG and cardiology consult.     Patient given Adenosine 12 mg IVP per Dr. Saldaña. EKG completed x 2.         RECOMMENDATIONS    We recommend: Repeat labs, continuous cardiac monitoring, low threshold for critical care upgrade.    PROVIDER ESCALATION    Orders received and case discussed with Dr. Oneil..    Disposition: Remain in room 1006.    FOLLOW UP    Bedside RNAdela updated on plan of care. Instructed to call the Rapid Response Nurse, Silvia Brown RN at [phone] for additional questions or concerns.

## 2022-04-27 NOTE — PROGRESS NOTES
"[facility] - [facility]  General Surgery  Progress Note    Subjective:     History of Present Illness:  Patient is 73 yo F with hx of GERD, DM2 and past hiatal hernia repair approx 10 yrs ago who presented to OSH on 4/24 with worsening abd pain, nausea, and emesis and was eventually found to have a gastric volvulus, and was transferred to [facility] for repair. She notes she had worsening reflux and some emesis on and off for the 2 weeks prior to presentation, but they all were short lived and resolved. She then developed severe epigastric pain on 4/24, and "just didn't feel well" and presented ot ED. She was found on CT scan to have concern for GOO. An EGD was performed on 4/25 which showed a tortuous stomach consistent with volvulus, and no signs of necrosis. An NGT was left, and she felt much better after completion. She was then transferred here for surgery.      Post-Op Info:  Procedure(s) (LRB):  REPAIR, HERNIA, HIATAL, LAPAROSCOPIC (N/A)  EGD (ESOPHAGOGASTRODUODENOSCOPY) (N/A)  FUNDOPLICATION, LAPAROSCOPIC, TOUPET (N/A)   1 Day Post-Op     Interval History: NAEON.  Pain well controlled this morning.  NG with 250cc out overnight.  Slightly bilious however mostly gastric.  No nausea.  HDS.  Afebrile.    Medications:  Continuous Infusions:   sodium chloride 0.9% 75 mL/hr at 04/27/22 0630    hydromorphone in 0.9 % NaCl 6 mg/30 ml       Scheduled Meds:   enoxaparin  40 mg Subcutaneous Daily    ondansetron  4 mg Intravenous Q6H    scopolamine  1 patch Transdermal Q3 Days     PRN Meds:dextrose 50%, diphenhydrAMINE, glucagon (human recombinant), hydrALAZINE, insulin aspart U-100, morphine, naloxone, prochlorperazine, promethazine (PHENERGAN) IVPB, sodium chloride 0.9%     Review of patient's allergies indicates:  No Known Allergies  Objective:     Vital Signs (Most Recent):  Temp: 97.8 °F (36.6 °C) (04/27/22 0744)  Pulse: 85 (04/27/22 0744)  Resp: 18 (04/27/22 0744)  BP: (!) 170/85 (04/27/22 0744)  SpO2: 98 % (04/27/22 " 0744)   Vital Signs (24h Range):  Temp:  [97.3 °F (36.3 °C)-98.7 °F (37.1 °C)] 97.8 °F (36.6 °C)  Pulse:  [71-97] 85  Resp:  [15-25] 18  SpO2:  [90 %-100 %] 98 %  BP: (124-170)/(64-87) 170/85     Weight: 86.3 kg (190 lb 4.1 oz)  Body mass index is 30.71 kg/m².    Intake/Output - Last 3 Shifts         04/25 0700 04/26 0659 04/26 0700 04/27 0659 04/27 0700 04/28 0659    I.V. (mL/kg) 900 (10.4) 1000 (11.6)     IV Piggyback  1400     Total Intake(mL/kg) 900 (10.4) 2400 (27.8)     Urine (mL/kg/hr) 1 (0) 1 (0)     Drains 500 450     Total Output 501 451     Net +399 +1949            Urine Occurrence  2 x             Physical Exam  Constitutional:       General: She is not in acute distress.     Appearance: Normal appearance. She is not ill-appearing or toxic-appearing.   HENT:      Head: Normocephalic and atraumatic.      Nose:      Comments: NG in place  Eyes:      Extraocular Movements: Extraocular movements intact.   Cardiovascular:      Rate and Rhythm: Normal rate and regular rhythm.   Pulmonary:      Effort: Pulmonary effort is normal. No respiratory distress.   Abdominal:      General: There is no distension.      Palpations: Abdomen is soft.      Tenderness: There is abdominal tenderness (appropriate, incisional).      Comments: Incisions c/d/i   Musculoskeletal:         General: Normal range of motion.      Cervical back: Normal range of motion.   Skin:     General: Skin is warm and dry.   Neurological:      General: No focal deficit present.      Mental Status: She is alert and oriented to person, place, and time.   Psychiatric:         Mood and Affect: Mood normal.         Behavior: Behavior normal.       Significant Labs:  I have reviewed all pertinent lab results within the past 24 hours.  CBC:   Recent Labs   Lab 04/27/22 0248   WBC 11.00   RBC 4.54   HGB 12.0   HCT 38.6      MCV 85   MCH 26.4*   MCHC 31.1*     BMP:   Recent Labs   Lab 04/27/22 0248   *  141*   *  145   K 3.8  3.9      105   CO2 30*  30*   BUN 20  20   CREATININE 0.9  0.9   CALCIUM 9.4  9.4   MG 2.0       Significant Diagnostics:  I have reviewed all pertinent imaging results/findings within the past 24 hours.    Assessment/Plan:     * Gastric volvulus  Patient is 71 yo F with GOO 2/2 gastric volvulus.  Now s/p reduction of gastric volvulus and hiatal hernia repair with fadi fundoplication on 4/26/22.    Keep NPO today  NGT to LIWS  mIVF  PRN pain and nausea medications, keep PCA today  DVT ppx: lovenox  OOBTC/ambulate  IS  Will monitor NG output and pending quality and quantity will assess when to remove and advancement of diet        Oleksandr Teixeira MD  General Surgery  [facility]

## 2022-04-27 NOTE — RESPIRATORY THERAPY
"RAPID RESPONSE RESPIRATORY THERAPY NOTE             Code Status: No Order   : 1950  Bed: 1006/1006 A:   MRN: [id number]  Time page Received:    Time Rapid Response RT at Bedside:   Time Rapid Response RT left Bedside:     SITUATION    Evaluated patient for: Tachy/SOB    BACKGROUND    Why is the patient in the hospital?: Gastric volvulus    Patient has no past medical history on file.    24 Hours Vitals Range:  Temp:  [97.4 °F (36.3 °C)-98.7 °F (37.1 °C)]   Pulse:  []   Resp:  [15-18]   BP: (116-174)/(64-86)   SpO2:  [92 %-99 %]     Labs:    Recent Labs     22  0447 22  0857 22  0248    145  145 147*  145   K 3.1* 3.4*  3.5 3.8  3.9    104  102 104  105   CO2 26 30*  29 30*  30*   CREATININE 0.8 0.8  0.8 0.9  0.9   * 110  109 140*  141*   PHOS  --  2.6* 4.0   MG  --  1.7 2.0        No results for input(s): PH, PCO2, PO2, HCO3, POCSATURATED, BE in the last 72 hours.    ASSESSMENT/INTERVENTIONS    Upon arrival in room Pt c/o SOB "feel funny"    Last Vitals: Temp: 98 °F (36.7 °C) ( 160)  Pulse: 211 (1812)  Resp: 18 ( 160)  BP: 116/70 (1812)  SpO2: 95 % (1812)  Level of Consciousness: Level of Consciousness (AVPU): alert  Respiratory Effort: Respiratory Effort: Unlabored, Grunting  Expansion/Accessory Muscle Usage: Expansion/Accessory Muscles/Retractions: expansion symmetric, no retractions, no use of accessory muscles  All Lung Field Breath Sounds: All Lung Fields Breath Sounds: Anterior:, Lateral:, diminished  O2 Device/Concentration: Flow (L/min): 4,  , O2 Device (Oxygen Therapy): room air  NIPPV: No Surgical airway: No Vent: No  ETCO2 monitored: ETCO2 (mmHg): 39 mmHg  Ambu at bedside: Ambu bag with the patient?: Yes, Adult Ambu    Active Orders   Respiratory Care    END TIDAL CO2 MONITOR Q12H     Frequency: Q12H     Number of Occurrences: Until Specified    Oxygen Continuous     Frequency: Continuous     Number of " Occurrences: Until Specified     Order Questions:      Device type: Low flow      Device: Nasal Cannula (1- 5 Liters)      LPM: 3      Titrate O2 per Oxygen Titration Protocol: Yes      To maintain SpO2 goal of: >= 90%      Notify MD of: Inability to achieve desired SpO2; Sudden change in patient status and requires 20% increase in FiO2; Patient requires >60% FiO2    Oxygen Continuous     Frequency: Continuous     Number of Occurrences: Until Specified     Order Comments: Discontinue when Sp02 is greater than or equal to 95% of equal to Preop Sp02       Order Questions:      Device type: Low flow      Device: Simple Face Mask      Titrate O2 per Oxygen Titration Protocol: Yes      Notify MD of: Inability to achieve desired SpO2    Oxygen Continuous     Frequency: Continuous     Number of Occurrences: Until Specified     Order Questions:      Device type: Low flow      Device: Nasal Cannula (1- 5 Liters)      LPM: 2      Titrate O2 per Oxygen Titration Protocol: Yes      To maintain SpO2 goal of: >= 90%      Notify MD of: Inability to achieve desired SpO2; Sudden change in patient status and requires 20% increase in FiO2; Patient requires >60% FiO2    Pulse Oximetry Continuous     Frequency: Continuous     Number of Occurrences: Until Specified       RECOMMENDATIONS  ?  We recommend: RRT Recs: Continue POC per primary team.    ESCALATION        FOLLOW-UP    Disposition: Remain in room 1006.    Please call back the Rapid Response RT, Blas Lou RRT at x [phone] for any questions or concerns.

## 2022-04-27 NOTE — ASSESSMENT & PLAN NOTE
Patient is 71 yo F with GOO 2/2 gastric volvulus.  Now s/p reduction of gastric volvulus and hiatal hernia repair with fadi fundoplication on 4/26/22.    Keep NPO today  NGT to LIWS  mIVF  PRN pain and nausea medications, keep PCA today  DVT ppx: lovenox  OOBTC/ambulate  IS  Will monitor NG output and pending quality and quantity will assess when to remove and advancement of diet

## 2022-04-27 NOTE — ANESTHESIA POSTPROCEDURE EVALUATION
Anesthesia Post Evaluation    Patient: Vidya Vazquez    Procedure(s) Performed: Procedure(s) (LRB):  REPAIR, HERNIA, HIATAL, LAPAROSCOPIC (N/A)  EGD (ESOPHAGOGASTRODUODENOSCOPY) (N/A)  FUNDOPLICATION, LAPAROSCOPIC, TOUPET (N/A)    Final Anesthesia Type: general      Level of consciousness: awake and alert  Post-procedure vital signs: reviewed and stable  Pain control: Pain has been treated.  Airway patency: patent    PONV status: Absent or treated.  Anesthetic complications: no      Cardiovascular status: hemodynamically stable  Respiratory status: unassisted  Hydration status: euvolemic            Vitals Value Taken Time   /77 04/27/22 1604   Temp 36.7 °C (98 °F) 04/27/22 1604   Pulse 98 04/27/22 1604   Resp 18 04/27/22 1604   SpO2 99 % 04/27/22 1604         Event Time   Out of Recovery 04/26/2022 18:00:00         Pain/Donald Score: Pain Rating Prior to Med Admin: 4 (4/27/2022  9:34 AM)  Pain Rating Post Med Admin: 3 (4/26/2022  8:10 PM)  Donald Score: 10 (4/26/2022  6:00 PM)

## 2022-04-27 NOTE — RESPIRATORY THERAPY
RAPID RESPONSE RESPIRATORY THERAPY ETCO2 CHECK         Time of visit: 1108     Code Status: No Order   : 1950  Bed: 1006/1006 A:   MRN: [id number]  Time spent at the bedside: < 15 min    SITUATION    Evaluated patient for: ETCo2 compliance    BACKGROUND    Why is the patient in the hospital?: Gastric volvulus    Patient has no past medical history on file.    24 Hours Vitals Range:  Temp:  [97.3 °F (36.3 °C)-98.7 °F (37.1 °C)]   Pulse:  []   Resp:  [15-25]   BP: (124-174)/(64-87)   SpO2:  [92 %-100 %]     Labs:    Recent Labs     22  0447 22  0857 22  0248    145  145 147*  145   K 3.1* 3.4*  3.5 3.8  3.9    104  102 104  105   CO2 26 30*  29 30*  30*   CREATININE 0.8 0.8  0.8 0.9  0.9   * 110  109 140*  141*   PHOS  --  2.6* 4.0   MG  --  1.7 2.0        No results for input(s): PH, PCO2, PO2, HCO3, POCSATURATED, BE in the last 72 hours.    ASSESSMENT/INTERVENTIONS    Upon arrival in room pt resting comfortably    Last VS   Temp: 98 °F (36.7 °C) ( 1604)  Pulse: 98 ( 1604)  Resp: 18 ( 1604)  BP: 156/77 ( 1604)  SpO2: 99 % ( 1604)    Level of Consciousness: Level of Consciousness (AVPU): alert  Respiratory Effort: Respiratory Effort: Unlabored, Grunting Expansion/Accessory Muscle Usage: Expansion/Accessory Muscles/Retractions: expansion symmetric, no retractions, no use of accessory muscles  All Lung Field Breath Sounds: All Lung Fields Breath Sounds: Anterior:, Lateral:, diminished  O2 Device/Concentration: Flow (L/min): 4,  , O2 Device (Oxygen Therapy): room air  Is the ETCO2 monitor on? Yes  Is the patient wearing a cannula? Yes  Are ETCO2 orders placed? Yes  Is the patient on a PCA pump? Yes  ETCO2 monitored: ETCO2 (mmHg): 39 mmHg  Ambu at bedside: Ambu bag with the patient?: Yes, Adult Ambu    Active Orders   Respiratory Care    END TIDAL CO2 MONITOR Q12H     Frequency: Q12H     Number of Occurrences: Until Specified    Oxygen  Continuous     Frequency: Continuous     Number of Occurrences: Until Specified     Order Questions:      Device type: Low flow      Device: Nasal Cannula (1- 5 Liters)      LPM: 3      Titrate O2 per Oxygen Titration Protocol: Yes      To maintain SpO2 goal of: >= 90%      Notify MD of: Inability to achieve desired SpO2; Sudden change in patient status and requires 20% increase in FiO2; Patient requires >60% FiO2    Oxygen Continuous     Frequency: Continuous     Number of Occurrences: Until Specified     Order Comments: Discontinue when Sp02 is greater than or equal to 95% of equal to Preop Sp02       Order Questions:      Device type: Low flow      Device: Simple Face Mask      Titrate O2 per Oxygen Titration Protocol: Yes      Notify MD of: Inability to achieve desired SpO2    Oxygen Continuous     Frequency: Continuous     Number of Occurrences: Until Specified     Order Questions:      Device type: Low flow      Device: Nasal Cannula (1- 5 Liters)      LPM: 2      Titrate O2 per Oxygen Titration Protocol: Yes      To maintain SpO2 goal of: >= 90%      Notify MD of: Inability to achieve desired SpO2; Sudden change in patient status and requires 20% increase in FiO2; Patient requires >60% FiO2    Pulse Oximetry Continuous     Frequency: Continuous     Number of Occurrences: Until Specified       RECOMMENDATIONS    We recommend: RRT Recs: Continue POC per primary team.    ESCALATION        FOLLOW-UP    Please call back the Rapid Response RT, Blas Lou RRT at x [phone] for any questions or concerns.

## 2022-04-28 LAB
ALBUMIN SERPL BCP-MCNC: 2.9 G/DL (ref 3.5–5.2)
ALP SERPL-CCNC: 72 U/L (ref 55–135)
ALT SERPL W/O P-5'-P-CCNC: 21 U/L (ref 10–44)
ANION GAP SERPL CALC-SCNC: 13 MMOL/L (ref 8–16)
ANION GAP SERPL CALC-SCNC: 14 MMOL/L (ref 8–16)
ASCENDING AORTA: 2.88 CM
AST SERPL-CCNC: 31 U/L (ref 10–40)
AV INDEX (PROSTH): 0.83
AV MEAN GRADIENT: 7 MMHG
AV PEAK GRADIENT: 16 MMHG
AV VALVE AREA: 2.87 CM2
AV VELOCITY RATIO: 0.7
BASOPHILS # BLD AUTO: 0.04 K/UL (ref 0–0.2)
BASOPHILS NFR BLD: 0.4 % (ref 0–1.9)
BILIRUB SERPL-MCNC: 0.7 MG/DL (ref 0.1–1)
BSA FOR ECHO PROCEDURE: 2 M2
BUN SERPL-MCNC: 14 MG/DL (ref 8–23)
BUN SERPL-MCNC: 14 MG/DL (ref 8–23)
CALCIUM SERPL-MCNC: 9 MG/DL (ref 8.7–10.5)
CALCIUM SERPL-MCNC: 9 MG/DL (ref 8.7–10.5)
CHLORIDE SERPL-SCNC: 105 MMOL/L (ref 95–110)
CHLORIDE SERPL-SCNC: 106 MMOL/L (ref 95–110)
CO2 SERPL-SCNC: 26 MMOL/L (ref 23–29)
CO2 SERPL-SCNC: 29 MMOL/L (ref 23–29)
CREAT SERPL-MCNC: 0.7 MG/DL (ref 0.5–1.4)
CREAT SERPL-MCNC: 0.7 MG/DL (ref 0.5–1.4)
CV ECHO LV RWT: 0.47 CM
DIFFERENTIAL METHOD: ABNORMAL
DOP CALC AO PEAK VEL: 2 M/S
DOP CALC AO VTI: 34.69 CM
DOP CALC LVOT AREA: 3.5 CM2
DOP CALC LVOT DIAMETER: 2.1 CM
DOP CALC LVOT PEAK VEL: 1.39 M/S
DOP CALC LVOT STROKE VOLUME: 99.39 CM3
DOP CALCLVOT PEAK VEL VTI: 28.71 CM
E WAVE DECELERATION TIME: 303.84 MSEC
E/A RATIO: 0.82
E/E' RATIO: 12.8 M/S
ECHO LV POSTERIOR WALL: 0.95 CM (ref 0.6–1.1)
EJECTION FRACTION: 65 %
EOSINOPHIL # BLD AUTO: 0.2 K/UL (ref 0–0.5)
EOSINOPHIL NFR BLD: 1.5 % (ref 0–8)
ERYTHROCYTE [DISTWIDTH] IN BLOOD BY AUTOMATED COUNT: 14.8 % (ref 11.5–14.5)
EST. GFR  (AFRICAN AMERICAN): >60 ML/MIN/1.73 M^2
EST. GFR  (AFRICAN AMERICAN): >60 ML/MIN/1.73 M^2
EST. GFR  (NON AFRICAN AMERICAN): >60 ML/MIN/1.73 M^2
EST. GFR  (NON AFRICAN AMERICAN): >60 ML/MIN/1.73 M^2
FRACTIONAL SHORTENING: 34 % (ref 28–44)
GLUCOSE SERPL-MCNC: 117 MG/DL (ref 70–110)
GLUCOSE SERPL-MCNC: 124 MG/DL (ref 70–110)
HCT VFR BLD AUTO: 36.8 % (ref 37–48.5)
HGB BLD-MCNC: 11.2 G/DL (ref 12–16)
IMM GRANULOCYTES # BLD AUTO: 0.04 K/UL (ref 0–0.04)
IMM GRANULOCYTES NFR BLD AUTO: 0.4 % (ref 0–0.5)
INTERVENTRICULAR SEPTUM: 1.03 CM (ref 0.6–1.1)
LA MAJOR: 5.4 CM
LA MINOR: 5.35 CM
LA WIDTH: 4.31 CM
LEFT ATRIUM SIZE: 3.78 CM
LEFT ATRIUM VOLUME INDEX MOD: 29 ML/M2
LEFT ATRIUM VOLUME INDEX: 38 ML/M2
LEFT ATRIUM VOLUME MOD: 56.89 CM3
LEFT ATRIUM VOLUME: 74.43 CM3
LEFT INTERNAL DIMENSION IN SYSTOLE: 2.65 CM (ref 2.1–4)
LEFT VENTRICLE DIASTOLIC VOLUME INDEX: 36.17 ML/M2
LEFT VENTRICLE DIASTOLIC VOLUME: 70.89 ML
LEFT VENTRICLE MASS INDEX: 64 G/M2
LEFT VENTRICLE SYSTOLIC VOLUME INDEX: 13.1 ML/M2
LEFT VENTRICLE SYSTOLIC VOLUME: 25.69 ML
LEFT VENTRICULAR INTERNAL DIMENSION IN DIASTOLE: 4.02 CM (ref 3.5–6)
LEFT VENTRICULAR MASS: 126.26 G
LV LATERAL E/E' RATIO: 12 M/S
LV SEPTAL E/E' RATIO: 13.71 M/S
LYMPHOCYTES # BLD AUTO: 1.4 K/UL (ref 1–4.8)
LYMPHOCYTES NFR BLD: 14.3 % (ref 18–48)
MAGNESIUM SERPL-MCNC: 2.1 MG/DL (ref 1.6–2.6)
MCH RBC QN AUTO: 25.9 PG (ref 27–31)
MCHC RBC AUTO-ENTMCNC: 30.4 G/DL (ref 32–36)
MCV RBC AUTO: 85 FL (ref 82–98)
MONOCYTES # BLD AUTO: 0.8 K/UL (ref 0.3–1)
MONOCYTES NFR BLD: 7.9 % (ref 4–15)
MV PEAK A VEL: 1.17 M/S
MV PEAK E VEL: 0.96 M/S
MV STENOSIS PRESSURE HALF TIME: 88.11 MS
MV VALVE AREA P 1/2 METHOD: 2.5 CM2
NEUTROPHILS # BLD AUTO: 7.6 K/UL (ref 1.8–7.7)
NEUTROPHILS NFR BLD: 75.5 % (ref 38–73)
NRBC BLD-RTO: 0 /100 WBC
PHOSPHATE SERPL-MCNC: 2.6 MG/DL (ref 2.7–4.5)
PHOSPHATE SERPL-MCNC: 3.2 MG/DL (ref 2.7–4.5)
PISA TR MAX VEL: 3.08 M/S
PLATELET # BLD AUTO: 280 K/UL (ref 150–450)
PMV BLD AUTO: 10.5 FL (ref 9.2–12.9)
POCT GLUCOSE: 109 MG/DL (ref 70–110)
POCT GLUCOSE: 116 MG/DL (ref 70–110)
POCT GLUCOSE: 97 MG/DL (ref 70–110)
POTASSIUM SERPL-SCNC: 3.7 MMOL/L (ref 3.5–5.1)
POTASSIUM SERPL-SCNC: 4.1 MMOL/L (ref 3.5–5.1)
PROT SERPL-MCNC: 6.5 G/DL (ref 6–8.4)
RA MAJOR: 4.73 CM
RA WIDTH: 3.5 CM
RBC # BLD AUTO: 4.32 M/UL (ref 4–5.4)
RIGHT VENTRICULAR END-DIASTOLIC DIMENSION: 4.02 CM
SINUS: 3.2 CM
SODIUM SERPL-SCNC: 146 MMOL/L (ref 136–145)
SODIUM SERPL-SCNC: 147 MMOL/L (ref 136–145)
STJ: 2.52 CM
TDI LATERAL: 0.08 M/S
TDI SEPTAL: 0.07 M/S
TDI: 0.08 M/S
TR MAX PG: 38 MMHG
TRICUSPID ANNULAR PLANE SYSTOLIC EXCURSION: 1.95 CM
WBC # BLD AUTO: 10 K/UL (ref 3.9–12.7)

## 2022-04-28 PROCEDURE — 99222 1ST HOSP IP/OBS MODERATE 55: CPT | Mod: ,,, | Performed by: INTERNAL MEDICINE

## 2022-04-28 PROCEDURE — 99900035 HC TECH TIME PER 15 MIN (STAT)

## 2022-04-28 PROCEDURE — 63600175 PHARM REV CODE 636 W HCPCS: Performed by: STUDENT IN AN ORGANIZED HEALTH CARE EDUCATION/TRAINING PROGRAM

## 2022-04-28 PROCEDURE — 94761 N-INVAS EAR/PLS OXIMETRY MLT: CPT

## 2022-04-28 PROCEDURE — 99222 PR INITIAL HOSPITAL CARE,LEVL II: ICD-10-PCS | Mod: ,,, | Performed by: INTERNAL MEDICINE

## 2022-04-28 PROCEDURE — 84100 ASSAY OF PHOSPHORUS: CPT | Performed by: STUDENT IN AN ORGANIZED HEALTH CARE EDUCATION/TRAINING PROGRAM

## 2022-04-28 PROCEDURE — 20000000 HC ICU ROOM

## 2022-04-28 PROCEDURE — 27000221 HC OXYGEN, UP TO 24 HOURS

## 2022-04-28 PROCEDURE — 83735 ASSAY OF MAGNESIUM: CPT | Performed by: STUDENT IN AN ORGANIZED HEALTH CARE EDUCATION/TRAINING PROGRAM

## 2022-04-28 PROCEDURE — 99223 1ST HOSP IP/OBS HIGH 75: CPT | Mod: 24,,, | Performed by: STUDENT IN AN ORGANIZED HEALTH CARE EDUCATION/TRAINING PROGRAM

## 2022-04-28 PROCEDURE — 93010 EKG 12-LEAD: ICD-10-PCS | Mod: ,,, | Performed by: INTERNAL MEDICINE

## 2022-04-28 PROCEDURE — 85025 COMPLETE CBC W/AUTO DIFF WBC: CPT | Performed by: STUDENT IN AN ORGANIZED HEALTH CARE EDUCATION/TRAINING PROGRAM

## 2022-04-28 PROCEDURE — 80053 COMPREHEN METABOLIC PANEL: CPT | Performed by: STUDENT IN AN ORGANIZED HEALTH CARE EDUCATION/TRAINING PROGRAM

## 2022-04-28 PROCEDURE — 25000003 PHARM REV CODE 250: Performed by: INTERNAL MEDICINE

## 2022-04-28 PROCEDURE — 63600175 PHARM REV CODE 636 W HCPCS: Performed by: INTERNAL MEDICINE

## 2022-04-28 PROCEDURE — 93010 ELECTROCARDIOGRAM REPORT: CPT | Mod: ,,, | Performed by: INTERNAL MEDICINE

## 2022-04-28 PROCEDURE — 99223 PR INITIAL HOSPITAL CARE,LEVL III: ICD-10-PCS | Mod: 24,,, | Performed by: STUDENT IN AN ORGANIZED HEALTH CARE EDUCATION/TRAINING PROGRAM

## 2022-04-28 PROCEDURE — 93005 ELECTROCARDIOGRAM TRACING: CPT

## 2022-04-28 RX ORDER — SODIUM CHLORIDE, SODIUM LACTATE, POTASSIUM CHLORIDE, CALCIUM CHLORIDE 600; 310; 30; 20 MG/100ML; MG/100ML; MG/100ML; MG/100ML
INJECTION, SOLUTION INTRAVENOUS CONTINUOUS
Status: DISCONTINUED | OUTPATIENT
Start: 2022-04-28 | End: 2022-04-30

## 2022-04-28 RX ORDER — HYDROMORPHONE HYDROCHLORIDE 1 MG/ML
0.2 INJECTION, SOLUTION INTRAMUSCULAR; INTRAVENOUS; SUBCUTANEOUS EVERY 4 HOURS PRN
Status: DISCONTINUED | OUTPATIENT
Start: 2022-04-28 | End: 2022-05-01

## 2022-04-28 RX ADMIN — DILTIAZEM HYDROCHLORIDE 5 MG: 5 INJECTION INTRAVENOUS at 11:04

## 2022-04-28 RX ADMIN — SODIUM CHLORIDE, SODIUM LACTATE, POTASSIUM CHLORIDE, AND CALCIUM CHLORIDE 1000 ML: .6; .31; .03; .02 INJECTION, SOLUTION INTRAVENOUS at 02:04

## 2022-04-28 RX ADMIN — ONDANSETRON 4 MG: 2 INJECTION INTRAMUSCULAR; INTRAVENOUS at 12:04

## 2022-04-28 RX ADMIN — DILTIAZEM HYDROCHLORIDE 5 MG: 5 INJECTION INTRAVENOUS at 12:04

## 2022-04-28 RX ADMIN — ONDANSETRON 4 MG: 2 INJECTION INTRAMUSCULAR; INTRAVENOUS at 05:04

## 2022-04-28 RX ADMIN — MUPIROCIN: 20 OINTMENT TOPICAL at 09:04

## 2022-04-28 RX ADMIN — ONDANSETRON 4 MG: 2 INJECTION INTRAMUSCULAR; INTRAVENOUS at 11:04

## 2022-04-28 RX ADMIN — SODIUM CHLORIDE, SODIUM LACTATE, POTASSIUM CHLORIDE, AND CALCIUM CHLORIDE: .6; .31; .03; .02 INJECTION, SOLUTION INTRAVENOUS at 09:04

## 2022-04-28 RX ADMIN — ENOXAPARIN SODIUM 40 MG: 40 INJECTION SUBCUTANEOUS at 05:04

## 2022-04-28 RX ADMIN — DILTIAZEM HYDROCHLORIDE 5 MG: 5 INJECTION INTRAVENOUS at 05:04

## 2022-04-28 NOTE — NURSING
Pt aaox4. Minimal complaint of pain; PCA pump in use.  Afebrile. -140s. O2 sat>90%; weaned to 1L NC.   HR 60-80s NSR since previous note. No additional tachycardia noted throughout the shift.   1 urine occurrence   Pt remains NPO. 400 cc NGT output.   Lap sites CDI with steri strips.   Labs trended; electrolytes replaced.  Pt and family updated on plan of care as needed.

## 2022-04-28 NOTE — ASSESSMENT & PLAN NOTE
73 yo F with hx of GERD, DM2 and past hiatal hernia repair approx 10 yrs ago who had repeat hiatal hernia repair on 4/26 complicated by new onset SVT and Aflutter vs Afib with RVR on POD1. SICU consulted for monitoring.      Neuro/Psych:   -- Sedation: None            -  prn IV dilaudid for pain             Cards:   #SVT  - resolved with IV adenosine x 2, now on IV dilt, will transition to PO once OK per surgery - NGT d/c'd this morning, if no vomiting by lunch time, can trial PO meds  - [id number] 4, will initiate therapeutic anticoagulation once OK per surgery  - TTE this admission with EF65, grade II diastolic dysfunction, no valvular abnormalities  - EKG this morning NSR, daily EKGs  - LR @ 100cc/hr while NPO      Pulm:   - saturating well on 2L  - supplemental O2 to maintain spO2>94%     Renal:  - no aguilar  - adequate UOP with normal BUN/Cr  - daily CMP  - prn electrolyte replacement      FEN / GI:  Sp repeat hiatal hernia repair 4/26  - NPO  - trial d/c NGT, trial PO meds after lunch  - Abd incisions CDI  - last BM 5 days ago, miralax started      ID:   - afebrile with no leukocytosis  - daily cbc      Heme/Onc:   - no bleeding or anemia  - daily cbc   - Transfuse hgb<7  - ppx lovenox, may escalate to therapeutic given elevated [id number]      Endo:   - Gluc goal 140-180, SSI      PPx:   Feeding: NPO  Analgesia/Sedation: dilaudid  Thromboembolic prevention: lovenox  HOB >30: Yes   Stress Ulcer ppx: NA  Glucose control: Critical care goal 140-180 g/dl, SSI     Lines/Drains/Airway: 2 PIV, NG         Dispo/Code Status/Palliative:   -- SICU / Full Code, stable for step down once on PO meds

## 2022-04-28 NOTE — PLAN OF CARE
[facility] - Surgical Intensive Care  Discharge Reassessment    Primary Care Provider: Primary Doctor No    Expected Discharge Date: 5/2/2022    Reassessment (most recent)     Discharge Reassessment - 04/28/22 0905        Discharge Reassessment    Assessment Type Discharge Planning Reassessment     Did the patient's condition or plan change since previous assessment? Yes     Discharge Plan discussed with: Patient     Communicated BRUNILDA with patient/caregiver Date not available/Unable to determine     Discharge Plan A Home;Home Health     Discharge Plan B Home with family     DME Needed Upon Discharge  none     Discharge Barriers Identified None     Why the patient remains in the hospital Requires continued medical care               4/27/2022 Interval History: Ms. Vazquez was otherwise doing well until rapid repsonse was called for sustained SVT on telemetry after ambulating in the halls. She was otherwise stable at that time and denied any symptoms. She received adenosine and dilt with resolution, however her tachyarrhythmia recurred and she was stepped up to to ICU for initiation of diltiazem gtt and has been hemodynamically stable sine.    Will continue to follow - Anticipate home with daughter.    Stephanie Jesus, SAMEER  Case   Ext [phone]

## 2022-04-28 NOTE — SUBJECTIVE & OBJECTIVE
Interval History: Ms. Vazquez was otherwise doing well until rapid repsonse was called for sustained SVT on telemetry after ambulating in the halls. She was otherwise stable at that time and denied any symptoms. She received adenosine and dilt with resolution, however her tachyarrhythmia recurred and she was stepped up to to ICU for initiation of diltiazem gtt and has been hemodynamically stable sine.      Medications:  Continuous Infusions:   sodium chloride 0.9% 75 mL/hr at 04/28/22 0400    dilTIAZem      hydromorphone in 0.9 % NaCl 6 mg/30 ml       Scheduled Meds:   diltiaZEM  5 mg Intravenous Q6H    enoxaparin  40 mg Subcutaneous Daily    mupirocin   Nasal BID    ondansetron  4 mg Intravenous Q6H    scopolamine  1 patch Transdermal Q3 Days     PRN Meds:acetaminophen, calcium gluconate IVPB, calcium gluconate IVPB, calcium gluconate IVPB, dextrose 50%, diphenhydrAMINE, glucagon (human recombinant), hydrALAZINE, insulin aspart U-100, magnesium sulfate IVPB, magnesium sulfate IVPB, naloxone, potassium chloride **AND** potassium chloride **AND** potassium chloride, prochlorperazine, promethazine (PHENERGAN) IVPB, sodium chloride 0.9%, sodium phosphate IVPB, sodium phosphate IVPB, sodium phosphate IVPB     Review of patient's allergies indicates:  No Known Allergies  Objective:     Vital Signs (Most Recent):  Temp: 97.7 °F (36.5 °C) (04/28/22 0300)  Pulse: 80 (04/28/22 0645)  Resp: 20 (04/28/22 0645)  BP: (!) 145/62 (04/28/22 0630)  SpO2: 95 % (04/28/22 0645)   Vital Signs (24h Range):  Temp:  [97.7 °F (36.5 °C)-99.1 °F (37.3 °C)] 97.7 °F (36.5 °C)  Pulse:  [] 80  Resp:  [16-36] 20  SpO2:  [93 %-100 %] 95 %  BP: (113-174)/() 145/62     Weight: 86.3 kg (190 lb 4.1 oz)  Body mass index is 30.71 kg/m².    Intake/Output - Last 3 Shifts         04/26 0700 04/27 0659 04/27 0700 04/28 0659 04/28 0700 04/29 0659    I.V. (mL/kg) 1000 (11.6) 1510.3 (17.5)     IV Piggyback 1400 699     Total Intake(mL/kg) 2400  (27.8) 2209.3 (25.6)     Urine (mL/kg/hr) 1 (0) 150 (0.1)     Drains 450 950     Total Output 451 1100     Net +1949 +1109.3            Urine Occurrence 2 x 4 x     Stool Occurrence  0 x             Physical Exam  Constitutional:       General: She is not in acute distress.     Appearance: Normal appearance. She is not ill-appearing or toxic-appearing.   HENT:      Head: Normocephalic and atraumatic.      Nose:      Comments: NG in place  Eyes:      Extraocular Movements: Extraocular movements intact.   Cardiovascular:      Rate and Rhythm: Normal rate and regular rhythm.   Pulmonary:      Effort: Pulmonary effort is normal. No respiratory distress.   Abdominal:      General: There is no distension.      Palpations: Abdomen is soft.      Tenderness: There is abdominal tenderness (appropriate, incisional).      Comments: Incisions c/d/i   Musculoskeletal:         General: Normal range of motion.      Cervical back: Normal range of motion.   Skin:     General: Skin is warm and dry.   Neurological:      General: No focal deficit present.      Mental Status: She is alert and oriented to person, place, and time.   Psychiatric:         Mood and Affect: Mood normal.         Behavior: Behavior normal.       Significant Labs:  I have reviewed all pertinent lab results within the past 24 hours.    Significant Diagnostics:  I have reviewed all pertinent imaging results/findings within the past 24 hours.

## 2022-04-28 NOTE — NURSING
Telemetry notified RN that pt was reading SVT in the 200s on the monitor at 1810. Pt. Asymptomatic, /70. Rapid response team and surgery team paged to bedside. Cardiology at bedside, serial EKGs done and in pt chart. Rapid response called, see RRT note. Pt given adenosine, BPs stable throughout. HR now 100s-110s on telemetry. Labs, KUB, and IV metoprolol ordered. Pt to remain in room 1006 for now, WCKAYLA.

## 2022-04-28 NOTE — ASSESSMENT & PLAN NOTE
71 yo F with hx of GERD, DM2 and past hiatal hernia repair approx 10 yrs ago who had repeat hiatal hernia repair on 4/26 complicated by new onset SVT and Aflutter vs Afib with RVR on POD1. SICU consulted for monitoring.      Neuro/Psych:   -- Sedation: None            -  Pt comfortable             Cards:   SVT, resolved  -sp Adensosine 12 x2 and Dilt bolus  Aflutter vs Afib RVR  - currenmtly NSR   BP stable  - Cardiology consulted  - sp Dilt bolus x2 without rate control  - Dilt gtt if neweded for rate control  - Metop 5 q6h  - Will begin AC once surgery Oks  - Daily EKGs  - LR @ 75      Pulm:   No acute issues   -supplemental O2 to maintain spO2>94%        Renal:  No acute issues  - daily CMP  - prn electrolyte replacement        FEN / GI:  Sp repeat hiatal hernia repair 4/26  - NPO  - NG to suction  - Abd incisios CDI  - waiting AC recs from surgery            ID:   - afebrile, WBC stable  - daily cbc      Heme/Onc:   - H/H stable  - daily cbc   - Transfuse hgb<7      Endo:   -- Gluc goal 140-180, SSI      PPx:   Feeding: NPO  Analgesia/Sedation: tylenol  Thromboembolic prevention: lovenox  HOB >30: Yes   Stress Ulcer ppx: NA  Glucose control: Critical care goal 140-180 g/dl, SSI     Lines/Drains/Airway: 2 PIV, NG         Dispo/Code Status/Palliative:   -- SICU / Full Code

## 2022-04-28 NOTE — PROGRESS NOTES
[facility]  Cardiology  Progress Note    Patient Name: Vidya Vazquez  MRN: [id number]  Admission Date: 4/24/2022  Hospital Length of Stay: 4 days  Code Status: Full Code   Attending Physician: David Mike Jr.,*   Primary Care Physician: Primary Doctor No  Expected Discharge Date: 5/2/2022  Principal Problem:Atrial flutter    Subjective:     Interval History: Patient seen and examined. Afebrile, vital signs stable. SVT and atrial flutter resolved as of this morning.       Review of Systems   Constitutional: Negative for chills and fever.   HENT:  Negative for congestion and sore throat.    Eyes:  Negative for vision loss in left eye, vision loss in right eye and visual disturbance.   Cardiovascular:  Negative for chest pain, leg swelling and palpitations.   Respiratory:  Negative for cough and shortness of breath.    Musculoskeletal:  Negative for back pain and joint pain.   Gastrointestinal:  Negative for constipation, diarrhea, nausea and vomiting.   Genitourinary:  Negative for dysuria and flank pain.   Neurological:  Negative for focal weakness and light-headedness.   Psychiatric/Behavioral:  Negative for altered mental status. The patient does not have insomnia.    Objective:     Vital Signs (Most Recent):  Temp: 98.4 °F (36.9 °C) (04/28/22 0700)  Pulse: 82 (04/28/22 1445)  Resp: 18 (04/28/22 1445)  BP: 132/65 (04/28/22 1445)  SpO2: (!) 93 % (04/28/22 1445)   Vital Signs (24h Range):  Temp:  [97.7 °F (36.5 °C)-99.1 °F (37.3 °C)] 98.4 °F (36.9 °C)  Pulse:  [] 82  Resp:  [15-47] 18  SpO2:  [90 %-100 %] 93 %  BP: (113-171)/() 132/65     Weight: 86.2 kg (190 lb)  Body mass index is 30.67 kg/m².     SpO2: (!) 93 %  O2 Device (Oxygen Therapy): nasal cannula      Intake/Output Summary (Last 24 hours) at 4/28/2022 1528  Last data filed at 4/28/2022 1400  Gross per 24 hour   Intake 2576.94 ml   Output 1100 ml   Net 1476.94 ml       Lines/Drains/Airways       Drain  Duration                   NG/OG Tube 04/25/22 1200 Left nostril 3 days              Peripheral Intravenous Line  Duration                  Peripheral IV - Single Lumen 04/25/22 2300 20 G Right;Medial Forearm 2 days         Peripheral IV - Single Lumen 04/26/22 1344 18 G Left Hand 2 days         Peripheral IV - Single Lumen 04/27/22 2357 20 G Right Forearm <1 day                    Physical Exam  Vitals and nursing note reviewed.   Constitutional:       Appearance: Normal appearance.   HENT:      Head: Normocephalic and atraumatic.   Eyes:      General: No scleral icterus.     Extraocular Movements: Extraocular movements intact.   Cardiovascular:      Rate and Rhythm: Normal rate and regular rhythm.      Pulses: Normal pulses.   Pulmonary:      Effort: Pulmonary effort is normal.      Breath sounds: Normal breath sounds.   Abdominal:      General: Bowel sounds are normal.      Palpations: Abdomen is soft.   Musculoskeletal:         General: No swelling or tenderness.      Cervical back: Normal range of motion and neck supple.   Skin:     General: Skin is warm and dry.      Capillary Refill: Capillary refill takes less than 2 seconds.   Neurological:      Mental Status: She is alert and oriented to person, place, and time. Mental status is at baseline.   Psychiatric:         Mood and Affect: Mood normal.         Behavior: Behavior normal.       Significant Labs: All pertinent labs within the past 24 hours have been reviewed.  CBC:   Recent Labs   Lab 04/27/22  0248 04/28/22  0443   WBC 11.00 10.00   HGB 12.0 11.2*   HCT 38.6 36.8*    280     CMP:   Recent Labs   Lab 04/27/22  0248 04/27/22  1858 04/27/22  2209 04/27/22  2340 04/28/22  0443   *  145 146* 143 147* 146*   K 3.8  3.9 3.6 5.9* 3.7 4.1     105 103 102 105 106   CO2 30*  30* 28 28 29 26   *  141* 138* 227* 124* 117*   BUN 20  20 17 15 14 14   CREATININE 0.9  0.9 0.8 0.7 0.7 0.7   CALCIUM 9.4  9.4 9.6 8.8 9.0 9.0   PROT 7.2 7.9  --   --   6.5   ALBUMIN 3.0* 3.3*  --   --  2.9*   BILITOT 0.5 0.7  --   --  0.7   ALKPHOS 85 85  --   --  72   AST 51* 35  --   --  31   ALT 29 23  --   --  21   ANIONGAP 13  10 15 13 13 14   EGFRNONAA >60.0  >60.0 >60.0 >60.0 >60.0 >60.0     Magnesium:   Recent Labs   Lab 04/27/22  1858 04/27/22  2209 04/28/22  0443   MG 2.0 2.0 2.1     Phosphorous:  Recent Labs   Lab 04/27/22  2209 04/27/22  2340 04/28/22  0443   PHOS 7.5* 2.6* 3.2     POCT Glucose:   Recent Labs   Lab 04/27/22  1327 04/27/22  1928 04/28/22  1228   POCTGLUCOSE 134* 136* 116*         Inpatient Medications:  Continuous Infusions:   dilTIAZem      lactated ringers 75 mL/hr at 04/28/22 1400     Scheduled Meds:   diltiaZEM  5 mg Intravenous Q6H    enoxaparin  40 mg Subcutaneous Daily    lactated ringers  1,000 mL Intravenous Once    mupirocin   Nasal BID    ondansetron  4 mg Intravenous Q6H    scopolamine  1 patch Transdermal Q3 Days     PRN Meds:acetaminophen, calcium gluconate IVPB, calcium gluconate IVPB, calcium gluconate IVPB, dextrose 50%, glucagon (human recombinant), hydrALAZINE, HYDROmorphone, insulin aspart U-100, magnesium sulfate IVPB, magnesium sulfate IVPB, potassium chloride **AND** potassium chloride **AND** potassium chloride, prochlorperazine, sodium chloride 0.9%, sodium phosphate IVPB, sodium phosphate IVPB, sodium phosphate IVPB      Assessment and Plan:     Ms Vidya Vazquez is a 72 year old female with PMH of HTN, DM2, remote history of hiatal hernia s/p repair who presented to [facility] with recurrence of hiatal hernia and gastric volvulus. She is POD1 s/p laparoscopic repair of above. Had 2 episodes of SVT with eventual conversion to atrial flutter earlier this evening. Initially responded well to adenosine, however remained in AFL following second dose.  Appears to have had 2 separate arrhythmias over the course of this evening. Initial SVT broke appropriately with adenosine, however converted to flutter following second. Responded  well to 10 mg IV push diltiazem with rate ~110s. Shortly following this she converted again to sinus rhythm.      Supraventricular tachycardia  Atrial flutter:   -Currently in sinus rhythm  -TTE[04/28/22]  · The left ventricle is normal in size with concentric remodeling and normal systolic function. The estimated ejection fraction is 65%.  · Normal right ventricular size with normal right ventricular systolic function.  · Grade II left ventricular diastolic dysfunction.  · Mild left atrial enlargement.  · Pulmonary arterial systolic pressure is greater than 38 mmHg.        RECOMMENDATIONS  -Diltiazem 5 mg IV q6 (transition to 30 mg PO q6 when ok to give enteral meds per surgery)  -CHADS-VASc is 4, would benefit from anticoagulation when surgery is confident hemostasis from surgery is achieved  -Electrophysiology follow-up outpatient for evaluation of UMA and catheter ablation.       Thank you for the consult. Cardiology will sign-off       VTE Risk Mitigation (From admission, onward)         Ordered     enoxaparin injection 40 mg  Daily         04/24/22 8339                Gene Amanda DO  Cardiology  [facility]

## 2022-04-28 NOTE — CONSULTS
"[facility] [place]  Cardiology Consult       Vidya Vazquez  YOB: 1950  Medical Record Number:  [id number]  Attending Physician:  David Mike Jr.,*   Date of Admission: 4/24/2022       Hospital Day:  4  Current Principal Problem:  Atrial flutter      History     Cc: SVT, Aflutter     HPI  Ms Vidya Vazquez is a 72 year old female with PMH of HTN, DM2, remote history of hiatal hernia s/p repair who presented to [facility] with recurrence of hiatal hernia and gastric volvulus. She is POD 1 s/p repair/abdominal surgery. She currently has NG tube placed for decompression otherwise recovering from surgery well. ~6 PM developed acute onset narrow complex tachycardia ~210 BPM. Cardiology was called to bedside. Appeared to be SVT, AVNRT suspected. Adenosine 12 mg pushed at bedside with return to sinus rhythm. At the time of initial adenosine push no flutter waves were noted on monitor. Unable to use GI tract per surgery so metoprolol tartrate 5 mg IV [id number] was initiated.     Later in the evening she had recurrence of narrow complex regular tachycardia with rate ~200s. Similar appearance to previous. Adenosine 12 mg again pushed at bedside and her rate improved to 140s and irregular. 12 lead EKG at that time revealed atrial flutter with variable block. She was transferred to ICU.     She reports feeling "tired" but remained hemodynamically stable throughout events of the evening and largely asymptomatic. Denies history of cardiac arrhythmias. No history of Afib/flutter, SVT per her or daughter at bedside who is an ICU nurse. No history of heart failure or CAD.     Bedside ultrasound performed with preserved LVEF ~60%, normal RV size and function, IVC 18 mm and collapsible with estimated CVP 3.        Medications - Outpatient  Prior to Admission medications    Not on File         Medications - Current  Scheduled Meds:   diltiaZEM        diltiaZEM  5 mg Intravenous Q6H    enoxaparin  40 mg " Subcutaneous Daily    mupirocin   Nasal BID    ondansetron  4 mg Intravenous Q6H    potassium phosphate IVPB  30 mmol Intravenous Once    scopolamine  1 patch Transdermal Q3 Days     Continuous Infusions:   sodium chloride 0.9% 75 mL/hr at 04/28/22 0000    dilTIAZem      hydromorphone in 0.9 % NaCl 6 mg/30 ml       PRN Meds:.acetaminophen, calcium gluconate IVPB, calcium gluconate IVPB, calcium gluconate IVPB, dextrose 50%, diphenhydrAMINE, glucagon (human recombinant), hydrALAZINE, insulin aspart U-100, magnesium sulfate IVPB, magnesium sulfate IVPB, naloxone, potassium chloride **AND** potassium chloride **AND** potassium chloride, prochlorperazine, promethazine (PHENERGAN) IVPB, sodium chloride 0.9%, sodium phosphate IVPB, sodium phosphate IVPB, sodium phosphate IVPB      Allergies  Review of patient's allergies indicates:  No Known Allergies      Past Medical History  History reviewed. No pertinent past medical history.      Past Surgical History  Past Surgical History:   Procedure Laterality Date    ESOPHAGOGASTRODUODENOSCOPY N/A 4/25/2022    Procedure: EGD (ESOPHAGOGASTRODUODENOSCOPY);  Surgeon: Cynthia Sumner MD;  Location: [facility];  Service: Gastroenterology;  Laterality: N/A;    ESOPHAGOGASTRODUODENOSCOPY N/A 4/26/2022    Procedure: EGD (ESOPHAGOGASTRODUODENOSCOPY);  Surgeon: David Mike Jr., MD;  Location: [address];  Service: General;  Laterality: N/A;    LAPAROSCOPIC TOUPET FUNDOPLICATION N/A 4/26/2022    Procedure: FUNDOPLICATION, LAPAROSCOPIC, TOUPET;  Surgeon: David Mike Jr., MD;  Location: [address];  Service: General;  Laterality: N/A;         Social History  Social History     Socioeconomic History    Marital status:          ROS  10 point ROS performed and negative except as noted in HPI       Physical Examination         Vital Signs  Vitals  Temp: 98.4 °F (36.9 °C)  Temp src: Oral  Pulse: 71  Heart Rate Source: Monitor, Continuous  Resp: (!)  31  SpO2: 98 %  Pulse Oximetry Type: Continuous  Flow (L/min): (S) 3 (decreased from 4L)  Oxygen Concentration (%): 32  O2 Device (Oxygen Therapy): nasal cannula  BP: 138/66  MAP (mmHg): 88  BP Location: Right arm  BP Method: Automatic  Patient Position: Lying          24 Hour VS Range    Temp:  [97.8 °F (36.6 °C)-99.1 °F (37.3 °C)]   Pulse:  []   Resp:  [16-31]   BP: (113-174)/()   SpO2:  [92 %-100 %]     Intake/Output Summary (Last 24 hours) at 4/28/2022 0046  Last data filed at 4/27/2022 2300  Gross per 24 hour   Intake 2189.31 ml   Output 900 ml   Net 1289.31 ml         Physical Exam:   Constitutional: no acute distress  HEENT: NCAT, EOMI, no scleral icterus  Cardiovascular: Irregularly irregular rhythm, no murmurs appreciated. 2+ carotid, radial pulses bilaterally    Pulmonary: Clear to auscultation bilaterally   Abdomen: nontender, non-distended   Neuro: alert and oriented, no focal deficits  Extremities: warm, no edema   MSK: no deformities  Integument: intact, no rashes       Data       Recent Labs   Lab 04/24/22  0825 04/26/22  0857 04/27/22  0248   WBC 8.22 7.34 11.00   HGB 12.1 12.2 12.0   HCT 38.1 38.9 38.6    314 292        No results for input(s): PROTIME, INR in the last 168 hours.     Recent Labs   Lab 04/25/22  0447 04/26/22  0857 04/27/22  0248 04/27/22  1858 04/27/22  2209    145  145 147*  145 146* 143   K 3.1* 3.4*  3.5 3.8  3.9 3.6 5.9*    104  102 104  105 103 102   CO2 26 30*  29 30*  30* 28 28   BUN 10 13  13 20  20 17 15   CREATININE 0.8 0.8  0.8 0.9  0.9 0.8 0.7   ANIONGAP 13 11  14 13  10 15 13   CALCIUM 9.5 9.2  9.2 9.4  9.4 9.6 8.8        Recent Labs   Lab 04/24/22  0825 04/26/22  0857 04/27/22  0248 04/27/22  1858   PROT 8.3 6.8 7.2 7.9   ALBUMIN 4.0 3.3* 3.0* 3.3*   BILITOT 0.5 1.1* 0.5 0.7   ALKPHOS 113 90 85 85   AST 18 25 51* 35   ALT 17 15 29 23        Recent Labs   Lab 04/24/22  0825   TROPONINI <0.006        BNP (pg/mL)   Date Value    04/27/2022 65   04/24/2022 15       No results for input(s): LABBLOO in the last 168 hours.     ECG:    Initial with narrow complex regular tachycardia with rate ~200s, AVNRT suspected      Following second SVT episode and adenosine push, irregularly irregular rhythm with apparent flutter waves. Likely AFL with variable block         Assessment & Plan   Ms Vidya Vazquez is a 72 year old female with PMH of HTN, DM2, remote history of hiatal hernia s/p repair who [facility] with recurrence of hiatal hernia and gastric volvulus. She is POD1 s/p laparoscopic repair of above. Had 2 episodes of SVT with eventual conversion to atrial flutter earlier this evening. Initially responded well to adenosine, however remained in AFL following second dose.     Supraventricular tachycardia  Atrial flutter:   Appears to have had 2 separate arrhythmias over the course of this evening. Initial SVT broke appropriately with adenosine, however converted to flutter following second. Responded well to 10 mg IV push diltiazem with rate ~110s. Shortly following this she converted again to sinus rhythm.   -Currently sinus  -Diltiazem 5 mg IV q6 (transition to 30 mg PO q6 when ok to give enteral meds per surgery)  -CHADS-VASc is 4, would benefit from anticoagulation when surgery is confident hemostasis from surgery is achieved  -Check formal echo  -Agree with IV fluids as she does not appear overloaded and has been NPO with gastric suction for the past day     Thank you for the consult. Cardiology will continue to follow.     Leonard Saldaña MD  [facility]   Cardiovascular Disease PGY-IV

## 2022-04-28 NOTE — SIGNIFICANT EVENT
Surgery Note:     Vidya Vazquez is 1 Day Post-Op from lap hiatal hernia repair with detorsion of gastric volvulus with David fundoplication. She had a rapid response called earlier this evening for sustained SVT (HR as high as 212bpm) after returning to her room from walking the halls. BP remained stable. Cards consulted. Received adenosine and diltiazem with resolution of the arrhythmia.     Labs showed mild hypokalemia and hypophosphatemia, replacements ordered. KUB shows NG to be in correct position, no early recurrence of hiatal hernia.     Flipped back into a tachy arrhythmia later this evening. HR in 130s, appeared to be afib. Stepped up to SICU for diltiazem gtt and hemodynamic monitoring. Appreciate cardiology recommendations.     Bertha Oneil MD  General Surgery Resident, PGY IV  Pager [phone]

## 2022-04-28 NOTE — CODE/ RAPID DOCUMENTATION
RAPID RESPONSE NURSE NOTE        Admit Date: 2022  LOS: 3  Code Status: No Order   Date of Consult: 2022  : 1950  Age: 72 y.o.  Weight:   Wt Readings from Last 1 Encounters:   22 86.3 kg (190 lb 4.1 oz)     Sex: female  Race: Black or    Bed: [address] [place] [address] A:   MRN: [id number]  Time Rapid Response Team page Received:   Time Rapid Response Team at Bedside:   Time Rapid Response Team left Bedside:   Was the patient discharged from an ICU this admission? No   Was the patient discharged from a PACU within last 24 hours? No   Did the patient receive conscious sedation/general anesthesia in last 24 hours? No  Was the patient in the ED within the past 24 hours? No  Was the patient on NIPPV within the past 24 hours? No   Did this progress into an ARC or CPA: no  Attending Physician: David Mike Jr.,*  Primary Service: General Surgery,Bariatrics       SITUATION    Notified by charge RN via phone call.  Reason for alert: tachycardia  Called to evaluate the patient for Dysrythmia    BACKGROUND     Why is the patient in the hospital?: Gastric volvulus    Patient has no past medical history on file.    Last Vitals:  Temp: 99.1 °F (37.3 °C) (1941)  Pulse: 96 (1941)  Resp: 17 (1941)  BP: 135/71 (1941)  SpO2: 96 % (1941)    24 Hours Vitals Range:  Temp:  [97.4 °F (36.3 °C)-99.1 °F (37.3 °C)]   Pulse:  []   Resp:  [16-18]   BP: (116-174)/(64-86)   SpO2:  [92 %-99 %]     Labs:  Recent Labs     22  0857 22  0248   WBC 7.34 11.00   HGB 12.2 12.0   HCT 38.9 38.6    292       Recent Labs     22  0857 22  0248 22  1858     145 147*  145 146*   K 3.4*  3.5 3.8  3.9 3.6     102 104  105 103   CO2 30*  29 30*  30* 28   CREATININE 0.8  0.8 0.9  0.9 0.8     109 140*  141* 138*   PHOS 2.6* 4.0 2.2*   MG 1.7 2.0 2.0        No results for input(s): PH, PCO2, PO2, HCO3, POCSATURATED, BE in  "the last 72 hours.     ASSESSMENT    Physical Exam  Cardiovascular:      Rate and Rhythm: Tachycardia present.   Pulmonary:      Effort: Pulmonary effort is normal.      Breath sounds: Normal breath sounds.   Skin:     General: Skin is warm and dry.      Capillary Refill: Capillary refill takes less than 2 seconds.   Neurological:      Mental Status: She is alert and oriented to person, place, and time.       /62 (79)  SpO2 95% on 4L NC, RR 18    Notified by charge RN of patient HR back in 200s    Upon arrival to bedside MD already present, cardiology en route.   Patient awake and alert, c/o some mild lightheadedness, can feel her pulse racing  Pacer pads in place, patient on Zoll monitor    INTERVENTIONS    The patient was seen for a Cardiac problem. Staff concerns included tachycardia. The following interventions were performed: cardiology consult.     Per MD order, 12mg adenosine given IVP   Patient HR converted to NSR in the 80s, but quickly returned to a rate of ~140 on the monitor.  5mg IVP metoprolol given per MD order as well at this time    /74 (95)  Patient states she feels "a little better."    Decision made to transfer patient to ICU for higher level of care, closer monitoring    RECOMMENDATIONS    We recommend: transfer to ICU    PROVIDER ESCALATION    Orders received and case discussed with Dr. Rosado.    Disposition: Tx in ICU bed 46513. patient transferred with RRN x2, JOE charge RN, JOE RN x2 with cardiac monitoring, supplemental O2    FOLLOW UP    charge Yen VERA updated on plan of care. Instructed to call the Rapid Response Nurse, Bertha Bell, RN at [phone] for additional questions or concerns.        "

## 2022-04-28 NOTE — NURSING
Pt admitted to SICU [id number] with rapid response team and [facility] nurses. Dr Rivero with SICU and Dr Saldañawith cardiology at bedside. HR in 130s irregular rhythm at this time; other vital signs within normal limits. Pt denies dizziness or distress at this time. Will now assume care.    12 lead EKG obtained.  Diltiazem 10 mg IVP ordered by Dr Saldaña.  BMP/Mg/Phos sent.

## 2022-04-28 NOTE — SUBJECTIVE & OBJECTIVE
Interval History: Patient seen and examined. Afebrile, vital signs stable. SVT and atrial flutter resolved as of this morning.       Review of Systems   Constitutional: Negative for chills and fever.   HENT:  Negative for congestion and sore throat.    Eyes:  Negative for vision loss in left eye, vision loss in right eye and visual disturbance.   Cardiovascular:  Negative for chest pain, leg swelling and palpitations.   Respiratory:  Negative for cough and shortness of breath.    Musculoskeletal:  Negative for back pain and joint pain.   Gastrointestinal:  Negative for constipation, diarrhea, nausea and vomiting.   Genitourinary:  Negative for dysuria and flank pain.   Neurological:  Negative for focal weakness and light-headedness.   Psychiatric/Behavioral:  Negative for altered mental status. The patient does not have insomnia.    Objective:     Vital Signs (Most Recent):  Temp: 98.4 °F (36.9 °C) (04/28/22 0700)  Pulse: 82 (04/28/22 1445)  Resp: 18 (04/28/22 1445)  BP: 132/65 (04/28/22 1445)  SpO2: (!) 93 % (04/28/22 1445)   Vital Signs (24h Range):  Temp:  [97.7 °F (36.5 °C)-99.1 °F (37.3 °C)] 98.4 °F (36.9 °C)  Pulse:  [] 82  Resp:  [15-47] 18  SpO2:  [90 %-100 %] 93 %  BP: (113-171)/() 132/65     Weight: 86.2 kg (190 lb)  Body mass index is 30.67 kg/m².     SpO2: (!) 93 %  O2 Device (Oxygen Therapy): nasal cannula      Intake/Output Summary (Last 24 hours) at 4/28/2022 1528  Last data filed at 4/28/2022 1400  Gross per 24 hour   Intake 2576.94 ml   Output 1100 ml   Net 1476.94 ml       Lines/Drains/Airways       Drain  Duration                  NG/OG Tube 04/25/22 1200 Left nostril 3 days              Peripheral Intravenous Line  Duration                  Peripheral IV - Single Lumen 04/25/22 2300 20 G Right;Medial Forearm 2 days         Peripheral IV - Single Lumen 04/26/22 1344 18 G Left Hand 2 days         Peripheral IV - Single Lumen 04/27/22 2357 20 G Right Forearm <1 day                     Physical Exam  Vitals and nursing note reviewed.   Constitutional:       Appearance: Normal appearance.   HENT:      Head: Normocephalic and atraumatic.   Eyes:      General: No scleral icterus.     Extraocular Movements: Extraocular movements intact.   Cardiovascular:      Rate and Rhythm: Normal rate and regular rhythm.      Pulses: Normal pulses.   Pulmonary:      Effort: Pulmonary effort is normal.      Breath sounds: Normal breath sounds.   Abdominal:      General: Bowel sounds are normal.      Palpations: Abdomen is soft.   Musculoskeletal:         General: No swelling or tenderness.      Cervical back: Normal range of motion and neck supple.   Skin:     General: Skin is warm and dry.      Capillary Refill: Capillary refill takes less than 2 seconds.   Neurological:      Mental Status: She is alert and oriented to person, place, and time. Mental status is at baseline.   Psychiatric:         Mood and Affect: Mood normal.         Behavior: Behavior normal.       Significant Labs: All pertinent labs within the past 24 hours have been reviewed.  CBC:   Recent Labs   Lab 04/27/22  0248 04/28/22  0443   WBC 11.00 10.00   HGB 12.0 11.2*   HCT 38.6 36.8*    280     CMP:   Recent Labs   Lab 04/27/22  0248 04/27/22  1858 04/27/22  2209 04/27/22  2340 04/28/22  0443   *  145 146* 143 147* 146*   K 3.8  3.9 3.6 5.9* 3.7 4.1     105 103 102 105 106   CO2 30*  30* 28 28 29 26   *  141* 138* 227* 124* 117*   BUN 20  20 17 15 14 14   CREATININE 0.9  0.9 0.8 0.7 0.7 0.7   CALCIUM 9.4  9.4 9.6 8.8 9.0 9.0   PROT 7.2 7.9  --   --  6.5   ALBUMIN 3.0* 3.3*  --   --  2.9*   BILITOT 0.5 0.7  --   --  0.7   ALKPHOS 85 85  --   --  72   AST 51* 35  --   --  31   ALT 29 23  --   --  21   ANIONGAP 13  10 15 13 13 14   EGFRNONAA >60.0  >60.0 >60.0 >60.0 >60.0 >60.0     Magnesium:   Recent Labs   Lab 04/27/22  1858 04/27/22  2209 04/28/22  0443   MG 2.0 2.0 2.1     Phosphorous:  Recent Labs   Lab  04/27/22  2209 04/27/22  2340 04/28/22  0443   PHOS 7.5* 2.6* 3.2     POCT Glucose:   Recent Labs   Lab 04/27/22  1327 04/27/22  1928 04/28/22  1228   POCTGLUCOSE 134* 136* 116*         Inpatient Medications:  Continuous Infusions:   dilTIAZem      lactated ringers 75 mL/hr at 04/28/22 1400     Scheduled Meds:   diltiaZEM  5 mg Intravenous Q6H    enoxaparin  40 mg Subcutaneous Daily    lactated ringers  1,000 mL Intravenous Once    mupirocin   Nasal BID    ondansetron  4 mg Intravenous Q6H    scopolamine  1 patch Transdermal Q3 Days     PRN Meds:acetaminophen, calcium gluconate IVPB, calcium gluconate IVPB, calcium gluconate IVPB, dextrose 50%, glucagon (human recombinant), hydrALAZINE, HYDROmorphone, insulin aspart U-100, magnesium sulfate IVPB, magnesium sulfate IVPB, potassium chloride **AND** potassium chloride **AND** potassium chloride, prochlorperazine, sodium chloride 0.9%, sodium phosphate IVPB, sodium phosphate IVPB, sodium phosphate IVPB

## 2022-04-28 NOTE — PROGRESS NOTES
"[facility] - [facility]  General Surgery  Progress Note    Subjective:     History of Present Illness:  Patient is 73 yo F with hx of GERD, DM2 and past hiatal hernia repair approx 10 yrs ago who presented to OSH on 4/24 with worsening abd pain, nausea, and emesis and was eventually found to have a gastric volvulus, and was [place] to [facility] [place]. She notes she had worsening reflux and some emesis on and off for the 2 weeks prior to presentation, but they all were short lived and resolved. She then developed severe epigastric pain on 4/24, and "just didn't feel well" and presented ot ED. She was found on CT scan to have concern for GOO. An EGD was performed on 4/25 which showed a tortuous stomach consistent with volvulus, and no signs of necrosis. An NGT was left, and she felt much better after completion. She was then transferred here for surgery.      Post-Op Info:  Procedure(s) (LRB):  REPAIR, HERNIA, HIATAL, LAPAROSCOPIC (N/A)  EGD (ESOPHAGOGASTRODUODENOSCOPY) (N/A)  FUNDOPLICATION, LAPAROSCOPIC, TOUPET (N/A)   2 Days Post-Op     Interval History: Ms. Vazquez was otherwise doing well until rapid repsonse was called for sustained SVT on telemetry after ambulating in the halls. She was otherwise stable at that time and denied any symptoms. She received adenosine and dilt with resolution, however her tachyarrhythmia recurred and she was stepped up to to ICU for initiation of diltiazem gtt and has been hemodynamically stable sine.      Medications:  Continuous Infusions:   sodium chloride 0.9% 75 mL/hr at 04/28/22 0400    dilTIAZem      hydromorphone in 0.9 % NaCl 6 mg/30 ml       Scheduled Meds:   diltiaZEM  5 mg Intravenous Q6H    enoxaparin  40 mg Subcutaneous Daily    mupirocin   Nasal BID    ondansetron  4 mg Intravenous Q6H    scopolamine  1 patch Transdermal Q3 Days     PRN Meds:acetaminophen, calcium gluconate IVPB, calcium gluconate IVPB, calcium gluconate IVPB, dextrose 50%, " diphenhydrAMINE, glucagon (human recombinant), hydrALAZINE, insulin aspart U-100, magnesium sulfate IVPB, magnesium sulfate IVPB, naloxone, potassium chloride **AND** potassium chloride **AND** potassium chloride, prochlorperazine, promethazine (PHENERGAN) IVPB, sodium chloride 0.9%, sodium phosphate IVPB, sodium phosphate IVPB, sodium phosphate IVPB     Review of patient's allergies indicates:  No Known Allergies  Objective:     Vital Signs (Most Recent):  Temp: 97.7 °F (36.5 °C) (04/28/22 0300)  Pulse: 80 (04/28/22 0645)  Resp: 20 (04/28/22 0645)  BP: (!) 145/62 (04/28/22 0630)  SpO2: 95 % (04/28/22 0645)   Vital Signs (24h Range):  Temp:  [97.7 °F (36.5 °C)-99.1 °F (37.3 °C)] 97.7 °F (36.5 °C)  Pulse:  [] 80  Resp:  [16-36] 20  SpO2:  [93 %-100 %] 95 %  BP: (113-174)/() 145/62     Weight: 86.3 kg (190 lb 4.1 oz)  Body mass index is 30.71 kg/m².    Intake/Output - Last 3 Shifts         04/26 0700  04/27 0659 04/27 0700  04/28 0659 04/28 0700  04/29 0659    I.V. (mL/kg) 1000 (11.6) 1510.3 (17.5)     IV Piggyback 1400 699     Total Intake(mL/kg) 2400 (27.8) 2209.3 (25.6)     Urine (mL/kg/hr) 1 (0) 150 (0.1)     Drains 450 950     Total Output 451 1100     Net +1949 +1109.3            Urine Occurrence 2 x 4 x     Stool Occurrence  0 x             Physical Exam  Constitutional:       General: She is not in acute distress.     Appearance: Normal appearance. She is not ill-appearing or toxic-appearing.   HENT:      Head: Normocephalic and atraumatic.      Nose:      Comments: NG in place  Eyes:      Extraocular Movements: Extraocular movements intact.   Cardiovascular:      Rate and Rhythm: Normal rate and regular rhythm.   Pulmonary:      Effort: Pulmonary effort is normal. No respiratory distress.   Abdominal:      General: There is no distension.      Palpations: Abdomen is soft.      Tenderness: There is abdominal tenderness (appropriate, incisional).      Comments: Incisions c/d/i   Musculoskeletal:          General: Normal range of motion.      Cervical back: Normal range of motion.   Skin:     General: Skin is warm and dry.   Neurological:      General: No focal deficit present.      Mental Status: She is alert and oriented to person, place, and time.   Psychiatric:         Mood and Affect: Mood normal.         Behavior: Behavior normal.       Significant Labs:  I have reviewed all pertinent lab results within the past 24 hours.    Significant Diagnostics:  I have reviewed all pertinent imaging results/findings within the past 24 hours.    Assessment/Plan:     Gastric volvulus  Patient is 73 yo F with GOO 2/2 gastric volvulus.  Now s/p reduction of gastric volvulus and hiatal hernia repair with fadi fundoplication on 4/26/22.    In NSR with dilt gtt on assessment this AM; Cardiology consulted appreciate recommendations  Continue NPO today  NGT to LIWS, will possibly remove this afternoon pending output  mIVF  PRN pain and nausea medications, keep PCA today  DVT ppx: lovenox  OOBTC/ambulate  IS    Dispo: Continue ICU care while on dilt gtt        Horacio Maza MD  General Surgery  [facility] - Surgical Intensive Care

## 2022-04-28 NOTE — NURSING
Pt tachycardic mid 130s. Rhythm appears regular. BP stable, no distress. Converted to NSR 70s after a few seconds. Notified SICU team. No additional orders noted.

## 2022-04-28 NOTE — NURSING
Pt noted to have regular heart rate in the 80s. Dr Saldaña notified; ordered to hold off on starting diltiazem gtt at this time. Additional 12 lead EKG obtained to confirm sinus rhythm.

## 2022-04-28 NOTE — H&P
[facility]  Critical Care - Surgery  History & Physical    Patient Name: Vidya Vazquez  MRN: [id number]  Admission Date: 4/24/2022  Code Status: Full Code  Attending Physician: David Mike Jr.,*   Primary Care Provider: Primary Doctor No   Principal Problem: Atrial flutter    Subjective:     HPI:  73 yo F with hx of GERD, DM2 and past hiatal hernia repair approx 10 yrs ago who had repeat hiatal hernia repair on 4/26. Surgery was uncomplicated and pt was recovering well in [facility]. On POD1, pt went into new onset SVT while using the restroom. HR max of 220. Initially SVT broke with Dilt and adenosine. Later pt reverted back into SVT and broke again with metop and adenosine. SICU was consulted for higher level of care. Upon arrive to SICU, pt was in new onset Aflutter vs Afib with RVR with Max . Pt otherwise Asymptomatic. AAOx4, denies SOB, CP, HA, lightheadedness. No further complaints at this time.      Hospital/ICU Course:  No notes on file    Follow-up For: Procedure(s) (LRB):  REPAIR, HERNIA, HIATAL, LAPAROSCOPIC (N/A)  EGD (ESOPHAGOGASTRODUODENOSCOPY) (N/A)  FUNDOPLICATION, LAPAROSCOPIC, TOUPET (N/A)    Post-Operative Day: 1 Day Post-Op     History reviewed. No pertinent past medical history.    Past Surgical History:   Procedure Laterality Date    ESOPHAGOGASTRODUODENOSCOPY N/A 4/25/2022    Procedure: EGD (ESOPHAGOGASTRODUODENOSCOPY);  Surgeon: Cynthia Sumner MD;  Location: [facility];  Service: Gastroenterology;  Laterality: N/A;    ESOPHAGOGASTRODUODENOSCOPY N/A 4/26/2022    Procedure: EGD (ESOPHAGOGASTRODUODENOSCOPY);  Surgeon: David Mike Jr., MD;  Location: [address];  Service: General;  Laterality: N/A;    LAPAROSCOPIC TOUPET FUNDOPLICATION N/A 4/26/2022    Procedure: FUNDOPLICATION, LAPAROSCOPIC, TOUPET;  Surgeon: David Mike Jr., MD;  Location: [facility] OR [address] [place] [address];  Service: General;  Laterality: N/A;       Review of patient's allergies  indicates:  No Known Allergies    Family History    None       Tobacco Use    Smoking status: Not on file    Smokeless tobacco: Not on file   Substance and Sexual Activity    Alcohol use: Not on file    Drug use: Not on file    Sexual activity: Not on file      Review of Systems   Constitutional:  Negative for diaphoresis and fatigue.   Respiratory:  Negative for chest tightness and shortness of breath.    Cardiovascular:  Negative for chest pain and palpitations.   Gastrointestinal:  Negative for abdominal pain, nausea and vomiting.   Neurological:  Negative for dizziness, syncope and light-headedness.   Psychiatric/Behavioral:  The patient is not nervous/anxious.    All other systems reviewed and are negative.  Objective:     Vital Signs (Most Recent):  Temp: 98.8 °F (37.1 °C) (04/27/22 2100)  Pulse: 96 (04/27/22 2115)  Resp: (!) 26 (04/27/22 2115)  BP: 129/82 (04/27/22 2115)  SpO2: 96 % (04/27/22 2115)   Vital Signs (24h Range):  Temp:  [97.4 °F (36.3 °C)-99.1 °F (37.3 °C)] 98.8 °F (37.1 °C)  Pulse:  [] 96  Resp:  [16-26] 26  SpO2:  [92 %-99 %] 96 %  BP: (113-174)/(62-91) 129/82     Weight: 86.3 kg (190 lb 4.1 oz)  Body mass index is 30.71 kg/m².      Intake/Output Summary (Last 24 hours) at 4/27/2022 2134  Last data filed at 4/27/2022 2100  Gross per 24 hour   Intake 2114.31 ml   Output 801 ml   Net 1313.31 ml       Physical Exam  Vitals and nursing note reviewed.   Constitutional:       General: She is not in acute distress.     Appearance: Normal appearance. She is normal weight. She is not ill-appearing or diaphoretic.   HENT:      Head: Normocephalic and atraumatic.      Nose: Nose normal. No congestion.      Mouth/Throat:      Mouth: Mucous membranes are moist.      Pharynx: Oropharynx is clear.   Eyes:      General: No scleral icterus.     Extraocular Movements: Extraocular movements intact.   Cardiovascular:      Rate and Rhythm: Tachycardia present. Rhythm irregular.      Pulses: Normal  pulses.      Heart sounds: Normal heart sounds. No murmur heard.  Pulmonary:      Effort: Pulmonary effort is normal.      Breath sounds: Normal breath sounds. No wheezing.   Abdominal:      Tenderness: There is no abdominal tenderness. There is no guarding.      Comments: Incision sites CDI   Musculoskeletal:         General: No swelling or deformity.   Skin:     General: Skin is warm and dry.   Neurological:      General: No focal deficit present.      Mental Status: She is alert and oriented to person, place, and time. Mental status is at baseline.   Psychiatric:         Mood and Affect: Mood normal.         Behavior: Behavior normal.       Vents:       Lines/Drains/Airways       Drain  Duration                  NG/OG Tube 04/25/22 1200 Left nostril 2 days              Peripheral Intravenous Line  Duration                  Peripheral IV - Single Lumen 04/25/22 2300 20 G Right;Medial Forearm 1 day         Peripheral IV - Single Lumen 04/26/22 1344 18 G Left Hand 1 day                    Significant Labs:    CBC/Anemia Profile:  Recent Labs   Lab 04/26/22  0857 04/27/22  0248   WBC 7.34 11.00   HGB 12.2 12.0   HCT 38.9 38.6    292   MCV 86 85   RDW 15.0* 14.9*        Chemistries:  Recent Labs   Lab 04/26/22  0857 04/27/22  0248 04/27/22  1858     145 147*  145 146*   K 3.4*  3.5 3.8  3.9 3.6     102 104  105 103   CO2 30*  29 30*  30* 28   BUN 13  13 20  20 17   CREATININE 0.8  0.8 0.9  0.9 0.8   CALCIUM 9.2  9.2 9.4  9.4 9.6   ALBUMIN 3.3* 3.0* 3.3*   PROT 6.8 7.2 7.9   BILITOT 1.1* 0.5 0.7   ALKPHOS 90 85 85   ALT 15 29 23   AST 25 51* 35   MG 1.7 2.0 2.0   PHOS 2.6* 4.0 2.2*       All pertinent labs within the past 24 hours have been reviewed.    Significant Imaging: I have reviewed all pertinent imaging results/findings within the past 24 hours.    Assessment/Plan:     * Atrial flutter  73 yo F with hx of GERD, DM2 and past hiatal hernia repair approx 10 yrs ago who had repeat  hiatal hernia repair on 4/26 complicated by new onset SVT and Aflutter vs Afib with RVR on POD1. SICU consulted for monitoring.      Neuro/Psych:   -- Sedation: None            -  Pt comfortable             Cards:   SVT, resolved  -sp Adensosine 12 x2 and Dilt bolus  Aflutter vs Afib RVR  - currently NSR, BP stable  - Cardiology consulted  - sp Dilt bolus x2 without rate control  - Dilt gtt if neweded for rate control  - Metop 5 q6h  - will likely start on PO Dilt once able to use GI traCT  - echo pending  - Daily EKGs  - LR @ 75      Pulm:   No acute issues   -supplemental O2 to maintain spO2>94%        Renal:  No acute issues  - daily CMP  - prn electrolyte replacement        FEN / GI:  Sp repeat hiatal hernia repair 4/26  - NPO  - NG to suction  - Abd incisios CDI  - waiting recs from cards            ID:   - afebrile, WBC stable  - daily cbc      Heme/Onc:   - H/H stable  - daily cbc   - Transfuse hgb<7      Endo:   -- Gluc goal 140-180, SSI      PPx:   Feeding: NPO  Analgesia/Sedation: tylenol  Thromboembolic prevention: lovenox  HOB >30: Yes   Stress Ulcer ppx: NA  Glucose control: Critical care goal 140-180 g/dl, SSI     Lines/Drains/Airway: 2 PIV, NG         Dispo/Code Status/Palliative:   -- SICU / Full Code, stable for step down          Critical secondary to Patient has a condition that poses threat to life and bodily function: unstable arrythmia     Critical care was time spent personally by me on the following activities: development of treatment plan with patient or surrogate and bedside caregivers, discussions with consultants, evaluation of patient's response to treatment, examination of patient, ordering and performing treatments and interventions, ordering and review of laboratory studies, ordering and review of radiographic studies, pulse oximetry, re-evaluation of patient's condition.  This critical care time did not overlap with that of any other provider or involve time for any  procedures.     Laureano Rivero III, MD  Critical Care - Surgery  [facility]

## 2022-04-28 NOTE — HPI
73 yo F with hx of GERD, DM2 and past hiatal hernia repair approx 10 yrs ago who had repeat hiatal hernia repair on 4/26. Surgery was uncomplicated and pt was recovering well in [facility]. On POD1, pt went into new onset SVT while using the restroom. HR max of 220. Initially SVT broke with Dilt and adenosine. Later pt reverted back into SVT and broke again with metop and adenosine. SICU was consulted for higher level of care. Upon arrive to SICU, pt was in new onset Aflutter vs Afib with RVR with Max . Pt otherwise Asymptomatic. AAOx4, denies SOB, CP, HA, lightheadedness. No further complaints at this time.

## 2022-04-28 NOTE — SUBJECTIVE & OBJECTIVE
Follow-up For: Procedure(s) (LRB):  REPAIR, HERNIA, HIATAL, LAPAROSCOPIC (N/A)  EGD (ESOPHAGOGASTRODUODENOSCOPY) (N/A)  FUNDOPLICATION, LAPAROSCOPIC, TOUPET (N/A)    Post-Operative Day: 1 Day Post-Op     History reviewed. No pertinent past medical history.    Past Surgical History:   Procedure Laterality Date    ESOPHAGOGASTRODUODENOSCOPY N/A 4/25/2022    Procedure: EGD (ESOPHAGOGASTRODUODENOSCOPY);  Surgeon: Cynthia Sumner MD;  Location: [facility];  Service: Gastroenterology;  Laterality: N/A;    ESOPHAGOGASTRODUODENOSCOPY N/A 4/26/2022    Procedure: EGD (ESOPHAGOGASTRODUODENOSCOPY);  Surgeon: David Mike Jr., MD;  Location: [address];  Service: General;  Laterality: N/A;    LAPAROSCOPIC TOUPET FUNDOPLICATION N/A 4/26/2022    Procedure: FUNDOPLICATION, LAPAROSCOPIC, TOUPET;  Surgeon: David Mike Jr., MD;  Location: [address];  Service: General;  Laterality: N/A;       Review of patient's allergies indicates:  No Known Allergies    Family History    None       Tobacco Use    Smoking status: Not on file    Smokeless tobacco: Not on file   Substance and Sexual Activity    Alcohol use: Not on file    Drug use: Not on file    Sexual activity: Not on file      Review of Systems   Constitutional:  Negative for diaphoresis and fatigue.   Respiratory:  Negative for chest tightness and shortness of breath.    Cardiovascular:  Negative for chest pain and palpitations.   Gastrointestinal:  Negative for abdominal pain, nausea and vomiting.   Neurological:  Negative for dizziness, syncope and light-headedness.   Psychiatric/Behavioral:  The patient is not nervous/anxious.    All other systems reviewed and are negative.  Objective:     Vital Signs (Most Recent):  Temp: 98.8 °F (37.1 °C) (04/27/22 2100)  Pulse: 96 (04/27/22 2115)  Resp: (!) 26 (04/27/22 2115)  BP: 129/82 (04/27/22 2115)  SpO2: 96 % (04/27/22 2115)   Vital Signs (24h Range):  Temp:  [97.4 °F (36.3 °C)-99.1 °F (37.3 °C)] 98.8 °F (37.1  °C)  Pulse:  [] 96  Resp:  [16-26] 26  SpO2:  [92 %-99 %] 96 %  BP: (113-174)/(62-91) 129/82     Weight: 86.3 kg (190 lb 4.1 oz)  Body mass index is 30.71 kg/m².      Intake/Output Summary (Last 24 hours) at 4/27/2022 2134  Last data filed at 4/27/2022 2100  Gross per 24 hour   Intake 2114.31 ml   Output 801 ml   Net 1313.31 ml       Physical Exam  Vitals and nursing note reviewed.   Constitutional:       General: She is not in acute distress.     Appearance: Normal appearance. She is normal weight. She is not ill-appearing or diaphoretic.   HENT:      Head: Normocephalic and atraumatic.      Nose: Nose normal. No congestion.      Mouth/Throat:      Mouth: Mucous membranes are moist.      Pharynx: Oropharynx is clear.   Eyes:      General: No scleral icterus.     Extraocular Movements: Extraocular movements intact.   Cardiovascular:      Rate and Rhythm: Tachycardia present. Rhythm irregular.      Pulses: Normal pulses.      Heart sounds: Normal heart sounds. No murmur heard.  Pulmonary:      Effort: Pulmonary effort is normal.      Breath sounds: Normal breath sounds. No wheezing.   Abdominal:      Tenderness: There is no abdominal tenderness. There is no guarding.      Comments: Incision sites CDI   Musculoskeletal:         General: No swelling or deformity.   Skin:     General: Skin is warm and dry.   Neurological:      General: No focal deficit present.      Mental Status: She is alert and oriented to person, place, and time. Mental status is at baseline.   Psychiatric:         Mood and Affect: Mood normal.         Behavior: Behavior normal.       Vents:       Lines/Drains/Airways       Drain  Duration                  NG/OG Tube 04/25/22 1200 Left nostril 2 days              Peripheral Intravenous Line  Duration                  Peripheral IV - Single Lumen 04/25/22 2300 20 G Right;Medial Forearm 1 day         Peripheral IV - Single Lumen 04/26/22 1344 18 G Left Hand 1 day                    Significant  Labs:    CBC/Anemia Profile:  Recent Labs   Lab 04/26/22  0857 04/27/22  0248   WBC 7.34 11.00   HGB 12.2 12.0   HCT 38.9 38.6    292   MCV 86 85   RDW 15.0* 14.9*        Chemistries:  Recent Labs   Lab 04/26/22  0857 04/27/22  0248 04/27/22  1858     145 147*  145 146*   K 3.4*  3.5 3.8  3.9 3.6     102 104  105 103   CO2 30*  29 30*  30* 28   BUN 13  13 20  20 17   CREATININE 0.8  0.8 0.9  0.9 0.8   CALCIUM 9.2  9.2 9.4  9.4 9.6   ALBUMIN 3.3* 3.0* 3.3*   PROT 6.8 7.2 7.9   BILITOT 1.1* 0.5 0.7   ALKPHOS 90 85 85   ALT 15 29 23   AST 25 51* 35   MG 1.7 2.0 2.0   PHOS 2.6* 4.0 2.2*       All pertinent labs within the past 24 hours have been reviewed.    Significant Imaging: I have reviewed all pertinent imaging results/findings within the past 24 hours.

## 2022-04-28 NOTE — ASSESSMENT & PLAN NOTE
Patient is 71 yo F with GOO 2/2 gastric volvulus.  Now s/p reduction of gastric volvulus and hiatal hernia repair with fadi fundoplication on 4/26/22.    In NSR with dilt gtt on assessment this AM; Cardiology consulted appreciate recommendations  Continue NPO today  NGT to LIWS, will possibly remove this afternoon pending output  mIVF  PRN pain and nausea medications, keep PCA today  DVT ppx: lovenox  OOBTC/ambulate  IS    Dispo: Continue ICU care while on dilt gtt

## 2022-04-29 LAB
ALBUMIN SERPL BCP-MCNC: 2.7 G/DL (ref 3.5–5.2)
ALP SERPL-CCNC: 67 U/L (ref 55–135)
ALT SERPL W/O P-5'-P-CCNC: 14 U/L (ref 10–44)
ANION GAP SERPL CALC-SCNC: 13 MMOL/L (ref 8–16)
AST SERPL-CCNC: 22 U/L (ref 10–40)
BASOPHILS # BLD AUTO: 0.03 K/UL (ref 0–0.2)
BASOPHILS NFR BLD: 0.3 % (ref 0–1.9)
BILIRUB SERPL-MCNC: 0.6 MG/DL (ref 0.1–1)
BUN SERPL-MCNC: 10 MG/DL (ref 8–23)
CALCIUM SERPL-MCNC: 8.8 MG/DL (ref 8.7–10.5)
CHLORIDE SERPL-SCNC: 105 MMOL/L (ref 95–110)
CO2 SERPL-SCNC: 30 MMOL/L (ref 23–29)
CREAT SERPL-MCNC: 0.7 MG/DL (ref 0.5–1.4)
DIFFERENTIAL METHOD: ABNORMAL
EOSINOPHIL # BLD AUTO: 0.2 K/UL (ref 0–0.5)
EOSINOPHIL NFR BLD: 2 % (ref 0–8)
ERYTHROCYTE [DISTWIDTH] IN BLOOD BY AUTOMATED COUNT: 14.5 % (ref 11.5–14.5)
EST. GFR  (AFRICAN AMERICAN): >60 ML/MIN/1.73 M^2
EST. GFR  (NON AFRICAN AMERICAN): >60 ML/MIN/1.73 M^2
GLUCOSE SERPL-MCNC: 83 MG/DL (ref 70–110)
HCT VFR BLD AUTO: 34.9 % (ref 37–48.5)
HGB BLD-MCNC: 10.7 G/DL (ref 12–16)
IMM GRANULOCYTES # BLD AUTO: 0.02 K/UL (ref 0–0.04)
IMM GRANULOCYTES NFR BLD AUTO: 0.2 % (ref 0–0.5)
LYMPHOCYTES # BLD AUTO: 1.1 K/UL (ref 1–4.8)
LYMPHOCYTES NFR BLD: 12 % (ref 18–48)
MAGNESIUM SERPL-MCNC: 1.8 MG/DL (ref 1.6–2.6)
MCH RBC QN AUTO: 26.9 PG (ref 27–31)
MCHC RBC AUTO-ENTMCNC: 30.7 G/DL (ref 32–36)
MCV RBC AUTO: 88 FL (ref 82–98)
MONOCYTES # BLD AUTO: 0.7 K/UL (ref 0.3–1)
MONOCYTES NFR BLD: 7.9 % (ref 4–15)
NEUTROPHILS # BLD AUTO: 7.1 K/UL (ref 1.8–7.7)
NEUTROPHILS NFR BLD: 77.6 % (ref 38–73)
NRBC BLD-RTO: 0 /100 WBC
PHOSPHATE SERPL-MCNC: 3 MG/DL (ref 2.7–4.5)
PLATELET # BLD AUTO: 250 K/UL (ref 150–450)
PMV BLD AUTO: 10.8 FL (ref 9.2–12.9)
POTASSIUM SERPL-SCNC: 3.5 MMOL/L (ref 3.5–5.1)
PROT SERPL-MCNC: 5.7 G/DL (ref 6–8.4)
RBC # BLD AUTO: 3.98 M/UL (ref 4–5.4)
SODIUM SERPL-SCNC: 148 MMOL/L (ref 136–145)
WBC # BLD AUTO: 9.14 K/UL (ref 3.9–12.7)

## 2022-04-29 PROCEDURE — 80053 COMPREHEN METABOLIC PANEL: CPT | Performed by: STUDENT IN AN ORGANIZED HEALTH CARE EDUCATION/TRAINING PROGRAM

## 2022-04-29 PROCEDURE — 94761 N-INVAS EAR/PLS OXIMETRY MLT: CPT

## 2022-04-29 PROCEDURE — 63600175 PHARM REV CODE 636 W HCPCS: Performed by: STUDENT IN AN ORGANIZED HEALTH CARE EDUCATION/TRAINING PROGRAM

## 2022-04-29 PROCEDURE — 99233 SBSQ HOSP IP/OBS HIGH 50: CPT | Mod: 24,,, | Performed by: STUDENT IN AN ORGANIZED HEALTH CARE EDUCATION/TRAINING PROGRAM

## 2022-04-29 PROCEDURE — 20000000 HC ICU ROOM

## 2022-04-29 PROCEDURE — 63600175 PHARM REV CODE 636 W HCPCS: Performed by: INTERNAL MEDICINE

## 2022-04-29 PROCEDURE — 93005 ELECTROCARDIOGRAM TRACING: CPT

## 2022-04-29 PROCEDURE — 25000003 PHARM REV CODE 250: Performed by: INTERNAL MEDICINE

## 2022-04-29 PROCEDURE — 94640 AIRWAY INHALATION TREATMENT: CPT

## 2022-04-29 PROCEDURE — 83735 ASSAY OF MAGNESIUM: CPT | Performed by: STUDENT IN AN ORGANIZED HEALTH CARE EDUCATION/TRAINING PROGRAM

## 2022-04-29 PROCEDURE — 99900035 HC TECH TIME PER 15 MIN (STAT)

## 2022-04-29 PROCEDURE — 99233 PR SUBSEQUENT HOSPITAL CARE,LEVL III: ICD-10-PCS | Mod: 24,,, | Performed by: STUDENT IN AN ORGANIZED HEALTH CARE EDUCATION/TRAINING PROGRAM

## 2022-04-29 PROCEDURE — 85025 COMPLETE CBC W/AUTO DIFF WBC: CPT | Performed by: STUDENT IN AN ORGANIZED HEALTH CARE EDUCATION/TRAINING PROGRAM

## 2022-04-29 PROCEDURE — 25000003 PHARM REV CODE 250: Performed by: STUDENT IN AN ORGANIZED HEALTH CARE EDUCATION/TRAINING PROGRAM

## 2022-04-29 PROCEDURE — 93010 ELECTROCARDIOGRAM REPORT: CPT | Mod: ,,, | Performed by: INTERNAL MEDICINE

## 2022-04-29 PROCEDURE — 93010 EKG 12-LEAD: ICD-10-PCS | Mod: ,,, | Performed by: INTERNAL MEDICINE

## 2022-04-29 PROCEDURE — 25000242 PHARM REV CODE 250 ALT 637 W/ HCPCS

## 2022-04-29 PROCEDURE — 84100 ASSAY OF PHOSPHORUS: CPT | Performed by: STUDENT IN AN ORGANIZED HEALTH CARE EDUCATION/TRAINING PROGRAM

## 2022-04-29 RX ORDER — LABETALOL HCL 20 MG/4 ML
10 SYRINGE (ML) INTRAVENOUS EVERY 4 HOURS PRN
Status: DISCONTINUED | OUTPATIENT
Start: 2022-04-29 | End: 2022-05-01 | Stop reason: HOSPADM

## 2022-04-29 RX ORDER — LABETALOL HCL 20 MG/4 ML
10 SYRINGE (ML) INTRAVENOUS ONCE
Status: DISCONTINUED | OUTPATIENT
Start: 2022-04-29 | End: 2022-04-29

## 2022-04-29 RX ORDER — POLYETHYLENE GLYCOL 3350 17 G/17G
17 POWDER, FOR SOLUTION ORAL DAILY
Status: DISCONTINUED | OUTPATIENT
Start: 2022-04-29 | End: 2022-04-29

## 2022-04-29 RX ORDER — DILTIAZEM HYDROCHLORIDE 30 MG/1
30 TABLET, FILM COATED ORAL EVERY 6 HOURS
Status: DISCONTINUED | OUTPATIENT
Start: 2022-04-29 | End: 2022-05-01 | Stop reason: HOSPADM

## 2022-04-29 RX ORDER — IPRATROPIUM BROMIDE AND ALBUTEROL SULFATE 2.5; .5 MG/3ML; MG/3ML
3 SOLUTION RESPIRATORY (INHALATION)
Status: DISCONTINUED | OUTPATIENT
Start: 2022-04-29 | End: 2022-04-29

## 2022-04-29 RX ORDER — LABETALOL HCL 20 MG/4 ML
10 SYRINGE (ML) INTRAVENOUS EVERY 4 HOURS PRN
Status: DISCONTINUED | OUTPATIENT
Start: 2022-04-29 | End: 2022-04-29

## 2022-04-29 RX ORDER — POLYETHYLENE GLYCOL 3350 17 G/17G
17 POWDER, FOR SOLUTION ORAL DAILY
Status: DISCONTINUED | OUTPATIENT
Start: 2022-04-30 | End: 2022-05-01 | Stop reason: HOSPADM

## 2022-04-29 RX ORDER — IPRATROPIUM BROMIDE AND ALBUTEROL SULFATE 2.5; .5 MG/3ML; MG/3ML
3 SOLUTION RESPIRATORY (INHALATION)
Status: DISCONTINUED | OUTPATIENT
Start: 2022-04-29 | End: 2022-05-01 | Stop reason: HOSPADM

## 2022-04-29 RX ADMIN — IPRATROPIUM BROMIDE AND ALBUTEROL SULFATE 3 ML: 2.5; .5 SOLUTION RESPIRATORY (INHALATION) at 10:04

## 2022-04-29 RX ADMIN — DILTIAZEM HYDROCHLORIDE 30 MG: 30 TABLET, FILM COATED ORAL at 06:04

## 2022-04-29 RX ADMIN — MUPIROCIN: 20 OINTMENT TOPICAL at 08:04

## 2022-04-29 RX ADMIN — DILTIAZEM HYDROCHLORIDE 30 MG: 30 TABLET, FILM COATED ORAL at 11:04

## 2022-04-29 RX ADMIN — SODIUM CHLORIDE, SODIUM LACTATE, POTASSIUM CHLORIDE, AND CALCIUM CHLORIDE: .6; .31; .03; .02 INJECTION, SOLUTION INTRAVENOUS at 08:04

## 2022-04-29 RX ADMIN — DILTIAZEM HYDROCHLORIDE 5 MG: 5 INJECTION INTRAVENOUS at 05:04

## 2022-04-29 RX ADMIN — POTASSIUM CHLORIDE 10 MEQ: 7.46 INJECTION, SOLUTION INTRAVENOUS at 06:04

## 2022-04-29 RX ADMIN — MAGNESIUM SULFATE 2 G: 2 INJECTION INTRAVENOUS at 03:04

## 2022-04-29 RX ADMIN — ONDANSETRON 4 MG: 2 INJECTION INTRAMUSCULAR; INTRAVENOUS at 11:04

## 2022-04-29 RX ADMIN — IPRATROPIUM BROMIDE AND ALBUTEROL SULFATE 3 ML: 2.5; .5 SOLUTION RESPIRATORY (INHALATION) at 07:04

## 2022-04-29 RX ADMIN — POTASSIUM CHLORIDE 10 MEQ: 7.46 INJECTION, SOLUTION INTRAVENOUS at 04:04

## 2022-04-29 RX ADMIN — ENOXAPARIN SODIUM 40 MG: 40 INJECTION SUBCUTANEOUS at 05:04

## 2022-04-29 RX ADMIN — POLYETHYLENE GLYCOL 3350 17 G: 17 POWDER, FOR SOLUTION ORAL at 08:04

## 2022-04-29 RX ADMIN — ONDANSETRON 4 MG: 2 INJECTION INTRAMUSCULAR; INTRAVENOUS at 05:04

## 2022-04-29 RX ADMIN — DILTIAZEM HYDROCHLORIDE 30 MG: 30 TABLET, FILM COATED ORAL at 12:04

## 2022-04-29 RX ADMIN — POTASSIUM CHLORIDE 10 MEQ: 7.46 INJECTION, SOLUTION INTRAVENOUS at 05:04

## 2022-04-29 RX ADMIN — ONDANSETRON 4 MG: 2 INJECTION INTRAMUSCULAR; INTRAVENOUS at 12:04

## 2022-04-29 RX ADMIN — SCOPALAMINE 1 PATCH: 1 PATCH, EXTENDED RELEASE TRANSDERMAL at 08:04

## 2022-04-29 RX ADMIN — POTASSIUM CHLORIDE 10 MEQ: 7.46 INJECTION, SOLUTION INTRAVENOUS at 03:04

## 2022-04-29 RX ADMIN — IPRATROPIUM BROMIDE AND ALBUTEROL SULFATE 3 ML: 2.5; .5 SOLUTION RESPIRATORY (INHALATION) at 04:04

## 2022-04-29 RX ADMIN — ONDANSETRON 4 MG: 2 INJECTION INTRAMUSCULAR; INTRAVENOUS at 06:04

## 2022-04-29 NOTE — SUBJECTIVE & OBJECTIVE
Interval History/Significant Events: Patient seen and examined, complains of sore throat and dry chronic cough. Denies chest pain, dyspnea, dizziness, abd pain, or any further symptoms at this time.    Follow-up For: Procedure(s) (LRB):  REPAIR, HERNIA, HIATAL, LAPAROSCOPIC (N/A)  EGD (ESOPHAGOGASTRODUODENOSCOPY) (N/A)  FUNDOPLICATION, LAPAROSCOPIC, TOUPET (N/A)    Post-Operative Day: 3 Days Post-Op    Objective:     Vital Signs (Most Recent):  Temp: 98.8 °F (37.1 °C) (04/29/22 0700)  Pulse: 68 (04/29/22 0830)  Resp: (!) 21 (04/29/22 0830)  BP: (!) 152/77 (04/29/22 0800)  SpO2: 98 % (04/29/22 0830)   Vital Signs (24h Range):  Temp:  [98.2 °F (36.8 °C)-99 °F (37.2 °C)] 98.8 °F (37.1 °C)  Pulse:  [] 68  Resp:  [15-47] 21  SpO2:  [90 %-100 %] 98 %  BP: (120-183)/(61-83) 152/77     Weight: 86.2 kg (190 lb)  Body mass index is 30.67 kg/m².      Intake/Output Summary (Last 24 hours) at 4/29/2022 0914  Last data filed at 4/29/2022 0800  Gross per 24 hour   Intake 3072.65 ml   Output 1800 ml   Net 1272.65 ml       Physical Exam  Vitals and nursing note reviewed.   Constitutional:       Appearance: Normal appearance.   HENT:      Mouth/Throat:      Mouth: Mucous membranes are moist.      Pharynx: Oropharynx is clear. No oropharyngeal exudate.   Eyes:      Conjunctiva/sclera: Conjunctivae normal.      Pupils: Pupils are equal, round, and reactive to light.   Cardiovascular:      Rate and Rhythm: Normal rate and regular rhythm.      Pulses: Normal pulses.      Heart sounds: Normal heart sounds. No murmur heard.  Pulmonary:      Effort: Pulmonary effort is normal.      Breath sounds: Normal breath sounds.   Abdominal:      General: There is no distension.      Tenderness: There is no abdominal tenderness.      Comments: Abd incision c/d/I, NGT in place   Skin:     General: Skin is warm and dry.      Coloration: Skin is not pale.   Neurological:      General: No focal deficit present.      Mental Status: She is alert and  oriented to person, place, and time.   Psychiatric:         Mood and Affect: Mood normal.         Behavior: Behavior normal.       Vents:  Oxygen Concentration (%): 28 (04/28/22 0411)    Lines/Drains/Airways       Drain  Duration                  NG/OG Tube 04/25/22 1200 Left nostril 3 days              Peripheral Intravenous Line  Duration                  Peripheral IV - Single Lumen 04/27/22 2357 20 G Right Forearm 1 day         Peripheral IV - Single Lumen 04/28/22 1600 18 G Right Antecubital <1 day                    Significant Labs:    CBC/Anemia Profile:  Recent Labs   Lab 04/28/22  0443 04/29/22  0509   WBC 10.00 9.14   HGB 11.2* 10.7*   HCT 36.8* 34.9*    250   MCV 85 88   RDW 14.8* 14.5        Chemistries:  Recent Labs   Lab 04/27/22  1858 04/27/22  2209 04/27/22  2340 04/28/22  0443 04/29/22  0509   * 143 147* 146* 148*   K 3.6 5.9* 3.7 4.1 3.5    102 105 106 105   CO2 28 28 29 26 30*   BUN 17 15 14 14 10   CREATININE 0.8 0.7 0.7 0.7 0.7   CALCIUM 9.6 8.8 9.0 9.0 8.8   ALBUMIN 3.3*  --   --  2.9* 2.7*   PROT 7.9  --   --  6.5 5.7*   BILITOT 0.7  --   --  0.7 0.6   ALKPHOS 85  --   --  72 67   ALT 23  --   --  21 14   AST 35  --   --  31 22   MG 2.0 2.0  --  2.1 1.8   PHOS 2.2* 7.5* 2.6* 3.2 3.0       Recent Lab Results  (Last 5 results in the past 24 hours)        04/29/22  0509   04/28/22  2337   04/28/22  1813   04/28/22  1228   04/28/22  0950        Albumin 2.7               Alkaline Phosphatase 67               ALT 14               Anion Gap 13               Ascending aorta         2.88       Ao peak marita         2.00       Ao VTI         34.69       AST 22               AV valve area         2.87       AV mean gradient         7       AV index (prosthetic)         0.83       AV peak gradient         16       AV Velocity Ratio         0.70       Baso # 0.03               Basophil % 0.3               BILIRUBIN TOTAL 0.6  Comment: For infants and newborns, interpretation of results  should be based  on gestational age, weight and in agreement with clinical  observations.    Premature Infant recommended reference ranges:  Up to 24 hours.............<8.0 mg/dL  Up to 48 hours............<12.0 mg/dL  3-5 days..................<15.0 mg/dL  6-29 days.................<15.0 mg/dL                 BSA         2       BUN 10               Calcium 8.8               Chloride 105               CO2 30               Creatinine 0.7               Left Ventricle Relative Wall Thickness         0.47       Differential Method Automated               E/A ratio         0.82       E/E' ratio         12.80       eGFR if  >60.0               eGFR if non  >60.0  Comment: Calculation used to obtain the estimated glomerular filtration  rate (eGFR) is the CKD-EPI equation.                  EF         65       Eos # 0.2               Eosinophil % 2.0               E wave deceleration time         303.84       FS         34       Glucose 83               Gran # (ANC) 7.1               Gran % 77.6               Hematocrit 34.9               Hemoglobin 10.7               Immature Grans (Abs) 0.02  Comment: Mild elevation in immature granulocytes is non specific and   can be seen in a variety of conditions including stress response,   acute inflammation, trauma and pregnancy. Correlation with other   laboratory and clinical findings is essential.                 Immature Granulocytes 0.2               IVSd         1.03       LA WIDTH         4.31       Left Atrium Major Axis         5.40       Left Atrium Minor Axis         5.35       LA size         3.78       LA volume         74.43                56.89       LA vol index         38.0       LA Volume Index (Mod)         29.0       LVOT area         3.5       LV LATERAL E/E' RATIO         12.00       LV SEPTAL E/E' RATIO         13.71       LV EDV BP         70.89       LV Diastolic Volume Index         36.17       LVIDd         4.02       LVIDs          2.65       LV mass         126.26       LV Mass Index         64       LVOT diameter         2.10       LVOT peak bertin         1.39       LVOT stroke volume         99.39       LVOT peak VTI         28.71       LV ESV BP         25.69       LV Systolic Volume Index         13.1       Lymph # 1.1               Lymph % 12.0               Magnesium 1.8               MCH 26.9               MCHC 30.7               MCV 88               Mean e'         0.08       Mono # 0.7               Mono % 7.9               MPV 10.8               MV valve area p 1/2 method         2.50       MV Peak A Bertin         1.17       MV Peak E Bertin         0.96       MV stenosis pressure 1/2 time         88.11       nRBC 0               Phosphorus 3.0               Platelets 250               POCT Glucose   97   109   116         Potassium 3.5               PROTEIN TOTAL 5.7               Posterior Wall         0.95       RA Major Axis         4.73       RA Width         3.50       RBC 3.98               RDW 14.5               RVDD         4.02       Sinus         3.20       Sodium 148               STJ         2.52       TAPSE         1.95       TDI SEPTAL         0.07       TDI LATERAL         0.08       Triscuspid Valve Regurgitation Peak Gradient         38       TR Max Bertin         3.08       WBC 9.14                                    All pertinent labs within the past 24 hours have been reviewed.       Significant Imaging:  I have reviewed all pertinent imaging results/findings within the past 24 hours.

## 2022-04-29 NOTE — HOSPITAL COURSE
Patient admitted to SICU for closer monitoring after going into SVT post-op hernia repair. Cardiology evaluated in PACU, SVT was broke with adenosine x2, then went into aflutter, rhythm controlled with IV dilt pushes. NGT for decompression.

## 2022-04-29 NOTE — ANESTHESIA POSTPROCEDURE EVALUATION
Anesthesia Post Evaluation    Patient: Vidya Vazquez    Procedure(s) Performed: Procedure(s) (LRB):  EGD (ESOPHAGOGASTRODUODENOSCOPY) (N/A)    Final Anesthesia Type: general      Patient location during evaluation: GI PACU  Patient participation: Yes- Able to Participate  Level of consciousness: awake and alert  Post-procedure vital signs: reviewed and stable  Pain management: adequate  Airway patency: patent    PONV status at discharge: No PONV  Anesthetic complications: no      Cardiovascular status: blood pressure returned to baseline and hemodynamically stable  Respiratory status: unassisted and spontaneous ventilation  Hydration status: euvolemic  Follow-up not needed.          Vitals Value Taken Time   /87 04/29/22 0901   Temp 37.1 °C (98.8 °F) 04/29/22 0700   Pulse 70 04/29/22 0923   Resp 26 04/29/22 0923   SpO2 94 % 04/29/22 0923   Vitals shown include unvalidated device data.      Event Time   Out of Recovery 04/25/2022 13:45:00         Pain/Donald Score: Pain Rating Post Med Admin: 0 (4/28/2022  9:36 AM)

## 2022-04-29 NOTE — PLAN OF CARE
Pt AAOx4 - follows commands and moves all extremities. SBP within goal <180. HR NSR on bedside monitor. Sats maintained on 2L NC while sleeping. Afebrile. NGT to LIWS - 400 brown/bile output. Pt with no BM overnight and denies passing gas. LR continued @ 100cc/hr. Pt denies pain or nausea - scheduled zofran administered per order. Pt voids per bedside commode with 1 person assist. BG monitored q6h. Pt's daughter at bedside throughout shift - both pt and pts daughter updated on and agreeable to plan of care.

## 2022-04-29 NOTE — PROGRESS NOTES
"[facility] - [facility] Intensive Care  General Surgery  Progress Note    Subjective:     History of Present Illness:  Patient is 73 yo F with hx of GERD, DM2 and past hiatal hernia repair approx 10 yrs ago who presented to OSH on 4/24 with worsening abd pain, nausea, and emesis and was eventually found to have a gastric volvulus, and was transferred to [facility] for repair. She notes she had worsening reflux and some emesis on and off for the 2 weeks prior to presentation, but they all were short lived and resolved. She then developed severe epigastric pain on 4/24, and "just didn't feel well" and presented ot ED. She was found on CT scan to have concern for GOO. An EGD was performed on 4/25 which showed a tortuous stomach consistent with volvulus, and no signs of necrosis. An NGT was left, and she felt much better after completion. She was then transferred here for surgery.      Post-Op Info:  Procedure(s) (LRB):  REPAIR, HERNIA, HIATAL, LAPAROSCOPIC (N/A)  EGD (ESOPHAGOGASTRODUODENOSCOPY) (N/A)  FUNDOPLICATION, LAPAROSCOPIC, TOUPET (N/A)   3 Days Post-Op     Interval History: Ms. Vazquez reports interval increase in cough, but denies significant nausea or emesis. She has been in HDS on dilt push Q6H. NGT with 400 out overnight.     Medications:  Continuous Infusions:   lactated ringers Stopped (04/29/22 0351)     Scheduled Meds:   diltiaZEM  5 mg Intravenous Q6H    enoxaparin  40 mg Subcutaneous Daily    mupirocin   Nasal BID    ondansetron  4 mg Intravenous Q6H    polyethylene glycol  17 g Per NG tube Daily    scopolamine  1 patch Transdermal Q3 Days     PRN Meds:acetaminophen, calcium gluconate IVPB, calcium gluconate IVPB, calcium gluconate IVPB, dextrose 50%, glucagon (human recombinant), hydrALAZINE, HYDROmorphone, insulin aspart U-100, magnesium sulfate IVPB, magnesium sulfate IVPB, potassium chloride **AND** potassium chloride **AND** potassium chloride, prochlorperazine, sodium chloride 0.9%, sodium " phosphate IVPB, sodium phosphate IVPB, sodium phosphate IVPB     Review of patient's allergies indicates:  No Known Allergies  Objective:     Vital Signs (Most Recent):  Temp: 98.3 °F (36.8 °C) (04/29/22 0400)  Pulse: 66 (04/29/22 0615)  Resp: 18 (04/29/22 0615)  BP: (!) 150/67 (04/29/22 0600)  SpO2: 99 % (04/29/22 0615)   Vital Signs (24h Range):  Temp:  [98.2 °F (36.8 °C)-99 °F (37.2 °C)] 98.3 °F (36.8 °C)  Pulse:  [] 66  Resp:  [15-47] 18  SpO2:  [90 %-100 %] 99 %  BP: (120-183)/(61-83) 150/67     Weight: 86.2 kg (190 lb)  Body mass index is 30.67 kg/m².    Intake/Output - Last 3 Shifts         04/27 0700  04/28 0659 04/28 0700 04/29 0659 04/29 0700 04/30 0659    I.V. (mL/kg) 1510.3 (17.5) 1763.4 (20.5)     NG/GT  60     IV Piggyback 699 989     Total Intake(mL/kg) 2209.3 (25.6) 2812.5 (32.6)     Urine (mL/kg/hr) 150 (0.1) 950 (0.5)     Drains 950 850     Total Output 1100 1800     Net +1109.3 +1012.5            Urine Occurrence 4 x      Stool Occurrence 0 x              Physical Exam  Constitutional:       General: She is not in acute distress.     Appearance: Normal appearance. She is not ill-appearing or toxic-appearing.   HENT:      Head: Normocephalic and atraumatic.      Nose:      Comments: NG in place  Eyes:      Extraocular Movements: Extraocular movements intact.   Cardiovascular:      Rate and Rhythm: Normal rate and regular rhythm.   Pulmonary:      Effort: Pulmonary effort is normal. No respiratory distress.   Abdominal:      General: There is no distension.      Palpations: Abdomen is soft.      Tenderness: There is abdominal tenderness (appropriate, incisional).      Comments: Incisions c/d/i   Musculoskeletal:         General: Normal range of motion.      Cervical back: Normal range of motion.   Skin:     General: Skin is warm and dry.   Neurological:      General: No focal deficit present.      Mental Status: She is alert and oriented to person, place, and time.   Psychiatric:          Mood and Affect: Mood normal.         Behavior: Behavior normal.       Significant Labs:  I have reviewed all pertinent lab results within the past 24 hours.    Significant Diagnostics:  I have reviewed all pertinent imaging results/findings within the past 24 hours.    Assessment/Plan:     Gastric volvulus  Patient is 71 yo F with GOO 2/2 gastric volvulus.  Now s/p reduction of gastric volvulus and hiatal hernia repair with fadi fundoplication on 4/26/22.    In NSR with scheduled dilt IV Q6 on assessment this AM; Unable to be stepped down if on dilt pushes  D/C NGT  Continue NPO today, will trial PO meds after lunch assuming no issues with n/v after removal of NGT  mIVF  Scheduled anti-emetics plus PRN  DVT ppx: lovenox  OOBTC/ambulate  IS    Dispo: Continue ICU care while requiring IV dilt; would monitor for tolerance of PO diltiazem and for any arrythmias overnight.  If tolerating and no nausea and no arrythmias may be good for stepdown tomorrow.        Horacio Maza MD  General Surgery  [facility] - Surgical Intensive Care

## 2022-04-29 NOTE — CONSULTS
[facility] - [facility]ensive [facility]  Adult Nutrition  Consult Note    SUMMARY     Recommendations    1. When medically able, ADAT to FLD as tolerate or per MD    2. Post gastric surgery diet education provided      3. RD following    Goals: Meet % EEN, EPN by RD f/u date  Nutrition Goal Status: new  Communication of RD Recs:  (POC)    Assessment and Plan    Nutrition Problem  Inadequate oral intake    Related to (etiology):   Gastric surgery    Signs and Symptoms (as evidenced by):   NPO status > 5 days    Interventions/Recommendations (treatment strategy):  Collaboration with others providers    Nutrition Diagnosis Status:   new    Malnutrition Assessment  Orbital Region (Subcutaneous Fat Loss): well nourished  Upper Arm Region (Subcutaneous Fat Loss): well nourished   [place] Region (Muscle Loss): mild depletion  Clavicle Bone Region (Muscle Loss): mild depletion  Clavicle and Acromion Bone Region (Muscle Loss): well nourished  Dorsal Hand (Muscle Loss): well nourished  Anterior Thigh Region (Muscle Loss): well nourished  Posterior Calf Region (Muscle Loss): well nourished     Reason for Assessment    Reason For Assessment: consult  Diagnosis:  (s/p gastric sx)  Relevant Medical History: Atrial flutter, hiatal hernia, T2DM, HTN, gastric volvulus  Interdisciplinary Rounds: did not attend    General Information Comments:   RD was consulted for post nissen. S/p repeat hiatal hernia repair and fundoplication on 4/26. NGT in place. Trial d/c NGT and PO med after lunch per MD. Pt reported appetite at home was good, was eating 4 small meals a day and snack occasionally. Pt stated UBW was 180 lb. Denies N/V, denies swallowing difficulties. Able to chew soft food. NFPE completed today, pt appears to have mild muscle wasting, possibly due to aging. Pt do not meet ASPEN criteria for malnutrition at this time.   Provided nutrition education on post gastric surgery diet with handout. Dicussed FLD x 1 week after advancing  "from CLD, then mechanical soft diet until f/u. Rec avoid food that cause gas and GI irritation. All question and concern has been answered. Handout left at bedside. RD will monitor and follow.    Nutrition Discharge Planning: post gastric surgery diet education provided on     Nutrition Risk Screen    Nutrition Risk Screen: no indicators present    Nutrition/Diet History    Spiritual, Cultural Beliefs, Jewish Practices, Values that Affect Care: no    Anthropometrics    Temp: 98.8 °F (37.1 °C)  Height Method: Stated  Height: 5' 6" (167.6 cm)  Height (inches): 66 in  Weight Method: Standard Scale  Weight: 86.2 kg (190 lb)  Weight (lb): 190 lb  Ideal Body Weight (IBW), Female: 130 lb  % Ideal Body Weight, Female (lb): 146.15 %  BMI (Calculated): 30.7  Usual Body Weight (UBW), k kg  % Usual Body Weight: 105.32       Lab/Procedures/Meds    Pertinent Labs Reviewed: reviewed  Pertinent Labs Comments: Na 148 H    Pertinent Medications Reviewed: reviewed  Pertinent Medications Comments: polyethylene glycol, enoxaparin, lactated ringers    Estimated/Assessed Needs    Weight Used For Calorie Calculations: 86.2 kg (190 lb 0.6 oz)  Energy Calorie Requirements (kcal): 1393 kcal  Energy Need Method: [place]     Protein Requirements: 71-83g (1.2-1.4g /kg IBW)  Weight Used For Protein Calculations: 59 kg (130 lb 1.1 oz)     Fluid Requirements (mL): 1ml/kcal or per MD  Estimated Fluid Requirement Method: RDA Method  RDA Method (mL): 1393  CHO Requirement: 174 g      Nutrition Prescription Ordered    Current Diet Order: NPO    Evaluation of Received Nutrient/Fluid Intake    I/O: +4.9 L since admit  Energy Calories Required: not meeting needs  Protein Required: not meeting needs  Comments: LBM:   % Intake of Estimated Energy Needs: 0%  % Meal Intake: 0%    Nutrition Risk    Level of Risk/Frequency of Follow-up: low - moderate       Monitor and Evaluation    Food and Nutrient Intake: energy intake, food and " beverage intake  Food and Nutrient Adminstration: diet order  Knowledge/Beliefs/Attitudes: food and nutrition knowledge/skill, beliefs and attitudes  Physical Activity and Function: nutrition-related ADLs and IADLs  Anthropometric Measurements: height/length, weight, weight change, body mass index  Biochemical Data, Medical Tests and Procedures: electrolyte and renal panel, gastrointestinal profile, glucose/endocrine profile, inflammatory profile, lipid profile  Nutrition-Focused Physical Findings: overall appearance       Nutrition Follow-Up    RD Follow-up?: Yes     Sasha RENO

## 2022-04-29 NOTE — ASSESSMENT & PLAN NOTE
Patient is 73 yo F with GOO 2/2 gastric volvulus.  Now s/p reduction of gastric volvulus and hiatal hernia repair with fadi fundoplication on 4/26/22.    In NSR with scheduled dilt IV Q6 on assessment this AM; Unable to be stepped down if on dilt pushes  D/C NGT  Continue NPO today, will trial PO meds after lunch assuming no issues with n/v after removal of NGT  mIVF  Scheduled anti-emetics plus PRN  DVT ppx: lovenox  OOBTC/ambulate  IS    Dispo: Continue ICU care while requiring IV dilt; possible stepdown pending tolerance of PO

## 2022-04-29 NOTE — PROGRESS NOTES
[facility]  Critical Care - Surgery  Progress Note    Patient Name: Vidya Vazquez  MRN: [id number]  Admission Date: 4/24/2022  Hospital Length of Stay: 5 days  Code Status: Full Code  Attending Provider: David Mike Jr.,*  Primary Care Provider: Primary Doctor No   Principal Problem: Atrial flutter    Subjective:     Hospital/ICU Course:  Patient admitted to SICU for closer monitoring after going into SVT post-op hernia repair. Cardiology evaluated in PACU, SVT was broke with adenosine x2, then went into aflutter, rhythm controlled with IV dilt pushes. NGT for decompression.      Interval History/Significant Events: Patient seen and examined, complains of sore throat and dry chronic cough. Denies chest pain, dyspnea, dizziness, abd pain, or any further symptoms at this time.    Follow-up For: Procedure(s) (LRB):  REPAIR, HERNIA, HIATAL, LAPAROSCOPIC (N/A)  EGD (ESOPHAGOGASTRODUODENOSCOPY) (N/A)  FUNDOPLICATION, LAPAROSCOPIC, TOUPET (N/A)    Post-Operative Day: 3 Days Post-Op    Objective:     Vital Signs (Most Recent):  Temp: 98.8 °F (37.1 °C) (04/29/22 0700)  Pulse: 68 (04/29/22 0830)  Resp: (!) 21 (04/29/22 0830)  BP: (!) 152/77 (04/29/22 0800)  SpO2: 98 % (04/29/22 0830)   Vital Signs (24h Range):  Temp:  [98.2 °F (36.8 °C)-99 °F (37.2 °C)] 98.8 °F (37.1 °C)  Pulse:  [] 68  Resp:  [15-47] 21  SpO2:  [90 %-100 %] 98 %  BP: (120-183)/(61-83) 152/77     Weight: 86.2 kg (190 lb)  Body mass index is 30.67 kg/m².      Intake/Output Summary (Last 24 hours) at 4/29/2022 0914  Last data filed at 4/29/2022 0800  Gross per 24 hour   Intake 3072.65 ml   Output 1800 ml   Net 1272.65 ml       Physical Exam  Vitals and nursing note reviewed.   Constitutional:       Appearance: Normal appearance.   HENT:      Mouth/Throat:      Mouth: Mucous membranes are moist.      Pharynx: Oropharynx is clear. No oropharyngeal exudate.   Eyes:      Conjunctiva/sclera: Conjunctivae normal.      Pupils:  Pupils are equal, round, and reactive to light.   Cardiovascular:      Rate and Rhythm: Normal rate and regular rhythm.      Pulses: Normal pulses.      Heart sounds: Normal heart sounds. No murmur heard.  Pulmonary:      Effort: Pulmonary effort is normal.      Breath sounds: Normal breath sounds.   Abdominal:      General: There is no distension.      Tenderness: There is no abdominal tenderness.      Comments: Abd incision c/d/I, NGT in place   Skin:     General: Skin is warm and dry.      Coloration: Skin is not pale.   Neurological:      General: No focal deficit present.      Mental Status: She is alert and oriented to person, place, and time.   Psychiatric:         Mood and Affect: Mood normal.         Behavior: Behavior normal.       Vents:  Oxygen Concentration (%): 28 (04/28/22 0411)    Lines/Drains/Airways       Drain  Duration                  NG/OG Tube 04/25/22 1200 Left nostril 3 days              Peripheral Intravenous Line  Duration                  Peripheral IV - Single Lumen 04/27/22 2357 20 G Right Forearm 1 day         Peripheral IV - Single Lumen 04/28/22 1600 18 G Right Antecubital <1 day                    Significant Labs:    CBC/Anemia Profile:  Recent Labs   Lab 04/28/22  0443 04/29/22  0509   WBC 10.00 9.14   HGB 11.2* 10.7*   HCT 36.8* 34.9*    250   MCV 85 88   RDW 14.8* 14.5        Chemistries:  Recent Labs   Lab 04/27/22  1858 04/27/22  2209 04/27/22  2340 04/28/22  0443 04/29/22  0509   * 143 147* 146* 148*   K 3.6 5.9* 3.7 4.1 3.5    102 105 106 105   CO2 28 28 29 26 30*   BUN 17 15 14 14 10   CREATININE 0.8 0.7 0.7 0.7 0.7   CALCIUM 9.6 8.8 9.0 9.0 8.8   ALBUMIN 3.3*  --   --  2.9* 2.7*   PROT 7.9  --   --  6.5 5.7*   BILITOT 0.7  --   --  0.7 0.6   ALKPHOS 85  --   --  72 67   ALT 23  --   --  21 14   AST 35  --   --  31 22   MG 2.0 2.0  --  2.1 1.8   PHOS 2.2* 7.5* 2.6* 3.2 3.0       Recent Lab Results  (Last 5 results in the past 24 hours)         04/29/22  0509   04/28/22  2337   04/28/22  1813   04/28/22  1228   04/28/22  0950        Albumin 2.7               Alkaline Phosphatase 67               ALT 14               Anion Gap 13               Ascending aorta         2.88       Ao peak marita         2.00       Ao VTI         34.69       AST 22               AV valve area         2.87       AV mean gradient         7       AV index (prosthetic)         0.83       AV peak gradient         16       AV Velocity Ratio         0.70       Baso # 0.03               Basophil % 0.3               BILIRUBIN TOTAL 0.6  Comment: For infants and newborns, interpretation of results should be based  on gestational age, weight and in agreement with clinical  observations.    Premature Infant recommended reference ranges:  Up to 24 hours.............<8.0 mg/dL  Up to 48 hours............<12.0 mg/dL  3-5 days..................<15.0 mg/dL  6-29 days.................<15.0 mg/dL                 BSA         2       BUN 10               Calcium 8.8               Chloride 105               CO2 30               Creatinine 0.7               Left Ventricle Relative Wall Thickness         0.47       Differential Method Automated               E/A ratio         0.82       E/E' ratio         12.80       eGFR if  >60.0               eGFR if non  >60.0  Comment: Calculation used to obtain the estimated glomerular filtration  rate (eGFR) is the CKD-EPI equation.                  EF         65       Eos # 0.2               Eosinophil % 2.0               E wave deceleration time         303.84       FS         34       Glucose 83               Gran # (ANC) 7.1               Gran % 77.6               Hematocrit 34.9               Hemoglobin 10.7               Immature Grans (Abs) 0.02  Comment: Mild elevation in immature granulocytes is non specific and   can be seen in a variety of conditions including stress response,   acute inflammation, trauma and pregnancy.  Correlation with other   laboratory and clinical findings is essential.                 Immature Granulocytes 0.2               IVSd         1.03       LA WIDTH         4.31       Left Atrium Major Axis         5.40       Left Atrium Minor Axis         5.35       LA size         3.78       LA volume         74.43                56.89       LA vol index         38.0       LA Volume Index (Mod)         29.0       LVOT area         3.5       LV LATERAL E/E' RATIO         12.00       LV SEPTAL E/E' RATIO         13.71       LV EDV BP         70.89       LV Diastolic Volume Index         36.17       LVIDd         4.02       LVIDs         2.65       LV mass         126.26       LV Mass Index         64       LVOT diameter         2.10       LVOT peak bertin         1.39       LVOT stroke volume         99.39       LVOT peak VTI         28.71       LV ESV BP         25.69       LV Systolic Volume Index         13.1       Lymph # 1.1               Lymph % 12.0               Magnesium 1.8               MCH 26.9               MCHC 30.7               MCV 88               Mean e'         0.08       Mono # 0.7               Mono % 7.9               MPV 10.8               MV valve area p 1/2 method         2.50       MV Peak A Bertin         1.17       MV Peak E Bertin         0.96       MV stenosis pressure 1/2 time         88.11       nRBC 0               Phosphorus 3.0               Platelets 250               POCT Glucose   97   109   116         Potassium 3.5               PROTEIN TOTAL 5.7               Posterior Wall         0.95       RA Major Axis         4.73       RA Width         3.50       RBC 3.98               RDW 14.5               RVDD         4.02       Sinus         3.20       Sodium 148               STJ         2.52       TAPSE         1.95       TDI SEPTAL         0.07       TDI LATERAL         0.08       Triscuspid Valve Regurgitation Peak Gradient         38       TR Max Bertin         3.08       WBC 9.14                                     All pertinent labs within the past 24 hours have been reviewed.       Significant Imaging:  I have reviewed all pertinent imaging results/findings within the past 24 hours.    Assessment/Plan:     * Atrial flutter  71 yo F with hx of GERD, DM2 and past hiatal hernia repair approx 10 yrs ago who had repeat hiatal hernia repair on 4/26 complicated by new onset SVT and Aflutter vs Afib with RVR on POD1. SICU consulted for monitoring.      Neuro/Psych:   -- Sedation: None            -  prn IV dilaudid for pain             Cards:   #SVT  - resolved with IV adenosine x 2, now on IV dilt, will transition to PO once OK per surgery - NGT d/c'd this morning, if no vomiting by lunch time, can trial PO meds  - [id number], will initiate therapeutic anticoagulation once OK per surgery  - TTE this admission with EF65, grade II diastolic dysfunction, no valvular abnormalities  - EKG this morning NSR, daily EKGs  - LR @ 100cc/hr while NPO      Pulm:   - saturating well on 2L  - supplemental O2 to maintain spO2>94%     Renal:  - no aguilar  - adequate UOP with normal BUN/Cr  - daily CMP  - prn electrolyte replacement      FEN / GI:  Sp repeat hiatal hernia repair 4/26  - NPO  - trial d/c NGT, trial PO meds after lunch  - Abd incisions CDI  - last BM 5 days ago, miralax started      ID:   - afebrile with no leukocytosis  - daily cbc      Heme/Onc:   - no bleeding or anemia  - daily cbc   - Transfuse hgb<7  - ppx lovenox, may escalate to therapeutic given elevated [id number]      Endo:   - Gluc goal 140-180, SSI      PPx:   Feeding: NPO  Analgesia/Sedation: dilaudid  Thromboembolic prevention: lovenox  HOB >30: Yes   Stress Ulcer ppx: NA  Glucose control: Critical care goal 140-180 g/dl, SSI     Lines/Drains/Airway: 2 PIV, NG         Dispo/Code Status/Palliative:   -- SICU / Full Code, stable for step down once on PO meds         Critical care was time spent personally by me on the following  activities: development of treatment plan with patient or surrogate and bedside caregivers, discussions with consultants, evaluation of patient's response to treatment, examination of patient, ordering and performing treatments and interventions, ordering and review of laboratory studies, ordering and review of radiographic studies, pulse oximetry, re-evaluation of patient's condition.  This critical care time did not overlap with that of any other provider or involve time for any procedures.     Ashlee Herring MD   PGY2  Critical Care - Surgery  [facility]

## 2022-04-29 NOTE — PLAN OF CARE
Stable this shift. Currently in SR on monitor. No recent rhythm changes. SBP stable. On room air- no distress. NGT to low intermittent suction, 450 cc total brown/bile output. Voiding per commode. 1L bolus of LR given and fluids increased to 100cc/hr for low UO. No complaints of pain or nausea. OOB to chair and ambulated in room this afternoon. Daughter, Trena, at bedside and updated on POC.

## 2022-04-29 NOTE — SUBJECTIVE & OBJECTIVE
Interval History: Ms. Vazquez reports interval increase in cough, but denies significant nausea or emesis. She has been in HDS on dilt push Q6H. NGT with 400 out overnight.     Medications:  Continuous Infusions:   lactated ringers Stopped (04/29/22 0351)     Scheduled Meds:   diltiaZEM  5 mg Intravenous Q6H    enoxaparin  40 mg Subcutaneous Daily    mupirocin   Nasal BID    ondansetron  4 mg Intravenous Q6H    polyethylene glycol  17 g Per NG tube Daily    scopolamine  1 patch Transdermal Q3 Days     PRN Meds:acetaminophen, calcium gluconate IVPB, calcium gluconate IVPB, calcium gluconate IVPB, dextrose 50%, glucagon (human recombinant), hydrALAZINE, HYDROmorphone, insulin aspart U-100, magnesium sulfate IVPB, magnesium sulfate IVPB, potassium chloride **AND** potassium chloride **AND** potassium chloride, prochlorperazine, sodium chloride 0.9%, sodium phosphate IVPB, sodium phosphate IVPB, sodium phosphate IVPB     Review of patient's allergies indicates:  No Known Allergies  Objective:     Vital Signs (Most Recent):  Temp: 98.3 °F (36.8 °C) (04/29/22 0400)  Pulse: 66 (04/29/22 0615)  Resp: 18 (04/29/22 0615)  BP: (!) 150/67 (04/29/22 0600)  SpO2: 99 % (04/29/22 0615)   Vital Signs (24h Range):  Temp:  [98.2 °F (36.8 °C)-99 °F (37.2 °C)] 98.3 °F (36.8 °C)  Pulse:  [] 66  Resp:  [15-47] 18  SpO2:  [90 %-100 %] 99 %  BP: (120-183)/(61-83) 150/67     Weight: 86.2 kg (190 lb)  Body mass index is 30.67 kg/m².    Intake/Output - Last 3 Shifts         04/27 0700 04/28 0659 04/28 0700 04/29 0659 04/29 0700 04/30 0659    I.V. (mL/kg) 1510.3 (17.5) 1763.4 (20.5)     NG/GT  60     IV Piggyback 699 989     Total Intake(mL/kg) 2209.3 (25.6) 2812.5 (32.6)     Urine (mL/kg/hr) 150 (0.1) 950 (0.5)     Drains 950 850     Total Output 1100 1800     Net +1109.3 +1012.5            Urine Occurrence 4 x      Stool Occurrence 0 x              Physical Exam  Constitutional:       General: She is not in acute distress.      Appearance: Normal appearance. She is not ill-appearing or toxic-appearing.   HENT:      Head: Normocephalic and atraumatic.      Nose:      Comments: NG in place  Eyes:      Extraocular Movements: Extraocular movements intact.   Cardiovascular:      Rate and Rhythm: Normal rate and regular rhythm.   Pulmonary:      Effort: Pulmonary effort is normal. No respiratory distress.   Abdominal:      General: There is no distension.      Palpations: Abdomen is soft.      Tenderness: There is abdominal tenderness (appropriate, incisional).      Comments: Incisions c/d/i   Musculoskeletal:         General: Normal range of motion.      Cervical back: Normal range of motion.   Skin:     General: Skin is warm and dry.   Neurological:      General: No focal deficit present.      Mental Status: She is alert and oriented to person, place, and time.   Psychiatric:         Mood and Affect: Mood normal.         Behavior: Behavior normal.       Significant Labs:  I have reviewed all pertinent lab results within the past 24 hours.    Significant Diagnostics:  I have reviewed all pertinent imaging results/findings within the past 24 hours.

## 2022-04-29 NOTE — CONSULTS
Recommendations     1. When medically able, ADAT to FLD as tolerate or per MD     2. Post gastric surgery diet education provided       3. RD following     Goals: Meet % EEN, EPN by RD f/u date  Nutrition Goal Status: new  Communication of RD Recs:  (POC)    Sasha Waldron  PL-[id number]

## 2022-04-30 LAB
ALBUMIN SERPL BCP-MCNC: 2.8 G/DL (ref 3.5–5.2)
ALP SERPL-CCNC: 68 U/L (ref 55–135)
ALT SERPL W/O P-5'-P-CCNC: 13 U/L (ref 10–44)
ANION GAP SERPL CALC-SCNC: 15 MMOL/L (ref 8–16)
ANION GAP SERPL CALC-SCNC: 15 MMOL/L (ref 8–16)
AST SERPL-CCNC: 22 U/L (ref 10–40)
BASOPHILS # BLD AUTO: 0.04 K/UL (ref 0–0.2)
BASOPHILS NFR BLD: 0.4 % (ref 0–1.9)
BILIRUB SERPL-MCNC: 0.7 MG/DL (ref 0.1–1)
BUN SERPL-MCNC: 8 MG/DL (ref 8–23)
BUN SERPL-MCNC: 8 MG/DL (ref 8–23)
CALCIUM SERPL-MCNC: 8.6 MG/DL (ref 8.7–10.5)
CALCIUM SERPL-MCNC: 8.6 MG/DL (ref 8.7–10.5)
CHLORIDE SERPL-SCNC: 104 MMOL/L (ref 95–110)
CHLORIDE SERPL-SCNC: 104 MMOL/L (ref 95–110)
CO2 SERPL-SCNC: 23 MMOL/L (ref 23–29)
CO2 SERPL-SCNC: 23 MMOL/L (ref 23–29)
CREAT SERPL-MCNC: 0.6 MG/DL (ref 0.5–1.4)
CREAT SERPL-MCNC: 0.6 MG/DL (ref 0.5–1.4)
DIFFERENTIAL METHOD: ABNORMAL
EOSINOPHIL # BLD AUTO: 0.3 K/UL (ref 0–0.5)
EOSINOPHIL NFR BLD: 3.4 % (ref 0–8)
ERYTHROCYTE [DISTWIDTH] IN BLOOD BY AUTOMATED COUNT: 13.9 % (ref 11.5–14.5)
EST. GFR  (AFRICAN AMERICAN): >60 ML/MIN/1.73 M^2
EST. GFR  (AFRICAN AMERICAN): >60 ML/MIN/1.73 M^2
EST. GFR  (NON AFRICAN AMERICAN): >60 ML/MIN/1.73 M^2
EST. GFR  (NON AFRICAN AMERICAN): >60 ML/MIN/1.73 M^2
GLUCOSE SERPL-MCNC: 80 MG/DL (ref 70–110)
GLUCOSE SERPL-MCNC: 81 MG/DL (ref 70–110)
HCT VFR BLD AUTO: 34.1 % (ref 37–48.5)
HGB BLD-MCNC: 10.6 G/DL (ref 12–16)
IMM GRANULOCYTES # BLD AUTO: 0.02 K/UL (ref 0–0.04)
IMM GRANULOCYTES NFR BLD AUTO: 0.2 % (ref 0–0.5)
LYMPHOCYTES # BLD AUTO: 1.4 K/UL (ref 1–4.8)
LYMPHOCYTES NFR BLD: 15.6 % (ref 18–48)
MAGNESIUM SERPL-MCNC: 1.9 MG/DL (ref 1.6–2.6)
MCH RBC QN AUTO: 26.4 PG (ref 27–31)
MCHC RBC AUTO-ENTMCNC: 31.1 G/DL (ref 32–36)
MCV RBC AUTO: 85 FL (ref 82–98)
MONOCYTES # BLD AUTO: 0.6 K/UL (ref 0.3–1)
MONOCYTES NFR BLD: 6.4 % (ref 4–15)
NEUTROPHILS # BLD AUTO: 6.6 K/UL (ref 1.8–7.7)
NEUTROPHILS NFR BLD: 74 % (ref 38–73)
NRBC BLD-RTO: 0 /100 WBC
PHOSPHATE SERPL-MCNC: 3 MG/DL (ref 2.7–4.5)
PLATELET # BLD AUTO: 291 K/UL (ref 150–450)
PMV BLD AUTO: 10 FL (ref 9.2–12.9)
POTASSIUM SERPL-SCNC: 3.8 MMOL/L (ref 3.5–5.1)
POTASSIUM SERPL-SCNC: 3.8 MMOL/L (ref 3.5–5.1)
PROT SERPL-MCNC: 6.4 G/DL (ref 6–8.4)
RBC # BLD AUTO: 4.02 M/UL (ref 4–5.4)
SODIUM SERPL-SCNC: 142 MMOL/L (ref 136–145)
SODIUM SERPL-SCNC: 142 MMOL/L (ref 136–145)
WBC # BLD AUTO: 8.9 K/UL (ref 3.9–12.7)

## 2022-04-30 PROCEDURE — 94640 AIRWAY INHALATION TREATMENT: CPT

## 2022-04-30 PROCEDURE — 93010 EKG 12-LEAD: ICD-10-PCS | Mod: ,,, | Performed by: INTERNAL MEDICINE

## 2022-04-30 PROCEDURE — 99900035 HC TECH TIME PER 15 MIN (STAT)

## 2022-04-30 PROCEDURE — 99233 SBSQ HOSP IP/OBS HIGH 50: CPT | Mod: 24,,, | Performed by: STUDENT IN AN ORGANIZED HEALTH CARE EDUCATION/TRAINING PROGRAM

## 2022-04-30 PROCEDURE — 25000003 PHARM REV CODE 250: Performed by: STUDENT IN AN ORGANIZED HEALTH CARE EDUCATION/TRAINING PROGRAM

## 2022-04-30 PROCEDURE — 25000242 PHARM REV CODE 250 ALT 637 W/ HCPCS

## 2022-04-30 PROCEDURE — 83735 ASSAY OF MAGNESIUM: CPT | Performed by: STUDENT IN AN ORGANIZED HEALTH CARE EDUCATION/TRAINING PROGRAM

## 2022-04-30 PROCEDURE — 20000000 HC ICU ROOM

## 2022-04-30 PROCEDURE — 93010 ELECTROCARDIOGRAM REPORT: CPT | Mod: ,,, | Performed by: INTERNAL MEDICINE

## 2022-04-30 PROCEDURE — 63600175 PHARM REV CODE 636 W HCPCS: Performed by: INTERNAL MEDICINE

## 2022-04-30 PROCEDURE — 80048 BASIC METABOLIC PNL TOTAL CA: CPT | Mod: XB | Performed by: INTERNAL MEDICINE

## 2022-04-30 PROCEDURE — 94761 N-INVAS EAR/PLS OXIMETRY MLT: CPT

## 2022-04-30 PROCEDURE — 99233 PR SUBSEQUENT HOSPITAL CARE,LEVL III: ICD-10-PCS | Mod: 24,,, | Performed by: STUDENT IN AN ORGANIZED HEALTH CARE EDUCATION/TRAINING PROGRAM

## 2022-04-30 PROCEDURE — 85025 COMPLETE CBC W/AUTO DIFF WBC: CPT | Performed by: STUDENT IN AN ORGANIZED HEALTH CARE EDUCATION/TRAINING PROGRAM

## 2022-04-30 PROCEDURE — 84100 ASSAY OF PHOSPHORUS: CPT | Performed by: STUDENT IN AN ORGANIZED HEALTH CARE EDUCATION/TRAINING PROGRAM

## 2022-04-30 PROCEDURE — 63600175 PHARM REV CODE 636 W HCPCS: Performed by: STUDENT IN AN ORGANIZED HEALTH CARE EDUCATION/TRAINING PROGRAM

## 2022-04-30 PROCEDURE — 80053 COMPREHEN METABOLIC PANEL: CPT | Performed by: STUDENT IN AN ORGANIZED HEALTH CARE EDUCATION/TRAINING PROGRAM

## 2022-04-30 PROCEDURE — 93005 ELECTROCARDIOGRAM TRACING: CPT

## 2022-04-30 PROCEDURE — 25000003 PHARM REV CODE 250: Performed by: SURGERY

## 2022-04-30 RX ADMIN — IPRATROPIUM BROMIDE AND ALBUTEROL SULFATE 3 ML: 2.5; .5 SOLUTION RESPIRATORY (INHALATION) at 08:04

## 2022-04-30 RX ADMIN — ONDANSETRON 4 MG: 2 INJECTION INTRAMUSCULAR; INTRAVENOUS at 11:04

## 2022-04-30 RX ADMIN — DILTIAZEM HYDROCHLORIDE 30 MG: 30 TABLET, FILM COATED ORAL at 05:04

## 2022-04-30 RX ADMIN — IPRATROPIUM BROMIDE AND ALBUTEROL SULFATE 3 ML: 2.5; .5 SOLUTION RESPIRATORY (INHALATION) at 07:04

## 2022-04-30 RX ADMIN — DILTIAZEM HYDROCHLORIDE 30 MG: 30 TABLET, FILM COATED ORAL at 12:04

## 2022-04-30 RX ADMIN — MUPIROCIN: 20 OINTMENT TOPICAL at 09:04

## 2022-04-30 RX ADMIN — ONDANSETRON 4 MG: 2 INJECTION INTRAMUSCULAR; INTRAVENOUS at 12:04

## 2022-04-30 RX ADMIN — POTASSIUM CHLORIDE 40 MEQ: 7.46 INJECTION, SOLUTION INTRAVENOUS at 05:04

## 2022-04-30 RX ADMIN — DILTIAZEM HYDROCHLORIDE 30 MG: 30 TABLET, FILM COATED ORAL at 11:04

## 2022-04-30 RX ADMIN — IPRATROPIUM BROMIDE AND ALBUTEROL SULFATE 3 ML: 2.5; .5 SOLUTION RESPIRATORY (INHALATION) at 05:04

## 2022-04-30 RX ADMIN — POLYETHYLENE GLYCOL 3350 17 G: 17 POWDER, FOR SOLUTION ORAL at 09:04

## 2022-04-30 RX ADMIN — ONDANSETRON 4 MG: 2 INJECTION INTRAMUSCULAR; INTRAVENOUS at 05:04

## 2022-04-30 RX ADMIN — ENOXAPARIN SODIUM 40 MG: 40 INJECTION SUBCUTANEOUS at 05:04

## 2022-04-30 RX ADMIN — IPRATROPIUM BROMIDE AND ALBUTEROL SULFATE 3 ML: 2.5; .5 SOLUTION RESPIRATORY (INHALATION) at 12:04

## 2022-04-30 NOTE — SUBJECTIVE & OBJECTIVE
Interval History/Significant Events: NAEO. Pt resting comfortably this am. No complaints at this time.    Follow-up For: Procedure(s) (LRB):  REPAIR, HERNIA, HIATAL, LAPAROSCOPIC (N/A)  EGD (ESOPHAGOGASTRODUODENOSCOPY) (N/A)  FUNDOPLICATION, LAPAROSCOPIC, TOUPET (N/A)    Post-Operative Day: 4 Days Post-Op    Objective:     Vital Signs (Most Recent):  Temp: 98.7 °F (37.1 °C) (04/30/22 0300)  Pulse: 68 (04/30/22 0545)  Resp: 20 (04/30/22 0545)  BP: (!) 157/74 (04/30/22 0500)  SpO2: (!) 93 % (04/30/22 0545)   Vital Signs (24h Range):  Temp:  [98.7 °F (37.1 °C)-99 °F (37.2 °C)] 98.7 °F (37.1 °C)  Pulse:  [60-95] 68  Resp:  [16-43] 20  SpO2:  [90 %-100 %] 93 %  BP: (144-176)/(67-87) 157/74     Weight: 86.2 kg (190 lb)  Body mass index is 30.67 kg/m².      Intake/Output Summary (Last 24 hours) at 4/30/2022 0609  Last data filed at 4/30/2022 0500  Gross per 24 hour   Intake 2559.75 ml   Output 1300 ml   Net 1259.75 ml       Physical Exam  Vitals and nursing note reviewed.   Constitutional:       Appearance: Normal appearance.   HENT:      Mouth/Throat:      Mouth: Mucous membranes are moist.      Pharynx: Oropharynx is clear. No oropharyngeal exudate.   Eyes:      Conjunctiva/sclera: Conjunctivae normal.      Pupils: Pupils are equal, round, and reactive to light.   Cardiovascular:      Rate and Rhythm: Normal rate and regular rhythm.      Pulses: Normal pulses.      Heart sounds: Normal heart sounds. No murmur heard.  Pulmonary:      Effort: Pulmonary effort is normal.      Breath sounds: Normal breath sounds.   Abdominal:      General: There is no distension.      Tenderness: There is no abdominal tenderness.      Comments: Abd incision c/d/I   Skin:     General: Skin is warm and dry.      Coloration: Skin is not pale.   Neurological:      General: No focal deficit present.      Mental Status: She is alert and oriented to person, place, and time.   Psychiatric:         Mood and Affect: Mood normal.         Behavior:  Behavior normal.       Vents:  Oxygen Concentration (%): 28 (04/28/22 0411)    Lines/Drains/Airways       Peripheral Intravenous Line  Duration                  Peripheral IV - Single Lumen 04/27/22 2357 20 G Right Forearm 2 days         Peripheral IV - Single Lumen 04/28/22 1600 18 G Right Antecubital 1 day                    Significant Labs:    CBC/Anemia Profile:  Recent Labs   Lab 04/29/22  0509 04/30/22  0216   WBC 9.14 8.90   HGB 10.7* 10.6*   HCT 34.9* 34.1*    291   MCV 88 85   RDW 14.5 13.9        Chemistries:  Recent Labs   Lab 04/29/22  0509 04/30/22  0216   * 142  142   K 3.5 3.8  3.8    104  104   CO2 30* 23  23   BUN 10 8  8   CREATININE 0.7 0.6  0.6   CALCIUM 8.8 8.6*  8.6*   ALBUMIN 2.7* 2.8*   PROT 5.7* 6.4   BILITOT 0.6 0.7   ALKPHOS 67 68   ALT 14 13   AST 22 22   MG 1.8 1.9   PHOS 3.0 3.0       All pertinent labs within the past 24 hours have been reviewed.    Significant Imaging:  I have reviewed all pertinent imaging results/findings within the past 24 hours.

## 2022-04-30 NOTE — PLAN OF CARE
Pt AAOx4 - follows commands and moves all extremities. SBP within goal <180. HR NSR on bedside monitor. Sats maintained on RA. Afebrile. Pt denies pain or nausea - scheduled zofran administered per order. Pt with no BM overnight and denies passing gas. LR continued @ 100cc/hr. Pt voids per bedside commode with 1 person assist. BG monitored q6h. Electrolytes replaced per order. Pt's son at bedside throughout shift - both pt and pts son updated on and agreeable to plan of care.

## 2022-04-30 NOTE — PROGRESS NOTES
"[facility]  General Surgery  Progress Note    Subjective:     History of Present Illness:  Patient is 73 yo F with hx of GERD, DM2 and past hiatal hernia repair approx 10 yrs ago who presented to OSH on 4/24 with worsening abd pain, nausea, and emesis and was eventually found to have a gastric volvulus, and was transferred to [facility] for repair. She notes she had worsening reflux and some emesis on and off for the 2 weeks prior to presentation, but they all were short lived and resolved. She then developed severe epigastric pain on 4/24, and "just didn't feel well" and presented ot ED. She was found on CT scan to have concern for GOO. An EGD was performed on 4/25 which showed a tortuous stomach consistent with volvulus, and no signs of necrosis. An NGT was left, and she felt much better after completion. She was then transferred here for surgery.      Post-Op Info:  Procedure(s) (LRB):  REPAIR, HERNIA, HIATAL, LAPAROSCOPIC (N/A)  EGD (ESOPHAGOGASTRODUODENOSCOPY) (N/A)  FUNDOPLICATION, LAPAROSCOPIC, TOUPET (N/A)   4 Days Post-Op     Interval History:   Looks great this AM, no nausea overnight.  Sinus rhythm all night.    Medications:  Continuous Infusions:   lactated ringers 100 mL/hr at 04/30/22 0800     Scheduled Meds:   albuterol-ipratropium  3 mL Nebulization Q4H WAKE    diltiaZEM  30 mg Oral Q6H    enoxaparin  40 mg Subcutaneous Daily    mupirocin   Nasal BID    ondansetron  4 mg Intravenous Q6H    polyethylene glycol  17 g Oral Daily    scopolamine  1 patch Transdermal Q3 Days     PRN Meds:acetaminophen, calcium gluconate IVPB, calcium gluconate IVPB, calcium gluconate IVPB, dextrose 50%, glucagon (human recombinant), HYDROmorphone, insulin aspart U-100, labetalol, magnesium sulfate IVPB, magnesium sulfate IVPB, potassium chloride **AND** potassium chloride **AND** potassium chloride, prochlorperazine, sodium chloride 0.9%, sodium phosphate IVPB, sodium phosphate IVPB, sodium " phosphate IVPB     Review of patient's allergies indicates:  No Known Allergies  Objective:     Vital Signs (Most Recent):  Temp: 98.7 °F (37.1 °C) (04/30/22 0700)  Pulse: 67 (04/30/22 0815)  Resp: (!) 21 (04/30/22 0815)  BP: (!) 157/77 (04/30/22 0800)  SpO2: 96 % (04/30/22 0815)   Vital Signs (24h Range):  Temp:  [98.7 °F (37.1 °C)-99 °F (37.2 °C)] 98.7 °F (37.1 °C)  Pulse:  [60-95] 67  Resp:  [16-43] 21  SpO2:  [90 %-98 %] 96 %  BP: (144-176)/(67-92) 157/77     Weight: 86.2 kg (190 lb)  Body mass index is 30.67 kg/m².    Intake/Output - Last 3 Shifts         04/28 0700 04/29 0659 04/29 0700 04/30 0659 04/30 0700  05/01 0659    I.V. (mL/kg) 1763.4 (20.5) 1959.7 (22.7) 167.8 (1.9)    NG/GT 60 200     IV Piggyback 989 400 161.4    Total Intake(mL/kg) 2812.5 (32.6) 2559.8 (29.7) 329.2 (3.8)    Urine (mL/kg/hr) 950 (0.5) 1300 (0.6)     Drains 850      Total Output 1800 1300     Net +1012.5 +1259.8 +329.2                   Physical Exam  Constitutional:       General: She is not in acute distress.     Appearance: Normal appearance. She is not ill-appearing or toxic-appearing.   HENT:      Head: Normocephalic and atraumatic.   Eyes:      Extraocular Movements: Extraocular movements intact.   Cardiovascular:      Rate and Rhythm: Normal rate and regular rhythm.   Pulmonary:      Effort: Pulmonary effort is normal. No respiratory distress.   Abdominal:      General: There is no distension.      Palpations: Abdomen is soft.      Tenderness: There is abdominal tenderness (appropriate, incisional).      Comments: Incisions c/d/I  Minimally distended   Musculoskeletal:         General: Normal range of motion.      Cervical back: Normal range of motion.   Skin:     General: Skin is warm and dry.   Neurological:      General: No focal deficit present.      Mental Status: She is alert and oriented to person, place, and time.   Psychiatric:         Mood and Affect: Mood normal.         Behavior: Behavior normal.        Significant Labs:  I have reviewed all pertinent lab results within the past 24 hours.    Significant Diagnostics:  I have reviewed all pertinent imaging results/findings within the past 24 hours.    Assessment/Plan:     Gastric volvulus  Patient is 73 yo F with GOO 2/2 gastric volvulus.  Now s/p reduction of gastric volvulus and hiatal hernia repair with fadi fundoplication on 4/26/22.    Oral dilt, telemetry  Clears  Scheduled anti-emetics plus PRN  DVT ppx: lovenox  OOBTC/ambulate  IS    Dispo: Okay for stepdown from my standpoint        Suhail Connolly MD  General Surgery  [facility]

## 2022-04-30 NOTE — ASSESSMENT & PLAN NOTE
71 yo F with hx of GERD, DM2 and past hiatal hernia repair approx 10 yrs ago who had repeat hiatal hernia repair on 4/26 complicated by new onset SVT and Aflutter vs Afib with RVR on POD1. SICU consulted for monitoring.      Neuro/Psych:   -- Sedation: None            -  prn IV dilaudid for pain             Cards:   #SVT  - resolved with IV adenosine x 2, now on IV dilt, will transition to PO once OK per surgery   - NGT d/c'd started onPO dilt, tolerating well  - [id number] 4, will initiate therapeutic anticoagulation once OK per surgery  - TTE this admission with EF65, grade II diastolic dysfunction, no valvular abnormalities  - NSR, daily EKGs  - LR @ 100cc/hr while NPO      Pulm:   - saturating well on 2L  - supplemental O2 to maintain spO2>94%     Renal:  - no aguilar  - adequate UOP with normal BUN/Cr  - daily CMP  - prn electrolyte replacement      FEN / GI:  Sp repeat hiatal hernia repair 4/26  - clear liquid diet  - tolerating PO meds  - Abd incisions CDI  - last BM 5 days ago, miralax started      ID:   - afebrile with no leukocytosis  - daily cbc      Heme/Onc:   - no bleeding or anemia  - daily cbc   - Transfuse hgb<7  - ppx lovenox, may escalate to therapeutic given elevated [id number]      Endo:   - Gluc goal 140-180, SSI      PPx:   Feeding: clear liquid diet  Analgesia/Sedation: dilaudid  Thromboembolic prevention: lovenox  HOB >30: Yes   Stress Ulcer ppx: NA  Glucose control: Critical care goal 140-180 g/dl, SSI     Lines/Drains/Airway: 2 PIV, NG         Dispo/Code Status/Palliative:   -- SICU / Full Code, stable for step down

## 2022-04-30 NOTE — PROGRESS NOTES
[facility]  Critical Care - Surgery  Progress Note    Patient Name: Vidya Vazquez  MRN: [id number]  Admission Date: 4/24/2022  Hospital Length of Stay: 6 days  Code Status: Full Code  Attending Provider: David Mike Jr.,*  Primary Care Provider: Primary Doctor No   Principal Problem: Atrial flutter    Subjective:     Hospital/ICU Course:  Patient admitted to SICU for closer monitoring after going into SVT post-op hernia repair. Cardiology evaluated in PACU, SVT was broke with adenosine x2, then went into aflutter, rhythm controlled with IV dilt pushes. NGT for decompression.      Interval History/Significant Events: NAEO. Pt resting comfortably this am. No complaints at this time.    Follow-up For: Procedure(s) (LRB):  REPAIR, HERNIA, HIATAL, LAPAROSCOPIC (N/A)  EGD (ESOPHAGOGASTRODUODENOSCOPY) (N/A)  FUNDOPLICATION, LAPAROSCOPIC, TOUPET (N/A)    Post-Operative Day: 4 Days Post-Op    Objective:     Vital Signs (Most Recent):  Temp: 98.7 °F (37.1 °C) (04/30/22 0300)  Pulse: 68 (04/30/22 0545)  Resp: 20 (04/30/22 0545)  BP: (!) 157/74 (04/30/22 0500)  SpO2: (!) 93 % (04/30/22 0545)   Vital Signs (24h Range):  Temp:  [98.7 °F (37.1 °C)-99 °F (37.2 °C)] 98.7 °F (37.1 °C)  Pulse:  [60-95] 68  Resp:  [16-43] 20  SpO2:  [90 %-100 %] 93 %  BP: (144-176)/(67-87) 157/74     Weight: 86.2 kg (190 lb)  Body mass index is 30.67 kg/m².      Intake/Output Summary (Last 24 hours) at 4/30/2022 0609  Last data filed at 4/30/2022 0500  Gross per 24 hour   Intake 2559.75 ml   Output 1300 ml   Net 1259.75 ml       Physical Exam  Vitals and nursing note reviewed.   Constitutional:       Appearance: Normal appearance.   HENT:      Mouth/Throat:      Mouth: Mucous membranes are moist.      Pharynx: Oropharynx is clear. No oropharyngeal exudate.   Eyes:      Conjunctiva/sclera: Conjunctivae normal.      Pupils: Pupils are equal, round, and reactive to light.   Cardiovascular:      Rate and Rhythm: Normal rate  and regular rhythm.      Pulses: Normal pulses.      Heart sounds: Normal heart sounds. No murmur heard.  Pulmonary:      Effort: Pulmonary effort is normal.      Breath sounds: Normal breath sounds.   Abdominal:      General: There is no distension.      Tenderness: There is no abdominal tenderness.      Comments: Abd incision c/d/I   Skin:     General: Skin is warm and dry.      Coloration: Skin is not pale.   Neurological:      General: No focal deficit present.      Mental Status: She is alert and oriented to person, place, and time.   Psychiatric:         Mood and Affect: Mood normal.         Behavior: Behavior normal.       Vents:  Oxygen Concentration (%): 28 (04/28/22 0411)    Lines/Drains/Airways       Peripheral Intravenous Line  Duration                  Peripheral IV - Single Lumen 04/27/22 2357 20 G Right Forearm 2 days         Peripheral IV - Single Lumen 04/28/22 1600 18 G Right Antecubital 1 day                    Significant Labs:    CBC/Anemia Profile:  Recent Labs   Lab 04/29/22  0509 04/30/22  0216   WBC 9.14 8.90   HGB 10.7* 10.6*   HCT 34.9* 34.1*    291   MCV 88 85   RDW 14.5 13.9        Chemistries:  Recent Labs   Lab 04/29/22  0509 04/30/22  0216   * 142  142   K 3.5 3.8  3.8    104  104   CO2 30* 23  23   BUN 10 8  8   CREATININE 0.7 0.6  0.6   CALCIUM 8.8 8.6*  8.6*   ALBUMIN 2.7* 2.8*   PROT 5.7* 6.4   BILITOT 0.6 0.7   ALKPHOS 67 68   ALT 14 13   AST 22 22   MG 1.8 1.9   PHOS 3.0 3.0       All pertinent labs within the past 24 hours have been reviewed.    Significant Imaging:  I have reviewed all pertinent imaging results/findings within the past 24 hours.    Assessment/Plan:     * Atrial flutter  71 yo F with hx of GERD, DM2 and past hiatal hernia repair approx 10 yrs ago who had repeat hiatal hernia repair on 4/26 complicated by new onset SVT and Aflutter vs Afib with RVR on POD1. SICU consulted for  monitoring.      Neuro/Psych:   -- Sedation: None            -  prn IV dilaudid for pain             Cards:   #SVT  - resolved with IV adenosine x 2, now on IV dilt, will transition to PO once OK per surgery   - NGT d/c'd started onPO dilt, tolerating well  - [id number] 4, will initiate therapeutic anticoagulation once OK per surgery  - TTE this admission with EF65, grade II diastolic dysfunction, no valvular abnormalities  - NSR, daily EKGs      Pulm:   - saturating well on 2L  - supplemental O2 to maintain spO2>94%     Renal:  - no aguilar  - adequate UOP with normal BUN/Cr  - daily CMP  - prn electrolyte replacement      FEN / GI:  Sp repeat hiatal hernia repair 4/26  - clear liquid diet  - tolerating PO meds  - Abd incisions CDI  - last BM 5 days ago, miralax started      ID:   - afebrile with no leukocytosis  - daily cbc      Heme/Onc:   - no bleeding or anemia  - daily cbc   - Transfuse hgb<7  - ppx lovenox, may escalate to therapeutic given elevated [id number]      Endo:   - Gluc goal 140-180, SSI      PPx:   Feeding: clear liquid diet  Analgesia/Sedation: dilaudid  Thromboembolic prevention: lovenox  HOB >30: Yes   Stress Ulcer ppx: NA  Glucose control: Critical care goal 140-180 g/dl, SSI     Lines/Drains/Airway: 2 PIV, NG         Dispo/Code Status/Palliative:   -- SICU / Full Code, stable for step down      Critical secondary to Patient has a condition that poses threat to life and bodily function: postop monitoring     Critical care was time spent personally by me on the following activities: development of treatment plan with patient or surrogate and bedside caregivers, discussions with consultants, evaluation of patient's response to treatment, examination of patient, ordering and performing treatments and interventions, ordering and review of laboratory studies, ordering and review of radiographic studies, pulse oximetry, re-evaluation of patient's condition.  This critical care time did not  overlap with that of any other provider or involve time for any procedures.     Laureano Rivero III, MD  Critical Care - Surgery  [facility]

## 2022-04-30 NOTE — ASSESSMENT & PLAN NOTE
73 yo F with hx of GERD, DM2 and past hiatal hernia repair approx 10 yrs ago who had repeat hiatal hernia repair on 4/26 complicated by new onset SVT and Aflutter vs Afib with RVR on POD1. SICU consulted for monitoring.      Neuro/Psych:   -- Sedation: None            -  prn Oxycodone             Cards:   #SVT  - resolved with IV adenosine x 2, now on PO diltiazem  - [id number] 4, will need anticoagulation on discharge per surgery  - TTE this admission with EF65, grade II diastolic dysfunction, no valvular abnormalities  - NSR      Pulm:   - saturating well on room air     Renal:  - adequate UOP with normal BUN/Cr  - daily CMP  - prn electrolyte replacement      FEN / GI:  Sp repeat hiatal hernia repair 4/26  - clear liquid diet advance as tolerated, discussed with nursing  - tolerating PO meds  - Abd incisions CDI  - last BM 4/30      ID:   - afebrile with no leukocytosis  - daily cbc      Heme/Onc:   - no bleeding or anemia  - daily cbc   - Transfuse hgb<7  - ppx lovenox, may escalate to therapeutic given elevated [id number]      Endo:   - Gluc goal 140-180, SSI      PPx:   Feeding: clear liquid diet advance as tolerated  Analgesia/Sedation: Percocet 5/10 q4 PRN  Thromboembolic prevention: lovenox  HOB >30: Yes   Stress Ulcer ppx: NA  Glucose control: Critical care goal 140-180 g/dl, SSI     Lines/Drains/Airway: 2 PIV, NG         Dispo/Code Status/Palliative:   -- SICU / Full Code, stable for step down vs possible discharge from primary team

## 2022-04-30 NOTE — ASSESSMENT & PLAN NOTE
Patient is 71 yo F with GOO 2/2 gastric volvulus.  Now s/p reduction of gastric volvulus and hiatal hernia repair with faid fundoplication on 4/26/22.    Oral dilt, telemetry  Clears  Scheduled anti-emetics plus PRN  DVT ppx: lovenox  OOBTC/ambulate  IS    Dispo: Okay for stepdown from my standpoint

## 2022-04-30 NOTE — PLAN OF CARE
Stable this shift. Currently in SR on monitor. No rhythm changes today. SBP <180. On room air- no distress. NGT removed, no N/V. Voiding per commode. Electrolytes replaced per order. No complaints of pain. OOB to chair and ambulated in room this afternoon. Daughter, Trena, at bedside and updated on POC.

## 2022-04-30 NOTE — SUBJECTIVE & OBJECTIVE
Interval History:   Looks great this AM, no nausea overnight.  Sinus rhythm all night.    Medications:  Continuous Infusions:   lactated ringers 100 mL/hr at 04/30/22 0800     Scheduled Meds:   albuterol-ipratropium  3 mL Nebulization Q4H WAKE    diltiaZEM  30 mg Oral Q6H    enoxaparin  40 mg Subcutaneous Daily    mupirocin   Nasal BID    ondansetron  4 mg Intravenous Q6H    polyethylene glycol  17 g Oral Daily    scopolamine  1 patch Transdermal Q3 Days     PRN Meds:acetaminophen, calcium gluconate IVPB, calcium gluconate IVPB, calcium gluconate IVPB, dextrose 50%, glucagon (human recombinant), HYDROmorphone, insulin aspart U-100, labetalol, magnesium sulfate IVPB, magnesium sulfate IVPB, potassium chloride **AND** potassium chloride **AND** potassium chloride, prochlorperazine, sodium chloride 0.9%, sodium phosphate IVPB, sodium phosphate IVPB, sodium phosphate IVPB     Review of patient's allergies indicates:  No Known Allergies  Objective:     Vital Signs (Most Recent):  Temp: 98.7 °F (37.1 °C) (04/30/22 0700)  Pulse: 67 (04/30/22 0815)  Resp: (!) 21 (04/30/22 0815)  BP: (!) 157/77 (04/30/22 0800)  SpO2: 96 % (04/30/22 0815)   Vital Signs (24h Range):  Temp:  [98.7 °F (37.1 °C)-99 °F (37.2 °C)] 98.7 °F (37.1 °C)  Pulse:  [60-95] 67  Resp:  [16-43] 21  SpO2:  [90 %-98 %] 96 %  BP: (144-176)/(67-92) 157/77     Weight: 86.2 kg (190 lb)  Body mass index is 30.67 kg/m².    Intake/Output - Last 3 Shifts         04/28 0700 04/29 0659 04/29 0700 04/30 0659 04/30 0700 05/01 0659    I.V. (mL/kg) 1763.4 (20.5) 1959.7 (22.7) 167.8 (1.9)    NG/GT 60 200     IV Piggyback 989 400 161.4    Total Intake(mL/kg) 2812.5 (32.6) 2559.8 (29.7) 329.2 (3.8)    Urine (mL/kg/hr) 950 (0.5) 1300 (0.6)     Drains 850      Total Output 1800 1300     Net +1012.5 +1259.8 +329.2                   Physical Exam  Constitutional:       General: She is not in acute distress.     Appearance: Normal appearance. She is not ill-appearing or  toxic-appearing.   HENT:      Head: Normocephalic and atraumatic.   Eyes:      Extraocular Movements: Extraocular movements intact.   Cardiovascular:      Rate and Rhythm: Normal rate and regular rhythm.   Pulmonary:      Effort: Pulmonary effort is normal. No respiratory distress.   Abdominal:      General: There is no distension.      Palpations: Abdomen is soft.      Tenderness: There is abdominal tenderness (appropriate, incisional).      Comments: Incisions c/d/I  Minimally distended   Musculoskeletal:         General: Normal range of motion.      Cervical back: Normal range of motion.   Skin:     General: Skin is warm and dry.   Neurological:      General: No focal deficit present.      Mental Status: She is alert and oriented to person, place, and time.   Psychiatric:         Mood and Affect: Mood normal.         Behavior: Behavior normal.       Significant Labs:  I have reviewed all pertinent lab results within the past 24 hours.    Significant Diagnostics:  I have reviewed all pertinent imaging results/findings within the past 24 hours.

## 2022-05-01 VITALS
SYSTOLIC BLOOD PRESSURE: 135 MMHG | BODY MASS INDEX: 30.53 KG/M2 | OXYGEN SATURATION: 98 % | RESPIRATION RATE: 20 BRPM | DIASTOLIC BLOOD PRESSURE: 66 MMHG | WEIGHT: 190 LBS | HEIGHT: 66 IN | HEART RATE: 78 BPM | TEMPERATURE: 98 F

## 2022-05-01 LAB
ALBUMIN SERPL BCP-MCNC: 2.6 G/DL (ref 3.5–5.2)
ALP SERPL-CCNC: 56 U/L (ref 55–135)
ALT SERPL W/O P-5'-P-CCNC: 12 U/L (ref 10–44)
ANION GAP SERPL CALC-SCNC: 11 MMOL/L (ref 8–16)
AST SERPL-CCNC: 21 U/L (ref 10–40)
BASOPHILS # BLD AUTO: 0.04 K/UL (ref 0–0.2)
BASOPHILS NFR BLD: 0.5 % (ref 0–1.9)
BILIRUB SERPL-MCNC: 0.6 MG/DL (ref 0.1–1)
BUN SERPL-MCNC: 6 MG/DL (ref 8–23)
CALCIUM SERPL-MCNC: 8.6 MG/DL (ref 8.7–10.5)
CHLORIDE SERPL-SCNC: 102 MMOL/L (ref 95–110)
CO2 SERPL-SCNC: 24 MMOL/L (ref 23–29)
CREAT SERPL-MCNC: 0.6 MG/DL (ref 0.5–1.4)
DIFFERENTIAL METHOD: ABNORMAL
EOSINOPHIL # BLD AUTO: 0.3 K/UL (ref 0–0.5)
EOSINOPHIL NFR BLD: 4.6 % (ref 0–8)
ERYTHROCYTE [DISTWIDTH] IN BLOOD BY AUTOMATED COUNT: 14.1 % (ref 11.5–14.5)
EST. GFR  (AFRICAN AMERICAN): >60 ML/MIN/1.73 M^2
EST. GFR  (NON AFRICAN AMERICAN): >60 ML/MIN/1.73 M^2
GLUCOSE SERPL-MCNC: 104 MG/DL (ref 70–110)
HCT VFR BLD AUTO: 32.2 % (ref 37–48.5)
HGB BLD-MCNC: 10.4 G/DL (ref 12–16)
IMM GRANULOCYTES # BLD AUTO: 0.01 K/UL (ref 0–0.04)
IMM GRANULOCYTES NFR BLD AUTO: 0.1 % (ref 0–0.5)
LYMPHOCYTES # BLD AUTO: 1.3 K/UL (ref 1–4.8)
LYMPHOCYTES NFR BLD: 17.9 % (ref 18–48)
MAGNESIUM SERPL-MCNC: 1.7 MG/DL (ref 1.6–2.6)
MCH RBC QN AUTO: 26.7 PG (ref 27–31)
MCHC RBC AUTO-ENTMCNC: 32.3 G/DL (ref 32–36)
MCV RBC AUTO: 83 FL (ref 82–98)
MONOCYTES # BLD AUTO: 0.4 K/UL (ref 0.3–1)
MONOCYTES NFR BLD: 5.9 % (ref 4–15)
NEUTROPHILS # BLD AUTO: 5.3 K/UL (ref 1.8–7.7)
NEUTROPHILS NFR BLD: 71 % (ref 38–73)
NRBC BLD-RTO: 0 /100 WBC
PHOSPHATE SERPL-MCNC: 3.4 MG/DL (ref 2.7–4.5)
PLATELET # BLD AUTO: 290 K/UL (ref 150–450)
PMV BLD AUTO: 10.2 FL (ref 9.2–12.9)
POCT GLUCOSE: 84 MG/DL (ref 70–110)
POCT GLUCOSE: 89 MG/DL (ref 70–110)
POTASSIUM SERPL-SCNC: 4.1 MMOL/L (ref 3.5–5.1)
PROT SERPL-MCNC: 5.9 G/DL (ref 6–8.4)
RBC # BLD AUTO: 3.9 M/UL (ref 4–5.4)
SODIUM SERPL-SCNC: 137 MMOL/L (ref 136–145)
WBC # BLD AUTO: 7.42 K/UL (ref 3.9–12.7)

## 2022-05-01 PROCEDURE — 83735 ASSAY OF MAGNESIUM: CPT | Performed by: STUDENT IN AN ORGANIZED HEALTH CARE EDUCATION/TRAINING PROGRAM

## 2022-05-01 PROCEDURE — 80053 COMPREHEN METABOLIC PANEL: CPT | Performed by: STUDENT IN AN ORGANIZED HEALTH CARE EDUCATION/TRAINING PROGRAM

## 2022-05-01 PROCEDURE — 99900035 HC TECH TIME PER 15 MIN (STAT)

## 2022-05-01 PROCEDURE — 63600175 PHARM REV CODE 636 W HCPCS: Performed by: STUDENT IN AN ORGANIZED HEALTH CARE EDUCATION/TRAINING PROGRAM

## 2022-05-01 PROCEDURE — 99233 PR SUBSEQUENT HOSPITAL CARE,LEVL III: ICD-10-PCS | Mod: 24,,, | Performed by: STUDENT IN AN ORGANIZED HEALTH CARE EDUCATION/TRAINING PROGRAM

## 2022-05-01 PROCEDURE — 25000003 PHARM REV CODE 250: Performed by: STUDENT IN AN ORGANIZED HEALTH CARE EDUCATION/TRAINING PROGRAM

## 2022-05-01 PROCEDURE — 99233 SBSQ HOSP IP/OBS HIGH 50: CPT | Mod: 24,,, | Performed by: STUDENT IN AN ORGANIZED HEALTH CARE EDUCATION/TRAINING PROGRAM

## 2022-05-01 PROCEDURE — 25000003 PHARM REV CODE 250: Performed by: SURGERY

## 2022-05-01 PROCEDURE — 85025 COMPLETE CBC W/AUTO DIFF WBC: CPT | Performed by: STUDENT IN AN ORGANIZED HEALTH CARE EDUCATION/TRAINING PROGRAM

## 2022-05-01 PROCEDURE — 94761 N-INVAS EAR/PLS OXIMETRY MLT: CPT

## 2022-05-01 PROCEDURE — 84100 ASSAY OF PHOSPHORUS: CPT | Performed by: STUDENT IN AN ORGANIZED HEALTH CARE EDUCATION/TRAINING PROGRAM

## 2022-05-01 RX ORDER — OXYCODONE AND ACETAMINOPHEN 5; 325 MG/1; MG/1
1 TABLET ORAL EVERY 4 HOURS PRN
Status: DISCONTINUED | OUTPATIENT
Start: 2022-05-01 | End: 2022-05-01

## 2022-05-01 RX ORDER — OXYCODONE AND ACETAMINOPHEN 10; 325 MG/1; MG/1
1 TABLET ORAL EVERY 4 HOURS PRN
Status: DISCONTINUED | OUTPATIENT
Start: 2022-05-01 | End: 2022-05-01

## 2022-05-01 RX ORDER — DILTIAZEM HYDROCHLORIDE 30 MG/1
30 TABLET, FILM COATED ORAL EVERY 6 HOURS
Qty: 120 TABLET | Refills: 11 | Status: SHIPPED | OUTPATIENT
Start: 2022-05-01 | End: 2022-10-19

## 2022-05-01 RX ADMIN — MAGNESIUM SULFATE 2 G: 2 INJECTION INTRAVENOUS at 05:05

## 2022-05-01 RX ADMIN — POLYETHYLENE GLYCOL 3350 17 G: 17 POWDER, FOR SOLUTION ORAL at 09:05

## 2022-05-01 RX ADMIN — DILTIAZEM HYDROCHLORIDE 30 MG: 30 TABLET, FILM COATED ORAL at 11:05

## 2022-05-01 RX ADMIN — ONDANSETRON 4 MG: 2 INJECTION INTRAMUSCULAR; INTRAVENOUS at 05:05

## 2022-05-01 RX ADMIN — DILTIAZEM HYDROCHLORIDE 30 MG: 30 TABLET, FILM COATED ORAL at 05:05

## 2022-05-01 NOTE — DISCHARGE SUMMARY
"[facility]  General Surgery  Discharge Summary      Patient Name: Vidya Vazquez  MRN: [id number]  Admission Date: 4/24/2022  Hospital Length of Stay: 7 days  Discharge Date and Time:  05/01/2022 8:46 AM  Attending Physician: David Mike Jr.,*   Discharging Provider: Suhail Connolly MD  Primary Care Provider: Primary Doctor No    HPI:   Patient is 73 yo F with hx of GERD, DM2 and past hiatal hernia repair approx 10 yrs ago who presented to OSH on 4/24 with worsening abd pain, nausea, and emesis and was eventually found to have a gastric volvulus, and was transferred to [facility] for repair. She notes she had worsening reflux and some emesis on and off for the 2 weeks prior to presentation, but they all were short lived and resolved. She then developed severe epigastric pain on 4/24, and "just didn't feel well" and presented ot ED. She was found on CT scan to have concern for GOO. An EGD was performed on 4/25 which showed a tortuous stomach consistent with volvulus, and no signs of necrosis. An NGT was left, and she felt much better after completion. She was then transferred here for surgery.      Procedure(s) (LRB):  REPAIR, HERNIA, HIATAL, LAPAROSCOPIC (N/A)  EGD (ESOPHAGOGASTRODUODENOSCOPY) (N/A)  FUNDOPLICATION, LAPAROSCOPIC, TOUPET (N/A)      Indwelling Lines/Drains at time of discharge:   Lines/Drains/Airways     None               Hospital Course: Following reduction of volvulus, HH repair, and Toupet, was doing well and then developed A flutter on the floor and transferred to ICU for IV diltiazem.  Within 24 hrs, arrhythmia had resolved and she did well after that.  Discharged on oral diltiazem and eliquis    Physical exam day of discharge:  Physical Exam  Constitutional:       Appearance: She is well-developed.   HENT:      Head: Normocephalic and atraumatic.   Eyes:      General:         Right eye: No discharge.         Left eye: No discharge.   Cardiovascular:      Rate and Rhythm: " Normal rate and regular rhythm.   Pulmonary:      Effort: Pulmonary effort is normal. No respiratory distress.   Abdominal:      Comments: Soft, non-distended, incisions c/d/i   Musculoskeletal:         General: Normal range of motion.      Cervical back: Normal range of motion and neck supple.   Skin:     General: Skin is warm and dry.   Neurological:      Mental Status: She is alert and oriented to person, place, and time.   Psychiatric:         Behavior: Behavior normal.           Goals of Care Treatment Preferences:  Code Status: Full Code      Consults:   Consults (From admission, onward)        Status Ordering Provider     Inpatient consult to Registered Dietitian/Nutritionist  Once        Provider:  (Not yet assigned)    Completed YANET GONGORA     Inpatient consult to Cardiology  Once        Provider:  (Not yet assigned)    Completed DAMIAN WHITFIELD     Inpatient consult to Gastroenterology  Once        Provider:  Cynthia Sumner MD    Completed AMARI SPEAR     Inpatient consult to General surgery  Once        Provider:  Kirt Carver MD    Completed CONSTANTINE DICKEY          Significant Diagnostic Studies: Labs:   BMP:   Recent Labs   Lab 04/30/22  0216 05/01/22 0336   GLU 81  80 104     142 137   K 3.8  3.8 4.1     104 102   CO2 23  23 24   BUN 8  8 6*   CREATININE 0.6  0.6 0.6   CALCIUM 8.6*  8.6* 8.6*   MG 1.9 1.7       Pending Diagnostic Studies:     Procedure Component Value Units Date/Time    Basic Metabolic Panel [[id number]] Collected: 05/01/22 0336    Order Status: Sent Lab Status: In process Updated: 05/01/22 0337    Specimen: Blood     Basic Metabolic Panel [[id number]] Collected: 04/29/22 0509    Order Status: Sent Lab Status: In process Updated: 04/29/22 0509    Specimen: Blood     Echo Saline Bubble? No [[id number]]     Order Status: Sent Lab Status: No result     Specimen to Pathology, Surgery General Surgery [[id number]] Collected: 04/26/22 1067    Order  Status: Sent Lab Status: In process Updated: 04/27/22 0042    Specimen: Tissue         Final Active Diagnoses:    Diagnosis Date Noted POA    PRINCIPAL PROBLEM:  Atrial flutter [I48.92] 04/27/2022 Unknown    Gastric volvulus [K31.89] 04/26/2022 Yes    Gastric outlet obstruction [K31.1] 04/24/2022 Yes    Hiatal hernia [K44.9] 04/24/2022 Yes    Hypokalemia [E87.6] 04/24/2022 Yes    Diabetes mellitus type II, controlled [E11.9] 04/24/2022 Yes    HTN (hypertension), benign [I10] 04/24/2022 Yes      Problems Resolved During this Admission:      Discharged Condition: good    Disposition: Home or Self Care    Follow Up:   Follow-up Information     David Mike Jr, MD Follow up in 2 week(s).    Specialties: General Surgery, Bariatrics  Why: Post-op  Contact information:  [address] [id number] [phone]                       Patient Instructions:      Diet Adult Regular     Lifting restrictions   Order Comments: No lifting greater than 10 pounds for 6 weeks from day of surgery.  No pushing/pulling such as vacuuming or raking.  No straining, avoid constipation and take stool softeners as described and laxatives as needed.  No driving while on narcotics and until you can react quickly without pain.     No dressing needed   Order Comments: WOUND CARE  You have skin glue over your incision(s)  This will slowly flake away in about 10 days  It is okay to shower starting the day after surgery  Can pat your incision dry  Please do not scrub hard over your incisions  Please do not pick the glue off or it will reopen the wound     Notify your health care provider if you experience any of the following:  temperature >100.4     Notify your health care provider if you experience any of the following:  severe uncontrolled pain     Notify your health care provider if you experience any of the following:  redness, tenderness, or signs of infection (pain, swelling, redness, odor or green/yellow discharge  around incision site)     Medications:  Reconciled Home Medications:      Medication List      START taking these medications    apixaban 5 mg Tab  Commonly known as: ELIQUIS  Take 1 tablet (5 mg total) by mouth 2 (two) times daily.     diltiaZEM 30 MG tablet  Commonly known as: CARDIZEM  Take 1 tablet (30 mg total) by mouth every 6 (six) hours.          Time spent on the discharge of patient: 15 minutes    Suhail Connolly MD  General Surgery  [facility] - Surgical Intensive Care

## 2022-05-01 NOTE — SUBJECTIVE & OBJECTIVE
Interval History/Significant Events: No acute events overnight. Now in sinus rhythm on PO diltiazem. BM yesterday. Advance diet as tolerated and step down later today if beds are available vs possible dc home    Follow-up For: Procedure(s) (LRB):  REPAIR, HERNIA, HIATAL, LAPAROSCOPIC (N/A)  EGD (ESOPHAGOGASTRODUODENOSCOPY) (N/A)  FUNDOPLICATION, LAPAROSCOPIC, TOUPET (N/A)    Post-Operative Day: 5 Days Post-Op    Objective:     Vital Signs (Most Recent):  Temp: 99 °F (37.2 °C) (05/01/22 0300)  Pulse: 66 (05/01/22 0600)  Resp: 17 (05/01/22 0600)  BP: (!) 160/76 (05/01/22 0500)  SpO2: 95 % (05/01/22 0600)   Vital Signs (24h Range):  Temp:  [98.7 °F (37.1 °C)-99.3 °F (37.4 °C)] 99 °F (37.2 °C)  Pulse:  [63-94] 66  Resp:  [15-47] 17  SpO2:  [92 %-97 %] 95 %  BP: (124-160)/(59-98) 160/76     Weight: 86.2 kg (190 lb)  Body mass index is 30.67 kg/m².      Intake/Output Summary (Last 24 hours) at 5/1/2022 0750  Last data filed at 5/1/2022 0400  Gross per 24 hour   Intake 539.82 ml   Output 2650 ml   Net -2110.18 ml       Physical Exam  Constitutional:       General: She is not in acute distress.     Appearance: Normal appearance. She is not ill-appearing or toxic-appearing.   HENT:      Head: Normocephalic and atraumatic.   Eyes:      Extraocular Movements: Extraocular movements intact.   Cardiovascular:      Rate and Rhythm: Normal rate and regular rhythm.   Pulmonary:      Effort: Pulmonary effort is normal. No respiratory distress.   Abdominal:      General: There is no distension.      Palpations: Abdomen is soft.      Tenderness: There is abdominal tenderness (appropriate, incisional).      Comments: Incisions c/d/I  Minimally distended   Musculoskeletal:         General: Normal range of motion.      Cervical back: Normal range of motion.   Skin:     General: Skin is warm and dry.   Neurological:      General: No focal deficit present.      Mental Status: She is alert and oriented to person, place, and time.    Psychiatric:         Mood and Affect: Mood normal.         Behavior: Behavior normal.       Vents:  Oxygen Concentration (%): 28 (04/28/22 0411)    Lines/Drains/Airways       Peripheral Intravenous Line  Duration                  Peripheral IV - Single Lumen 04/27/22 2357 20 G Right Forearm 3 days                    Significant Labs:    CBC/Anemia Profile:  Recent Labs   Lab 04/30/22 0216 05/01/22  0336   WBC 8.90 7.42   HGB 10.6* 10.4*   HCT 34.1* 32.2*    290   MCV 85 83   RDW 13.9 14.1        Chemistries:  Recent Labs   Lab 04/30/22 0216 05/01/22  0336     142 137   K 3.8  3.8 4.1     104 102   CO2 23  23 24   BUN 8  8 6*   CREATININE 0.6  0.6 0.6   CALCIUM 8.6*  8.6* 8.6*   ALBUMIN 2.8* 2.6*   PROT 6.4 5.9*   BILITOT 0.7 0.6   ALKPHOS 68 56   ALT 13 12   AST 22 21   MG 1.9 1.7   PHOS 3.0 3.4       All pertinent labs within the past 24 hours have been reviewed.    Significant Imaging:  I have reviewed all pertinent imaging results/findings within the past 24 hours.

## 2022-05-01 NOTE — HOSPITAL COURSE
Following reduction of volvulus, HH repair, and Toupet, was doing well and then developed A flutter on the floor and transferred to ICU for IV diltiazem.  Within 24 hrs, arrhythmia had resolved and she did well after that.  Discharged on oral diltiazem and eliquis

## 2022-05-01 NOTE — PLAN OF CARE
Patient tolerating full liquids- no nausea. 2 Bms. Denies pain. Patient and family educated on discharge instructions and follow up care. AVS given to patient and discussed with teach back. Discharged to home with sister at bedside.

## 2022-05-01 NOTE — DISCHARGE INSTRUCTIONS
Please observe a full liquid diet for 5 days after discharge and then begin a mechanical soft diet until we see you in clinic

## 2022-05-01 NOTE — PLAN OF CARE
Pt AAOx4 - follows commands and moves all extremities. SBP within goal <180. HR NSR on bedside monitor. Sats maintained on RA. Afebrile. Pt denies pain or nausea - scheduled zofran administered per order. Tolerating clear liquid diet. Pt voids per bedside commode with 1 person assist. BG monitored q6h. Electrolytes replaced per order. Pt's daughter at bedside throughout shift - both pt and pts daughter updated on and agreeable to plan of care. Step down orders in place - awaiting bed.

## 2022-05-01 NOTE — PROGRESS NOTES
[facility]  Critical Care - Surgery  Progress Note    Patient Name: Vidya Vazquez  MRN: [id number]  Admission Date: 4/24/2022  Hospital Length of Stay: 7 days  Code Status: Full Code  Attending Provider: David Mike Jr.,*  Primary Care Provider: Primary Doctor No   Principal Problem: Atrial flutter    Subjective:     Hospital/ICU Course:  Patient admitted to SICU for closer monitoring after going into SVT post-op hernia repair. Cardiology evaluated in PACU, SVT was broke with adenosine x2, then went into aflutter, rhythm controlled with IV dilt pushes. NGT for decompression.      Interval History/Significant Events: No acute events overnight. Now in sinus rhythm on PO diltiazem. BM yesterday. Advance diet as tolerated and step down later today if beds are available vs possible dc home    Follow-up For: Procedure(s) (LRB):  REPAIR, HERNIA, HIATAL, LAPAROSCOPIC (N/A)  EGD (ESOPHAGOGASTRODUODENOSCOPY) (N/A)  FUNDOPLICATION, LAPAROSCOPIC, TOUPET (N/A)    Post-Operative Day: 5 Days Post-Op    Objective:     Vital Signs (Most Recent):  Temp: 99 °F (37.2 °C) (05/01/22 0300)  Pulse: 66 (05/01/22 0600)  Resp: 17 (05/01/22 0600)  BP: (!) 160/76 (05/01/22 0500)  SpO2: 95 % (05/01/22 0600)   Vital Signs (24h Range):  Temp:  [98.7 °F (37.1 °C)-99.3 °F (37.4 °C)] 99 °F (37.2 °C)  Pulse:  [63-94] 66  Resp:  [15-47] 17  SpO2:  [92 %-97 %] 95 %  BP: (124-160)/(59-98) 160/76     Weight: 86.2 kg (190 lb)  Body mass index is 30.67 kg/m².      Intake/Output Summary (Last 24 hours) at 5/1/2022 0750  Last data filed at 5/1/2022 0400  Gross per 24 hour   Intake 539.82 ml   Output 2650 ml   Net -2110.18 ml       Physical Exam  Constitutional:       General: She is not in acute distress.     Appearance: Normal appearance. She is not ill-appearing or toxic-appearing.   HENT:      Head: Normocephalic and atraumatic.   Eyes:      Extraocular Movements: Extraocular movements intact.   Cardiovascular:      Rate and  Rhythm: Normal rate and regular rhythm.   Pulmonary:      Effort: Pulmonary effort is normal. No respiratory distress.   Abdominal:      General: There is no distension.      Palpations: Abdomen is soft.      Tenderness: There is abdominal tenderness (appropriate, incisional).      Comments: Incisions c/d/I  Minimally distended   Musculoskeletal:         General: Normal range of motion.      Cervical back: Normal range of motion.   Skin:     General: Skin is warm and dry.   Neurological:      General: No focal deficit present.      Mental Status: She is alert and oriented to person, place, and time.   Psychiatric:         Mood and Affect: Mood normal.         Behavior: Behavior normal.       Vents:  Oxygen Concentration (%): 28 (04/28/22 0411)    Lines/Drains/Airways       Peripheral Intravenous Line  Duration                  Peripheral IV - Single Lumen 04/27/22 2357 20 G Right Forearm 3 days                    Significant Labs:    CBC/Anemia Profile:  Recent Labs   Lab 04/30/22  0216 05/01/22  0336   WBC 8.90 7.42   HGB 10.6* 10.4*   HCT 34.1* 32.2*    290   MCV 85 83   RDW 13.9 14.1        Chemistries:  Recent Labs   Lab 04/30/22  0216 05/01/22  0336     142 137   K 3.8  3.8 4.1     104 102   CO2 23  23 24   BUN 8  8 6*   CREATININE 0.6  0.6 0.6   CALCIUM 8.6*  8.6* 8.6*   ALBUMIN 2.8* 2.6*   PROT 6.4 5.9*   BILITOT 0.7 0.6   ALKPHOS 68 56   ALT 13 12   AST 22 21   MG 1.9 1.7   PHOS 3.0 3.4       All pertinent labs within the past 24 hours have been reviewed.    Significant Imaging:  I have reviewed all pertinent imaging results/findings within the past 24 hours.    Assessment/Plan:     * Atrial flutter  73 yo F with hx of GERD, DM2 and past hiatal hernia repair approx 10 yrs ago who had repeat hiatal hernia repair on 4/26 complicated by new onset SVT and Aflutter vs Afib with RVR on POD1. SICU consulted for monitoring.      Neuro/Psych:   -- Sedation: None            -  prn Percocet  5/10 for pain             Cards:   #SVT  - resolved with IV adenosine x 2, now on PO diltiazem  - [id number] 4, will need anticoagulation on discharge per surgery  - TTE this admission with EF65, grade II diastolic dysfunction, no valvular abnormalities  - NSR      Pulm:   - saturating well on room air     Renal:  - adequate UOP with normal BUN/Cr  - daily CMP  - prn electrolyte replacement      FEN / GI:  Sp repeat hiatal hernia repair 4/26  - clear liquid diet advance as tolerated, discussed with nursing  - tolerating PO meds  - Abd incisions CDI  - last BM 4/30      ID:   - afebrile with no leukocytosis  - daily cbc      Heme/Onc:   - no bleeding or anemia  - daily cbc   - Transfuse hgb<7  - ppx lovenox, may escalate to therapeutic given elevated [id number]      Endo:   - Gluc goal 140-180, SSI      PPx:   Feeding: clear liquid diet advance as tolerated  Analgesia/Sedation: Percocet 5/10 q4 PRN  Thromboembolic prevention: lovenox  HOB >30: Yes   Stress Ulcer ppx: NA  Glucose control: Critical care goal 140-180 g/dl, SSI     Lines/Drains/Airway: 2 PIV, NG         Dispo/Code Status/Palliative:   -- SICU / Full Code, stable for step down             Critical care was time spent personally by me on the following activities: development of treatment plan with patient or surrogate and bedside caregivers, discussions with consultants, evaluation of patient's response to treatment, examination of patient, ordering and performing treatments and interventions, ordering and review of laboratory studies, ordering and review of radiographic studies, pulse oximetry, re-evaluation of patient's condition.  This critical care time did not overlap with that of any other provider or involve time for any procedures.     Chinedu Key, DO  Critical Care - Surgery  [facility]

## 2022-05-02 LAB
FINAL PATHOLOGIC DIAGNOSIS: NORMAL
Lab: NORMAL

## 2022-05-04 ENCOUNTER — TELEPHONE (OUTPATIENT)
Dept: ELECTROPHYSIOLOGY | Facility: CLINIC | Age: 72
End: 2022-05-04
Payer: MEDICAID

## 2022-05-04 DIAGNOSIS — I49.9 CARDIAC ARRHYTHMIA, UNSPECIFIED CARDIAC ARRHYTHMIA TYPE: Primary | ICD-10-CM

## 2022-05-04 NOTE — TELEPHONE ENCOUNTER
----- Message from Gabriela Michael RN sent at 5/4/2022 11:22 AM CDT -----  Hi-    Pt was recently in the hospital for surgical repair of recurring hiatal hernia.  While in the hospital she had some arrhythmia issues and have asked that she follow up with EP.  She is seeing Dr. Mike for a post op on Wednesday, 5/11/22 @ 8688.  We were hoping that she could be seen by one of the EP physicians the same day.  If not, can someone please reach out to this pt to help get her scheduled appropriately.    Thanks,  Gabriela

## 2022-05-06 RX ORDER — ONDANSETRON 4 MG/1
4 TABLET, ORALLY DISINTEGRATING ORAL EVERY 8 HOURS PRN
Qty: 90 TABLET | Refills: 2 | Status: SHIPPED | OUTPATIENT
Start: 2022-05-06

## 2022-05-09 LAB
POCT GLUCOSE: 103 MG/DL (ref 70–110)
POCT GLUCOSE: 106 MG/DL (ref 70–110)
POCT GLUCOSE: 120 MG/DL (ref 70–110)
POCT GLUCOSE: 126 MG/DL (ref 70–110)
POCT GLUCOSE: 134 MG/DL (ref 70–110)
POCT GLUCOSE: 144 MG/DL (ref 70–110)
POCT GLUCOSE: 84 MG/DL (ref 70–110)

## 2022-05-10 NOTE — PROGRESS NOTES
Electrophysiology Clinic Note    Reason for new patient visit: Evaluation and recommendations regarding postoperative SVT degenerating into atrial fibrillation following administration of adenosine.     PRESENTING HISTORY:     History of Present Illness:  Ms. Vidya Vazquez is a genesis 72-year-old woman who presents today for evaluation and recommendations regarding postoperative SVT degenerating into atrial fibrillation following administration of adenosine. She has a past medical history significant for postoperative SVT and atrial fibrillation, hypertension, type II diabetes mellitus, history of prior hiatal hernia s/p repair approximately 10 years ago, and obesity.     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 She underwent reduction of her volvulus, with repeat hiatal hernia repair and laparoscopic Toupet fundoplication on 4/26/2022. 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 Following administration of adenosine, this rhythm degenerated into atrial fibrillation. She was initiated on rate control with diltiazem, in addition to initiation of oral anticoagulation with apixaban.    In discussion with Ms. Vazquez today, she tells me that she is feeling overall quite well. She denies any episodes of dizziness, lightheadedness, syncope/presyncope, chest pain or chest discomfort, palpitations, nausea or vomiting, orthopnea, lower extremity edema, or PND. She reports baseline mild shortness of breath and dyspnea with exertion. She can climb 1 flight of stairs prior to needing to take a break, and can walk for 1-2 blocks without limitation following her recent surgery. She denies any adverse bleeding events while maintained on oral anticoagulation.     Review of  Systems:  Review of Systems   Constitutional: Negative for activity change.   HENT: Negative for nasal congestion, nosebleeds, postnasal drip, rhinorrhea, sinus pressure/congestion, sneezing and sore throat.    Respiratory: Positive for shortness of breath. Negative for apnea, cough, chest tightness and wheezing.    Cardiovascular: Negative for chest pain, palpitations and leg swelling.   Gastrointestinal: Negative for abdominal distention, abdominal pain, blood in stool, change in bowel habit, constipation, diarrhea, nausea, vomiting and change in bowel habit.   Genitourinary: Negative for dysuria and hematuria.   Musculoskeletal: Positive for arthralgias. Negative for gait problem.   Neurological: Negative for dizziness, seizures, syncope, weakness, light-headedness, headaches, disturbances in coordination and coordination difficulties.        PAST HISTORY:     No past medical history on file.    Past Surgical History:   Procedure Laterality Date    ESOPHAGOGASTRODUODENOSCOPY N/A 4/26/2022    Procedure: EGD (ESOPHAGOGASTRODUODENOSCOPY);  Surgeon: David Mike Jr., MD;  Location: [address];  Service: General;  Laterality: N/A;    ESOPHAGOGASTRODUODENOSCOPY N/A 4/25/2022    Procedure: EGD (ESOPHAGOGASTRODUODENOSCOPY);  Surgeon: Cynthia Sumner MD;  Location: [facility];  Service: Gastroenterology;  Laterality: N/A;    LAPAROSCOPIC TOUPET FUNDOPLICATION N/A 4/26/2022    Procedure: FUNDOPLICATION, LAPAROSCOPIC, TOUPET;  Surgeon: David Mike Jr., MD;  Location: [address];  Service: General;  Laterality: N/A;       Family History:  No family history on file.    Social History:  She  has no history on file for tobacco use, alcohol use, and drug use.      MEDICATIONS & ALLERGIES:     Review of patient's allergies indicates:  No Known Allergies    Current Outpatient Medications on File Prior to Visit   Medication Sig Dispense Refill    apixaban (ELIQUIS) 5 mg Tab Take 1 tablet (5 mg total) by  "mouth 2 (two) times daily. 180 tablet 3    diltiaZEM (CARDIZEM) 30 MG tablet Take 1 tablet (30 mg total) by mouth every 6 (six) hours. 120 tablet 11    ondansetron (ZOFRAN-ODT) 4 MG TbDL Take 1 tablet (4 mg total) by mouth every 8 (eight) hours as needed (nausea). 90 tablet 2     No current facility-administered medications on file prior to visit.        OBJECTIVE:     Vital Signs:  /69   Pulse 94   Ht 5' 5" (1.651 m)   Wt 76.7 kg (169 lb 1.5 oz)   BMI 28.14 kg/m²     Physical Exam:  Physical Exam  Constitutional:       General: She is not in acute distress.     Appearance: Normal appearance. She is not ill-appearing or diaphoretic.      Comments: Well-appearing elderly woman in NAD.   HENT:      Head: Normocephalic and atraumatic.      Comments: Mask worn in clinic in the setting of COVID precautions.   Eyes:      Pupils: Pupils are equal, round, and reactive to light.   Cardiovascular:      Rate and Rhythm: Normal rate and regular rhythm.      Pulses: Normal pulses.      Heart sounds: Normal heart sounds. No murmur heard.    No friction rub. No gallop.   Pulmonary:      Effort: Pulmonary effort is normal. No respiratory distress.      Breath sounds: Normal breath sounds. No wheezing, rhonchi or rales.   Chest:      Chest wall: No tenderness.   Abdominal:      General: There is no distension.      Palpations: Abdomen is soft.      Tenderness: There is no abdominal tenderness.   Musculoskeletal:         General: No swelling or tenderness.      Cervical back: Normal range of motion.      Right lower leg: No edema.      Left lower leg: No edema.   Skin:     General: Skin is warm and dry.      Findings: No erythema, lesion or rash.   Neurological:      General: No focal deficit present.      Mental Status: She is alert and oriented to person, place, and time. Mental status is at baseline.      Motor: No weakness.      Gait: Gait normal.   Psychiatric:         Mood and Affect: Mood normal.         Behavior: " Behavior normal.        Laboratory Data:  Lab Results   Component Value Date    WBC 7.42 05/01/2022    HGB 10.4 (L) 05/01/2022    HCT 32.2 (L) 05/01/2022    MCV 83 05/01/2022     05/01/2022     Lab Results   Component Value Date     05/01/2022     05/01/2022    K 4.1 05/01/2022     05/01/2022    CO2 24 05/01/2022    BUN 6 (L) 05/01/2022    CREATININE 0.6 05/01/2022    CALCIUM 8.6 (L) 05/01/2022    MG 1.7 05/01/2022     No results found for: INR, PROTIME    Pertinent Cardiac Data:  ECG: Normal sinus rhythm with rate of 94 bpm,  ms, QRS 84 ms, QT/QTc 360/450 ms, nonspecific STT changes.     Resting 2D Transthoracic Echocardiogram - 4/28/2022:  · The left ventricle is normal in size with concentric remodeling and normal systolic function. The estimated ejection fraction is 65%.  · Normal right ventricular size with normal right ventricular systolic function.  · Grade II left ventricular diastolic dysfunction.  · Mild left atrial enlargement.  · Pulmonary arterial systolic pressure is greater than 38 mmHg.      ASSESSMENT & PLAN:   Ms. Vidya Vazquez is a genesis 72-year-old woman who presents today for evaluation and recommendations regarding postoperative SVT degenerating into atrial fibrillation following administration of adenosine. She has a past medical history significant for postoperative SVT and atrial fibrillation, hypertension, type II diabetes mellitus, history of prior hiatal hernia s/p repair approximately 10 years ago, and obesity. 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 Following administration of adenosine, this rhythm degenerated into atrial fibrillation.      - We discussed the pathophysiology of SVT and the fact that this rhythm degenerated into atrial fibrillation following adenosine administration. We  discussed the most likely etiology of her SVT - AVNRT. A rudimentary diagram was drawn for educational purposes.  - We discussed that postoperative SVT and atrial fibrillation may have been driven by her recent surgery and post-inflammatory state.   - We discussed remaining on oral anticoagulation for at least three months following her episode of atrial fibrillation. We will obtain a 48-hour Holter monitor. Should this study fail to demonstrate any recurrent arrhythmias, we may discuss cessation of her apixaban therapy at our next encounter.   - She remains on rate control with diltiazem 30mg po q6 hours. We discussed modification of this agent to single dosing, but she recently filled this prescription and would prefer to finish her current bottle of diltiazem.   - We discussed that the biggest risk associated with paroxysmal atrial fibrillation is an increased risk of CVA. Her [id number] is 4 (HTN, T2DM, female gender, age 65-74), portending an annual adjusted risk of CVA of 4%. Should she evidence recurrent atrial fibrillation, we would advise continuation of her oral anticoagulation.     This patient will return to clinic with me in three months with a 48-hour Holter monitor in the interim. All questions and concerns were addressed at this encounter.     Signing Physician:       GISELLE Das MD  Electrophysiology Attending

## 2022-05-11 ENCOUNTER — HOSPITAL ENCOUNTER (OUTPATIENT)
Dept: CARDIOLOGY | Facility: CLINIC | Age: 72
Discharge: HOME | End: 2022-05-11
Payer: MEDICAID

## 2022-05-11 ENCOUNTER — OFFICE VISIT (OUTPATIENT)
Dept: SURGERY | Facility: CLINIC | Age: 72
End: 2022-05-11
Payer: MEDICAID

## 2022-05-11 ENCOUNTER — OFFICE VISIT (OUTPATIENT)
Dept: ELECTROPHYSIOLOGY | Facility: CLINIC | Age: 72
End: 2022-05-11
Payer: MEDICAID

## 2022-05-11 VITALS
BODY MASS INDEX: 28.21 KG/M2 | SYSTOLIC BLOOD PRESSURE: 159 MMHG | HEART RATE: 100 BPM | WEIGHT: 169.31 LBS | HEIGHT: 65 IN | DIASTOLIC BLOOD PRESSURE: 86 MMHG

## 2022-05-11 VITALS
HEIGHT: 65 IN | HEART RATE: 94 BPM | SYSTOLIC BLOOD PRESSURE: 133 MMHG | WEIGHT: 169.06 LBS | DIASTOLIC BLOOD PRESSURE: 69 MMHG | BODY MASS INDEX: 28.17 KG/M2

## 2022-05-11 DIAGNOSIS — I97.89 POSTOPERATIVE ATRIAL FIBRILLATION: ICD-10-CM

## 2022-05-11 DIAGNOSIS — I10 PRIMARY HYPERTENSION: ICD-10-CM

## 2022-05-11 DIAGNOSIS — Z87.19 HISTORY OF REPAIR OF HIATAL HERNIA: ICD-10-CM

## 2022-05-11 DIAGNOSIS — I48.91 POSTOPERATIVE ATRIAL FIBRILLATION: ICD-10-CM

## 2022-05-11 DIAGNOSIS — Z98.890 HISTORY OF REPAIR OF HIATAL HERNIA: ICD-10-CM

## 2022-05-11 DIAGNOSIS — E78.2 MIXED HYPERLIPIDEMIA: ICD-10-CM

## 2022-05-11 DIAGNOSIS — E66.3 OVERWEIGHT (BMI 25.0-29.9): ICD-10-CM

## 2022-05-11 DIAGNOSIS — E11.9 TYPE 2 DIABETES MELLITUS WITHOUT COMPLICATION, WITHOUT LONG-TERM CURRENT USE OF INSULIN: ICD-10-CM

## 2022-05-11 DIAGNOSIS — I47.10 SVT (SUPRAVENTRICULAR TACHYCARDIA): Primary | ICD-10-CM

## 2022-05-11 DIAGNOSIS — Z09 POSTOP CHECK: Primary | ICD-10-CM

## 2022-05-11 DIAGNOSIS — I49.9 CARDIAC ARRHYTHMIA, UNSPECIFIED CARDIAC ARRHYTHMIA TYPE: ICD-10-CM

## 2022-05-11 DIAGNOSIS — K31.89 GASTRIC VOLVULUS: ICD-10-CM

## 2022-05-11 PROCEDURE — 3288F PR FALLS RISK ASSESSMENT DOCUMENTED: ICD-10-PCS | Mod: CPTII,,, | Performed by: SURGERY

## 2022-05-11 PROCEDURE — 4010F PR ACE/ARB THEARPY RXD/TAKEN: ICD-10-PCS | Mod: CPTII,,, | Performed by: SURGERY

## 2022-05-11 PROCEDURE — 1159F PR MEDICATION LIST DOCUMENTED IN MEDICAL RECORD: ICD-10-PCS | Mod: CPTII,,, | Performed by: SURGERY

## 2022-05-11 PROCEDURE — 4010F ACE/ARB THERAPY RXD/TAKEN: CPT | Mod: CPTII,,, | Performed by: SURGERY

## 2022-05-11 PROCEDURE — 1101F PR PT FALLS ASSESS DOC 0-1 FALLS W/OUT INJ PAST YR: ICD-10-PCS | Mod: CPTII,,, | Performed by: STUDENT IN AN ORGANIZED HEALTH CARE EDUCATION/TRAINING PROGRAM

## 2022-05-11 PROCEDURE — 3008F BODY MASS INDEX DOCD: CPT | Mod: CPTII,,, | Performed by: STUDENT IN AN ORGANIZED HEALTH CARE EDUCATION/TRAINING PROGRAM

## 2022-05-11 PROCEDURE — 3044F PR MOST RECENT HEMOGLOBIN A1C LEVEL <7.0%: ICD-10-PCS | Mod: CPTII,,, | Performed by: STUDENT IN AN ORGANIZED HEALTH CARE EDUCATION/TRAINING PROGRAM

## 2022-05-11 PROCEDURE — 1101F PT FALLS ASSESS-DOCD LE1/YR: CPT | Mod: CPTII,,, | Performed by: SURGERY

## 2022-05-11 PROCEDURE — 1126F AMNT PAIN NOTED NONE PRSNT: CPT | Mod: CPTII,,, | Performed by: STUDENT IN AN ORGANIZED HEALTH CARE EDUCATION/TRAINING PROGRAM

## 2022-05-11 PROCEDURE — 3008F PR BODY MASS INDEX (BMI) DOCUMENTED: ICD-10-PCS | Mod: CPTII,,, | Performed by: STUDENT IN AN ORGANIZED HEALTH CARE EDUCATION/TRAINING PROGRAM

## 2022-05-11 PROCEDURE — 3044F PR MOST RECENT HEMOGLOBIN A1C LEVEL <7.0%: ICD-10-PCS | Mod: CPTII,,, | Performed by: SURGERY

## 2022-05-11 PROCEDURE — 4010F ACE/ARB THERAPY RXD/TAKEN: CPT | Mod: CPTII,,, | Performed by: STUDENT IN AN ORGANIZED HEALTH CARE EDUCATION/TRAINING PROGRAM

## 2022-05-11 PROCEDURE — 3008F BODY MASS INDEX DOCD: CPT | Mod: CPTII,,, | Performed by: SURGERY

## 2022-05-11 PROCEDURE — 99024 PR POST-OP FOLLOW-UP VISIT: ICD-10-PCS | Mod: ,,, | Performed by: SURGERY

## 2022-05-11 PROCEDURE — 1159F PR MEDICATION LIST DOCUMENTED IN MEDICAL RECORD: ICD-10-PCS | Mod: CPTII,,, | Performed by: STUDENT IN AN ORGANIZED HEALTH CARE EDUCATION/TRAINING PROGRAM

## 2022-05-11 PROCEDURE — 99205 PR OFFICE/OUTPT VISIT, NEW, LEVL V, 60-74 MIN: ICD-10-PCS | Mod: S$PBB,,, | Performed by: STUDENT IN AN ORGANIZED HEALTH CARE EDUCATION/TRAINING PROGRAM

## 2022-05-11 PROCEDURE — 3079F PR MOST RECENT DIASTOLIC BLOOD PRESSURE 80-89 MM HG: ICD-10-PCS | Mod: CPTII,,, | Performed by: SURGERY

## 2022-05-11 PROCEDURE — 1126F PR PAIN SEVERITY QUANTIFIED, NO PAIN PRESENT: ICD-10-PCS | Mod: CPTII,,, | Performed by: SURGERY

## 2022-05-11 PROCEDURE — 99024 POSTOP FOLLOW-UP VISIT: CPT | Mod: ,,, | Performed by: SURGERY

## 2022-05-11 PROCEDURE — 99999 PR PBB SHADOW E&M-EST. PATIENT-LVL III: CPT | Mod: PBBFAC,,, | Performed by: SURGERY

## 2022-05-11 PROCEDURE — 3044F HG A1C LEVEL LT 7.0%: CPT | Mod: CPTII,,, | Performed by: STUDENT IN AN ORGANIZED HEALTH CARE EDUCATION/TRAINING PROGRAM

## 2022-05-11 PROCEDURE — 93010 RHYTHM STRIP: ICD-10-PCS | Mod: S$PBB,,, | Performed by: INTERNAL MEDICINE

## 2022-05-11 PROCEDURE — 3079F DIAST BP 80-89 MM HG: CPT | Mod: CPTII,,, | Performed by: SURGERY

## 2022-05-11 PROCEDURE — 3044F HG A1C LEVEL LT 7.0%: CPT | Mod: CPTII,,, | Performed by: SURGERY

## 2022-05-11 PROCEDURE — 99999 PR PBB SHADOW E&M-EST. PATIENT-LVL III: ICD-10-PCS | Mod: PBBFAC,,, | Performed by: SURGERY

## 2022-05-11 PROCEDURE — 3077F PR MOST RECENT SYSTOLIC BLOOD PRESSURE >= 140 MM HG: ICD-10-PCS | Mod: CPTII,,, | Performed by: SURGERY

## 2022-05-11 PROCEDURE — 1159F MED LIST DOCD IN RCRD: CPT | Mod: CPTII,,, | Performed by: STUDENT IN AN ORGANIZED HEALTH CARE EDUCATION/TRAINING PROGRAM

## 2022-05-11 PROCEDURE — 1160F RVW MEDS BY RX/DR IN RCRD: CPT | Mod: CPTII,,, | Performed by: SURGERY

## 2022-05-11 PROCEDURE — 1160F PR REVIEW ALL MEDS BY PRESCRIBER/CLIN PHARMACIST DOCUMENTED: ICD-10-PCS | Mod: CPTII,,, | Performed by: SURGERY

## 2022-05-11 PROCEDURE — 1126F PR PAIN SEVERITY QUANTIFIED, NO PAIN PRESENT: ICD-10-PCS | Mod: CPTII,,, | Performed by: STUDENT IN AN ORGANIZED HEALTH CARE EDUCATION/TRAINING PROGRAM

## 2022-05-11 PROCEDURE — 99213 OFFICE O/P EST LOW 20 MIN: CPT | Mod: PBBFAC | Performed by: SURGERY

## 2022-05-11 PROCEDURE — 1159F MED LIST DOCD IN RCRD: CPT | Mod: CPTII,,, | Performed by: SURGERY

## 2022-05-11 PROCEDURE — 93005 ELECTROCARDIOGRAM TRACING: CPT | Mod: PBBFAC | Performed by: INTERNAL MEDICINE

## 2022-05-11 PROCEDURE — 3288F PR FALLS RISK ASSESSMENT DOCUMENTED: ICD-10-PCS | Mod: CPTII,,, | Performed by: STUDENT IN AN ORGANIZED HEALTH CARE EDUCATION/TRAINING PROGRAM

## 2022-05-11 PROCEDURE — 3078F DIAST BP <80 MM HG: CPT | Mod: CPTII,,, | Performed by: STUDENT IN AN ORGANIZED HEALTH CARE EDUCATION/TRAINING PROGRAM

## 2022-05-11 PROCEDURE — 3008F PR BODY MASS INDEX (BMI) DOCUMENTED: ICD-10-PCS | Mod: CPTII,,, | Performed by: SURGERY

## 2022-05-11 PROCEDURE — 3078F PR MOST RECENT DIASTOLIC BLOOD PRESSURE < 80 MM HG: ICD-10-PCS | Mod: CPTII,,, | Performed by: STUDENT IN AN ORGANIZED HEALTH CARE EDUCATION/TRAINING PROGRAM

## 2022-05-11 PROCEDURE — 1101F PR PT FALLS ASSESS DOC 0-1 FALLS W/OUT INJ PAST YR: ICD-10-PCS | Mod: CPTII,,, | Performed by: SURGERY

## 2022-05-11 PROCEDURE — 1126F AMNT PAIN NOTED NONE PRSNT: CPT | Mod: CPTII,,, | Performed by: SURGERY

## 2022-05-11 PROCEDURE — 1111F PR DISCHARGE MEDS RECONCILED W/ CURRENT OUTPATIENT MED LIST: ICD-10-PCS | Mod: CPTII,,, | Performed by: STUDENT IN AN ORGANIZED HEALTH CARE EDUCATION/TRAINING PROGRAM

## 2022-05-11 PROCEDURE — 3288F FALL RISK ASSESSMENT DOCD: CPT | Mod: CPTII,,, | Performed by: SURGERY

## 2022-05-11 PROCEDURE — 4010F PR ACE/ARB THEARPY RXD/TAKEN: ICD-10-PCS | Mod: CPTII,,, | Performed by: STUDENT IN AN ORGANIZED HEALTH CARE EDUCATION/TRAINING PROGRAM

## 2022-05-11 PROCEDURE — 99213 OFFICE O/P EST LOW 20 MIN: CPT | Mod: PBBFAC,27 | Performed by: STUDENT IN AN ORGANIZED HEALTH CARE EDUCATION/TRAINING PROGRAM

## 2022-05-11 PROCEDURE — 93010 ELECTROCARDIOGRAM REPORT: CPT | Mod: S$PBB,,, | Performed by: INTERNAL MEDICINE

## 2022-05-11 PROCEDURE — 3075F PR MOST RECENT SYSTOLIC BLOOD PRESS GE 130-139MM HG: ICD-10-PCS | Mod: CPTII,,, | Performed by: STUDENT IN AN ORGANIZED HEALTH CARE EDUCATION/TRAINING PROGRAM

## 2022-05-11 PROCEDURE — 1111F DSCHRG MED/CURRENT MED MERGE: CPT | Mod: CPTII,,, | Performed by: STUDENT IN AN ORGANIZED HEALTH CARE EDUCATION/TRAINING PROGRAM

## 2022-05-11 PROCEDURE — 3077F SYST BP >= 140 MM HG: CPT | Mod: CPTII,,, | Performed by: SURGERY

## 2022-05-11 PROCEDURE — 1101F PT FALLS ASSESS-DOCD LE1/YR: CPT | Mod: CPTII,,, | Performed by: STUDENT IN AN ORGANIZED HEALTH CARE EDUCATION/TRAINING PROGRAM

## 2022-05-11 PROCEDURE — 99999 PR PBB SHADOW E&M-EST. PATIENT-LVL III: CPT | Mod: PBBFAC,,, | Performed by: STUDENT IN AN ORGANIZED HEALTH CARE EDUCATION/TRAINING PROGRAM

## 2022-05-11 PROCEDURE — 99999 PR PBB SHADOW E&M-EST. PATIENT-LVL III: ICD-10-PCS | Mod: PBBFAC,,, | Performed by: STUDENT IN AN ORGANIZED HEALTH CARE EDUCATION/TRAINING PROGRAM

## 2022-05-11 PROCEDURE — 3288F FALL RISK ASSESSMENT DOCD: CPT | Mod: CPTII,,, | Performed by: STUDENT IN AN ORGANIZED HEALTH CARE EDUCATION/TRAINING PROGRAM

## 2022-05-11 PROCEDURE — 99205 OFFICE O/P NEW HI 60 MIN: CPT | Mod: S$PBB,,, | Performed by: STUDENT IN AN ORGANIZED HEALTH CARE EDUCATION/TRAINING PROGRAM

## 2022-05-11 PROCEDURE — 3075F SYST BP GE 130 - 139MM HG: CPT | Mod: CPTII,,, | Performed by: STUDENT IN AN ORGANIZED HEALTH CARE EDUCATION/TRAINING PROGRAM

## 2022-05-11 RX ORDER — ERGOCALCIFEROL 1.25 MG/1
50000 CAPSULE ORAL
COMMUNITY
Start: 2022-01-07

## 2022-05-11 NOTE — PROGRESS NOTES
"[facility]  Critical Care - Surgery  Progress Note     Patient Name: Vidya Vazquez  MRN: [id number]  Code Status: Full Code  Attending Provider: David Mike Jr.,*  Primary Care Provider: Primary Doctor No   Principal Problem: Atrial flutter     Subjective:      HPI:   Patient is 73 yo F with hx of GERD, DM2 and past hiatal hernia repair approx 10 yrs ago who presented to OSH on 4/24 with worsening abd pain, nausea, and emesis and was eventually found to have a gastric volvulus, and was transferred to [facility] for repair. She notes she had worsening reflux and some emesis on and off for the 2 weeks prior to presentation, but they all were short lived and resolved. She then developed severe epigastric pain on 4/24, and "just didn't feel well" and presented ot ED. She was found on CT scan to have concern for GOO. An EGD was performed on 4/25 which showed a tortuous stomach consistent with volvulus, and no signs of necrosis. An NGT was left, and she felt much better after completion. She was then transferred here for surgery.    Procedure(s) (LRB):  REPAIR, HERNIA, HIATAL, LAPAROSCOPIC (N/A)  EGD (ESOPHAGOGASTRODUODENOSCOPY) (N/A)  FUNDOPLICATION, LAPAROSCOPIC, TOUPET (N/A)      Interval History/Significant Events: No acute events overnight. Now in sinus rhythm on PO diltiazem. BM yesterday. Advance diet as tolerated and step down later today if beds are available vs possible dc home       Objective:      Vital Signs (Most Recent):  Temp: 99 °F (37.2 °C) (05/01/22 0300)  Pulse: 66 (05/01/22 0600)  Resp: 17 (05/01/22 0600)  BP: (!) 160/76 (05/01/22 0500)  SpO2: 95 % (05/01/22 0600)    Vital Signs (24h Range):  Temp:  [98.7 °F (37.1 °C)-99.3 °F (37.4 °C)] 99 °F (37.2 °C)  Pulse:  [63-94] 66  Resp:  [15-47] 17  SpO2:  [92 %-97 %] 95 %  BP: (124-160)/(59-98) 160/76      Weight: 86.2 kg (190 lb)  Body mass index is 30.67 kg/m².        Intake/Output Summary (Last 24 hours) at 5/1/2022 4590  Last data " filed at 5/1/2022 0400      Gross per 24 hour   Intake 539.82 ml   Output 2650 ml   Net -2110.18 ml         Physical Exam  Constitutional:       General: She is not in acute distress.     Appearance: Normal appearance. She is not ill-appearing or toxic-appearing.   HENT:      Head: Normocephalic and atraumatic.   Eyes:      Extraocular Movements: Extraocular movements intact.   Cardiovascular:      Rate and Rhythm: Normal rate and regular rhythm.   Pulmonary:      Effort: Pulmonary effort is normal. No respiratory distress.   Abdominal:      General: There is no distension.      Palpations: Abdomen is soft.      Tenderness: There is abdominal tenderness (appropriate, incisional).      Comments: Incisions c/d/I  Minimally distended   Musculoskeletal:         General: Normal range of motion.      Cervical back: Normal range of motion.   Skin:     General: Skin is warm and dry.   Neurological:      General: No focal deficit present.      Mental Status: She is alert and oriented to person, place, and time.   Psychiatric:         Mood and Affect: Mood normal.         Behavior: Behavior normal.         Vents:  Oxygen Concentration (%): 28 (04/28/22 0411)     Lines/Drains/Airways         Peripheral Intravenous Line  Duration                     Peripheral IV - Single Lumen 04/27/22 2357 20 G Right Forearm 3 days                          Significant Labs:     CBC/Anemia Profile:       Recent Labs   Lab 04/30/22  0216 05/01/22  0336   WBC 8.90 7.42   HGB 10.6* 10.4*   HCT 34.1* 32.2*    290   MCV 85 83   RDW 13.9 14.1         Chemistries:       Recent Labs   Lab 04/30/22  0216 05/01/22  0336     142 137   K 3.8  3.8 4.1     104 102   CO2 23  23 24   BUN 8  8 6*   CREATININE 0.6  0.6 0.6   CALCIUM 8.6*  8.6* 8.6*   ALBUMIN 2.8* 2.6*   PROT 6.4 5.9*   BILITOT 0.7 0.6   ALKPHOS 68 56   ALT 13 12   AST 22 21   MG 1.9 1.7   PHOS 3.0 3.4         All pertinent labs within the past 24 hours have been  reviewed.     Significant Imaging:  I have reviewed all pertinent imaging results/findings within the past 24 hours.     Assessment/Plan:      * Atrial flutter  71 yo F with hx of GERD, DM2 and past hiatal hernia repair approx 10 yrs ago who had repeat hiatal hernia repair on 4/26 complicated by new onset SVT and Aflutter vs Afib with RVR on POD1. SICU consulted for monitoring.       Neuro/Psych:   -- Sedation: None            -  prn Percocet 5/10 for pain             Cards:   #SVT  - resolved with IV adenosine x 2, now on PO diltiazem  - [id number] 4, will need anticoagulation on discharge per surgery  - TTE this admission with EF65, grade II diastolic dysfunction, no valvular abnormalities  - NSR      Pulm:   - saturating well on room air      Renal:  - adequate UOP with normal BUN/Cr  - daily CMP  - prn electrolyte replacement      FEN / GI:  Sp repeat hiatal hernia repair 4/26  - clear liquid diet advance as tolerated, discussed with nursing  - tolerating PO meds  - Abd incisions CDI  - last BM 4/30      ID:   - afebrile with no leukocytosis  - daily cbc      Heme/Onc:   - no bleeding or anemia  - daily cbc   - Transfuse hgb<7  - ppx lovenox, may escalate to therapeutic given elevated [id number]      Endo:   - Gluc goal 140-180, SSI      PPx:   Feeding: clear liquid diet advance as tolerated  Analgesia/Sedation: Percocet 5/10 q4 PRN  Thromboembolic prevention: lovenox  HOB >30: Yes   Stress Ulcer ppx: NA  Glucose control: Critical care goal 140-180 g/dl, SSI     Lines/Drains/Airway: 2 PIV, NG         Dispo/Code Status/Palliative:   -- SICU / Full Code, stable for step down

## 2022-05-11 NOTE — PROGRESS NOTES
HPI:  The patient is status post-lap redo hh repair for acute volvulus.  Doing well.  Pain resolved.  No issues.  Ann-Marie diet without difficulty.    PHYSICAL EXAM:    In NAD  Incisions c/d/i    ASSESSMENT:    The patient is doing well after surgery.     PLAN:    Follow up PRN.  Activity: No heavy lifting for 4 weeks.        David Mike MD

## 2022-05-12 PROBLEM — I47.10 SVT (SUPRAVENTRICULAR TACHYCARDIA): Status: ACTIVE | Noted: 2022-05-12

## 2022-05-12 PROBLEM — I48.92 ATRIAL FLUTTER: Status: RESOLVED | Noted: 2022-04-27 | Resolved: 2022-05-12

## 2022-08-12 ENCOUNTER — CLINICAL SUPPORT (OUTPATIENT)
Dept: CARDIOLOGY | Facility: HOSPITAL | Age: 72
End: 2022-08-12
Attending: STUDENT IN AN ORGANIZED HEALTH CARE EDUCATION/TRAINING PROGRAM
Payer: MEDICAID

## 2022-08-12 DIAGNOSIS — I47.10 SVT (SUPRAVENTRICULAR TACHYCARDIA): ICD-10-CM

## 2022-08-12 PROCEDURE — 93227 HOLTER MONITOR - 48 HOUR (CUPID ONLY): ICD-10-PCS | Mod: ,,, | Performed by: STUDENT IN AN ORGANIZED HEALTH CARE EDUCATION/TRAINING PROGRAM

## 2022-08-12 PROCEDURE — 93225 XTRNL ECG REC<48 HRS REC: CPT

## 2022-08-12 PROCEDURE — 93227 XTRNL ECG REC<48 HR R&I: CPT | Mod: ,,, | Performed by: STUDENT IN AN ORGANIZED HEALTH CARE EDUCATION/TRAINING PROGRAM

## 2022-08-16 ENCOUNTER — TELEPHONE (OUTPATIENT)
Dept: ELECTROPHYSIOLOGY | Facility: CLINIC | Age: 72
End: 2022-08-16
Payer: MEDICAID

## 2022-08-16 NOTE — TELEPHONE ENCOUNTER
Called pt to reschedule appointment with Dr. Das. No answer, left a message with this information.

## 2022-08-16 NOTE — TELEPHONE ENCOUNTER
Pt returned my call to reschedule appointment with Dr. Das. Pt agreed to see VEENA Crum to be seen sooner and has been scheduled. Pt verbally confirmed new appointment date/time and thanked me for calling. I will sent an appointment letter in the mail per request.

## 2022-08-16 NOTE — TELEPHONE ENCOUNTER
----- Message from Naomi Cesar sent at 8/16/2022 10:03 AM CDT -----  Regarding: ret call  Pt is returning your call and can be reached at [phone].    Thank you

## 2022-08-19 LAB
OHS CV EVENT MONITOR DAY: 0
OHS CV HOLTER LENGTH DECIMAL HOURS: 48
OHS CV HOLTER LENGTH HOURS: 48
OHS CV HOLTER LENGTH MINUTES: 0
OHS CV HOLTER SINUS AVERAGE HR: 76
OHS CV HOLTER SINUS MAX HR: 125
OHS CV HOLTER SINUS MIN HR: 53

## 2022-10-12 NOTE — PROGRESS NOTES
Ms. Vazquez is a patient of Dr. Das and was last seen in clinic 5/11/2022.      Subjective:   Patient ID:  Vidya Vazquez is a 72 y.o. female who presents for follow-up of SVT  .     HPI:    Ms. Vazquez is a 72 y.o. female with SVT, AF, DM, HTN, hiatal hernia (s/p repair) here for follow up.     Background:    History of Present Illness:    Ms. Vidya Vazquez is a genesis 72-year-old woman who presents today for evaluation and recommendations regarding postoperative SVT degenerating into atrial fibrillation following administration of adenosine. She has a past medical history significant for postoperative SVT and atrial fibrillation, hypertension, type II diabetes mellitus, history of prior hiatal hernia s/p repair approximately 10 years ago, and obesity.     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 She underwent reduction of her volvulus, with repeat hiatal hernia repair and laparoscopic Toupet fundoplication on 4/26/2022. 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 Following administration of adenosine, this rhythm degenerated into atrial fibrillation. She was initiated on rate control with diltiazem, in addition to initiation of oral anticoagulation with apixaban.    In discussion with Ms. Vazquez today, she tells me that she is feeling overall quite well. She denies any episodes of dizziness, lightheadedness, syncope/presyncope, chest pain or chest discomfort, palpitations, nausea or vomiting, orthopnea, lower extremity edema, or PND. She reports baseline mild shortness of breath and dyspnea with exertion. She can climb 1 flight of stairs prior to needing to take a break, and can walk for 1-2 blocks without limitation following her recent surgery.  She denies any adverse bleeding events while maintained on oral anticoagulation.     Ms. Vidya Vazquez is a genesis 72-year-old woman who presents today for evaluation and recommendations regarding postoperative SVT degenerating into atrial fibrillation following administration of adenosine. She has a past medical history significant for postoperative SVT and atrial fibrillation, hypertension, type II diabetes mellitus, history of prior hiatal hernia s/p repair approximately 10 years ago, and obesity. 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 Following administration of adenosine, this rhythm degenerated into atrial fibrillation.      - We discussed the pathophysiology of SVT and the fact that this rhythm degenerated into atrial fibrillation following adenosine administration. We discussed the most likely etiology of her SVT - AVNRT. A rudimentary diagram was drawn for educational purposes.  - We discussed that postoperative SVT and atrial fibrillation may have been driven by her recent surgery and post-inflammatory state.   - We discussed remaining on oral anticoagulation for at least three months following her episode of atrial fibrillation. We will obtain a 48-hour Holter monitor. Should this study fail to demonstrate any recurrent arrhythmias, we may discuss cessation of her apixaban therapy at our next encounter.   - She remains on rate control with diltiazem 30mg po q6 hours. We discussed modification of this agent to single dosing, but she recently filled this prescription and would prefer to finish her current bottle of diltiazem.   - We discussed that the biggest risk associated with paroxysmal atrial fibrillation is an increased risk of CVA. Her [id number] is 4 (HTN, T2DM, female gender, age 65-74), portending an annual adjusted risk of CVA of  4%. Should she evidence recurrent atrial fibrillation, we would advise continuation of her oral anticoagulation.     Update (10/19/2022):    Holter 8/12/2022:  Baseline rhythm was normal sinus rhythm with normal intervals and an average heart rate of 76 bpm.  There were no reported symptoms.  There were rare PVCs with an overall PVC burden of 0.2%. There were very rare PACs with an overall PAC burden of 0%.  There were no atrial or ventricular arrhythmias.    Today she says she feels great. No cardiac complaints. Ms. Vazquez reports no chest pain with exertion or at rest, palpitations, SOB, CARROLL, dizziness, or syncope.  She says she stopped eliquis and diltiazem after Holter results.    I have personally reviewed the patient's EKG today, which shows sinus rhythm at 83bpm. NE interval is 136. QRS is 82. QTc is 427.    Relevant Cardiac Test Results:    2D Echo (4/28/2022):  The left ventricle is normal in size with concentric remodeling and normal systolic function. The estimated ejection fraction is 65%.  Normal right ventricular size with normal right ventricular systolic function.  Grade II left ventricular diastolic dysfunction.  Mild left atrial enlargement.  Pulmonary arterial systolic pressure is greater than 38 mmHg.    Current Outpatient Medications   Medication Sig    apixaban (ELIQUIS) 5 mg Tab Take 1 tablet (5 mg total) by mouth 2 (two) times daily.    diltiaZEM (CARDIZEM) 30 MG tablet Take 1 tablet (30 mg total) by mouth every 6 (six) hours.    ergocalciferol (ERGOCALCIFEROL) 50,000 unit Cap Take 50,000 Units by mouth every 7 days.    ondansetron (ZOFRAN-ODT) 4 MG TbDL Take 1 tablet (4 mg total) by mouth every 8 (eight) hours as needed (nausea). (Patient not taking: Reported on 5/11/2022)     No current facility-administered medications for this visit.     Review of Systems   Constitutional: Negative for malaise/fatigue.   Cardiovascular:  Negative for chest pain, dyspnea on exertion, irregular heartbeat,  "leg swelling and palpitations.   Respiratory:  Negative for shortness of breath.    Hematologic/Lymphatic: Negative for bleeding problem.   Skin:  Negative for rash.   Musculoskeletal:  Negative for myalgias.   Gastrointestinal:  Negative for hematemesis, hematochezia and nausea.   Genitourinary:  Negative for hematuria.   Neurological:  Negative for light-headedness.   Psychiatric/Behavioral:  Negative for altered mental status.    Allergic/Immunologic: Negative for persistent infections.     Objective:        /71   Pulse 83   Ht 5' 6" (1.676 m)   Wt 78.2 kg (172 lb 6.4 oz)   BMI 27.83 kg/m²     Physical Exam  Vitals and nursing note reviewed.   Constitutional:       Appearance: Normal appearance. She is well-developed.   HENT:      Head: Normocephalic.      Nose: Nose normal.   Eyes:      Pupils: Pupils are equal, round, and reactive to light.   Cardiovascular:      Rate and Rhythm: Normal rate and regular rhythm.   Pulmonary:      Effort: No respiratory distress.      Breath sounds: Normal breath sounds.   Musculoskeletal:         General: Normal range of motion.   Skin:     General: Skin is warm and dry.      Findings: No erythema.   Neurological:      Mental Status: She is alert and oriented to person, place, and time.   Psychiatric:         Speech: Speech normal.         Behavior: Behavior normal.     Lab Results   Component Value Date     05/01/2022    K 4.1 05/01/2022    MG 1.7 05/01/2022    BUN 6 (L) 05/01/2022    CREATININE 0.6 05/01/2022    ALT 12 05/01/2022    AST 21 05/01/2022    HGB 10.4 (L) 05/01/2022    HCT 32.2 (L) 05/01/2022           Assessment:     1. SVT (supraventricular tachycardia)    2. HTN (hypertension), benign    3. PAF (paroxysmal atrial fibrillation) - post-op    4. Cardiac arrhythmia, unspecified cardiac arrhythmia type      Plan:     In summary, Ms. Vazquez is a 72 y.o. female with SVT, AF, DM, HTN, hiatal hernia (s/p repair) here for follow up.   She is 6 months s/p " post-op SVT which degenerated into AF after adenosine administration. She was placed on eliquis and diltiazem at that time.   Recent Holter monitor revealed no clinically significant arrhythmias. She stopped her eliquis and diltiazem at that point. We discussed ongoing monitoring for atrial arrhythmias. We discussed symptoms to watch for, including palpitations, SOB, fatigue, elevated HR, and irregular pulse on HR monitor. We discussed reducing odds of developing AF via HTN control, TIFFANY treatment if applicable, regular exercise, and maintaining ideal weight.      RTC 6 mo, sooner if needed    *A copy of this note has been sent to Dr. Das*    Follow up in about 6 months (around 4/19/2023).    ------------------------------------------------------------------    MARLA Mace, NP-C  Cardiac Electrophysiology

## 2022-10-19 ENCOUNTER — OFFICE VISIT (OUTPATIENT)
Dept: ELECTROPHYSIOLOGY | Facility: CLINIC | Age: 72
End: 2022-10-19
Payer: MEDICAID

## 2022-10-19 VITALS
HEIGHT: 66 IN | HEART RATE: 83 BPM | SYSTOLIC BLOOD PRESSURE: 126 MMHG | BODY MASS INDEX: 27.7 KG/M2 | DIASTOLIC BLOOD PRESSURE: 71 MMHG | WEIGHT: 172.38 LBS

## 2022-10-19 DIAGNOSIS — I49.9 CARDIAC ARRHYTHMIA, UNSPECIFIED CARDIAC ARRHYTHMIA TYPE: ICD-10-CM

## 2022-10-19 DIAGNOSIS — I10 HTN (HYPERTENSION), BENIGN: ICD-10-CM

## 2022-10-19 DIAGNOSIS — I48.0 PAF (PAROXYSMAL ATRIAL FIBRILLATION): ICD-10-CM

## 2022-10-19 DIAGNOSIS — I47.10 SVT (SUPRAVENTRICULAR TACHYCARDIA): Primary | ICD-10-CM

## 2022-10-19 PROCEDURE — 99214 OFFICE O/P EST MOD 30 MIN: CPT | Mod: S$PBB,,, | Performed by: NURSE PRACTITIONER

## 2022-10-19 PROCEDURE — 3078F DIAST BP <80 MM HG: CPT | Mod: CPTII,,, | Performed by: NURSE PRACTITIONER

## 2022-10-19 PROCEDURE — 99999 PR PBB SHADOW E&M-EST. PATIENT-LVL III: ICD-10-PCS | Mod: PBBFAC,,, | Performed by: NURSE PRACTITIONER

## 2022-10-19 PROCEDURE — 3074F SYST BP LT 130 MM HG: CPT | Mod: CPTII,,, | Performed by: NURSE PRACTITIONER

## 2022-10-19 PROCEDURE — 3288F PR FALLS RISK ASSESSMENT DOCUMENTED: ICD-10-PCS | Mod: CPTII,,, | Performed by: NURSE PRACTITIONER

## 2022-10-19 PROCEDURE — 99213 OFFICE O/P EST LOW 20 MIN: CPT | Mod: PBBFAC | Performed by: NURSE PRACTITIONER

## 2022-10-19 PROCEDURE — 93010 RHYTHM STRIP: ICD-10-PCS | Mod: S$PBB,,, | Performed by: INTERNAL MEDICINE

## 2022-10-19 PROCEDURE — 1159F PR MEDICATION LIST DOCUMENTED IN MEDICAL RECORD: ICD-10-PCS | Mod: CPTII,,, | Performed by: NURSE PRACTITIONER

## 2022-10-19 PROCEDURE — 1101F PR PT FALLS ASSESS DOC 0-1 FALLS W/OUT INJ PAST YR: ICD-10-PCS | Mod: CPTII,,, | Performed by: NURSE PRACTITIONER

## 2022-10-19 PROCEDURE — 3078F PR MOST RECENT DIASTOLIC BLOOD PRESSURE < 80 MM HG: ICD-10-PCS | Mod: CPTII,,, | Performed by: NURSE PRACTITIONER

## 2022-10-19 PROCEDURE — 3044F PR MOST RECENT HEMOGLOBIN A1C LEVEL <7.0%: ICD-10-PCS | Mod: CPTII,,, | Performed by: NURSE PRACTITIONER

## 2022-10-19 PROCEDURE — 3288F FALL RISK ASSESSMENT DOCD: CPT | Mod: CPTII,,, | Performed by: NURSE PRACTITIONER

## 2022-10-19 PROCEDURE — 1126F PR PAIN SEVERITY QUANTIFIED, NO PAIN PRESENT: ICD-10-PCS | Mod: CPTII,,, | Performed by: NURSE PRACTITIONER

## 2022-10-19 PROCEDURE — 3074F PR MOST RECENT SYSTOLIC BLOOD PRESSURE < 130 MM HG: ICD-10-PCS | Mod: CPTII,,, | Performed by: NURSE PRACTITIONER

## 2022-10-19 PROCEDURE — 3044F HG A1C LEVEL LT 7.0%: CPT | Mod: CPTII,,, | Performed by: NURSE PRACTITIONER

## 2022-10-19 PROCEDURE — 99999 PR PBB SHADOW E&M-EST. PATIENT-LVL III: CPT | Mod: PBBFAC,,, | Performed by: NURSE PRACTITIONER

## 2022-10-19 PROCEDURE — 1160F PR REVIEW ALL MEDS BY PRESCRIBER/CLIN PHARMACIST DOCUMENTED: ICD-10-PCS | Mod: CPTII,,, | Performed by: NURSE PRACTITIONER

## 2022-10-19 PROCEDURE — 1101F PT FALLS ASSESS-DOCD LE1/YR: CPT | Mod: CPTII,,, | Performed by: NURSE PRACTITIONER

## 2022-10-19 PROCEDURE — 1160F RVW MEDS BY RX/DR IN RCRD: CPT | Mod: CPTII,,, | Performed by: NURSE PRACTITIONER

## 2022-10-19 PROCEDURE — 99214 PR OFFICE/OUTPT VISIT, EST, LEVL IV, 30-39 MIN: ICD-10-PCS | Mod: S$PBB,,, | Performed by: NURSE PRACTITIONER

## 2022-10-19 PROCEDURE — 1159F MED LIST DOCD IN RCRD: CPT | Mod: CPTII,,, | Performed by: NURSE PRACTITIONER

## 2022-10-19 PROCEDURE — 4010F ACE/ARB THERAPY RXD/TAKEN: CPT | Mod: CPTII,,, | Performed by: NURSE PRACTITIONER

## 2022-10-19 PROCEDURE — 93010 ELECTROCARDIOGRAM REPORT: CPT | Mod: S$PBB,,, | Performed by: INTERNAL MEDICINE

## 2022-10-19 PROCEDURE — 1126F AMNT PAIN NOTED NONE PRSNT: CPT | Mod: CPTII,,, | Performed by: NURSE PRACTITIONER

## 2022-10-19 PROCEDURE — 4010F PR ACE/ARB THEARPY RXD/TAKEN: ICD-10-PCS | Mod: CPTII,,, | Performed by: NURSE PRACTITIONER

## 2022-10-19 PROCEDURE — 93005 ELECTROCARDIOGRAM TRACING: CPT | Mod: PBBFAC | Performed by: INTERNAL MEDICINE

## 2022-10-19 RX ORDER — METFORMIN HYDROCHLORIDE 500 MG/1
500 TABLET ORAL DAILY
COMMUNITY
Start: 2022-10-14

## 2022-10-19 RX ORDER — HYDROXYZINE HYDROCHLORIDE 25 MG/1
25 TABLET, FILM COATED ORAL DAILY
COMMUNITY
Start: 2022-09-04

## 2022-10-19 RX ORDER — ATORVASTATIN CALCIUM 20 MG/1
TABLET, FILM COATED ORAL
COMMUNITY
Start: 2022-06-13

## 2022-11-15 ENCOUNTER — APPOINTMENT (RX ONLY)
Dept: URBAN - METROPOLITAN AREA CLINIC 156 | Facility: CLINIC | Age: 72
Setting detail: DERMATOLOGY
End: 2022-11-15

## 2022-11-15 DIAGNOSIS — L71.8 OTHER ROSACEA: ICD-10-CM | Status: INADEQUATELY CONTROLLED

## 2022-11-15 DIAGNOSIS — Z85.820 PERSONAL HISTORY OF MALIGNANT MELANOMA OF SKIN: ICD-10-CM

## 2022-11-15 DIAGNOSIS — L82.1 OTHER SEBORRHEIC KERATOSIS: ICD-10-CM

## 2022-11-15 DIAGNOSIS — D22 MELANOCYTIC NEVI: ICD-10-CM

## 2022-11-15 DIAGNOSIS — Z85.828 PERSONAL HISTORY OF OTHER MALIGNANT NEOPLASM OF SKIN: ICD-10-CM

## 2022-11-15 DIAGNOSIS — L57.0 ACTINIC KERATOSIS: ICD-10-CM

## 2022-11-15 DIAGNOSIS — D18.0 HEMANGIOMA: ICD-10-CM

## 2022-11-15 PROBLEM — D18.01 HEMANGIOMA OF SKIN AND SUBCUTANEOUS TISSUE: Status: ACTIVE | Noted: 2022-11-15

## 2022-11-15 PROBLEM — D22.5 MELANOCYTIC NEVI OF TRUNK: Status: ACTIVE | Noted: 2022-11-15

## 2022-11-15 PROBLEM — D48.5 NEOPLASM OF UNCERTAIN BEHAVIOR OF SKIN: Status: ACTIVE | Noted: 2022-11-15

## 2022-11-15 PROCEDURE — ? PRESCRIPTION

## 2022-11-15 PROCEDURE — 11103 TANGNTL BX SKIN EA SEP/ADDL: CPT

## 2022-11-15 PROCEDURE — ? OBSERVATION

## 2022-11-15 PROCEDURE — ? BIOPSY BY SHAVE METHOD

## 2022-11-15 PROCEDURE — ? COUNSELING

## 2022-11-15 PROCEDURE — 99213 OFFICE O/P EST LOW 20 MIN: CPT | Mod: 25

## 2022-11-15 PROCEDURE — ? LIQUID NITROGEN

## 2022-11-15 PROCEDURE — 11102 TANGNTL BX SKIN SINGLE LES: CPT

## 2022-11-15 PROCEDURE — 17000 DESTRUCT PREMALG LESION: CPT | Mod: 59

## 2022-11-15 PROCEDURE — ? PRESCRIPTION MEDICATION MANAGEMENT

## 2022-11-15 RX ORDER — OXYMETAZOLINE HYDROCHLORIDE 1 G/100G
CREAM TOPICAL
Qty: 30 | Refills: 5 | Status: ERX | COMMUNITY
Start: 2022-11-15

## 2022-11-15 RX ADMIN — OXYMETAZOLINE HYDROCHLORIDE: 1 CREAM TOPICAL at 00:00

## 2022-11-15 ASSESSMENT — LOCATION DETAILED DESCRIPTION DERM
LOCATION DETAILED: INFERIOR THORACIC SPINE
LOCATION DETAILED: GLABELLA
LOCATION DETAILED: NASAL DORSUM
LOCATION DETAILED: LEFT SUPERIOR UPPER BACK
LOCATION DETAILED: SUPERIOR THORACIC SPINE
LOCATION DETAILED: LOWER STERNUM
LOCATION DETAILED: LEFT INFERIOR MEDIAL UPPER BACK
LOCATION DETAILED: RIGHT LATERAL MALAR CHEEK
LOCATION DETAILED: RIGHT PROXIMAL DORSAL FOREARM
LOCATION DETAILED: RIGHT LATERAL EYEBROW
LOCATION DETAILED: NASAL SUPRATIP
LOCATION DETAILED: LEFT MID-UPPER BACK

## 2022-11-15 ASSESSMENT — LOCATION ZONE DERM
LOCATION ZONE: TRUNK
LOCATION ZONE: NOSE
LOCATION ZONE: ARM
LOCATION ZONE: FACE

## 2022-11-15 ASSESSMENT — LOCATION SIMPLE DESCRIPTION DERM
LOCATION SIMPLE: GLABELLA
LOCATION SIMPLE: LEFT UPPER BACK
LOCATION SIMPLE: RIGHT CHEEK
LOCATION SIMPLE: UPPER BACK
LOCATION SIMPLE: RIGHT EYEBROW
LOCATION SIMPLE: CHEST
LOCATION SIMPLE: NOSE
LOCATION SIMPLE: RIGHT FOREARM

## 2022-11-15 NOTE — PROCEDURE: BIOPSY BY SHAVE METHOD
Detail Level: Detailed
Depth Of Biopsy: dermis
Was A Bandage Applied: Yes
Size Of Lesion In Cm: 0.2
Biopsy Type: H and E
Biopsy Method: double edge Personna blade
Anesthesia Type: 1% lidocaine without epinephrine
Anesthesia Volume In Cc (Will Not Render If 0): 0.5
Additional Anesthesia Volume In Cc (Will Not Render If 0): 0
Hemostasis: Electrocautery
Wound Care: Aquaphor
Dressing: Band-Aid
Destruction After The Procedure: No
Type Of Destruction Used: Curettage
Curettage Text: The wound bed was treated with curettage after the biopsy was performed.
Cryotherapy Text: The wound bed was treated with cryotherapy after the biopsy was performed.
Electrodesiccation Text: The wound bed was treated with electrodesiccation after the biopsy was performed.
Electrodesiccation And Curettage Text: The wound bed was treated with electrodesiccation and curettage after the biopsy was performed.
Silver Nitrate Text: The wound bed was treated with silver nitrate after the biopsy was performed.
Lab: [id number]
Lab Facility: [id number]
Consent: Written consent was obtained and risks were reviewed including but not limited to scarring, infection, bleeding, scabbing, incomplete removal, nerve damage and allergy to anesthesia.
Post-Care Instructions: I reviewed with the patient in detail post-care instructions. Patient is to keep the biopsy site dry overnight, and then apply Aquaphor twice daily until healed. Patient may apply hydrogen peroxide soaks to remove any crusting.
Notification Instructions: Patient will be notified of biopsy results. However, patient instructed to call the office if not contacted within 2 weeks.
Billing Type: Third-Party Bill
Information: Selecting Yes will display possible errors in your note based on the variables you have selected. This validation is only offered as a suggestion for you. PLEASE NOTE THAT THE VALIDATION TEXT WILL BE REMOVED WHEN YOU FINALIZE YOUR NOTE. IF YOU WANT TO FAX A PRELIMINARY NOTE YOU WILL NEED TO TOGGLE THIS TO 'NO' IF YOU DO NOT WANT IT IN YOUR FAXED NOTE.
Size Of Lesion In Cm: 0.3
Lab: [id number]
Lab Facility: [id number]
Billing Type: Third-Party Bill
Size Of Lesion In Cm: 0.7
X Size Of Lesion In Cm: 0.6

## 2022-11-15 NOTE — PROCEDURE: PRESCRIPTION MEDICATION MANAGEMENT
Samples Given: Rhofade
Initiate Treatment: Metronidazole/ivermectin compound- apply QHS\\nRhofade- apply QAM
Render In Strict Bullet Format?: No
Detail Level: Zone

## 2022-11-15 NOTE — PROCEDURE: LIQUID NITROGEN
Show Applicator Variable?: Yes
Duration Of Freeze Thaw-Cycle (Seconds): 0
Consent: The patient's consent was obtained including but not limited to risks of crusting, scabbing, blistering, scarring, darker or lighter pigmentary change, recurrence, incomplete removal and infection.
Detail Level: Detailed
Render Post-Care Instructions In Note?: no
Number Of Freeze-Thaw Cycles: 1 freeze-thaw cycle
Post-Care Instructions: I reviewed with the patient in detail post-care instructions. Patient is to wear sunprotection, and avoid picking at any of the treated lesions. Pt may apply Vaseline to crusted or scabbing areas.

## 2022-11-15 NOTE — PROCEDURE: COUNSELING
When Should The Patient Follow-Up For Their Next Full-Body Skin Exam?: 1 Year
Detail Level: Detailed
Quality 137: Melanoma: Continuity Of Care - Recall System: Patient information entered into a recall system that includes: target date for the next exam specified AND a process to follow up with patients regarding missed or unscheduled appointments
Detail Level: Simple
Size Of Lesion In Cm (Optional): 0

## 2022-12-28 ENCOUNTER — APPOINTMENT (RX ONLY)
Dept: URBAN - METROPOLITAN AREA CLINIC 156 | Facility: CLINIC | Age: 72
Setting detail: DERMATOLOGY
End: 2022-12-28

## 2022-12-28 PROBLEM — C44.519 BASAL CELL CARCINOMA OF SKIN OF OTHER PART OF TRUNK: Status: ACTIVE | Noted: 2022-12-28

## 2022-12-28 PROCEDURE — ? MEDICATION COUNSELING

## 2022-12-28 PROCEDURE — ? PRESCRIPTION

## 2022-12-28 PROCEDURE — ? EXCISION

## 2022-12-28 PROCEDURE — 11603 EXC TR-EXT MAL+MARG 2.1-3 CM: CPT

## 2022-12-28 PROCEDURE — ? COUNSELING

## 2022-12-28 PROCEDURE — 13101 CMPLX RPR TRUNK 2.6-7.5 CM: CPT

## 2022-12-28 RX ORDER — CEPHALEXIN 500 MG/1
TABLET ORAL
Qty: 14 | Refills: 0 | Status: ERX | COMMUNITY
Start: 2022-12-28

## 2022-12-28 RX ORDER — MUPIROCIN 20 MG/G
OINTMENT TOPICAL
Qty: 22 | Refills: 3 | Status: ERX | COMMUNITY
Start: 2022-12-28

## 2022-12-28 RX ADMIN — CEPHALEXIN: 500 TABLET ORAL at 00:00

## 2022-12-28 RX ADMIN — MUPIROCIN: 20 OINTMENT TOPICAL at 00:00

## 2022-12-28 NOTE — PROCEDURE: MEDICATION COUNSELING
Aklief Pregnancy And Lactation Text: It is unknown if this medication is safe to use during pregnancy.  It is unknown if this medication is excreted in breast milk.  Breastfeeding women should use the topical cream on the smallest area of the skin for the shortest time needed while breastfeeding.  Do not apply to nipple and areola.
Tazorac Pregnancy And Lactation Text: This medication is not safe during pregnancy. It is unknown if this medication is excreted in breast milk.
Doxepin Pregnancy And Lactation Text: This medication is Pregnancy Category C and it isn't known if it is safe during pregnancy. It is also excreted in breast milk and breast feeding isn't recommended.
Arava Counseling:  Patient counseled regarding adverse effects of Arava including but not limited to nausea, vomiting, abnormalities in liver function tests. Patients may develop mouth sores, rash, diarrhea, and abnormalities in blood counts. The patient understands that monitoring is required including LFTs and blood counts.  There is a rare possibility of scarring of the liver and lung problems that can occur when taking methotrexate. Persistent nausea, loss of appetite, pale stools, dark urine, cough, and shortness of breath should be reported immediately. Patient advised to discontinue Arava treatment and consult with a physician prior to attempting conception. The patient will have to undergo a treatment to eliminate Arava from the body prior to conception.
Cimzia Pregnancy And Lactation Text: This medication crosses the placenta but can be considered safe in certain situations. Cimzia may be excreted in breast milk.
Cantharidin Counseling: Calcipotriene Counseling:  I discussed with the patient the risks of calcipotriene including but not limited to erythema, scaling, itching, and irritation.
Ilumya Counseling: I discussed with the patient the risks of tildrakizumab including but not limited to immunosuppression, malignancy, posterior leukoencephalopathy syndrome, and serious infections.  The patient understands that monitoring is required including a PPD at baseline and must alert us or the primary physician if symptoms of infection or other concerning signs are noted.
Acitretin Pregnancy And Lactation Text: This medication is Pregnancy Category X and should not be given to women who are pregnant or may become pregnant in the future. This medication is excreted in breast milk.
Gabapentin Pregnancy And Lactation Text: This medication is Pregnancy Category C and isn't considered safe during pregnancy. It is excreted in breast milk.
Nsaids Counseling: NSAID Counseling: I discussed with the patient that NSAIDs should be taken with food. Prolonged use of NSAIDs can result in the development of stomach ulcers.  Patient advised to stop taking NSAIDs if abdominal pain occurs.  The patient verbalized understanding of the proper use and possible adverse effects of NSAIDs.  All of the patient's questions and concerns were addressed.
Bactrim Pregnancy And Lactation Text: This medication is Pregnancy Category D and is known to cause fetal risk.  It is also excreted in breast milk.
Eucrisa Pregnancy And Lactation Text: This medication has not been assigned a Pregnancy Risk Category but animal studies failed to show danger with the topical medication. It is unknown if the medication is excreted in breast milk.
Xolair Counseling:  Patient informed of potential adverse effects including but not limited to fever, muscle aches, rash and allergic reactions.  The patient verbalized understanding of the proper use and possible adverse effects of Xolair.  All of the patient's questions and concerns were addressed.
Simponi Pregnancy And Lactation Text: The risk during pregnancy and breastfeeding is uncertain with this medication.
Azathioprine Counseling:  I discussed with the patient the risks of azathioprine including but not limited to myelosuppression, immunosuppression, hepatotoxicity, lymphoma, and infections.  The patient understands that monitoring is required including baseline LFTs, Creatinine, possible TPMP genotyping and weekly CBCs for the first month and then every 2 weeks thereafter.  The patient verbalized understanding of the proper use and possible adverse effects of azathioprine.  All of the patient's questions and concerns were addressed.
Itraconazole Pregnancy And Lactation Text: This medication is Pregnancy Category C and it isn't know if it is safe during pregnancy. It is also excreted in breast milk.
Sarecycline Pregnancy And Lactation Text: This medication is Pregnancy Category D and not consider safe during pregnancy. It is also excreted in breast milk.
Metronidazole Counseling:  I discussed with the patient the risks of metronidazole including but not limited to seizures, nausea/vomiting, a metallic taste in the mouth, nausea/vomiting and severe allergy.
Methotrexate Pregnancy And Lactation Text: This medication is Pregnancy Category X and is known to cause fetal harm. This medication is excreted in breast milk.
Mirvaso Counseling: Mirvaso is a topical medication which can decrease superficial blood flow where applied. Side effects are uncommon and include stinging, redness and allergic reactions.
Oxybutynin Pregnancy And Lactation Text: This medication is Pregnancy Category B and is considered safe during pregnancy. It is unknown if it is excreted in breast milk.
Olumiant Pregnancy And Lactation Text: Based on animal studies, Olumiant may cause embryo-fetal harm when administered to pregnant women.  The medication should not be used in pregnancy.  Breastfeeding is not recommended during treatment.
Qbrexza Pregnancy And Lactation Text: There is no available data on Qbrexza use in pregnant women.  There is no available data on Qbrexza use in lactation.
Topical Ketoconazole Counseling: Patient counseled that this medication may cause skin irritation or allergic reactions.  In the event of skin irritation, the patient was advised to reduce the amount of the drug applied or use it less frequently.   The patient verbalized understanding of the proper use and possible adverse effects of ketoconazole.  All of the patient's questions and concerns were addressed.
Dutasteride Counseling: Dustasteride Counseling:  I discussed with the patient the risks of use of dutasteride including but not limited to decreased libido, decreased ejaculate volume, and gynecomastia. Women who can become pregnant should not handle medication.  All of the patient's questions and concerns were addressed.
Valtrex Counseling: I discussed with the patient the risks of valacyclovir including but not limited to kidney damage, nausea, vomiting and severe allergy.  The patient understands that if the infection seems to be worsening or is not improving, they are to call.
Vtama Pregnancy And Lactation Text: It is unknown if this medication can cause problems during pregnancy and breastfeeding.
Topical Clindamycin Counseling: Patient counseled that this medication may cause skin irritation or allergic reactions.  In the event of skin irritation, the patient was advised to reduce the amount of the drug applied or use it less frequently.   The patient verbalized understanding of the proper use and possible adverse effects of clindamycin.  All of the patient's questions and concerns were addressed.
Cosentyx Counseling:  I discussed with the patient the risks of Cosentyx including but not limited to worsening of Crohn's disease, immunosuppression, allergic reactions and infections.  The patient understands that monitoring is required including a PPD at baseline and must alert us or the primary physician if symptoms of infection or other concerning signs are noted.
Arava Pregnancy And Lactation Text: This medication is Pregnancy Category X and is absolutely contraindicated during pregnancy. It is unknown if it is excreted in breast milk.
Azelaic Acid Counseling: Patient counseled that medicine may cause skin irritation and to avoid applying near the eyes.  In the event of skin irritation, the patient was advised to reduce the amount of the drug applied or use it less frequently.   The patient verbalized understanding of the proper use and possible adverse effects of azelaic acid.  All of the patient's questions and concerns were addressed.
Hydroxyzine Counseling: Patient advised that the medication is sedating and not to drive a car after taking this medication.  Patient informed of potential adverse effects including but not limited to dry mouth, urinary retention, and blurry vision.  The patient verbalized understanding of the proper use and possible adverse effects of hydroxyzine.  All of the patient's questions and concerns were addressed.
Cantharidin Pregnancy And Lactation Text: The use of this medication during pregnancy or lactation is not recommended as there is insufficient data.
Ketoconazole Counseling:   Patient counseled regarding improving absorption with orange juice.  Adverse effects include but are not limited to breast enlargement, headache, diarrhea, nausea, upset stomach, liver function test abnormalities, taste disturbance, and stomach pain.  There is a rare possibility of liver failure that can occur when taking ketoconazole. The patient understands that monitoring of LFTs may be required, especially at baseline. The patient verbalized understanding of the proper use and possible adverse effects of ketoconazole.  All of the patient's questions and concerns were addressed.
Bexarotene Counseling:  I discussed with the patient the risks of bexarotene including but not limited to hair loss, dry lips/skin/eyes, liver abnormalities, hyperlipidemia, pancreatitis, depression/suicidal ideation, photosensitivity, drug rash/allergic reactions, hypothyroidism, anemia, leukopenia, infection, cataracts, and teratogenicity.  Patient understands that they will need regular blood tests to check lipid profile, liver function tests, white blood cell count, thyroid function tests and pregnancy test if applicable.
Glycopyrrolate Counseling:  I discussed with the patient the risks of glycopyrrolate including but not limited to skin rash, drowsiness, dry mouth, difficulty urinating, and blurred vision.
Cephalexin Counseling: I counseled the patient regarding use of cephalexin as an antibiotic for prophylactic and/or therapeutic purposes. Cephalexin (commonly prescribed under brand name Keflex) is a cephalosporin antibiotic which is active against numerous classes of bacteria, including most skin bacteria. Side effects may include nausea, diarrhea, gastrointestinal upset, rash, hives, yeast infections, and in rare cases, hepatitis, kidney disease, seizures, fever, confusion, neurologic symptoms, and others. Patients with severe allergies to penicillin medications are cautioned that there is about a 10% incidence of cross-reactivity with cephalosporins. When possible, patients with penicillin allergies should use alternatives to cephalosporins for antibiotic therapy.
Skyrizi Counseling: I discussed with the patient the risks of risankizumab-rzaa including but not limited to immunosuppression, and serious infections.  The patient understands that monitoring is required including a PPD at baseline and must alert us or the primary physician if symptoms of infection or other concerning signs are noted.
Azathioprine Pregnancy And Lactation Text: This medication is Pregnancy Category D and isn't considered safe during pregnancy. It is unknown if this medication is excreted in breast milk.
Metronidazole Pregnancy And Lactation Text: This medication is Pregnancy Category B and considered safe during pregnancy.  It is also excreted in breast milk.
Mirvaso Pregnancy And Lactation Text: This medication has not been assigned a Pregnancy Risk Category. It is unknown if the medication is excreted in breast milk.
Hydroquinone Counseling:  Patient advised that medication may result in skin irritation, lightening (hypopigmentation), dryness, and burning.  In the event of skin irritation, the patient was advised to reduce the amount of the drug applied or use it less frequently.  Rarely, spots that are treated with hydroquinone can become darker (pseudoochronosis).  Should this occur, patient instructed to stop medication and call the office. The patient verbalized understanding of the proper use and possible adverse effects of hydroquinone.  All of the patient's questions and concerns were addressed.
Nsaids Pregnancy And Lactation Text: These medications are considered safe up to 30 weeks gestation. It is excreted in breast milk.
Propranolol Counseling:  I discussed with the patient the risks of propranolol including but not limited to low heart rate, low blood pressure, low blood sugar, restlessness and increased cold sensitivity. They should call the office if they experience any of these side effects.
Rinvoq Counseling: I discussed with the patient the risks of Rinvoq therapy including but not limited to upper respiratory tract infections, shingles, cold sores, bronchitis, nausea, cough, fever, acne, and headache. Live vaccines should be avoided.  This medication has been linked to serious infections; higher rate of mortality; malignancy and lymphoproliferative disorders; major adverse cardiovascular events; thrombosis; thrombocytopenia, anemia, and neutropenia; lipid elevations; liver enzyme elevations; and gastrointestinal perforations.
Xolair Pregnancy And Lactation Text: This medication is Pregnancy Category B and is considered safe during pregnancy. This medication is excreted in breast milk.
Tetracycline Counseling: Patient counseled regarding possible photosensitivity and increased risk for sunburn.  Patient instructed to avoid sunlight, if possible.  When exposed to sunlight, patients should wear protective clothing, sunglasses, and sunscreen.  The patient was instructed to call the office immediately if the following severe adverse effects occur:  hearing changes, easy bruising/bleeding, severe headache, or vision changes.  The patient verbalized understanding of the proper use and possible adverse effects of tetracycline.  All of the patient's questions and concerns were addressed. Patient understands to avoid pregnancy while on therapy due to potential birth defects.
Rhofade Counseling: Rhofade is a topical medication which can decrease superficial blood flow where applied. Side effects are uncommon and include stinging, redness and allergic reactions.
Prednisone Counseling:  I discussed with the patient the risks of prolonged use of prednisone including but not limited to weight gain, insomnia, osteoporosis, mood changes, diabetes, susceptibility to infection, glaucoma and high blood pressure.  In cases where prednisone use is prolonged, patients should be monitored with blood pressure checks, serum glucose levels and an eye exam.  Additionally, the patient may need to be placed on GI prophylaxis, PCP prophylaxis, and calcium and vitamin D supplementation and/or a bisphosphonate.  The patient verbalized understanding of the proper use and the possible adverse effects of prednisone.  All of the patient's questions and concerns were addressed.
Dutasteride Pregnancy And Lactation Text: This medication is absolutely contraindicated in women, especially during pregnancy and breast feeding. Feminization of male fetuses is possible if taking while pregnant.
Valtrex Pregnancy And Lactation Text: this medication is Pregnancy Category B and is considered safe during pregnancy. This medication is not directly found in breast milk but it's metabolite acyclovir is present.
Zoryve Counseling:  I discussed with the patient that Zoryve is not for use in the eyes, mouth or vagina. The most commonly reported side effects include diarrhea, headache, insomnia, application site pain, upper respiratory tract infections, and urinary tract infections.  All of the patient's questions and concerns were addressed.
Hydroxyzine Pregnancy And Lactation Text: This medication is not safe during pregnancy and should not be taken. It is also excreted in breast milk and breast feeding isn't recommended.
Azelaic Acid Pregnancy And Lactation Text: This medication is considered safe during pregnancy and breast feeding.
Clofazimine Counseling:  I discussed with the patient the risks of clofazimine including but not limited to skin and eye pigmentation, liver damage, nausea/vomiting, gastrointestinal bleeding and allergy.
Cosentyx Pregnancy And Lactation Text: This medication is Pregnancy Category B and is considered safe during pregnancy. It is unknown if this medication is excreted in breast milk.
Infliximab Counseling:  I discussed with the patient the risks of infliximab including but not limited to myelosuppression, immunosuppression, autoimmune hepatitis, demyelinating diseases, lymphoma, and serious infections.  The patient understands that monitoring is required including a PPD at baseline and must alert us or the primary physician if symptoms of infection or other concerning signs are noted.
5-Fu Counseling: 5-Fluorouracil Counseling:  I discussed with the patient the risks of 5-fluorouracil including but not limited to erythema, scaling, itching, weeping, crusting, and pain.
Bexarotene Pregnancy And Lactation Text: This medication is Pregnancy Category X and should not be given to women who are pregnant or may become pregnant. This medication should not be used if you are breast feeding.
Ketoconazole Pregnancy And Lactation Text: This medication is Pregnancy Category C and it isn't know if it is safe during pregnancy. It is also excreted in breast milk and breast feeding isn't recommended.
Glycopyrrolate Pregnancy And Lactation Text: This medication is Pregnancy Category B and is considered safe during pregnancy. It is unknown if it is excreted breast milk.
Minocycline Counseling: Patient advised regarding possible photosensitivity and discoloration of the teeth, skin, lips, tongue and gums.  Patient instructed to avoid sunlight, if possible.  When exposed to sunlight, patients should wear protective clothing, sunglasses, and sunscreen.  The patient was instructed to call the office immediately if the following severe adverse effects occur:  hearing changes, easy bruising/bleeding, severe headache, or vision changes.  The patient verbalized understanding of the proper use and possible adverse effects of minocycline.  All of the patient's questions and concerns were addressed.
Cephalexin Pregnancy And Lactation Text: This medication is Pregnancy Category B and considered safe during pregnancy.  It is also excreted in breast milk but can be used safely for shorter doses.
Cellcept Counseling:  I discussed with the patient the risks of mycophenolate mofetil including but not limited to infection/immunosuppression, GI upset, hypokalemia, hypercholesterolemia, bone marrow suppression, lymphoproliferative disorders, malignancy, GI ulceration/bleed/perforation, colitis, interstitial lung disease, kidney failure, progressive multifocal leukoencephalopathy, and birth defects.  The patient understands that monitoring is required including a baseline creatinine and regular CBC testing. In addition, patient must alert us immediately if symptoms of infection or other concerning signs are noted.
Olanzapine Counseling- I discussed with the patient the common side effects of olanzapine including but are not limited to: lack of energy, dry mouth, increased appetite, sleepiness, tremor, constipation, dizziness, changes in behavior, or restlessness.  Explained that teenagers are more likely to experience headaches, abdominal pain, pain in the arms or legs, tiredness, and sleepiness.  Serious side effects include but are not limited: increased risk of death in elderly patients who are confused, have memory loss, or dementia-related psychosis; hyperglycemia; increased cholesterol and triglycerides; and weight gain.
Opzelura Counseling:  I discussed with the patient the risks of Opzelura including but not limited to nasopharngitis, bronchitis, ear infection, eosinophila, hives, diarrhea, folliculitis, tonsillitis, and rhinorrhea.  Taken orally, this medication has been linked to serious infections; higher rate of mortality; malignancy and lymphoproliferative disorders; major adverse cardiovascular events; thrombosis; thrombocytopenia, anemia, and neutropenia; and lipid elevations.
Prednisone Pregnancy And Lactation Text: This medication is Pregnancy Category C and it isn't know if it is safe during pregnancy. This medication is excreted in breast milk.
Propranolol Pregnancy And Lactation Text: This medication is Pregnancy Category C and it isn't known if it is safe during pregnancy. It is excreted in breast milk.
Use Enhanced Medication Counseling?: No
Rinvoq Pregnancy And Lactation Text: Based on animal studies, Rinvoq may cause embryo-fetal harm when administered to pregnant women.  The medication should not be used in pregnancy.  Breastfeeding is not recommended during treatment and for 6 days after the last dose.
Topical Sulfur Applications Counseling: Topical Sulfur Counseling: Patient counseled that this medication may cause skin irritation or allergic reactions.  In the event of skin irritation, the patient was advised to reduce the amount of the drug applied or use it less frequently.   The patient verbalized understanding of the proper use and possible adverse effects of topical sulfur application.  All of the patient's questions and concerns were addressed.
Finasteride Counseling:  I discussed with the patient the risks of use of finasteride including but not limited to decreased libido, decreased ejaculate volume, gynecomastia, and depression. Women should not handle medication.  All of the patient's questions and concerns were addressed.
Benzoyl Peroxide Counseling: Patient counseled that medicine may cause skin irritation and bleach clothing.  In the event of skin irritation, the patient was advised to reduce the amount of the drug applied or use it less frequently.   The patient verbalized understanding of the proper use and possible adverse effects of benzoyl peroxide.  All of the patient's questions and concerns were addressed.
Dupixent Counseling: I discussed with the patient the risks of dupilumab including but not limited to eye infection and irritation, cold sores, injection site reactions, worsening of asthma, allergic reactions and increased risk of parasitic infection.  Live vaccines should be avoided while taking dupilumab. Dupilumab will also interact with certain medications such as warfarin and cyclosporine. The patient understands that monitoring is required and they must alert us or the primary physician if symptoms of infection or other concerning signs are noted.
Clindamycin Counseling: I counseled the patient regarding use of clindamycin as an antibiotic for prophylactic and/or therapeutic purposes. Clindamycin is active against numerous classes of bacteria, including skin bacteria. Side effects may include nausea, diarrhea, gastrointestinal upset, rash, hives, yeast infections, and in rare cases, colitis.
Erivedge Counseling- I discussed with the patient the risks of Erivedge including but not limited to nausea, vomiting, diarrhea, constipation, weight loss, changes in the sense of taste, decreased appetite, muscle spasms, and hair loss.  The patient verbalized understanding of the proper use and possible adverse effects of Erivedge.  All of the patient's questions and concerns were addressed.
5-Fu Pregnancy And Lactation Text: This medication is Pregnancy Category X and contraindicated in pregnancy and in women who may become pregnant. It is unknown if this medication is excreted in breast milk.
Hydroxychloroquine Counseling:  I discussed with the patient that a baseline ophthalmologic exam is needed at the start of therapy and every year thereafter while on therapy. A CBC may also be warranted for monitoring.  The side effects of this medication were discussed with the patient, including but not limited to agranulocytosis, aplastic anemia, seizures, rashes, retinopathy, and liver toxicity. Patient instructed to call the office should any adverse effect occur.  The patient verbalized understanding of the proper use and possible adverse effects of Plaquenil.  All the patient's questions and concerns were addressed.
Stelara Counseling:  I discussed with the patient the risks of ustekinumab including but not limited to immunosuppression, malignancy, posterior leukoencephalopathy syndrome, and serious infections.  The patient understands that monitoring is required including a PPD at baseline and must alert us or the primary physician if symptoms of infection or other concerning signs are noted.
Terbinafine Counseling: Patient counseling regarding adverse effects of terbinafine including but not limited to headache, diarrhea, rash, upset stomach, liver function test abnormalities, itching, taste/smell disturbance, nausea, abdominal pain, and flatulence.  There is a rare possibility of liver failure that can occur when taking terbinafine.  The patient understands that a baseline LFT and kidney function test may be required. The patient verbalized understanding of the proper use and possible adverse effects of terbinafine.  All of the patient's questions and concerns were addressed.
Isotretinoin Counseling: Patient should get monthly blood tests, not donate blood, not drive at night if vision affected, not share medication, and not undergo elective surgery for 6 months after tx completed. Side effects reviewed, pt to contact office should one occur.
Sotyktu Counseling:  I discussed the most common side effects of Sotyktu including: common cold, sore throat, sinus infections, cold sores, canker sores, folliculitis, and acne.? I also discussed more serious side effects of Sotyktu including but not limited to: serious allergic reactions; increased risk for infections such as TB; cancers such as lymphomas; rhabdomyolysis and elevated CPK; and elevated triglycerides and liver enzymes.?
Opzelura Pregnancy And Lactation Text: There is insufficient data to evaluate drug-associated risk for major birth defects, miscarriage, or other adverse maternal or fetal outcomes.  There is a pregnancy registry that monitors pregnancy outcomes in pregnant persons exposed to the medication during pregnancy.  It is unknown if this medication is excreted in breast milk.  Do not breastfeed during treatment and for about 4 weeks after the last dose.
Olanzapine Pregnancy And Lactation Text: This medication is pregnancy category C.   There are no adequate and well controlled trials with olanzapine in pregnant females.  Olanzapine should be used during pregnancy only if the potential benefit justifies the potential risk to the fetus.   In a study in lactating healthy women, olanzapine was excreted in breast milk.  It is recommended that women taking olanzapine should not breast feed.
Imiquimod Counseling:  I discussed with the patient the risks of imiquimod including but not limited to erythema, scaling, itching, weeping, crusting, and pain.  Patient understands that the inflammatory response to imiquimod is variable from person to person and was educated regarded proper titration schedule.  If flu-like symptoms develop, patient knows to discontinue the medication and contact us.
Topical Sulfur Applications Pregnancy And Lactation Text: This medication is considered safe during pregnancy and breast feeding secondary to limited systemic absorption.
SSKI Counseling:  I discussed with the patient the risks of SSKI including but not limited to thyroid abnormalities, metallic taste, GI upset, fever, headache, acne, arthralgias, paraesthesias, lymphadenopathy, easy bleeding, arrhythmias, and allergic reaction.
Albendazole Counseling:  I discussed with the patient the risks of albendazole including but not limited to cytopenia, kidney damage, nausea/vomiting and severe allergy.  The patient understands that this medication is being used in an off-label manner.
Finasteride Pregnancy And Lactation Text: This medication is absolutely contraindicated during pregnancy. It is unknown if it is excreted in breast milk.
Solaraze Counseling:  I discussed with the patient the risks of Solaraze including but not limited to erythema, scaling, itching, weeping, crusting, and pain.
Zyclara Counseling:  I discussed with the patient the risks of imiquimod including but not limited to erythema, scaling, itching, weeping, crusting, and pain.  Patient understands that the inflammatory response to imiquimod is variable from person to person and was educated regarded proper titration schedule.  If flu-like symptoms develop, patient knows to discontinue the medication and contact us.
Dupixent Pregnancy And Lactation Text: This medication likely crosses the placenta but the risk for the fetus is uncertain. This medication is excreted in breast milk.
Benzoyl Peroxide Pregnancy And Lactation Text: This medication is Pregnancy Category C. It is unknown if benzoyl peroxide is excreted in breast milk.
Drysol Counseling:  I discussed with the patient the risks of drysol/aluminum chloride including but not limited to skin rash, itching, irritation, burning.
Fluconazole Counseling:  Patient counseled regarding adverse effects of fluconazole including but not limited to headache, diarrhea, nausea, upset stomach, liver function test abnormalities, taste disturbance, and stomach pain.  There is a rare possibility of liver failure that can occur when taking fluconazole.  The patient understands that monitoring of LFTs and kidney function test may be required, especially at baseline. The patient verbalized understanding of the proper use and possible adverse effects of fluconazole.  All of the patient's questions and concerns were addressed.
Colchicine Counseling:  Patient counseled regarding adverse effects including but not limited to stomach upset (nausea, vomiting, stomach pain, or diarrhea).  Patient instructed to limit alcohol consumption while taking this medication.  Colchicine may reduce blood counts especially with prolonged use.  The patient understands that monitoring of kidney function and blood counts may be required, especially at baseline. The patient verbalized understanding of the proper use and possible adverse effects of colchicine.  All of the patient's questions and concerns were addressed.
Rituxan Counseling:  I discussed with the patient the risks of Rituxan infusions. Side effects can include infusion reactions, severe drug rashes including mucocutaneous reactions, reactivation of latent hepatitis and other infections and rarely progressive multifocal leukoencephalopathy.  All of the patient's questions and concerns were addressed.
Hydroxychloroquine Pregnancy And Lactation Text: This medication has been shown to cause fetal harm but it isn't assigned a Pregnancy Risk Category. There are small amounts excreted in breast milk.
Cibinqo Counseling: I discussed with the patient the risks of Cibinqo therapy including but not limited to common cold, nausea, headache, cold sores, increased blood CPK levels, dizziness, UTIs, fatigue, acne, and vomitting. Live vaccines should be avoided.  This medication has been linked to serious infections; higher rate of mortality; malignancy and lymphoproliferative disorders; major adverse cardiovascular events; thrombosis; thrombocytopenia and lymphopenia; lipid elevations; and retinal detachment.
Imiquimod Pregnancy And Lactation Text: This medication is Pregnancy Category C. It is unknown if this medication is excreted in breast milk.
Cyclophosphamide Counseling:  I discussed with the patient the risks of cyclophosphamide including but not limited to hair loss, hormonal abnormalities, decreased fertility, abdominal pain, diarrhea, nausea and vomiting, bone marrow suppression and infection. The patient understands that monitoring is required while taking this medication.
Isotretinoin Pregnancy And Lactation Text: This medication is Pregnancy Category X and is considered extremely dangerous during pregnancy. It is unknown if it is excreted in breast milk.
Terbinafine Pregnancy And Lactation Text: This medication is Pregnancy Category B and is considered safe during pregnancy. It is also excreted in breast milk and breast feeding isn't recommended.
Ivermectin Pregnancy And Lactation Text: This medication is Pregnancy Category C and it isn't known if it is safe during pregnancy. It is also excreted in breast milk.
Oral Minoxidil Counseling- I discussed with the patient the risks of oral minoxidil including but not limited to shortness of breath, swelling of the feet or ankles, dizziness, lightheadedness, unwanted hair growth and allergic reaction.  The patient verbalized understanding of the proper use and possible adverse effects of oral minoxidil.  All of the patient's questions and concerns were addressed.
Quinolones Counseling:  I discussed with the patient the risks of fluoroquinolones including but not limited to GI upset, allergic reaction, drug rash, diarrhea, dizziness, photosensitivity, yeast infections, liver function test abnormalities, tendonitis/tendon rupture.
Clindamycin Pregnancy And Lactation Text: This medication can be used in pregnancy if certain situations. Clindamycin is also present in breast milk.
Sotyktu Pregnancy And Lactation Text: There is insufficient data to evaluate whether or not Sotyktu is safe to use during pregnancy.? ?It is not known if Sotyktu passes into breast milk and whether or not it is safe to use when breastfeeding.??
Picato Counseling:  I discussed with the patient the risks of Picato including but not limited to erythema, scaling, itching, weeping, crusting, and pain.
Solaraze Pregnancy And Lactation Text: This medication is Pregnancy Category B and is considered safe. There is some data to suggest avoiding during the third trimester. It is unknown if this medication is excreted in breast milk.
Wartpeel Counseling:  I discussed with the patient the risks of Wartpeel including but not limited to erythema, scaling, itching, weeping, crusting, and pain.
Birth Control Pills Counseling: Birth Control Pill Counseling: I discussed with the patient the potential side effects of OCPs including but not limited to increased risk of stroke, heart attack, thrombophlebitis, deep venous thrombosis, hepatic adenomas, breast changes, GI upset, headaches, and depression.  The patient verbalized understanding of the proper use and possible adverse effects of OCPs. All of the patient's questions and concerns were addressed.
Sski Pregnancy And Lactation Text: This medication is Pregnancy Category D and isn't considered safe during pregnancy. It is excreted in breast milk.
Libtayo Counseling- I discussed with the patient the risks of Libtayo including but not limited to nausea, vomiting, diarrhea, and bone or muscle pain.  The patient verbalized understanding of the proper use and possible adverse effects of Libtayo.  All of the patient's questions and concerns were addressed.
Carac Counseling:  I discussed with the patient the risks of Carac including but not limited to erythema, scaling, itching, weeping, crusting, and pain.
High Dose Vitamin A Counseling: Side effects reviewed, pt to contact office should one occur.
Rituxan Pregnancy And Lactation Text: This medication is Pregnancy Category C and it isn't know if it is safe during pregnancy. It is unknown if this medication is excreted in breast milk but similar antibodies are known to be excreted.
Enbrel Counseling:  I discussed with the patient the risks of etanercept including but not limited to myelosuppression, immunosuppression, autoimmune hepatitis, demyelinating diseases, lymphoma, and infections.  The patient understands that monitoring is required including a PPD at baseline and must alert us or the primary physician if symptoms of infection or other concerning signs are noted.
Cibinqo Pregnancy And Lactation Text: It is unknown if this medication will adversely affect pregnancy or breast feeding.  You should not take this medication if you are currently pregnant or planning a pregnancy or while breastfeeding.
Doxycycline Counseling:  Patient counseled regarding possible photosensitivity and increased risk for sunburn.  Patient instructed to avoid sunlight, if possible.  When exposed to sunlight, patients should wear protective clothing, sunglasses, and sunscreen.  The patient was instructed to call the office immediately if the following severe adverse effects occur:  hearing changes, easy bruising/bleeding, severe headache, or vision changes.  The patient verbalized understanding of the proper use and possible adverse effects of doxycycline.  All of the patient's questions and concerns were addressed.
Klisyri Counseling:  I discussed with the patient the risks of Klisyri including but not limited to erythema, scaling, itching, weeping, crusting, and pain.
Low Dose Naltrexone Counseling- I discussed with the patient the potential risks and side effects of low dose naltrexone including but not limited to: more vivid dreams, headaches, nausea, vomiting, abdominal pain, fatigue, dizziness, and anxiety.
Taltz Counseling: I discussed with the patient the risks of ixekizumab including but not limited to immunosuppression, serious infections, worsening of inflammatory bowel disease and drug reactions.  The patient understands that monitoring is required including a PPD at baseline and must alert us or the primary physician if symptoms of infection or other concerning signs are noted.
Ivermectin Counseling:  Patient instructed to take medication on an empty stomach with a full glass of water.  Patient informed of potential adverse effects including but not limited to nausea, diarrhea, dizziness, itching, and swelling of the extremities or lymph nodes.  The patient verbalized understanding of the proper use and possible adverse effects of ivermectin.  All of the patient's questions and concerns were addressed.
Oral Minoxidil Pregnancy And Lactation Text: This medication should only be used when clearly needed if you are pregnant, attempting to become pregnant or breast feeding.
Xeljanz Counseling: I discussed with the patient the risks of Xeljanz therapy including increased risk of infection, liver issues, headache, diarrhea, or cold symptoms. Live vaccines should be avoided. They were instructed to call if they have any problems.
Topical Retinoid counseling:  Patient advised to apply a pea-sized amount only at bedtime and wait 30 minutes after washing their face before applying.  If too drying, patient may add a non-comedogenic moisturizer. The patient verbalized understanding of the proper use and possible adverse effects of retinoids.  All of the patient's questions and concerns were addressed.
Thalidomide Counseling: I discussed with the patient the risks of thalidomide including but not limited to birth defects, anxiety, weakness, chest pain, dizziness, cough and severe allergy.
Cyclophosphamide Pregnancy And Lactation Text: This medication is Pregnancy Category D and it isn't considered safe during pregnancy. This medication is excreted in breast milk.
Birth Control Pills Pregnancy And Lactation Text: This medication should be avoided if pregnant and for the first 30 days post-partum.
Adbry Counseling: I discussed with the patient the risks of tralokinumab including but not limited to eye infection and irritation, cold sores, injection site reactions, worsening of asthma, allergic reactions and increased risk of parasitic infection.  Live vaccines should be avoided while taking tralokinumab. The patient understands that monitoring is required and they must alert us or the primary physician if symptoms of infection or other concerning signs are noted.
Cimetidine Counseling:  I discussed with the patient the risks of Cimetidine including but not limited to gynecomastia, headache, diarrhea, nausea, drowsiness, arrhythmias, pancreatitis, skin rashes, psychosis, bone marrow suppression and kidney toxicity.
Olumiant Counseling: I discussed with the patient the risks of Olumiant therapy including but not limited to upper respiratory tract infections, shingles, cold sores, and nausea. Live vaccines should be avoided.  This medication has been linked to serious infections; higher rate of mortality; malignancy and lymphoproliferative disorders; major adverse cardiovascular events; thrombosis; gastrointestinal perforations; neutropenia; lymphopenia; anemia; liver enzyme elevations; and lipid elevations.
Libtayo Pregnancy And Lactation Text: This medication is contraindicated in pregnancy and when breast feeding.
Azithromycin Counseling:  I discussed with the patient the risks of azithromycin including but not limited to GI upset, allergic reaction, drug rash, diarrhea, and yeast infections.
Elidel Counseling: Patient may experience a mild burning sensation during topical application. Elidel is not approved in children less than 2 years of age. There have been case reports of hematologic and skin malignancies in patients using topical calcineurin inhibitors although causality is questionable.
Low Dose Naltrexone Pregnancy And Lactation Text: Naltrexone is pregnancy category C.  There have been no adequate and well-controlled studies in pregnant women.  It should be used in pregnancy only if the potential benefit justifies the potential risk to the fetus.   Limited data indicates that naltrexone is minimally excreted into breastmilk.
Dapsone Counseling: I discussed with the patient the risks of dapsone including but not limited to hemolytic anemia, agranulocytosis, rashes, methemoglobinemia, kidney failure, peripheral neuropathy, headaches, GI upset, and liver toxicity.  Patients who start dapsone require monitoring including baseline LFTs and weekly CBCs for the first month, then every month thereafter.  The patient verbalized understanding of the proper use and possible adverse effects of dapsone.  All of the patient's questions and concerns were addressed.
Griseofulvin Counseling:  I discussed with the patient the risks of griseofulvin including but not limited to photosensitivity, cytopenia, liver damage, nausea/vomiting and severe allergy.  The patient understands that this medication is best absorbed when taken with a fatty meal (e.g., ice cream or french fries).
High Dose Vitamin A Pregnancy And Lactation Text: High dose vitamin A therapy is contraindicated during pregnancy and breast feeding.
Siliq Counseling:  I discussed with the patient the risks of Siliq including but not limited to new or worsening depression, suicidal thoughts and behavior, immunosuppression, malignancy, posterior leukoencephalopathy syndrome, and serious infections.  The patient understands that monitoring is required including a PPD at baseline and must alert us or the primary physician if symptoms of infection or other concerning signs are noted. There is also a special program designed to monitor depression which is required with Siliq.
Doxycycline Pregnancy And Lactation Text: This medication is Pregnancy Category D and not consider safe during pregnancy. It is also excreted in breast milk but is considered safe for shorter treatment courses.
Protopic Counseling: Patient may experience a mild burning sensation during topical application. Protopic is not approved in children less than 2 years of age. There have been case reports of hematologic and skin malignancies in patients using topical calcineurin inhibitors although causality is questionable.
Cyclosporine Counseling:  I discussed with the patient the risks of cyclosporine including but not limited to hypertension, gingival hyperplasia,myelosuppression, immunosuppression, liver damage, kidney damage, neurotoxicity, lymphoma, and serious infections. The patient understands that monitoring is required including baseline blood pressure, CBC, CMP, lipid panel and uric acid, and then 1-2 times monthly CMP and blood pressure.
Opioid Counseling: I discussed with the patient the potential side effects of opioids including but not limited to addiction, altered mental status, and depression. I stressed avoiding alcohol, benzodiazepines, muscle relaxants and sleep aids unless specifically okayed by a physician. The patient verbalized understanding of the proper use and possible adverse effects of opioids. All of the patient's questions and concerns were addressed. They were instructed to flush the remaining pills down the toilet if they did not need them for pain.
Klisyri Pregnancy And Lactation Text: It is unknown if this medication can harm a developing fetus or if it is excreted in breast milk.
Detail Level: Simple
Otezla Counseling: The side effects of Otezla were discussed with the patient, including but not limited to worsening or new depression, weight loss, diarrhea, nausea, upper respiratory tract infection, and headache. Patient instructed to call the office should any adverse effect occur.  The patient verbalized understanding of the proper use and possible adverse effects of Otezla.  All the patient's questions and concerns were addressed.
Winlevi Counseling:  I discussed with the patient the risks of topical clascoterone including but not limited to erythema, scaling, itching, and stinging. Patient voiced their understanding.
Rifampin Counseling: I discussed with the patient the risks of rifampin including but not limited to liver damage, kidney damage, red-orange body fluids, nausea/vomiting and severe allergy.
Xeldamariz Pregnancy And Lactation Text: This medication is Pregnancy Category D and is not considered safe during pregnancy.  The risk during breast feeding is also uncertain.
Spironolactone Counseling: Patient advised regarding risks of diarrhea, abdominal pain, hyperkalemia, birth defects (for female patients), liver toxicity and renal toxicity. The patient may need blood work to monitor liver and kidney function and potassium levels while on therapy. The patient verbalized understanding of the proper use and possible adverse effects of spironolactone.  All of the patient's questions and concerns were addressed.
Humira Counseling:  I discussed with the patient the risks of adalimumab including but not limited to myelosuppression, immunosuppression, autoimmune hepatitis, demyelinating diseases, lymphoma, and serious infections.  The patient understands that monitoring is required including a PPD at baseline and must alert us or the primary physician if symptoms of infection or other concerning signs are noted.
Adbry Pregnancy And Lactation Text: It is unknown if this medication will adversely affect pregnancy or breast feeding.
Azithromycin Pregnancy And Lactation Text: This medication is considered safe during pregnancy and is also secreted in breast milk.
Odomzo Counseling- I discussed with the patient the risks of Odomzo including but not limited to nausea, vomiting, diarrhea, constipation, weight loss, changes in the sense of taste, decreased appetite, muscle spasms, and hair loss.  The patient verbalized understanding of the proper use and possible adverse effects of Odomzo.  All of the patient's questions and concerns were addressed.
Calcipotriene Counseling: Cantharidin Counseling:  I discussed with the patient the risks of Cantharidin including but not limited to pain, redness, burning, itching, and blistering.
Dapsone Pregnancy And Lactation Text: This medication is Pregnancy Category C and is not considered safe during pregnancy or breast feeding.
Griseofulvin Pregnancy And Lactation Text: This medication is Pregnancy Category X and is known to cause serious birth defects. It is unknown if this medication is excreted in breast milk but breast feeding should be avoided.
Rifampin Pregnancy And Lactation Text: This medication is Pregnancy Category C and it isn't know if it is safe during pregnancy. It is also excreted in breast milk and should not be used if you are breast feeding.
Opioid Pregnancy And Lactation Text: These medications can lead to premature delivery and should be avoided during pregnancy. These medications are also present in breast milk in small amounts.
Otezla Pregnancy And Lactation Text: This medication is Pregnancy Category C and it isn't known if it is safe during pregnancy. It is unknown if it is excreted in breast milk.
Erythromycin Counseling:  I discussed with the patient the risks of erythromycin including but not limited to GI upset, allergic reaction, drug rash, diarrhea, increase in liver enzymes, and yeast infections.
Minoxidil Counseling: Minoxidil is a topical medication which can increase blood flow where it is applied. It is uncertain how this medication increases hair growth. Side effects are uncommon and include stinging and allergic reactions.
Niacinamide Counseling: I recommended taking niacin or niacinamide, also know as vitamin B3, twice daily. Recent evidence suggests that taking vitamin B3 (500 mg twice daily) can reduce the risk of actinic keratoses and non-melanoma skin cancers. Side effects of vitamin B3 include flushing and headache.
Tremfya Counseling: I discussed with the patient the risks of guselkumab including but not limited to immunosuppression, serious infections, and drug reactions.  The patient understands that monitoring is required including a PPD at baseline and must alert us or the primary physician if symptoms of infection or other concerning signs are noted.
Tranexamic Acid Counseling:  Patient advised of the small risk of bleeding problems with tranexamic acid. They were also instructed to call if they developed any nausea, vomiting or diarrhea. All of the patient's questions and concerns were addressed.
Tazorac Counseling:  Patient advised that medication is irritating and drying.  Patient may need to apply sparingly and wash off after an hour before eventually leaving it on overnight.  The patient verbalized understanding of the proper use and possible adverse effects of tazorac.  All of the patient's questions and concerns were addressed.
Protopic Pregnancy And Lactation Text: This medication is Pregnancy Category C. It is unknown if this medication is excreted in breast milk when applied topically.
Spironolactone Pregnancy And Lactation Text: This medication can cause feminization of the male fetus and should be avoided during pregnancy. The active metabolite is also found in breast milk.
Winlevi Pregnancy And Lactation Text: This medication is considered safe during pregnancy and breastfeeding.
Cimzia Counseling:  I discussed with the patient the risks of Cimzia including but not limited to immunosuppression, allergic reactions and infections.  The patient understands that monitoring is required including a PPD at baseline and must alert us or the primary physician if symptoms of infection or other concerning signs are noted.
Calcipotriene Pregnancy And Lactation Text: This medication has not been proven safe during pregnancy. It is unknown if this medication is excreted in breast milk.
Doxepin Counseling:  Patient advised that the medication is sedating and not to drive a car after taking this medication. Patient informed of potential adverse effects including but not limited to dry mouth, urinary retention, and blurry vision.  The patient verbalized understanding of the proper use and possible adverse effects of doxepin.  All of the patient's questions and concerns were addressed.
Aklief counseling:  Patient advised to apply a pea-sized amount only at bedtime and wait 30 minutes after washing their face before applying.  If too drying, patient may add a non-comedogenic moisturizer.  The most commonly reported side effects including irritation, redness, scaling, dryness, stinging, burning, itching, and increased risk of sunburn.  The patient verbalized understanding of the proper use and possible adverse effects of retinoids.  All of the patient's questions and concerns were addressed.
Gabapentin Counseling: I discussed with the patient the risks of gabapentin including but not limited to dizziness, somnolence, fatigue and ataxia.
Itraconazole Counseling:  I discussed with the patient the risks of itraconazole including but not limited to liver damage, nausea/vomiting, neuropathy, and severe allergy.  The patient understands that this medication is best absorbed when taken with acidic beverages such as non-diet cola or ginger ale.  The patient understands that monitoring is required including baseline LFTs and repeat LFTs at intervals.  The patient understands that they are to contact us or the primary physician if concerning signs are noted.
Acitretin Counseling:  I discussed with the patient the risks of acitretin including but not limited to hair loss, dry lips/skin/eyes, liver damage, hyperlipidemia, depression/suicidal ideation, photosensitivity.  Serious rare side effects can include but are not limited to pancreatitis, pseudotumor cerebri, bony changes, clot formation/stroke/heart attack.  Patient understands that alcohol is contraindicated since it can result in liver toxicity and significantly prolong the elimination of the drug by many years.
Eucrisa Counseling: Patient may experience a mild burning sensation during topical application. Eucrisa is not approved in children less than 3 months of age.
Bactrim Counseling:  I discussed with the patient the risks of sulfa antibiotics including but not limited to GI upset, allergic reaction, drug rash, diarrhea, dizziness, photosensitivity, and yeast infections.  Rarely, more serious reactions can occur including but not limited to aplastic anemia, agranulocytosis, methemoglobinemia, blood dyscrasias, liver or kidney failure, lung infiltrates or desquamative/blistering drug rashes.
Simponi Counseling:  I discussed with the patient the risks of golimumab including but not limited to myelosuppression, immunosuppression, autoimmune hepatitis, demyelinating diseases, lymphoma, and serious infections.  The patient understands that monitoring is required including a PPD at baseline and must alert us or the primary physician if symptoms of infection or other concerning signs are noted.
Niacinamide Pregnancy And Lactation Text: These medications are considered safe during pregnancy.
Erythromycin Pregnancy And Lactation Text: This medication is Pregnancy Category B and is considered safe during pregnancy. It is also excreted in breast milk.
Sarecycline Counseling: Patient advised regarding possible photosensitivity and discoloration of the teeth, skin, lips, tongue and gums.  Patient instructed to avoid sunlight, if possible.  When exposed to sunlight, patients should wear protective clothing, sunglasses, and sunscreen.  The patient was instructed to call the office immediately if the following severe adverse effects occur:  hearing changes, easy bruising/bleeding, severe headache, or vision changes.  The patient verbalized understanding of the proper use and possible adverse effects of sarecycline.  All of the patient's questions and concerns were addressed.
Qbrexza Counseling:  I discussed with the patient the risks of Qbrexza including but not limited to headache, mydriasis, blurred vision, dry eyes, nasal dryness, dry mouth, dry throat, dry skin, urinary hesitation, and constipation.  Local skin reactions including erythema, burning, stinging, and itching can also occur.
Methotrexate Counseling:  Patient counseled regarding adverse effects of methotrexate including but not limited to nausea, vomiting, abnormalities in liver function tests. Patients may develop mouth sores, rash, diarrhea, and abnormalities in blood counts. The patient understands that monitoring is required including LFT's and blood counts.  There is a rare possibility of scarring of the liver and lung problems that can occur when taking methotrexate. Persistent nausea, loss of appetite, pale stools, dark urine, cough, and shortness of breath should be reported immediately. Patient advised to discontinue methotrexate treatment at least three months before attempting to become pregnant.  I discussed the need for folate supplements while taking methotrexate.  These supplements can decrease side effects during methotrexate treatment. The patient verbalized understanding of the proper use and possible adverse effects of methotrexate.  All of the patient's questions and concerns were addressed.
Oxybutynin Counseling:  I discussed with the patient the risks of oxybutynin including but not limited to skin rash, drowsiness, dry mouth, difficulty urinating, and blurred vision.
Tranexamic Acid Pregnancy And Lactation Text: It is unknown if this medication is safe during pregnancy or breast feeding.
VTAMA Counseling: I discussed with the patient that VTAMA is not for use in the eyes, mouth or mouth. They should call the office if they develop any signs of allergic reactions to VTAMA. The patient verbalized understanding of the proper use and possible adverse effects of VTAMA.  All of the patient's questions and concerns were addressed.

## 2022-12-28 NOTE — PROCEDURE: EXCISION
Size Of Lesion In Cm: 1.5
X Size Of Lesion In Cm (Optional): 1.4
Size Of Margin In Cm: 0.4
Anesthesia Volume In Cc: 6
Was An Eye Clamp Used?: No
Eye Clamp Note Details: An eye clamp was used during the procedure.
Excision Method: Fusiform
Saucerization Depth: dermis and superficial adipose tissue
Repair Type: Complex
Suturegard Retention Suture: 2-0 Nylon
Retention Suture Bite Size: 3 mm
Length To Time In Minutes Device Was In Place: 10
Number Of Hemigard Strips Per Side: 1
Intermediate / Complex Repair - Final Wound Length In Cm: 3.4
Complex Requirements: Extensive Undermining Performed?: Yes
Width Of Defect Perpendicular To Closure In Cm (Required): 2.2
Distance Of Undermining In Cm (Required): 2.3
Undermining Type: Entire Wound
Debridement Text: The wound edges were debrided prior to proceeding with the closure to facilitate wound healing.
Helical Rim Text: The closure involved the helical rim.
Vermilion Border Text: The closure involved the vermilion border.
Nostril Rim Text: The closure involved the nostril rim.
Retention Suture Text: Retention sutures were placed to support the closure and prevent dehiscence.
Primary Defect Length (In Cm): 0
Suture Removal: 14 days
Lab: [id number]
Lab Facility: [id number]
Graft Donor Site Bandage (Optional-Leave Blank If You Don't Want In Note): Steri-strips and a pressure bandage were applied to the donor site.
Epidermal Closure Graft Donor Site (Optional): simple interrupted
Billing Type: Third-Party Bill
Excision Depth: adipose tissue
Scalpel Size: 15 blade
Anesthesia Type: 1% lidocaine with epinephrine
Hemostasis: Electrocautery
Estimated Blood Loss (Cc): minimal
Detail Level: Detailed
Deep Sutures: 3-0 Vicryl
Number Of Deep Sutures (Optional): 7
Epidermal Sutures: 4-0 Prolene
Epidermal Closure: horizontal mattress
Additional Epidermal Closure (Optional): running
Wound Care: Mupirocin
Dressing: dry sterile dressing
Suturegard Intro: Intraoperative tissue expansion was performed, utilizing the SUTUREGARD device, in order to reduce wound tension.
Suturegard Body: The suture ends were repeatedly re-tightened and re-clamped to achieve the desired tissue expansion.
Hemigard Intro: Due to skin fragility and wound tension, it was decided to use HEMIGARD adhesive retention suture devices to permit a linear closure. The skin was cleaned and dried for a 6cm distance away from the wound. Excessive hair, if present, was removed to allow for adhesion.
Hemigard Postcare Instructions: The HEMIGARD strips are to remain completely dry for at least 5-7 days.
Positioning (Leave Blank If You Do Not Want): The patient was placed in a comfortable position exposing the surgical site.
Pre-Excision Curettage Text (Leave Blank If You Do Not Want): Prior to drawing the surgical margin the visible lesion was removed with electrodesiccation and curettage to clearly define the lesion size.
Complex Repair Preamble Text (Leave Blank If You Do Not Want): Extensive wide undermining was performed.
Intermediate Repair Preamble Text (Leave Blank If You Do Not Want): Undermining was performed with blunt dissection.
Curvilinear Excision Additional Text (Leave Blank If You Do Not Want): The margin was drawn around the clinically apparent lesion.  A curvilinear shape was then drawn on the skin incorporating the lesion and margins.  Incisions were then made along these lines to the appropriate tissue plane and the lesion was extirpated.
Fusiform Excision Additional Text (Leave Blank If You Do Not Want): The margin was drawn around the clinically apparent lesion.  A fusiform shape was then drawn on the skin incorporating the lesion and margins.  Incisions were then made along these lines to the appropriate tissue plane and the lesion was extirpated.
Elliptical Excision Additional Text (Leave Blank If You Do Not Want): The margin was drawn around the clinically apparent lesion.  An elliptical shape was then drawn on the skin incorporating the lesion and margins.  Incisions were then made along these lines to the appropriate tissue plane and the lesion was extirpated.
Saucerization Excision Additional Text (Leave Blank If You Do Not Want): The margin was drawn around the clinically apparent lesion.  Incisions were then made along these lines, in a tangential fashion, to the appropriate tissue plane and the lesion was extirpated.
Slit Excision Additional Text (Leave Blank If You Do Not Want): A linear line was drawn on the skin overlying the lesion. An incision was made slowly until the lesion was visualized.  Once visualized, the lesion was removed with blunt dissection.
Excisional Biopsy Additional Text (Leave Blank If You Do Not Want): The margin was drawn around the clinically apparent lesion. An elliptical shape was then drawn on the skin incorporating the lesion and margins.  Incisions were then made along these lines to the appropriate tissue plane and the lesion was extirpated.
Perilesional Excision Additional Text (Leave Blank If You Do Not Want): The margin was drawn around the clinically apparent lesion. Incisions were then made along these lines to the appropriate tissue plane and the lesion was extirpated.
Repair Performed By Another Provider Text (Leave Blank If You Do Not Want): After the tissue was excised the defect was repaired by another provider.
No Repair - Repaired With Adjacent Surgical Defect Text (Leave Blank If You Do Not Want): After the excision the defect was repaired concurrently with another surgical defect which was in close approximation.
Adjacent Tissue Transfer Text: The defect edges were debeveled with a #15 scalpel blade.  Given the location of the defect and the proximity to free margins an adjacent tissue transfer was deemed most appropriate.  Using a sterile surgical marker, an appropriate flap was drawn incorporating the defect and placing the expected incisions within the relaxed skin tension lines where possible.    The area thus outlined was incised deep to adipose tissue with a #15 scalpel blade.  The skin margins were undermined to an appropriate distance in all directions utilizing iris scissors.
Advancement Flap (Single) Text: The defect edges were debeveled with a #15 scalpel blade.  Given the location of the defect and the proximity to free margins a single advancement flap was deemed most appropriate.  Using a sterile surgical marker, an appropriate advancement flap was drawn incorporating the defect and placing the expected incisions within the relaxed skin tension lines where possible.    The area thus outlined was incised deep to adipose tissue with a #15 scalpel blade.  The skin margins were undermined to an appropriate distance in all directions utilizing iris scissors.
Advancement Flap (Double) Text: The defect edges were debeveled with a #15 scalpel blade.  Given the location of the defect and the proximity to free margins a double advancement flap was deemed most appropriate.  Using a sterile surgical marker, the appropriate advancement flaps were drawn incorporating the defect and placing the expected incisions within the relaxed skin tension lines where possible.    The area thus outlined was incised deep to adipose tissue with a #15 scalpel blade.  The skin margins were undermined to an appropriate distance in all directions utilizing iris scissors.
Burow's Advancement Flap Text: The defect edges were debeveled with a #15 scalpel blade.  Given the location of the defect and the proximity to free margins a Burow's advancement flap was deemed most appropriate.  Using a sterile surgical marker, the appropriate advancement flap was drawn incorporating the defect and placing the expected incisions within the relaxed skin tension lines where possible.    The area thus outlined was incised deep to adipose tissue with a #15 scalpel blade.  The skin margins were undermined to an appropriate distance in all directions utilizing iris scissors.
Chonodrocutaneous Helical Advancement Flap Text: The defect edges were debeveled with a #15 scalpel blade.  Given the location of the defect and the proximity to free margins a chondrocutaneous helical advancement flap was deemed most appropriate.  Using a sterile surgical marker, the appropriate advancement flap was drawn incorporating the defect and placing the expected incisions within the relaxed skin tension lines where possible.    The area thus outlined was incised deep to adipose tissue with a #15 scalpel blade.  The skin margins were undermined to an appropriate distance in all directions utilizing iris scissors.
Crescentic Advancement Flap Text: The defect edges were debeveled with a #15 scalpel blade.  Given the location of the defect and the proximity to free margins a crescentic advancement flap was deemed most appropriate.  Using a sterile surgical marker, the appropriate advancement flap was drawn incorporating the defect and placing the expected incisions within the relaxed skin tension lines where possible.    The area thus outlined was incised deep to adipose tissue with a #15 scalpel blade.  The skin margins were undermined to an appropriate distance in all directions utilizing iris scissors.
A-T Advancement Flap Text: The defect edges were debeveled with a #15 scalpel blade.  Given the location of the defect, shape of the defect and the proximity to free margins an A-T advancement flap was deemed most appropriate.  Using a sterile surgical marker, an appropriate advancement flap was drawn incorporating the defect and placing the expected incisions within the relaxed skin tension lines where possible.    The area thus outlined was incised deep to adipose tissue with a #15 scalpel blade.  The skin margins were undermined to an appropriate distance in all directions utilizing iris scissors.
O-T Advancement Flap Text: The defect edges were debeveled with a #15 scalpel blade.  Given the location of the defect, shape of the defect and the proximity to free margins an O-T advancement flap was deemed most appropriate.  Using a sterile surgical marker, an appropriate advancement flap was drawn incorporating the defect and placing the expected incisions within the relaxed skin tension lines where possible.    The area thus outlined was incised deep to adipose tissue with a #15 scalpel blade.  The skin margins were undermined to an appropriate distance in all directions utilizing iris scissors.
O-L Flap Text: The defect edges were debeveled with a #15 scalpel blade.  Given the location of the defect, shape of the defect and the proximity to free margins an O-L flap was deemed most appropriate.  Using a sterile surgical marker, an appropriate advancement flap was drawn incorporating the defect and placing the expected incisions within the relaxed skin tension lines where possible.    The area thus outlined was incised deep to adipose tissue with a #15 scalpel blade.  The skin margins were undermined to an appropriate distance in all directions utilizing iris scissors.
O-Z Flap Text: The defect edges were debeveled with a #15 scalpel blade.  Given the location of the defect, shape of the defect and the proximity to free margins an O-Z flap was deemed most appropriate.  Using a sterile surgical marker, an appropriate transposition flap was drawn incorporating the defect and placing the expected incisions within the relaxed skin tension lines where possible. The area thus outlined was incised deep to adipose tissue with a #15 scalpel blade.  The skin margins were undermined to an appropriate distance in all directions utilizing iris scissors.
Double O-Z Flap Text: The defect edges were debeveled with a #15 scalpel blade.  Given the location of the defect, shape of the defect and the proximity to free margins a Double O-Z flap was deemed most appropriate.  Using a sterile surgical marker, an appropriate transposition flap was drawn incorporating the defect and placing the expected incisions within the relaxed skin tension lines where possible. The area thus outlined was incised deep to adipose tissue with a #15 scalpel blade.  The skin margins were undermined to an appropriate distance in all directions utilizing iris scissors.
V-Y Flap Text: The defect edges were debeveled with a #15 scalpel blade.  Given the location of the defect, shape of the defect and the proximity to free margins a V-Y flap was deemed most appropriate.  Using a sterile surgical marker, an appropriate advancement flap was drawn incorporating the defect and placing the expected incisions within the relaxed skin tension lines where possible.    The area thus outlined was incised deep to adipose tissue with a #15 scalpel blade.  The skin margins were undermined to an appropriate distance in all directions utilizing iris scissors.
Advancement-Rotation Flap Text: The defect edges were debeveled with a #15 scalpel blade.  Given the location of the defect, shape of the defect and the proximity to free margins an advancement-rotation flap was deemed most appropriate.  Using a sterile surgical marker, an appropriate flap was drawn incorporating the defect and placing the expected incisions within the relaxed skin tension lines where possible. The area thus outlined was incised deep to adipose tissue with a #15 scalpel blade.  The skin margins were undermined to an appropriate distance in all directions utilizing iris scissors.
Mercedes Flap Text: The defect edges were debeveled with a #15 scalpel blade.  Given the location of the defect, shape of the defect and the proximity to free margins a Mercedes flap was deemed most appropriate.  Using a sterile surgical marker, an appropriate advancement flap was drawn incorporating the defect and placing the expected incisions within the relaxed skin tension lines where possible. The area thus outlined was incised deep to adipose tissue with a #15 scalpel blade.  The skin margins were undermined to an appropriate distance in all directions utilizing iris scissors.
Modified Advancement Flap Text: The defect edges were debeveled with a #15 scalpel blade.  Given the location of the defect, shape of the defect and the proximity to free margins a modified advancement flap was deemed most appropriate.  Using a sterile surgical marker, an appropriate advancement flap was drawn incorporating the defect and placing the expected incisions within the relaxed skin tension lines where possible.    The area thus outlined was incised deep to adipose tissue with a #15 scalpel blade.  The skin margins were undermined to an appropriate distance in all directions utilizing iris scissors.
Mucosal Advancement Flap Text: Given the location of the defect, shape of the defect and the proximity to free margins a mucosal advancement flap was deemed most appropriate. Incisions were made with a 15 blade scalpel in the appropriate fashion along the cutaneous vermilion border and the mucosal lip. The remaining actinically damaged mucosal tissue was excised.  The mucosal advancement flap was then elevated to the gingival sulcus with care taken to preserve the neurovascular structures and advanced into the primary defect. Care was taken to ensure that precise realignment of the vermilion border was achieved.
Peng Advancement Flap Text: The defect edges were debeveled with a #15 scalpel blade.  Given the location of the defect, shape of the defect and the proximity to free margins a Peng advancement flap was deemed most appropriate.  Using a sterile surgical marker, an appropriate advancement flap was drawn incorporating the defect and placing the expected incisions within the relaxed skin tension lines where possible. The area thus outlined was incised deep to adipose tissue with a #15 scalpel blade.  The skin margins were undermined to an appropriate distance in all directions utilizing iris scissors.
Hatchet Flap Text: The defect edges were debeveled with a #15 scalpel blade.  Given the location of the defect, shape of the defect and the proximity to free margins a hatchet flap was deemed most appropriate.  Using a sterile surgical marker, an appropriate hatchet flap was drawn incorporating the defect and placing the expected incisions within the relaxed skin tension lines where possible.    The area thus outlined was incised deep to adipose tissue with a #15 scalpel blade.  The skin margins were undermined to an appropriate distance in all directions utilizing iris scissors.
Rotation Flap Text: The defect edges were debeveled with a #15 scalpel blade.  Given the location of the defect, shape of the defect and the proximity to free margins a rotation flap was deemed most appropriate.  Using a sterile surgical marker, an appropriate rotation flap was drawn incorporating the defect and placing the expected incisions within the relaxed skin tension lines where possible.    The area thus outlined was incised deep to adipose tissue with a #15 scalpel blade.  The skin margins were undermined to an appropriate distance in all directions utilizing iris scissors.
Spiral Flap Text: The defect edges were debeveled with a #15 scalpel blade.  Given the location of the defect, shape of the defect and the proximity to free margins a spiral flap was deemed most appropriate.  Using a sterile surgical marker, an appropriate rotation flap was drawn incorporating the defect and placing the expected incisions within the relaxed skin tension lines where possible. The area thus outlined was incised deep to adipose tissue with a #15 scalpel blade.  The skin margins were undermined to an appropriate distance in all directions utilizing iris scissors.
Staged Advancement Flap Text: The defect edges were debeveled with a #15 scalpel blade.  Given the location of the defect, shape of the defect and the proximity to free margins a staged advancement flap was deemed most appropriate.  Using a sterile surgical marker, an appropriate advancement flap was drawn incorporating the defect and placing the expected incisions within the relaxed skin tension lines where possible. The area thus outlined was incised deep to adipose tissue with a #15 scalpel blade.  The skin margins were undermined to an appropriate distance in all directions utilizing iris scissors.
Star Wedge Flap Text: The defect edges were debeveled with a #15 scalpel blade.  Given the location of the defect, shape of the defect and the proximity to free margins a star wedge flap was deemed most appropriate.  Using a sterile surgical marker, an appropriate rotation flap was drawn incorporating the defect and placing the expected incisions within the relaxed skin tension lines where possible. The area thus outlined was incised deep to adipose tissue with a #15 scalpel blade.  The skin margins were undermined to an appropriate distance in all directions utilizing iris scissors.
Transposition Flap Text: The defect edges were debeveled with a #15 scalpel blade.  Given the location of the defect and the proximity to free margins a transposition flap was deemed most appropriate.  Using a sterile surgical marker, an appropriate transposition flap was drawn incorporating the defect.    The area thus outlined was incised deep to adipose tissue with a #15 scalpel blade.  The skin margins were undermined to an appropriate distance in all directions utilizing iris scissors.
Muscle Hinge Flap Text: The defect edges were debeveled with a #15 scalpel blade.  Given the size, depth and location of the defect and the proximity to free margins a muscle hinge flap was deemed most appropriate.  Using a sterile surgical marker, an appropriate hinge flap was drawn incorporating the defect. The area thus outlined was incised with a #15 scalpel blade.  The skin margins were undermined to an appropriate distance in all directions utilizing iris scissors.
Mustarde Flap Text: The defect edges were debeveled with a #15 scalpel blade.  Given the size, depth and location of the defect and the proximity to free margins a Mustarde flap was deemed most appropriate.  Using a sterile surgical marker, an appropriate flap was drawn incorporating the defect. The area thus outlined was incised with a #15 scalpel blade.  The skin margins were undermined to an appropriate distance in all directions utilizing iris scissors.
Nasal Turnover Hinge Flap Text: The defect edges were debeveled with a #15 scalpel blade.  Given the size, depth, location of the defect and the defect being full thickness a nasal turnover hinge flap was deemed most appropriate.  Using a sterile surgical marker, an appropriate hinge flap was drawn incorporating the defect. The area thus outlined was incised with a #15 scalpel blade. The flap was designed to recreate the nasal mucosal lining and the alar rim. The skin margins were undermined to an appropriate distance in all directions utilizing iris scissors.
Nasalis-Muscle-Based Myocutaneous Island Pedicle Flap Text: Using a #15 blade, an incision was made around the donor flap to the level of the nasalis muscle. Wide lateral undermining was then performed in both the subcutaneous plane above the nasalis muscle, and in a submuscular plane just above periosteum. This allowed the formation of a free nasalis muscle axial pedicle (based on the angular artery) which was still attached to the actual cutaneous flap, increasing its mobility and vascular viability. Hemostasis was obtained with pinpoint electrocoagulation. The flap was mobilized into position and the pivotal anchor points positioned and stabilized with buried interrupted sutures. Subcutaneous and dermal tissues were closed in a multilayered fashion with sutures. Tissue redundancies were excised, and the epidermal edges were apposed without significant tension and sutured with sutures.
Orbicularis Oris Muscle Flap Text: The defect edges were debeveled with a #15 scalpel blade.  Given that the defect affected the competency of the oral sphincter an orbicularis oris muscle flap was deemed most appropriate to restore this competency and normal muscle function.  Using a sterile surgical marker, an appropriate flap was drawn incorporating the defect. The area thus outlined was incised with a #15 scalpel blade.
Melolabial Transposition Flap Text: The defect edges were debeveled with a #15 scalpel blade.  Given the location of the defect and the proximity to free margins a melolabial flap was deemed most appropriate.  Using a sterile surgical marker, an appropriate melolabial transposition flap was drawn incorporating the defect.    The area thus outlined was incised deep to adipose tissue with a #15 scalpel blade.  The skin margins were undermined to an appropriate distance in all directions utilizing iris scissors.
Rhombic Flap Text: The defect edges were debeveled with a #15 scalpel blade.  Given the location of the defect and the proximity to free margins a rhombic flap was deemed most appropriate.  Using a sterile surgical marker, an appropriate rhombic flap was drawn incorporating the defect.    The area thus outlined was incised deep to adipose tissue with a #15 scalpel blade.  The skin margins were undermined to an appropriate distance in all directions utilizing iris scissors.
Rhomboid Transposition Flap Text: The defect edges were debeveled with a #15 scalpel blade.  Given the location of the defect and the proximity to free margins a rhomboid transposition flap was deemed most appropriate.  Using a sterile surgical marker, an appropriate rhomboid flap was drawn incorporating the defect.    The area thus outlined was incised deep to adipose tissue with a #15 scalpel blade.  The skin margins were undermined to an appropriate distance in all directions utilizing iris scissors.
Bi-Rhombic Flap Text: The defect edges were debeveled with a #15 scalpel blade.  Given the location of the defect and the proximity to free margins a bi-rhombic flap was deemed most appropriate.  Using a sterile surgical marker, an appropriate rhombic flap was drawn incorporating the defect. The area thus outlined was incised deep to adipose tissue with a #15 scalpel blade.  The skin margins were undermined to an appropriate distance in all directions utilizing iris scissors.
Helical Rim Advancement Flap Text: The defect edges were debeveled with a #15 blade scalpel.  Given the location of the defect and the proximity to free margins (helical rim) a double helical rim advancement flap was deemed most appropriate.  Using a sterile surgical marker, the appropriate advancement flaps were drawn incorporating the defect and placing the expected incisions between the helical rim and antihelix where possible.  The area thus outlined was incised through and through with a #15 scalpel blade.  With a skin hook and iris scissors, the flaps were gently and sharply undermined and freed up.
Bilateral Helical Rim Advancement Flap Text: The defect edges were debeveled with a #15 blade scalpel.  Given the location of the defect and the proximity to free margins (helical rim) a bilateral helical rim advancement flap was deemed most appropriate.  Using a sterile surgical marker, the appropriate advancement flaps were drawn incorporating the defect and placing the expected incisions between the helical rim and antihelix where possible.  The area thus outlined was incised through and through with a #15 scalpel blade.  With a skin hook and iris scissors, the flaps were gently and sharply undermined and freed up.
Ear Star Wedge Flap Text: The defect edges were debeveled with a #15 blade scalpel.  Given the location of the defect and the proximity to free margins (helical rim) an ear star wedge flap was deemed most appropriate.  Using a sterile surgical marker, the appropriate flap was drawn incorporating the defect and placing the expected incisions between the helical rim and antihelix where possible.  The area thus outlined was incised through and through with a #15 scalpel blade.
Banner Transposition Flap Text: The defect edges were debeveled with a #15 scalpel blade.  Given the location of the defect and the proximity to free margins a Banner transposition flap was deemed most appropriate.  Using a sterile surgical marker, an appropriate flap drawn around the defect. The area thus outlined was incised deep to adipose tissue with a #15 scalpel blade.  The skin margins were undermined to an appropriate distance in all directions utilizing iris scissors.
Bilobed Flap Text: The defect edges were debeveled with a #15 scalpel blade.  Given the location of the defect and the proximity to free margins a bilobe flap was deemed most appropriate.  Using a sterile surgical marker, an appropriate bilobe flap drawn around the defect.    The area thus outlined was incised deep to adipose tissue with a #15 scalpel blade.  The skin margins were undermined to an appropriate distance in all directions utilizing iris scissors.
Bilobed Transposition Flap Text: The defect edges were debeveled with a #15 scalpel blade.  Given the location of the defect and the proximity to free margins a bilobed transposition flap was deemed most appropriate.  Using a sterile surgical marker, an appropriate bilobe flap drawn around the defect.    The area thus outlined was incised deep to adipose tissue with a #15 scalpel blade.  The skin margins were undermined to an appropriate distance in all directions utilizing iris scissors.
Trilobed Flap Text: The defect edges were debeveled with a #15 scalpel blade.  Given the location of the defect and the proximity to free margins a trilobed flap was deemed most appropriate.  Using a sterile surgical marker, an appropriate trilobed flap drawn around the defect.    The area thus outlined was incised deep to adipose tissue with a #15 scalpel blade.  The skin margins were undermined to an appropriate distance in all directions utilizing iris scissors.
Dorsal Nasal Flap Text: The defect edges were debeveled with a #15 scalpel blade.  Given the location of the defect and the proximity to free margins a dorsal nasal flap was deemed most appropriate.  Using a sterile surgical marker, an appropriate dorsal nasal flap was drawn around the defect.    The area thus outlined was incised deep to adipose tissue with a #15 scalpel blade.  The skin margins were undermined to an appropriate distance in all directions utilizing iris scissors.
Island Pedicle Flap Text: The defect edges were debeveled with a #15 scalpel blade.  Given the location of the defect, shape of the defect and the proximity to free margins an island pedicle advancement flap was deemed most appropriate.  Using a sterile surgical marker, an appropriate advancement flap was drawn incorporating the defect, outlining the appropriate donor tissue and placing the expected incisions within the relaxed skin tension lines where possible.    The area thus outlined was incised deep to adipose tissue with a #15 scalpel blade.  The skin margins were undermined to an appropriate distance in all directions around the primary defect and laterally outward around the island pedicle utilizing iris scissors.  There was minimal undermining beneath the pedicle flap.
Island Pedicle Flap With Canthal Suspension Text: The defect edges were debeveled with a #15 scalpel blade.  Given the location of the defect, shape of the defect and the proximity to free margins an island pedicle advancement flap was deemed most appropriate.  Using a sterile surgical marker, an appropriate advancement flap was drawn incorporating the defect, outlining the appropriate donor tissue and placing the expected incisions within the relaxed skin tension lines where possible. The area thus outlined was incised deep to adipose tissue with a #15 scalpel blade.  The skin margins were undermined to an appropriate distance in all directions around the primary defect and laterally outward around the island pedicle utilizing iris scissors.  There was minimal undermining beneath the pedicle flap. A suspension suture was placed in the canthal tendon to prevent tension and prevent ectropion.
Alar Island Pedicle Flap Text: The defect edges were debeveled with a #15 scalpel blade.  Given the location of the defect, shape of the defect and the proximity to the alar rim an island pedicle advancement flap was deemed most appropriate.  Using a sterile surgical marker, an appropriate advancement flap was drawn incorporating the defect, outlining the appropriate donor tissue and placing the expected incisions within the nasal ala running parallel to the alar rim. The area thus outlined was incised with a #15 scalpel blade.  The skin margins were undermined minimally to an appropriate distance in all directions around the primary defect and laterally outward around the island pedicle utilizing iris scissors.  There was minimal undermining beneath the pedicle flap.
Double Island Pedicle Flap Text: The defect edges were debeveled with a #15 scalpel blade.  Given the location of the defect, shape of the defect and the proximity to free margins a double island pedicle advancement flap was deemed most appropriate.  Using a sterile surgical marker, an appropriate advancement flap was drawn incorporating the defect, outlining the appropriate donor tissue and placing the expected incisions within the relaxed skin tension lines where possible.    The area thus outlined was incised deep to adipose tissue with a #15 scalpel blade.  The skin margins were undermined to an appropriate distance in all directions around the primary defect and laterally outward around the island pedicle utilizing iris scissors.  There was minimal undermining beneath the pedicle flap.
Island Pedicle Flap-Requiring Vessel Identification Text: The defect edges were debeveled with a #15 scalpel blade.  Given the location of the defect, shape of the defect and the proximity to free margins an island pedicle advancement flap was deemed most appropriate.  Using a sterile surgical marker, an appropriate advancement flap was drawn, based on the axial vessel mentioned above, incorporating the defect, outlining the appropriate donor tissue and placing the expected incisions within the relaxed skin tension lines where possible.    The area thus outlined was incised deep to adipose tissue with a #15 scalpel blade.  The skin margins were undermined to an appropriate distance in all directions around the primary defect and laterally outward around the island pedicle utilizing iris scissors.  There was minimal undermining beneath the pedicle flap.
Keystone Flap Text: The defect edges were debeveled with a #15 scalpel blade.  Given the location of the defect, shape of the defect a keystone flap was deemed most appropriate.  Using a sterile surgical marker, an appropriate keystone flap was drawn incorporating the defect, outlining the appropriate donor tissue and placing the expected incisions within the relaxed skin tension lines where possible. The area thus outlined was incised deep to adipose tissue with a #15 scalpel blade.  The skin margins were undermined to an appropriate distance in all directions around the primary defect and laterally outward around the flap utilizing iris scissors.
O-T Plasty Text: The defect edges were debeveled with a #15 scalpel blade.  Given the location of the defect, shape of the defect and the proximity to free margins an O-T plasty was deemed most appropriate.  Using a sterile surgical marker, an appropriate O-T plasty was drawn incorporating the defect and placing the expected incisions within the relaxed skin tension lines where possible.    The area thus outlined was incised deep to adipose tissue with a #15 scalpel blade.  The skin margins were undermined to an appropriate distance in all directions utilizing iris scissors.
O-Z Plasty Text: The defect edges were debeveled with a #15 scalpel blade.  Given the location of the defect, shape of the defect and the proximity to free margins an O-Z plasty (double transposition flap) was deemed most appropriate.  Using a sterile surgical marker, the appropriate transposition flaps were drawn incorporating the defect and placing the expected incisions within the relaxed skin tension lines where possible.    The area thus outlined was incised deep to adipose tissue with a #15 scalpel blade.  The skin margins were undermined to an appropriate distance in all directions utilizing iris scissors.  Hemostasis was achieved with electrocautery.  The flaps were then transposed into place, one clockwise and the other counterclockwise, and anchored with interrupted buried subcutaneous sutures.
Double O-Z Plasty Text: The defect edges were debeveled with a #15 scalpel blade.  Given the location of the defect, shape of the defect and the proximity to free margins a Double O-Z plasty (double transposition flap) was deemed most appropriate.  Using a sterile surgical marker, the appropriate transposition flaps were drawn incorporating the defect and placing the expected incisions within the relaxed skin tension lines where possible. The area thus outlined was incised deep to adipose tissue with a #15 scalpel blade.  The skin margins were undermined to an appropriate distance in all directions utilizing iris scissors.  Hemostasis was achieved with electrocautery.  The flaps were then transposed into place, one clockwise and the other counterclockwise, and anchored with interrupted buried subcutaneous sutures.
V-Y Plasty Text: The defect edges were debeveled with a #15 scalpel blade.  Given the location of the defect, shape of the defect and the proximity to free margins an V-Y advancement flap was deemed most appropriate.  Using a sterile surgical marker, an appropriate advancement flap was drawn incorporating the defect and placing the expected incisions within the relaxed skin tension lines where possible.    The area thus outlined was incised deep to adipose tissue with a #15 scalpel blade.  The skin margins were undermined to an appropriate distance in all directions utilizing iris scissors.
H Plasty Text: Given the location of the defect, shape of the defect and the proximity to free margins a H-plasty was deemed most appropriate for repair.  Using a sterile surgical marker, the appropriate advancement arms of the H-plasty were drawn incorporating the defect and placing the expected incisions within the relaxed skin tension lines where possible. The area thus outlined was incised deep to adipose tissue with a #15 scalpel blade. The skin margins were undermined to an appropriate distance in all directions utilizing iris scissors.  The opposing advancement arms were then advanced into place in opposite direction and anchored with interrupted buried subcutaneous sutures.
W Plasty Text: The lesion was extirpated to the level of the fat with a #15 scalpel blade.  Given the location of the defect, shape of the defect and the proximity to free margins a W-plasty was deemed most appropriate for repair.  Using a sterile surgical marker, the appropriate transposition arms of the W-plasty were drawn incorporating the defect and placing the expected incisions within the relaxed skin tension lines where possible.    The area thus outlined was incised deep to adipose tissue with a #15 scalpel blade.  The skin margins were undermined to an appropriate distance in all directions utilizing iris scissors.  The opposing transposition arms were then transposed into place in opposite direction and anchored with interrupted buried subcutaneous sutures.
Z Plasty Text: The lesion was extirpated to the level of the fat with a #15 scalpel blade.  Given the location of the defect, shape of the defect and the proximity to free margins a Z-plasty was deemed most appropriate for repair.  Using a sterile surgical marker, the appropriate transposition arms of the Z-plasty were drawn incorporating the defect and placing the expected incisions within the relaxed skin tension lines where possible.    The area thus outlined was incised deep to adipose tissue with a #15 scalpel blade.  The skin margins were undermined to an appropriate distance in all directions utilizing iris scissors.  The opposing transposition arms were then transposed into place in opposite direction and anchored with interrupted buried subcutaneous sutures.
Zygomaticofacial Flap Text: Given the location of the defect, shape of the defect and the proximity to free margins a zygomaticofacial flap was deemed most appropriate for repair.  Using a sterile surgical marker, the appropriate flap was drawn incorporating the defect and placing the expected incisions within the relaxed skin tension lines where possible. The area thus outlined was incised deep to adipose tissue with a #15 scalpel blade with preservation of a vascular pedicle.  The skin margins were undermined to an appropriate distance in all directions utilizing iris scissors.  The flap was then placed into the defect and anchored with interrupted buried subcutaneous sutures.
Cheek Interpolation Flap Text: A decision was made to reconstruct the defect utilizing an interpolation axial flap and a staged reconstruction.  A telfa template was made of the defect.  This telfa template was then used to outline the Cheek Interpolation flap.  The donor area for the pedicle flap was then injected with anesthesia.  The flap was excised through the skin and subcutaneous tissue down to the layer of the underlying musculature.  The interpolation flap was carefully excised within this deep plane to maintain its blood supply.  The edges of the donor site were undermined.   The donor site was closed in a primary fashion.  The pedicle was then rotated into position and sutured.  Once the tube was sutured into place, adequate blood supply was confirmed with blanching and refill.  The pedicle was then wrapped with xeroform gauze and dressed appropriately with a telfa and gauze bandage to ensure continued blood supply and protect the attached pedicle.
Cheek-To-Nose Interpolation Flap Text: A decision was made to reconstruct the defect utilizing an interpolation axial flap and a staged reconstruction.  A telfa template was made of the defect.  This telfa template was then used to outline the Cheek-To-Nose Interpolation flap.  The donor area for the pedicle flap was then injected with anesthesia.  The flap was excised through the skin and subcutaneous tissue down to the layer of the underlying musculature.  The interpolation flap was carefully excised within this deep plane to maintain its blood supply.  The edges of the donor site were undermined.   The donor site was closed in a primary fashion.  The pedicle was then rotated into position and sutured.  Once the tube was sutured into place, adequate blood supply was confirmed with blanching and refill.  The pedicle was then wrapped with xeroform gauze and dressed appropriately with a telfa and gauze bandage to ensure continued blood supply and protect the attached pedicle.
Interpolation Flap Text: A decision was made to reconstruct the defect utilizing an interpolation axial flap and a staged reconstruction.  A telfa template was made of the defect.  This telfa template was then used to outline the interpolation flap.  The donor area for the pedicle flap was then injected with anesthesia.  The flap was excised through the skin and subcutaneous tissue down to the layer of the underlying musculature.  The interpolation flap was carefully excised within this deep plane to maintain its blood supply.  The edges of the donor site were undermined.   The donor site was closed in a primary fashion.  The pedicle was then rotated into position and sutured.  Once the tube was sutured into place, adequate blood supply was confirmed with blanching and refill.  The pedicle was then wrapped with xeroform gauze and dressed appropriately with a telfa and gauze bandage to ensure continued blood supply and protect the attached pedicle.
Melolabial Interpolation Flap Text: A decision was made to reconstruct the defect utilizing an interpolation axial flap and a staged reconstruction.  A telfa template was made of the defect.  This telfa template was then used to outline the melolabial interpolation flap.  The donor area for the pedicle flap was then injected with anesthesia.  The flap was excised through the skin and subcutaneous tissue down to the layer of the underlying musculature.  The pedicle flap was carefully excised within this deep plane to maintain its blood supply.  The edges of the donor site were undermined.   The donor site was closed in a primary fashion.  The pedicle was then rotated into position and sutured.  Once the tube was sutured into place, adequate blood supply was confirmed with blanching and refill.  The pedicle was then wrapped with xeroform gauze and dressed appropriately with a telfa and gauze bandage to ensure continued blood supply and protect the attached pedicle.
Mastoid Interpolation Flap Text: A decision was made to reconstruct the defect utilizing an interpolation axial flap and a staged reconstruction.  A telfa template was made of the defect.  This telfa template was then used to outline the mastoid interpolation flap.  The donor area for the pedicle flap was then injected with anesthesia.  The flap was excised through the skin and subcutaneous tissue down to the layer of the underlying musculature.  The pedicle flap was carefully excised within this deep plane to maintain its blood supply.  The edges of the donor site were undermined.   The donor site was closed in a primary fashion.  The pedicle was then rotated into position and sutured.  Once the tube was sutured into place, adequate blood supply was confirmed with blanching and refill.  The pedicle was then wrapped with xeroform gauze and dressed appropriately with a telfa and gauze bandage to ensure continued blood supply and protect the attached pedicle.
Posterior Auricular Interpolation Flap Text: A decision was made to reconstruct the defect utilizing an interpolation axial flap and a staged reconstruction.  A telfa template was made of the defect.  This telfa template was then used to outline the posterior auricular interpolation flap.  The donor area for the pedicle flap was then injected with anesthesia.  The flap was excised through the skin and subcutaneous tissue down to the layer of the underlying musculature.  The pedicle flap was carefully excised within this deep plane to maintain its blood supply.  The edges of the donor site were undermined.   The donor site was closed in a primary fashion.  The pedicle was then rotated into position and sutured.  Once the tube was sutured into place, adequate blood supply was confirmed with blanching and refill.  The pedicle was then wrapped with xeroform gauze and dressed appropriately with a telfa and gauze bandage to ensure continued blood supply and protect the attached pedicle.
Paramedian Forehead Flap Text: A decision was made to reconstruct the defect utilizing an interpolation axial flap and a staged reconstruction.  A telfa template was made of the defect.  This telfa template was then used to outline the paramedian forehead pedicle flap.  The donor area for the pedicle flap was then injected with anesthesia.  The flap was excised through the skin and subcutaneous tissue down to the layer of the underlying musculature.  The pedicle flap was carefully excised within this deep plane to maintain its blood supply.  The edges of the donor site were undermined.   The donor site was closed in a primary fashion.  The pedicle was then rotated into position and sutured.  Once the tube was sutured into place, adequate blood supply was confirmed with blanching and refill.  The pedicle was then wrapped with xeroform gauze and dressed appropriately with a telfa and gauze bandage to ensure continued blood supply and protect the attached pedicle.
Abbe Flap (Upper To Lower Lip) Text: The defect of the lower lip was assessed and measured.  Given the location and size of the defect, an Abbe flap was deemed most appropriate.  Using a sterile surgical marker, an appropriate Abbe flap was measured and drawn on the upper lip. Local anesthesia was then infiltrated.  A scalpel was then used to incise the upper lip through and through the skin, vermilion, muscle and mucosa, leaving the flap pedicled on the opposite side.  The flap was then rotated and transferred to the lower lip defect.  The flap was then sutured into place with a three layer technique, closing the orbicularis oris muscle layer with subcutaneous buried sutures, followed by a mucosal layer and an epidermal layer.
Abbe Flap (Lower To Upper Lip) Text: The defect of the upper lip was assessed and measured.  Given the location and size of the defect, an Abbe flap was deemed most appropriate.  Using a sterile surgical marker, an appropriate Abbe flap was measured and drawn on the lower lip. Local anesthesia was then infiltrated. A scalpel was then used to incise the upper lip through and through the skin, vermilion, muscle and mucosa, leaving the flap pedicled on the opposite side.  The flap was then rotated and transferred to the lower lip defect.  The flap was then sutured into place with a three layer technique, closing the orbicularis oris muscle layer with subcutaneous buried sutures, followed by a mucosal layer and an epidermal layer.
Estlander Flap (Upper To Lower Lip) Text: The defect of the lower lip was assessed and measured.  Given the location and size of the defect, an Estlander flap was deemed most appropriate.  Using a sterile surgical marker, an appropriate Estlander flap was measured and drawn on the upper lip. Local anesthesia was then infiltrated. A scalpel was then used to incise the lateral aspect of the flap, through skin, muscle and mucosa, leaving the flap pedicled medially.  The flap was then rotated and positioned to fill the lower lip defect.  The flap was then sutured into place with a three layer technique, closing the orbicularis oris muscle layer with subcutaneous buried sutures, followed by a mucosal layer and an epidermal layer.
Estlander Flap (Lower To Upper Lip) Text: The defect of the lower lip was assessed and measured.  Given the location and size of the defect, an Estlander flap was deemed most appropriate.  Using a sterile surgical marker, an appropriate Estlander flap was measured and drawn on the upper lip. Local anesthesia was then infiltrated. A scalpel was then used to incise the lateral aspect of the flap, through skin, muscle and mucosa, leaving the flap pedicled medially.  The flap was then rotated and positioned to fill the lower lip defect.  The flap was then sutured into place with a three layer technique, closing the orbicularis oris muscle layer with subcutaneous buried sutures, followed by a mucosal layer and an epidermal layer.
Lip Wedge Excision Repair Text: Given the location of the defect and the proximity to free margins a full thickness wedge repair was deemed most appropriate.  Using a sterile surgical marker, the appropriate repair was drawn incorporating the defect and placing the expected incisions perpendicular to the vermilion border.  The vermilion border was also meticulously outlined to ensure appropriate reapproximation during the repair.  The area thus outlined was incised through and through with a #15 scalpel blade.  The muscularis and dermis were reaproximated with deep sutures following hemostasis. Care was taken to realign the vermilion border before proceeding with the superficial closure.  Once the vermilion was realigned the superfical and mucosal closure was finished.
Ftsg Text: The defect edges were debeveled with a #15 scalpel blade.  Given the location of the defect, shape of the defect and the proximity to free margins a full thickness skin graft was deemed most appropriate.  Using a sterile surgical marker, the primary defect shape was transferred to the donor site. The area thus outlined was incised deep to adipose tissue with a #15 scalpel blade.  The harvested graft was then trimmed of adipose tissue until only dermis and epidermis was left.  The skin margins of the secondary defect were undermined to an appropriate distance in all directions utilizing iris scissors.  The secondary defect was closed with interrupted buried subcutaneous sutures.  The skin edges were then re-apposed with running  sutures.  The skin graft was then placed in the primary defect and oriented appropriately.
Split-Thickness Skin Graft Text: The defect edges were debeveled with a #15 scalpel blade.  Given the location of the defect, shape of the defect and the proximity to free margins a split thickness skin graft was deemed most appropriate.  Using a sterile surgical marker, the primary defect shape was transferred to the donor site. The split thickness graft was then harvested.  The skin graft was then placed in the primary defect and oriented appropriately.
Burow's Graft Text: The defect edges were debeveled with a #15 scalpel blade.  Given the location of the defect, shape of the defect, the proximity to free margins and the presence of a standing cone deformity a Burow's skin graft was deemed most appropriate. The standing cone was removed and this tissue was then trimmed to the shape of the primary defect. The adipose tissue was also removed until only dermis and epidermis were left.  The skin margins of the secondary defect were undermined to an appropriate distance in all directions utilizing iris scissors.  The secondary defect was closed with interrupted buried subcutaneous sutures.  The skin edges were then re-apposed with running  sutures.  The skin graft was then placed in the primary defect and oriented appropriately.
Cartilage Graft Text: The defect edges were debeveled with a #15 scalpel blade.  Given the location of the defect, shape of the defect, the fact the defect involved a full thickness cartilage defect a cartilage graft was deemed most appropriate.  An appropriate donor site was identified, cleansed, and anesthetized. The cartilage graft was then harvested and transferred to the recipient site, oriented appropriately and then sutured into place.  The secondary defect was then repaired using a primary closure.
Composite Graft Text: The defect edges were debeveled with a #15 scalpel blade.  Given the location of the defect, shape of the defect, the proximity to free margins and the fact the defect was full thickness a composite graft was deemed most appropriate.  The defect was outline and then transferred to the donor site.  A full thickness graft was then excised from the donor site. The graft was then placed in the primary defect, oriented appropriately and then sutured into place.  The secondary defect was then repaired using a primary closure.
Epidermal Autograft Text: The defect edges were debeveled with a #15 scalpel blade.  Given the location of the defect, shape of the defect and the proximity to free margins an epidermal autograft was deemed most appropriate.  Using a sterile surgical marker, the primary defect shape was transferred to the donor site. The epidermal graft was then harvested.  The skin graft was then placed in the primary defect and oriented appropriately.
Dermal Autograft Text: The defect edges were debeveled with a #15 scalpel blade.  Given the location of the defect, shape of the defect and the proximity to free margins a dermal autograft was deemed most appropriate.  Using a sterile surgical marker, the primary defect shape was transferred to the donor site. The area thus outlined was incised deep to adipose tissue with a #15 scalpel blade.  The harvested graft was then trimmed of adipose and epidermal tissue until only dermis was left.  The skin graft was then placed in the primary defect and oriented appropriately.
Skin Substitute Text: The defect edges were debeveled with a #15 scalpel blade.  Given the location of the defect, shape of the defect and the proximity to free margins a skin substitute graft was deemed most appropriate.  The graft material was trimmed to fit the size of the defect. The graft was then placed in the primary defect and oriented appropriately.
Tissue Cultured Epidermal Autograft Text: The defect edges were debeveled with a #15 scalpel blade.  Given the location of the defect, shape of the defect and the proximity to free margins a tissue cultured epidermal autograft was deemed most appropriate.  The graft was then trimmed to fit the size of the defect.  The graft was then placed in the primary defect and oriented appropriately.
Xenograft Text: The defect edges were debeveled with a #15 scalpel blade.  Given the location of the defect, shape of the defect and the proximity to free margins a xenograft was deemed most appropriate.  The graft was then trimmed to fit the size of the defect.  The graft was then placed in the primary defect and oriented appropriately.
Purse String (Intermediate) Text: Given the location of the defect and the characteristics of the surrounding skin a purse string intermediate closure was deemed most appropriate.  Undermining was performed circumferentially around the surgical defect.  A purse string suture was then placed and tightened.
Purse String (Simple) Text: Given the location of the defect and the characteristics of the surrounding skin a purse string simple closure was deemed most appropriate.  Undermining was performed circumferentially around the surgical defect.  A purse string suture was then placed and tightened.
Partial Purse String (Intermediate) Text: Given the location of the defect and the characteristics of the surrounding skin an intermediate purse string closure was deemed most appropriate.  Undermining was performed circumferentially around the surgical defect.  A purse string suture was then placed and tightened. Wound tension of the circular defect prevented complete closure of the wound.
Partial Purse String (Simple) Text: Given the location of the defect and the characteristics of the surrounding skin a simple purse string closure was deemed most appropriate.  Undermining was performed circumferentially around the surgical defect.  A purse string suture was then placed and tightened. Wound tension of the circular defect prevented complete closure of the wound.
Complex Repair And Single Advancement Flap Text: The defect edges were debeveled with a #15 scalpel blade.  The primary defect was closed partially with a complex linear closure.  Given the location of the remaining defect, shape of the defect and the proximity to free margins a single advancement flap was deemed most appropriate for complete closure of the defect.  Using a sterile surgical marker, an appropriate advancement flap was drawn incorporating the defect and placing the expected incisions within the relaxed skin tension lines where possible.    The area thus outlined was incised deep to adipose tissue with a #15 scalpel blade.  The skin margins were undermined to an appropriate distance in all directions utilizing iris scissors.
Complex Repair And Double Advancement Flap Text: The defect edges were debeveled with a #15 scalpel blade.  The primary defect was closed partially with a complex linear closure.  Given the location of the remaining defect, shape of the defect and the proximity to free margins a double advancement flap was deemed most appropriate for complete closure of the defect.  Using a sterile surgical marker, an appropriate advancement flap was drawn incorporating the defect and placing the expected incisions within the relaxed skin tension lines where possible.    The area thus outlined was incised deep to adipose tissue with a #15 scalpel blade.  The skin margins were undermined to an appropriate distance in all directions utilizing iris scissors.
Complex Repair And Modified Advancement Flap Text: The defect edges were debeveled with a #15 scalpel blade.  The primary defect was closed partially with a complex linear closure.  Given the location of the remaining defect, shape of the defect and the proximity to free margins a modified advancement flap was deemed most appropriate for complete closure of the defect.  Using a sterile surgical marker, an appropriate advancement flap was drawn incorporating the defect and placing the expected incisions within the relaxed skin tension lines where possible.    The area thus outlined was incised deep to adipose tissue with a #15 scalpel blade.  The skin margins were undermined to an appropriate distance in all directions utilizing iris scissors.
Complex Repair And A-T Advancement Flap Text: The defect edges were debeveled with a #15 scalpel blade.  The primary defect was closed partially with a complex linear closure.  Given the location of the remaining defect, shape of the defect and the proximity to free margins an A-T advancement flap was deemed most appropriate for complete closure of the defect.  Using a sterile surgical marker, an appropriate advancement flap was drawn incorporating the defect and placing the expected incisions within the relaxed skin tension lines where possible.    The area thus outlined was incised deep to adipose tissue with a #15 scalpel blade.  The skin margins were undermined to an appropriate distance in all directions utilizing iris scissors.
Complex Repair And O-T Advancement Flap Text: The defect edges were debeveled with a #15 scalpel blade.  The primary defect was closed partially with a complex linear closure.  Given the location of the remaining defect, shape of the defect and the proximity to free margins an O-T advancement flap was deemed most appropriate for complete closure of the defect.  Using a sterile surgical marker, an appropriate advancement flap was drawn incorporating the defect and placing the expected incisions within the relaxed skin tension lines where possible.    The area thus outlined was incised deep to adipose tissue with a #15 scalpel blade.  The skin margins were undermined to an appropriate distance in all directions utilizing iris scissors.
Complex Repair And O-L Flap Text: The defect edges were debeveled with a #15 scalpel blade.  The primary defect was closed partially with a complex linear closure.  Given the location of the remaining defect, shape of the defect and the proximity to free margins an O-L flap was deemed most appropriate for complete closure of the defect.  Using a sterile surgical marker, an appropriate flap was drawn incorporating the defect and placing the expected incisions within the relaxed skin tension lines where possible.    The area thus outlined was incised deep to adipose tissue with a #15 scalpel blade.  The skin margins were undermined to an appropriate distance in all directions utilizing iris scissors.
Complex Repair And Bilobe Flap Text: The defect edges were debeveled with a #15 scalpel blade.  The primary defect was closed partially with a complex linear closure.  Given the location of the remaining defect, shape of the defect and the proximity to free margins a bilobe flap was deemed most appropriate for complete closure of the defect.  Using a sterile surgical marker, an appropriate advancement flap was drawn incorporating the defect and placing the expected incisions within the relaxed skin tension lines where possible.    The area thus outlined was incised deep to adipose tissue with a #15 scalpel blade.  The skin margins were undermined to an appropriate distance in all directions utilizing iris scissors.
Complex Repair And Melolabial Flap Text: The defect edges were debeveled with a #15 scalpel blade.  The primary defect was closed partially with a complex linear closure.  Given the location of the remaining defect, shape of the defect and the proximity to free margins a melolabial flap was deemed most appropriate for complete closure of the defect.  Using a sterile surgical marker, an appropriate advancement flap was drawn incorporating the defect and placing the expected incisions within the relaxed skin tension lines where possible.    The area thus outlined was incised deep to adipose tissue with a #15 scalpel blade.  The skin margins were undermined to an appropriate distance in all directions utilizing iris scissors.
Complex Repair And Rotation Flap Text: The defect edges were debeveled with a #15 scalpel blade.  The primary defect was closed partially with a complex linear closure.  Given the location of the remaining defect, shape of the defect and the proximity to free margins a rotation flap was deemed most appropriate for complete closure of the defect.  Using a sterile surgical marker, an appropriate advancement flap was drawn incorporating the defect and placing the expected incisions within the relaxed skin tension lines where possible.    The area thus outlined was incised deep to adipose tissue with a #15 scalpel blade.  The skin margins were undermined to an appropriate distance in all directions utilizing iris scissors.
Complex Repair And Rhombic Flap Text: The defect edges were debeveled with a #15 scalpel blade.  The primary defect was closed partially with a complex linear closure.  Given the location of the remaining defect, shape of the defect and the proximity to free margins a rhombic flap was deemed most appropriate for complete closure of the defect.  Using a sterile surgical marker, an appropriate advancement flap was drawn incorporating the defect and placing the expected incisions within the relaxed skin tension lines where possible.    The area thus outlined was incised deep to adipose tissue with a #15 scalpel blade.  The skin margins were undermined to an appropriate distance in all directions utilizing iris scissors.
Complex Repair And Transposition Flap Text: The defect edges were debeveled with a #15 scalpel blade.  The primary defect was closed partially with a complex linear closure.  Given the location of the remaining defect, shape of the defect and the proximity to free margins a transposition flap was deemed most appropriate for complete closure of the defect.  Using a sterile surgical marker, an appropriate advancement flap was drawn incorporating the defect and placing the expected incisions within the relaxed skin tension lines where possible.    The area thus outlined was incised deep to adipose tissue with a #15 scalpel blade.  The skin margins were undermined to an appropriate distance in all directions utilizing iris scissors.
Complex Repair And V-Y Plasty Text: The defect edges were debeveled with a #15 scalpel blade.  The primary defect was closed partially with a complex linear closure.  Given the location of the remaining defect, shape of the defect and the proximity to free margins a V-Y plasty was deemed most appropriate for complete closure of the defect.  Using a sterile surgical marker, an appropriate advancement flap was drawn incorporating the defect and placing the expected incisions within the relaxed skin tension lines where possible.    The area thus outlined was incised deep to adipose tissue with a #15 scalpel blade.  The skin margins were undermined to an appropriate distance in all directions utilizing iris scissors.
Complex Repair And M Plasty Text: The defect edges were debeveled with a #15 scalpel blade.  The primary defect was closed partially with a complex linear closure.  Given the location of the remaining defect, shape of the defect and the proximity to free margins an M plasty was deemed most appropriate for complete closure of the defect.  Using a sterile surgical marker, an appropriate advancement flap was drawn incorporating the defect and placing the expected incisions within the relaxed skin tension lines where possible.    The area thus outlined was incised deep to adipose tissue with a #15 scalpel blade.  The skin margins were undermined to an appropriate distance in all directions utilizing iris scissors.
Complex Repair And Double M Plasty Text: The defect edges were debeveled with a #15 scalpel blade.  The primary defect was closed partially with a complex linear closure.  Given the location of the remaining defect, shape of the defect and the proximity to free margins a double M plasty was deemed most appropriate for complete closure of the defect.  Using a sterile surgical marker, an appropriate advancement flap was drawn incorporating the defect and placing the expected incisions within the relaxed skin tension lines where possible.    The area thus outlined was incised deep to adipose tissue with a #15 scalpel blade.  The skin margins were undermined to an appropriate distance in all directions utilizing iris scissors.
Complex Repair And W Plasty Text: The defect edges were debeveled with a #15 scalpel blade.  The primary defect was closed partially with a complex linear closure.  Given the location of the remaining defect, shape of the defect and the proximity to free margins a W plasty was deemed most appropriate for complete closure of the defect.  Using a sterile surgical marker, an appropriate advancement flap was drawn incorporating the defect and placing the expected incisions within the relaxed skin tension lines where possible.    The area thus outlined was incised deep to adipose tissue with a #15 scalpel blade.  The skin margins were undermined to an appropriate distance in all directions utilizing iris scissors.
Complex Repair And Z Plasty Text: The defect edges were debeveled with a #15 scalpel blade.  The primary defect was closed partially with a complex linear closure.  Given the location of the remaining defect, shape of the defect and the proximity to free margins a Z plasty was deemed most appropriate for complete closure of the defect.  Using a sterile surgical marker, an appropriate advancement flap was drawn incorporating the defect and placing the expected incisions within the relaxed skin tension lines where possible.    The area thus outlined was incised deep to adipose tissue with a #15 scalpel blade.  The skin margins were undermined to an appropriate distance in all directions utilizing iris scissors.
Complex Repair And Dorsal Nasal Flap Text: The defect edges were debeveled with a #15 scalpel blade.  The primary defect was closed partially with a complex linear closure.  Given the location of the remaining defect, shape of the defect and the proximity to free margins a dorsal nasal flap was deemed most appropriate for complete closure of the defect.  Using a sterile surgical marker, an appropriate flap was drawn incorporating the defect and placing the expected incisions within the relaxed skin tension lines where possible.    The area thus outlined was incised deep to adipose tissue with a #15 scalpel blade.  The skin margins were undermined to an appropriate distance in all directions utilizing iris scissors.
Complex Repair And Ftsg Text: The defect edges were debeveled with a #15 scalpel blade.  The primary defect was closed partially with a complex linear closure.  Given the location of the defect, shape of the defect and the proximity to free margins a full thickness skin graft was deemed most appropriate to repair the remaining defect.  The graft was trimmed to fit the size of the remaining defect.  The graft was then placed in the primary defect, oriented appropriately, and sutured into place.
Complex Repair And Burow's Graft Text: The defect edges were debeveled with a #15 scalpel blade.  The primary defect was closed partially with a complex linear closure.  Given the location of the defect, shape of the defect, the proximity to free margins and the presence of a standing cone deformity a Burow's graft was deemed most appropriate to repair the remaining defect.  The graft was trimmed to fit the size of the remaining defect.  The graft was then placed in the primary defect, oriented appropriately, and sutured into place.
Complex Repair And Split-Thickness Skin Graft Text: The defect edges were debeveled with a #15 scalpel blade.  The primary defect was closed partially with a complex linear closure.  Given the location of the defect, shape of the defect and the proximity to free margins a split thickness skin graft was deemed most appropriate to repair the remaining defect.  The graft was trimmed to fit the size of the remaining defect.  The graft was then placed in the primary defect, oriented appropriately, and sutured into place.
Complex Repair And Epidermal Autograft Text: The defect edges were debeveled with a #15 scalpel blade.  The primary defect was closed partially with a complex linear closure.  Given the location of the defect, shape of the defect and the proximity to free margins an epidermal autograft was deemed most appropriate to repair the remaining defect.  The graft was trimmed to fit the size of the remaining defect.  The graft was then placed in the primary defect, oriented appropriately, and sutured into place.
Complex Repair And Dermal Autograft Text: The defect edges were debeveled with a #15 scalpel blade.  The primary defect was closed partially with a complex linear closure.  Given the location of the defect, shape of the defect and the proximity to free margins an dermal autograft was deemed most appropriate to repair the remaining defect.  The graft was trimmed to fit the size of the remaining defect.  The graft was then placed in the primary defect, oriented appropriately, and sutured into place.
Complex Repair And Tissue Cultured Epidermal Autograft Text: The defect edges were debeveled with a #15 scalpel blade.  The primary defect was closed partially with a complex linear closure.  Given the location of the defect, shape of the defect and the proximity to free margins an tissue cultured epidermal autograft was deemed most appropriate to repair the remaining defect.  The graft was trimmed to fit the size of the remaining defect.  The graft was then placed in the primary defect, oriented appropriately, and sutured into place.
Complex Repair And Xenograft Text: The defect edges were debeveled with a #15 scalpel blade.  The primary defect was closed partially with a complex linear closure.  Given the location of the defect, shape of the defect and the proximity to free margins a xenograft was deemed most appropriate to repair the remaining defect.  The graft was trimmed to fit the size of the remaining defect.  The graft was then placed in the primary defect, oriented appropriately, and sutured into place.
Complex Repair And Skin Substitute Graft Text: The defect edges were debeveled with a #15 scalpel blade.  The primary defect was closed partially with a complex linear closure.  Given the location of the remaining defect, shape of the defect and the proximity to free margins a skin substitute graft was deemed most appropriate to repair the remaining defect.  The graft was trimmed to fit the size of the remaining defect.  The graft was then placed in the primary defect, oriented appropriately, and sutured into place.
Path Notes (To The Dermatopathologist): Please check margins. Tagged at 12 o’clock
Consent was obtained from the patient. The risks and benefits to therapy were discussed in detail. Specifically, the risks of infection, scarring, bleeding, prolonged wound healing, incomplete removal, allergy to anesthesia, nerve injury and recurrence were addressed. Prior to the procedure, the treatment site was clearly identified and confirmed by the patient. All components of Universal Protocol/PAUSE Rule completed.
Post-Care Instructions: I reviewed with the patient in detail post-care instructions. Patient is not to engage in any heavy lifting, exercise, or swimming for the next 14 days. Should the patient develop any fevers, chills, bleeding, severe pain patient will contact the office immediately.
Home Suture Removal Text: Patient was provided a home suture removal kit and will remove their sutures at home.  If they have any questions or difficulties they will call the office.
Where Do You Want The Question To Include Opioid Counseling Located?: Case Summary Tab
Information: Selecting Yes will display possible errors in your note based on the variables you have selected. This validation is only offered as a suggestion for you. PLEASE NOTE THAT THE VALIDATION TEXT WILL BE REMOVED WHEN YOU FINALIZE YOUR NOTE. IF YOU WANT TO FAX A PRELIMINARY NOTE YOU WILL NEED TO TOGGLE THIS TO 'NO' IF YOU DO NOT WANT IT IN YOUR FAXED NOTE.

## 2023-01-11 ENCOUNTER — APPOINTMENT (RX ONLY)
Dept: URBAN - METROPOLITAN AREA CLINIC 155 | Facility: CLINIC | Age: 73
Setting detail: DERMATOLOGY
End: 2023-01-11

## 2023-01-11 DIAGNOSIS — Z48.02 ENCOUNTER FOR REMOVAL OF SUTURES: ICD-10-CM | Status: STABLE

## 2023-01-11 PROBLEM — C44.91 BASAL CELL CARCINOMA OF SKIN, UNSPECIFIED: Status: ACTIVE | Noted: 2023-01-11

## 2023-01-11 PROCEDURE — ? PRESCRIPTION

## 2023-01-11 PROCEDURE — ? ADDITIONAL NOTES

## 2023-01-11 PROCEDURE — 99024 POSTOP FOLLOW-UP VISIT: CPT

## 2023-01-11 PROCEDURE — ? DEFER

## 2023-01-11 PROCEDURE — ? COUNSELING

## 2023-01-11 NOTE — PROCEDURE: ADDITIONAL NOTES
Detail Level: Simple
Render Risk Assessment In Note?: no
Additional Notes: Previous excision at site showed residual BCC patient has since been scheduled for Mohs

## 2023-01-11 NOTE — PROCEDURE: DEFER
Reason To Defer Override: patient has Mohs 3/6/2023
Size Of Lesion In Cm (Optional): 0
Introduction Text (Please End With A Colon): The following procedure was deferred:
Detail Level: Detailed
Procedure To Be Performed At Next Visit: Mohs surgery

## 2023-01-15 RX ORDER — MUPIROCIN 20 MG/G
OINTMENT TOPICAL
Qty: 22 | Refills: 1 | Status: ERX

## 2023-02-15 ENCOUNTER — APPOINTMENT (RX ONLY)
Dept: URBAN - METROPOLITAN AREA CLINIC 156 | Facility: CLINIC | Age: 73
Setting detail: DERMATOLOGY
End: 2023-02-15

## 2023-02-15 DIAGNOSIS — Z85.828 PERSONAL HISTORY OF OTHER MALIGNANT NEOPLASM OF SKIN: ICD-10-CM | Status: STABLE

## 2023-02-15 DIAGNOSIS — L71.8 OTHER ROSACEA: ICD-10-CM

## 2023-02-15 DIAGNOSIS — L57.8 OTHER SKIN CHANGES DUE TO CHRONIC EXPOSURE TO NONIONIZING RADIATION: ICD-10-CM | Status: INADEQUATELY CONTROLLED

## 2023-02-15 DIAGNOSIS — Z85.820 PERSONAL HISTORY OF MALIGNANT MELANOMA OF SKIN: ICD-10-CM | Status: STABLE

## 2023-02-15 DIAGNOSIS — L57.0 ACTINIC KERATOSIS: ICD-10-CM | Status: INADEQUATELY CONTROLLED

## 2023-02-15 PROBLEM — C44.519 BASAL CELL CARCINOMA OF SKIN OF OTHER PART OF TRUNK: Status: ACTIVE | Noted: 2023-02-15

## 2023-02-15 PROCEDURE — 99214 OFFICE O/P EST MOD 30 MIN: CPT | Mod: 25

## 2023-02-15 PROCEDURE — ? PRESCRIPTION MEDICATION MANAGEMENT

## 2023-02-15 PROCEDURE — ? LIQUID NITROGEN

## 2023-02-15 PROCEDURE — ? COUNSELING

## 2023-02-15 PROCEDURE — ? PRESCRIPTION

## 2023-02-15 PROCEDURE — ? DEFER

## 2023-02-15 PROCEDURE — ? MEDICATION COUNSELING

## 2023-02-15 PROCEDURE — 17000 DESTRUCT PREMALG LESION: CPT

## 2023-02-15 RX ORDER — PHARMACY COMPOUNDING ACCESSORY
EACH MISCELLANEOUS
Qty: 30 | Refills: 3 | Status: ERX | COMMUNITY
Start: 2023-02-15

## 2023-02-15 RX ADMIN — Medication: at 00:00

## 2023-02-15 ASSESSMENT — LOCATION SIMPLE DESCRIPTION DERM
LOCATION SIMPLE: RIGHT FOREARM
LOCATION SIMPLE: LEFT FOREHEAD
LOCATION SIMPLE: RIGHT EYEBROW
LOCATION SIMPLE: NOSE
LOCATION SIMPLE: GLABELLA

## 2023-02-15 ASSESSMENT — LOCATION ZONE DERM
LOCATION ZONE: FACE
LOCATION ZONE: NOSE
LOCATION ZONE: ARM

## 2023-02-15 ASSESSMENT — LOCATION DETAILED DESCRIPTION DERM
LOCATION DETAILED: LEFT INFERIOR MEDIAL FOREHEAD
LOCATION DETAILED: GLABELLA
LOCATION DETAILED: RIGHT LATERAL EYEBROW
LOCATION DETAILED: RIGHT PROXIMAL DORSAL FOREARM
LOCATION DETAILED: NASAL ROOT

## 2023-02-15 NOTE — PROCEDURE: DEFER
Procedure To Be Performed At Next Visit: Mohs surgery
Reason To Defer Override: due to residual BCC present after excision patient has been scheduled for a Mohs on 3/6/23
Introduction Text (Please End With A Colon): The following procedure was deferred:
Size Of Lesion In Cm (Optional): 0
Detail Level: Detailed

## 2023-02-15 NOTE — PROCEDURE: PRESCRIPTION MEDICATION MANAGEMENT
Modify Regimen: Ivermectin 1% / Metrogel 0.75% \\nQuantity: 30.0 g\\nIvermectin 1% / Metrogel 0.75% (0.75% metronidazole, 1% ivermectin)\\nSig: Apply to entire face in the morning and before bed.
Samples Given: Rhofade 1 % topical cream \\nQuantity: 30.0 g  Days Supply: 30\\nSig: Apply to face every morning as needed.
Render In Strict Bullet Format?: No
Detail Level: Zone

## 2023-02-15 NOTE — PROCEDURE: MEDICATION COUNSELING
Picato Counseling:  I discussed with the patient the risks of Picato including but not limited to erythema, scaling, itching, weeping, crusting, and pain.
Cyclosporine Pregnancy And Lactation Text: This medication is Pregnancy Category C and it isn't know if it is safe during pregnancy. This medication is excreted in breast milk.
Cimzia Counseling:  I discussed with the patient the risks of Cimzia including but not limited to immunosuppression, allergic reactions and infections.  The patient understands that monitoring is required including a PPD at baseline and must alert us or the primary physician if symptoms of infection or other concerning signs are noted.
Imiquimod Pregnancy And Lactation Text: This medication is Pregnancy Category C. It is unknown if this medication is excreted in breast milk.
Oxybutynin Pregnancy And Lactation Text: This medication is Pregnancy Category B and is considered safe during pregnancy. It is unknown if it is excreted in breast milk.
Drysol Counseling:  I discussed with the patient the risks of drysol/aluminum chloride including but not limited to skin rash, itching, irritation, burning.
Doxycycline Pregnancy And Lactation Text: This medication is Pregnancy Category D and not consider safe during pregnancy. It is also excreted in breast milk but is considered safe for shorter treatment courses.
Hydroxyzine Counseling: Patient advised that the medication is sedating and not to drive a car after taking this medication.  Patient informed of potential adverse effects including but not limited to dry mouth, urinary retention, and blurry vision.  The patient verbalized understanding of the proper use and possible adverse effects of hydroxyzine.  All of the patient's questions and concerns were addressed.
Sarecycline Pregnancy And Lactation Text: This medication is Pregnancy Category D and not consider safe during pregnancy. It is also excreted in breast milk.
Nsaids Counseling: NSAID Counseling: I discussed with the patient that NSAIDs should be taken with food. Prolonged use of NSAIDs can result in the development of stomach ulcers.  Patient advised to stop taking NSAIDs if abdominal pain occurs.  The patient verbalized understanding of the proper use and possible adverse effects of NSAIDs.  All of the patient's questions and concerns were addressed.
Rinvoq Counseling: I discussed with the patient the risks of Rinvoq therapy including but not limited to upper respiratory tract infections, shingles, cold sores, bronchitis, nausea, cough, fever, acne, and headache. Live vaccines should be avoided.  This medication has been linked to serious infections; higher rate of mortality; malignancy and lymphoproliferative disorders; major adverse cardiovascular events; thrombosis; thrombocytopenia, anemia, and neutropenia; lipid elevations; liver enzyme elevations; and gastrointestinal perforations.
Odomzo Counseling- I discussed with the patient the risks of Odomzo including but not limited to nausea, vomiting, diarrhea, constipation, weight loss, changes in the sense of taste, decreased appetite, muscle spasms, and hair loss.  The patient verbalized understanding of the proper use and possible adverse effects of Odomzo.  All of the patient's questions and concerns were addressed.
Clofazimine Counseling:  I discussed with the patient the risks of clofazimine including but not limited to skin and eye pigmentation, liver damage, nausea/vomiting, gastrointestinal bleeding and allergy.
Valtrex Counseling: I discussed with the patient the risks of valacyclovir including but not limited to kidney damage, nausea, vomiting and severe allergy.  The patient understands that if the infection seems to be worsening or is not improving, they are to call.
Zoryve Counseling:  I discussed with the patient that Zoryve is not for use in the eyes, mouth or vagina. The most commonly reported side effects include diarrhea, headache, insomnia, application site pain, upper respiratory tract infections, and urinary tract infections.  All of the patient's questions and concerns were addressed.
Metronidazole Counseling:  I discussed with the patient the risks of metronidazole including but not limited to seizures, nausea/vomiting, a metallic taste in the mouth, nausea/vomiting and severe allergy.
Topical Steroids Applications Pregnancy And Lactation Text: Most topical steroids are considered safe to use during pregnancy and lactation.  Any topical steroid applied to the breast or nipple should be washed off before breastfeeding.
Spironolactone Pregnancy And Lactation Text: This medication can cause feminization of the male fetus and should be avoided during pregnancy. The active metabolite is also found in breast milk.
Azelaic Acid Pregnancy And Lactation Text: This medication is considered safe during pregnancy and breast feeding.
5-Fu Counseling: 5-Fluorouracil Counseling:  I discussed with the patient the risks of 5-fluorouracil including but not limited to erythema, scaling, itching, weeping, crusting, and pain.
Azithromycin Pregnancy And Lactation Text: This medication is considered safe during pregnancy and is also secreted in breast milk.
Simponi Pregnancy And Lactation Text: The risk during pregnancy and breastfeeding is uncertain with this medication.
Topical Retinoid counseling:  Patient advised to apply a pea-sized amount only at bedtime and wait 30 minutes after washing their face before applying.  If too drying, patient may add a non-comedogenic moisturizer. The patient verbalized understanding of the proper use and possible adverse effects of retinoids.  All of the patient's questions and concerns were addressed.
Ketoconazole Counseling:   Patient counseled regarding improving absorption with orange juice.  Adverse effects include but are not limited to breast enlargement, headache, diarrhea, nausea, upset stomach, liver function test abnormalities, taste disturbance, and stomach pain.  There is a rare possibility of liver failure that can occur when taking ketoconazole. The patient understands that monitoring of LFTs may be required, especially at baseline. The patient verbalized understanding of the proper use and possible adverse effects of ketoconazole.  All of the patient's questions and concerns were addressed.
Methotrexate Counseling:  Patient counseled regarding adverse effects of methotrexate including but not limited to nausea, vomiting, abnormalities in liver function tests. Patients may develop mouth sores, rash, diarrhea, and abnormalities in blood counts. The patient understands that monitoring is required including LFT's and blood counts.  There is a rare possibility of scarring of the liver and lung problems that can occur when taking methotrexate. Persistent nausea, loss of appetite, pale stools, dark urine, cough, and shortness of breath should be reported immediately. Patient advised to discontinue methotrexate treatment at least three months before attempting to become pregnant.  I discussed the need for folate supplements while taking methotrexate.  These supplements can decrease side effects during methotrexate treatment. The patient verbalized understanding of the proper use and possible adverse effects of methotrexate.  All of the patient's questions and concerns were addressed.
Ilumya Counseling: I discussed with the patient the risks of tildrakizumab including but not limited to immunosuppression, malignancy, posterior leukoencephalopathy syndrome, and serious infections.  The patient understands that monitoring is required including a PPD at baseline and must alert us or the primary physician if symptoms of infection or other concerning signs are noted.
Bexarotene Pregnancy And Lactation Text: This medication is Pregnancy Category X and should not be given to women who are pregnant or may become pregnant. This medication should not be used if you are breast feeding.
Erythromycin Counseling:  I discussed with the patient the risks of erythromycin including but not limited to GI upset, allergic reaction, drug rash, diarrhea, increase in liver enzymes, and yeast infections.
Cimzia Pregnancy And Lactation Text: This medication crosses the placenta but can be considered safe in certain situations. Cimzia may be excreted in breast milk.
Clofazimine Pregnancy And Lactation Text: This medication is Pregnancy Category C and isn't considered safe during pregnancy. It is excreted in breast milk.
Klisyri Counseling:  I discussed with the patient the risks of Klisyri including but not limited to erythema, scaling, itching, weeping, crusting, and pain.
Hydroxyzine Pregnancy And Lactation Text: This medication is not safe during pregnancy and should not be taken. It is also excreted in breast milk and breast feeding isn't recommended.
Nsaids Pregnancy And Lactation Text: These medications are considered safe up to 30 weeks gestation. It is excreted in breast milk.
Rinvoq Pregnancy And Lactation Text: Based on animal studies, Rinvoq may cause embryo-fetal harm when administered to pregnant women.  The medication should not be used in pregnancy.  Breastfeeding is not recommended during treatment and for 6 days after the last dose.
Propranolol Counseling:  I discussed with the patient the risks of propranolol including but not limited to low heart rate, low blood pressure, low blood sugar, restlessness and increased cold sensitivity. They should call the office if they experience any of these side effects.
Tetracycline Counseling: Patient counseled regarding possible photosensitivity and increased risk for sunburn.  Patient instructed to avoid sunlight, if possible.  When exposed to sunlight, patients should wear protective clothing, sunglasses, and sunscreen.  The patient was instructed to call the office immediately if the following severe adverse effects occur:  hearing changes, easy bruising/bleeding, severe headache, or vision changes.  The patient verbalized understanding of the proper use and possible adverse effects of tetracycline.  All of the patient's questions and concerns were addressed. Patient understands to avoid pregnancy while on therapy due to potential birth defects.
Valtrex Pregnancy And Lactation Text: this medication is Pregnancy Category B and is considered safe during pregnancy. This medication is not directly found in breast milk but it's metabolite acyclovir is present.
5-Fu Pregnancy And Lactation Text: This medication is Pregnancy Category X and contraindicated in pregnancy and in women who may become pregnant. It is unknown if this medication is excreted in breast milk.
Zoryve Pregnancy And Lactation Text: It is unknown if this medication can cause problems during pregnancy and breastfeeding.
Odomzo Pregnancy And Lactation Text: This medication is Pregnancy Category X and is absolutely contraindicated during pregnancy. It is unknown if it is excreted in breast milk.
Topical Sulfur Applications Counseling: Topical Sulfur Counseling: Patient counseled that this medication may cause skin irritation or allergic reactions.  In the event of skin irritation, the patient was advised to reduce the amount of the drug applied or use it less frequently.   The patient verbalized understanding of the proper use and possible adverse effects of topical sulfur application.  All of the patient's questions and concerns were addressed.
Skyrizi Counseling: I discussed with the patient the risks of risankizumab-rzaa including but not limited to immunosuppression, and serious infections.  The patient understands that monitoring is required including a PPD at baseline and must alert us or the primary physician if symptoms of infection or other concerning signs are noted.
Ketoconazole Pregnancy And Lactation Text: This medication is Pregnancy Category C and it isn't know if it is safe during pregnancy. It is also excreted in breast milk and breast feeding isn't recommended.
Metronidazole Pregnancy And Lactation Text: This medication is Pregnancy Category B and considered safe during pregnancy.  It is also excreted in breast milk.
Isotretinoin Counseling: Patient should get monthly blood tests, not donate blood, not drive at night if vision affected, not share medication, and not undergo elective surgery for 6 months after tx completed. Side effects reviewed, pt to contact office should one occur.
Benzoyl Peroxide Counseling: Patient counseled that medicine may cause skin irritation and bleach clothing.  In the event of skin irritation, the patient was advised to reduce the amount of the drug applied or use it less frequently.   The patient verbalized understanding of the proper use and possible adverse effects of benzoyl peroxide.  All of the patient's questions and concerns were addressed.
Protopic Counseling: Patient may experience a mild burning sensation during topical application. Protopic is not approved in children less than 2 years of age. There have been case reports of hematologic and skin malignancies in patients using topical calcineurin inhibitors although causality is questionable.
Use Enhanced Medication Counseling?: No
Azathioprine Counseling:  I discussed with the patient the risks of azathioprine including but not limited to myelosuppression, immunosuppression, hepatotoxicity, lymphoma, and infections.  The patient understands that monitoring is required including baseline LFTs, Creatinine, possible TPMP genotyping and weekly CBCs for the first month and then every 2 weeks thereafter.  The patient verbalized understanding of the proper use and possible adverse effects of azathioprine.  All of the patient's questions and concerns were addressed.
Methotrexate Pregnancy And Lactation Text: This medication is Pregnancy Category X and is known to cause fetal harm. This medication is excreted in breast milk.
Cosentyx Counseling:  I discussed with the patient the risks of Cosentyx including but not limited to worsening of Crohn's disease, immunosuppression, allergic reactions and infections.  The patient understands that monitoring is required including a PPD at baseline and must alert us or the primary physician if symptoms of infection or other concerning signs are noted.
Klisyri Pregnancy And Lactation Text: It is unknown if this medication can harm a developing fetus or if it is excreted in breast milk.
Bactrim Counseling:  I discussed with the patient the risks of sulfa antibiotics including but not limited to GI upset, allergic reaction, drug rash, diarrhea, dizziness, photosensitivity, and yeast infections.  Rarely, more serious reactions can occur including but not limited to aplastic anemia, agranulocytosis, methemoglobinemia, blood dyscrasias, liver or kidney failure, lung infiltrates or desquamative/blistering drug rashes.
Zyclara Counseling:  I discussed with the patient the risks of imiquimod including but not limited to erythema, scaling, itching, weeping, crusting, and pain.  Patient understands that the inflammatory response to imiquimod is variable from person to person and was educated regarded proper titration schedule.  If flu-like symptoms develop, patient knows to discontinue the medication and contact us.
Elidel Counseling: Patient may experience a mild burning sensation during topical application. Elidel is not approved in children less than 2 years of age. There have been case reports of hematologic and skin malignancies in patients using topical calcineurin inhibitors although causality is questionable.
Olanzapine Counseling- I discussed with the patient the common side effects of olanzapine including but are not limited to: lack of energy, dry mouth, increased appetite, sleepiness, tremor, constipation, dizziness, changes in behavior, or restlessness.  Explained that teenagers are more likely to experience headaches, abdominal pain, pain in the arms or legs, tiredness, and sleepiness.  Serious side effects include but are not limited: increased risk of death in elderly patients who are confused, have memory loss, or dementia-related psychosis; hyperglycemia; increased cholesterol and triglycerides; and weight gain.
Glycopyrrolate Pregnancy And Lactation Text: This medication is Pregnancy Category B and is considered safe during pregnancy. It is unknown if it is excreted breast milk.
Propranolol Pregnancy And Lactation Text: This medication is Pregnancy Category C and it isn't known if it is safe during pregnancy. It is excreted in breast milk.
Colchicine Counseling:  Patient counseled regarding adverse effects including but not limited to stomach upset (nausea, vomiting, stomach pain, or diarrhea).  Patient instructed to limit alcohol consumption while taking this medication.  Colchicine may reduce blood counts especially with prolonged use.  The patient understands that monitoring of kidney function and blood counts may be required, especially at baseline. The patient verbalized understanding of the proper use and possible adverse effects of colchicine.  All of the patient's questions and concerns were addressed.
Minocycline Counseling: Patient advised regarding possible photosensitivity and discoloration of the teeth, skin, lips, tongue and gums.  Patient instructed to avoid sunlight, if possible.  When exposed to sunlight, patients should wear protective clothing, sunglasses, and sunscreen.  The patient was instructed to call the office immediately if the following severe adverse effects occur:  hearing changes, easy bruising/bleeding, severe headache, or vision changes.  The patient verbalized understanding of the proper use and possible adverse effects of minocycline.  All of the patient's questions and concerns were addressed.
Terbinafine Counseling: Patient counseling regarding adverse effects of terbinafine including but not limited to headache, diarrhea, rash, upset stomach, liver function test abnormalities, itching, taste/smell disturbance, nausea, abdominal pain, and flatulence.  There is a rare possibility of liver failure that can occur when taking terbinafine.  The patient understands that a baseline LFT and kidney function test may be required. The patient verbalized understanding of the proper use and possible adverse effects of terbinafine.  All of the patient's questions and concerns were addressed.
Albendazole Counseling:  I discussed with the patient the risks of albendazole including but not limited to cytopenia, kidney damage, nausea/vomiting and severe allergy.  The patient understands that this medication is being used in an off-label manner.
Sotyktu Counseling:  I discussed the most common side effects of Sotyktu including: common cold, sore throat, sinus infections, cold sores, canker sores, folliculitis, and acne.  I also discussed more serious side effects of Sotyktu including but not limited to: serious allergic reactions; increased risk for infections such as TB; cancers such as lymphomas; rhabdomyolysis and elevated CPK; and elevated triglycerides and liver enzymes. 
Topical Sulfur Applications Pregnancy And Lactation Text: This medication is considered safe during pregnancy and breast feeding secondary to limited systemic absorption.
Benzoyl Peroxide Pregnancy And Lactation Text: This medication is Pregnancy Category C. It is unknown if benzoyl peroxide is excreted in breast milk.
Protopic Pregnancy And Lactation Text: This medication is Pregnancy Category C. It is unknown if this medication is excreted in breast milk when applied topically.
Tazorac Counseling:  Patient advised that medication is irritating and drying.  Patient may need to apply sparingly and wash off after an hour before eventually leaving it on overnight.  The patient verbalized understanding of the proper use and possible adverse effects of tazorac.  All of the patient's questions and concerns were addressed.
Infliximab Counseling:  I discussed with the patient the risks of infliximab including but not limited to myelosuppression, immunosuppression, autoimmune hepatitis, demyelinating diseases, lymphoma, and serious infections.  The patient understands that monitoring is required including a PPD at baseline and must alert us or the primary physician if symptoms of infection or other concerning signs are noted.
Bactrim Pregnancy And Lactation Text: This medication is Pregnancy Category D and is known to cause fetal risk.  It is also excreted in breast milk.
Cosentyx Pregnancy And Lactation Text: This medication is Pregnancy Category B and is considered safe during pregnancy. It is unknown if this medication is excreted in breast milk.
Dutasteride Counseling: Dustasteride Counseling:  I discussed with the patient the risks of use of dutasteride including but not limited to decreased libido, decreased ejaculate volume, and gynecomastia. Women who can become pregnant should not handle medication.  All of the patient's questions and concerns were addressed.
Isotretinoin Pregnancy And Lactation Text: This medication is Pregnancy Category X and is considered extremely dangerous during pregnancy. It is unknown if it is excreted in breast milk.
Xolair Counseling:  Patient informed of potential adverse effects including but not limited to fever, muscle aches, rash and allergic reactions.  The patient verbalized understanding of the proper use and possible adverse effects of Xolair.  All of the patient's questions and concerns were addressed.
Minoxidil Counseling: Minoxidil is a topical medication which can increase blood flow where it is applied. It is uncertain how this medication increases hair growth. Side effects are uncommon and include stinging and allergic reactions.
SSKI Counseling:  I discussed with the patient the risks of SSKI including but not limited to thyroid abnormalities, metallic taste, GI upset, fever, headache, acne, arthralgias, paraesthesias, lymphadenopathy, easy bleeding, arrhythmias, and allergic reaction.
Azathioprine Pregnancy And Lactation Text: This medication is Pregnancy Category D and isn't considered safe during pregnancy. It is unknown if this medication is excreted in breast milk.
Prednisone Counseling:  I discussed with the patient the risks of prolonged use of prednisone including but not limited to weight gain, insomnia, osteoporosis, mood changes, diabetes, susceptibility to infection, glaucoma and high blood pressure.  In cases where prednisone use is prolonged, patients should be monitored with blood pressure checks, serum glucose levels and an eye exam.  Additionally, the patient may need to be placed on GI prophylaxis, PCP prophylaxis, and calcium and vitamin D supplementation and/or a bisphosphonate.  The patient verbalized understanding of the proper use and the possible adverse effects of prednisone.  All of the patient's questions and concerns were addressed.
Hydroxychloroquine Counseling:  I discussed with the patient that a baseline ophthalmologic exam is needed at the start of therapy and every year thereafter while on therapy. A CBC may also be warranted for monitoring.  The side effects of this medication were discussed with the patient, including but not limited to agranulocytosis, aplastic anemia, seizures, rashes, retinopathy, and liver toxicity. Patient instructed to call the office should any adverse effect occur.  The patient verbalized understanding of the proper use and possible adverse effects of Plaquenil.  All the patient's questions and concerns were addressed.
Sotyktu Pregnancy And Lactation Text: There is insufficient data to evaluate whether or not Sotyktu is safe to use during pregnancy.   It is not known if Sotyktu passes into breast milk and whether or not it is safe to use when breastfeeding.  
Olanzapine Pregnancy And Lactation Text: This medication is pregnancy category C.   There are no adequate and well controlled trials with olanzapine in pregnant females.  Olanzapine should be used during pregnancy only if the potential benefit justifies the potential risk to the fetus.   In a study in lactating healthy women, olanzapine was excreted in breast milk.  It is recommended that women taking olanzapine should not breast feed.
Albendazole Pregnancy And Lactation Text: This medication is Pregnancy Category C and it isn't known if it is safe during pregnancy. It is also excreted in breast milk.
Carac Counseling:  I discussed with the patient the risks of Carac including but not limited to erythema, scaling, itching, weeping, crusting, and pain.
Cephalexin Counseling: I counseled the patient regarding use of cephalexin as an antibiotic for prophylactic and/or therapeutic purposes. Cephalexin (commonly prescribed under brand name Keflex) is a cephalosporin antibiotic which is active against numerous classes of bacteria, including most skin bacteria. Side effects may include nausea, diarrhea, gastrointestinal upset, rash, hives, yeast infections, and in rare cases, hepatitis, kidney disease, seizures, fever, confusion, neurologic symptoms, and others. Patients with severe allergies to penicillin medications are cautioned that there is about a 10% incidence of cross-reactivity with cephalosporins. When possible, patients with penicillin allergies should use alternatives to cephalosporins for antibiotic therapy.
Wartpeel Counseling:  I discussed with the patient the risks of Wartpeel including but not limited to erythema, scaling, itching, weeping, crusting, and pain.
Stelara Counseling:  I discussed with the patient the risks of ustekinumab including but not limited to immunosuppression, malignancy, posterior leukoencephalopathy syndrome, and serious infections.  The patient understands that monitoring is required including a PPD at baseline and must alert us or the primary physician if symptoms of infection or other concerning signs are noted.
Tazorac Pregnancy And Lactation Text: This medication is not safe during pregnancy. It is unknown if this medication is excreted in breast milk.
Terbinafine Pregnancy And Lactation Text: This medication is Pregnancy Category B and is considered safe during pregnancy. It is also excreted in breast milk and breast feeding isn't recommended.
High Dose Vitamin A Counseling: Side effects reviewed, pt to contact office should one occur.
Fluconazole Counseling:  Patient counseled regarding adverse effects of fluconazole including but not limited to headache, diarrhea, nausea, upset stomach, liver function test abnormalities, taste disturbance, and stomach pain.  There is a rare possibility of liver failure that can occur when taking fluconazole.  The patient understands that monitoring of LFTs and kidney function test may be required, especially at baseline. The patient verbalized understanding of the proper use and possible adverse effects of fluconazole.  All of the patient's questions and concerns were addressed.
Dutasteride Pregnancy And Lactation Text: This medication is absolutely contraindicated in women, especially during pregnancy and breast feeding. Feminization of male fetuses is possible if taking while pregnant.
Dupixent Counseling: I discussed with the patient the risks of dupilumab including but not limited to eye infection and irritation, cold sores, injection site reactions, worsening of asthma, allergic reactions and increased risk of parasitic infection.  Live vaccines should be avoided while taking dupilumab. Dupilumab will also interact with certain medications such as warfarin and cyclosporine. The patient understands that monitoring is required and they must alert us or the primary physician if symptoms of infection or other concerning signs are noted.
Qbrexza Counseling:  I discussed with the patient the risks of Qbrexza including but not limited to headache, mydriasis, blurred vision, dry eyes, nasal dryness, dry mouth, dry throat, dry skin, urinary hesitation, and constipation.  Local skin reactions including erythema, burning, stinging, and itching can also occur.
Xolair Pregnancy And Lactation Text: This medication is Pregnancy Category B and is considered safe during pregnancy. This medication is excreted in breast milk.
Sski Pregnancy And Lactation Text: This medication is Pregnancy Category D and isn't considered safe during pregnancy. It is excreted in breast milk.
Eucrisa Counseling: Patient may experience a mild burning sensation during topical application. Eucrisa is not approved in children less than 2 years of age.
Oral Minoxidil Counseling- I discussed with the patient the risks of oral minoxidil including but not limited to shortness of breath, swelling of the feet or ankles, dizziness, lightheadedness, unwanted hair growth and allergic reaction.  The patient verbalized understanding of the proper use and possible adverse effects of oral minoxidil.  All of the patient's questions and concerns were addressed.
Xeljanz Counseling: I discussed with the patient the risks of Xeljanz therapy including increased risk of infection, liver issues, headache, diarrhea, or cold symptoms. Live vaccines should be avoided. They were instructed to call if they have any problems.
Dapsone Counseling: I discussed with the patient the risks of dapsone including but not limited to hemolytic anemia, agranulocytosis, rashes, methemoglobinemia, kidney failure, peripheral neuropathy, headaches, GI upset, and liver toxicity.  Patients who start dapsone require monitoring including baseline LFTs and weekly CBCs for the first month, then every month thereafter.  The patient verbalized understanding of the proper use and possible adverse effects of dapsone.  All of the patient's questions and concerns were addressed.
Minoxidil Pregnancy And Lactation Text: This medication has not been assigned a Pregnancy Risk Category but animal studies failed to show danger with the topical medication. It is unknown if the medication is excreted in breast milk.
Cellcept Counseling:  I discussed with the patient the risks of mycophenolate mofetil including but not limited to infection/immunosuppression, GI upset, hypokalemia, hypercholesterolemia, bone marrow suppression, lymphoproliferative disorders, malignancy, GI ulceration/bleed/perforation, colitis, interstitial lung disease, kidney failure, progressive multifocal leukoencephalopathy, and birth defects.  The patient understands that monitoring is required including a baseline creatinine and regular CBC testing. In addition, patient must alert us immediately if symptoms of infection or other concerning signs are noted.
Quinolones Counseling:  I discussed with the patient the risks of fluoroquinolones including but not limited to GI upset, allergic reaction, drug rash, diarrhea, dizziness, photosensitivity, yeast infections, liver function test abnormalities, tendonitis/tendon rupture.
Detail Level: Simple
Hydroxychloroquine Pregnancy And Lactation Text: This medication has been shown to cause fetal harm but it isn't assigned a Pregnancy Risk Category. There are small amounts excreted in breast milk.
Ivermectin Counseling:  Patient instructed to take medication on an empty stomach with a full glass of water.  Patient informed of potential adverse effects including but not limited to nausea, diarrhea, dizziness, itching, and swelling of the extremities or lymph nodes.  The patient verbalized understanding of the proper use and possible adverse effects of ivermectin.  All of the patient's questions and concerns were addressed.
Finasteride Counseling:  I discussed with the patient the risks of use of finasteride including but not limited to decreased libido, decreased ejaculate volume, gynecomastia, and depression. Women should not handle medication.  All of the patient's questions and concerns were addressed.
Opioid Counseling: I discussed with the patient the potential side effects of opioids including but not limited to addiction, altered mental status, and depression. I stressed avoiding alcohol, benzodiazepines, muscle relaxants and sleep aids unless specifically okayed by a physician. The patient verbalized understanding of the proper use and possible adverse effects of opioids. All of the patient's questions and concerns were addressed. They were instructed to flush the remaining pills down the toilet if they did not need them for pain.
Topical Clindamycin Counseling: Patient counseled that this medication may cause skin irritation or allergic reactions.  In the event of skin irritation, the patient was advised to reduce the amount of the drug applied or use it less frequently.   The patient verbalized understanding of the proper use and possible adverse effects of clindamycin.  All of the patient's questions and concerns were addressed.
Fluconazole Pregnancy And Lactation Text: This medication is Pregnancy Category C and it isn't know if it is safe during pregnancy. It is also excreted in breast milk.
Rituxan Counseling:  I discussed with the patient the risks of Rituxan infusions. Side effects can include infusion reactions, severe drug rashes including mucocutaneous reactions, reactivation of latent hepatitis and other infections and rarely progressive multifocal leukoencephalopathy.  All of the patient's questions and concerns were addressed.
High Dose Vitamin A Pregnancy And Lactation Text: High dose vitamin A therapy is contraindicated during pregnancy and breast feeding.
Qbrexza Pregnancy And Lactation Text: There is no available data on Qbrexza use in pregnant women.  There is no available data on Qbrexza use in lactation.
Dupixent Pregnancy And Lactation Text: This medication likely crosses the placenta but the risk for the fetus is uncertain. This medication is excreted in breast milk.
Mirvaso Counseling: Mirvaso is a topical medication which can decrease superficial blood flow where applied. Side effects are uncommon and include stinging, redness and allergic reactions.
Oral Minoxidil Pregnancy And Lactation Text: This medication should only be used when clearly needed if you are pregnant, attempting to become pregnant or breast feeding.
Cimetidine Counseling:  I discussed with the patient the risks of Cimetidine including but not limited to gynecomastia, headache, diarrhea, nausea, drowsiness, arrhythmias, pancreatitis, skin rashes, psychosis, bone marrow suppression and kidney toxicity.
Cephalexin Pregnancy And Lactation Text: This medication is Pregnancy Category B and considered safe during pregnancy.  It is also excreted in breast milk but can be used safely for shorter doses.
Dapsone Pregnancy And Lactation Text: This medication is Pregnancy Category C and is not considered safe during pregnancy or breast feeding.
Cibinqo Counseling: I discussed with the patient the risks of Cibinqo therapy including but not limited to common cold, nausea, headache, cold sores, increased blood CPK levels, dizziness, UTIs, fatigue, acne, and vomitting. Live vaccines should be avoided.  This medication has been linked to serious infections; higher rate of mortality; malignancy and lymphoproliferative disorders; major adverse cardiovascular events; thrombosis; thrombocytopenia and lymphopenia; lipid elevations; and retinal detachment.
Low Dose Naltrexone Counseling- I discussed with the patient the potential risks and side effects of low dose naltrexone including but not limited to: more vivid dreams, headaches, nausea, vomiting, abdominal pain, fatigue, dizziness, and anxiety.
Xeldamariz Pregnancy And Lactation Text: This medication is Pregnancy Category D and is not considered safe during pregnancy.  The risk during breast feeding is also uncertain.
Thalidomide Counseling: I discussed with the patient the risks of thalidomide including but not limited to birth defects, anxiety, weakness, chest pain, dizziness, cough and severe allergy.
Erivedge Counseling- I discussed with the patient the risks of Erivedge including but not limited to nausea, vomiting, diarrhea, constipation, weight loss, changes in the sense of taste, decreased appetite, muscle spasms, and hair loss.  The patient verbalized understanding of the proper use and possible adverse effects of Erivedge.  All of the patient's questions and concerns were addressed.
Winlevi Counseling:  I discussed with the patient the risks of topical clascoterone including but not limited to erythema, scaling, itching, and stinging. Patient voiced their understanding.
Taltz Counseling: I discussed with the patient the risks of ixekizumab including but not limited to immunosuppression, serious infections, worsening of inflammatory bowel disease and drug reactions.  The patient understands that monitoring is required including a PPD at baseline and must alert us or the primary physician if symptoms of infection or other concerning signs are noted.
Finasteride Pregnancy And Lactation Text: This medication is absolutely contraindicated during pregnancy. It is unknown if it is excreted in breast milk.
Calcipotriene Counseling: Cantharidin Counseling:  I discussed with the patient the risks of Cantharidin including but not limited to pain, redness, burning, itching, and blistering.
Opioid Pregnancy And Lactation Text: These medications can lead to premature delivery and should be avoided during pregnancy. These medications are also present in breast milk in small amounts.
Rhofade Counseling: Rhofade is a topical medication which can decrease superficial blood flow where applied. Side effects are uncommon and include stinging, redness and allergic reactions.
Rituxan Pregnancy And Lactation Text: This medication is Pregnancy Category C and it isn't know if it is safe during pregnancy. It is unknown if this medication is excreted in breast milk but similar antibodies are known to be excreted.
Cyclophosphamide Counseling:  I discussed with the patient the risks of cyclophosphamide including but not limited to hair loss, hormonal abnormalities, decreased fertility, abdominal pain, diarrhea, nausea and vomiting, bone marrow suppression and infection. The patient understands that monitoring is required while taking this medication.
Griseofulvin Counseling:  I discussed with the patient the risks of griseofulvin including but not limited to photosensitivity, cytopenia, liver damage, nausea/vomiting and severe allergy.  The patient understands that this medication is best absorbed when taken with a fatty meal (e.g., ice cream or french fries).
Enbrel Counseling:  I discussed with the patient the risks of etanercept including but not limited to myelosuppression, immunosuppression, autoimmune hepatitis, demyelinating diseases, lymphoma, and infections.  The patient understands that monitoring is required including a PPD at baseline and must alert us or the primary physician if symptoms of infection or other concerning signs are noted.
Clindamycin Counseling: I counseled the patient regarding use of clindamycin as an antibiotic for prophylactic and/or therapeutic purposes. Clindamycin is active against numerous classes of bacteria, including skin bacteria. Side effects may include nausea, diarrhea, gastrointestinal upset, rash, hives, yeast infections, and in rare cases, colitis.
Mirvaso Pregnancy And Lactation Text: This medication has not been assigned a Pregnancy Risk Category. It is unknown if the medication is excreted in breast milk.
Hydroquinone Counseling:  Patient advised that medication may result in skin irritation, lightening (hypopigmentation), dryness, and burning.  In the event of skin irritation, the patient was advised to reduce the amount of the drug applied or use it less frequently.  Rarely, spots that are treated with hydroquinone can become darker (pseudoochronosis).  Should this occur, patient instructed to stop medication and call the office. The patient verbalized understanding of the proper use and possible adverse effects of hydroquinone.  All of the patient's questions and concerns were addressed.
Low Dose Naltrexone Pregnancy And Lactation Text: Naltrexone is pregnancy category C.  There have been no adequate and well-controlled studies in pregnant women.  It should be used in pregnancy only if the potential benefit justifies the potential risk to the fetus.   Limited data indicates that naltrexone is minimally excreted into breastmilk.
Gabapentin Counseling: I discussed with the patient the risks of gabapentin including but not limited to dizziness, somnolence, fatigue and ataxia.
Rifampin Counseling: I discussed with the patient the risks of rifampin including but not limited to liver damage, kidney damage, red-orange body fluids, nausea/vomiting and severe allergy.
Cibinqo Pregnancy And Lactation Text: It is unknown if this medication will adversely affect pregnancy or breast feeding.  You should not take this medication if you are currently pregnant or planning a pregnancy or while breastfeeding.
Winlevi Pregnancy And Lactation Text: This medication is considered safe during pregnancy and breastfeeding.
Otezla Counseling: The side effects of Otezla were discussed with the patient, including but not limited to worsening or new depression, weight loss, diarrhea, nausea, upper respiratory tract infection, and headache. Patient instructed to call the office should any adverse effect occur.  The patient verbalized understanding of the proper use and possible adverse effects of Otezla.  All the patient's questions and concerns were addressed.
Clindamycin Pregnancy And Lactation Text: This medication can be used in pregnancy if certain situations. Clindamycin is also present in breast milk.
Siliq Counseling:  I discussed with the patient the risks of Siliq including but not limited to new or worsening depression, suicidal thoughts and behavior, immunosuppression, malignancy, posterior leukoencephalopathy syndrome, and serious infections.  The patient understands that monitoring is required including a PPD at baseline and must alert us or the primary physician if symptoms of infection or other concerning signs are noted. There is also a special program designed to monitor depression which is required with Siliq.
Topical Ketoconazole Counseling: Patient counseled that this medication may cause skin irritation or allergic reactions.  In the event of skin irritation, the patient was advised to reduce the amount of the drug applied or use it less frequently.   The patient verbalized understanding of the proper use and possible adverse effects of ketoconazole.  All of the patient's questions and concerns were addressed.
Griseofulvin Pregnancy And Lactation Text: This medication is Pregnancy Category X and is known to cause serious birth defects. It is unknown if this medication is excreted in breast milk but breast feeding should be avoided.
Acitretin Counseling:  I discussed with the patient the risks of acitretin including but not limited to hair loss, dry lips/skin/eyes, liver damage, hyperlipidemia, depression/suicidal ideation, photosensitivity.  Serious rare side effects can include but are not limited to pancreatitis, pseudotumor cerebri, bony changes, clot formation/stroke/heart attack.  Patient understands that alcohol is contraindicated since it can result in liver toxicity and significantly prolong the elimination of the drug by many years.
Birth Control Pills Counseling: Birth Control Pill Counseling: I discussed with the patient the potential side effects of OCPs including but not limited to increased risk of stroke, heart attack, thrombophlebitis, deep venous thrombosis, hepatic adenomas, breast changes, GI upset, headaches, and depression.  The patient verbalized understanding of the proper use and possible adverse effects of OCPs. All of the patient's questions and concerns were addressed.
Opzelura Counseling:  I discussed with the patient the risks of Opzelura including but not limited to nasopharngitis, bronchitis, ear infection, eosinophila, hives, diarrhea, folliculitis, tonsillitis, and rhinorrhea.  Taken orally, this medication has been linked to serious infections; higher rate of mortality; malignancy and lymphoproliferative disorders; major adverse cardiovascular events; thrombosis; thrombocytopenia, anemia, and neutropenia; and lipid elevations.
Aklief counseling:  Patient advised to apply a pea-sized amount only at bedtime and wait 30 minutes after washing their face before applying.  If too drying, patient may add a non-comedogenic moisturizer.  The most commonly reported side effects including irritation, redness, scaling, dryness, stinging, burning, itching, and increased risk of sunburn.  The patient verbalized understanding of the proper use and possible adverse effects of retinoids.  All of the patient's questions and concerns were addressed.
Calcipotriene Pregnancy And Lactation Text: This medication has not been proven safe during pregnancy. It is unknown if this medication is excreted in breast milk.
Doxepin Counseling:  Patient advised that the medication is sedating and not to drive a car after taking this medication. Patient informed of potential adverse effects including but not limited to dry mouth, urinary retention, and blurry vision.  The patient verbalized understanding of the proper use and possible adverse effects of doxepin.  All of the patient's questions and concerns were addressed.
Tranexamic Acid Counseling:  Patient advised of the small risk of bleeding problems with tranexamic acid. They were also instructed to call if they developed any nausea, vomiting or diarrhea. All of the patient's questions and concerns were addressed.
Cyclophosphamide Pregnancy And Lactation Text: This medication is Pregnancy Category D and it isn't considered safe during pregnancy. This medication is excreted in breast milk.
Adbry Counseling: I discussed with the patient the risks of tralokinumab including but not limited to eye infection and irritation, cold sores, injection site reactions, worsening of asthma, allergic reactions and increased risk of parasitic infection.  Live vaccines should be avoided while taking tralokinumab. The patient understands that monitoring is required and they must alert us or the primary physician if symptoms of infection or other concerning signs are noted.
VTAMA Counseling: I discussed with the patient that VTAMA is not for use in the eyes, mouth or mouth. They should call the office if they develop any signs of allergic reactions to VTAMA. The patient verbalized understanding of the proper use and possible adverse effects of VTAMA.  All of the patient's questions and concerns were addressed.
Niacinamide Counseling: I recommended taking niacin or niacinamide, also know as vitamin B3, twice daily. Recent evidence suggests that taking vitamin B3 (500 mg twice daily) can reduce the risk of actinic keratoses and non-melanoma skin cancers. Side effects of vitamin B3 include flushing and headache.
Otezla Pregnancy And Lactation Text: This medication is Pregnancy Category C and it isn't known if it is safe during pregnancy. It is unknown if it is excreted in breast milk.
Rifampin Pregnancy And Lactation Text: This medication is Pregnancy Category C and it isn't know if it is safe during pregnancy. It is also excreted in breast milk and should not be used if you are breast feeding.
Libtayo Counseling- I discussed with the patient the risks of Libtayo including but not limited to nausea, vomiting, diarrhea, and bone or muscle pain.  The patient verbalized understanding of the proper use and possible adverse effects of Libtayo.  All of the patient's questions and concerns were addressed.
Arava Counseling:  Patient counseled regarding adverse effects of Arava including but not limited to nausea, vomiting, abnormalities in liver function tests. Patients may develop mouth sores, rash, diarrhea, and abnormalities in blood counts. The patient understands that monitoring is required including LFTs and blood counts.  There is a rare possibility of scarring of the liver and lung problems that can occur when taking methotrexate. Persistent nausea, loss of appetite, pale stools, dark urine, cough, and shortness of breath should be reported immediately. Patient advised to discontinue Arava treatment and consult with a physician prior to attempting conception. The patient will have to undergo a treatment to eliminate Arava from the body prior to conception.
Cantharidin Counseling: Calcipotriene Counseling:  I discussed with the patient the risks of calcipotriene including but not limited to erythema, scaling, itching, and irritation.
Tremfya Counseling: I discussed with the patient the risks of guselkumab including but not limited to immunosuppression, serious infections, and drug reactions.  The patient understands that monitoring is required including a PPD at baseline and must alert us or the primary physician if symptoms of infection or other concerning signs are noted.
Olumiant Counseling: I discussed with the patient the risks of Olumiant therapy including but not limited to upper respiratory tract infections, shingles, cold sores, and nausea. Live vaccines should be avoided.  This medication has been linked to serious infections; higher rate of mortality; malignancy and lymphoproliferative disorders; major adverse cardiovascular events; thrombosis; gastrointestinal perforations; neutropenia; lymphopenia; anemia; liver enzyme elevations; and lipid elevations.
Solaraze Counseling:  I discussed with the patient the risks of Solaraze including but not limited to erythema, scaling, itching, weeping, crusting, and pain.
Aklief Pregnancy And Lactation Text: It is unknown if this medication is safe to use during pregnancy.  It is unknown if this medication is excreted in breast milk.  Breastfeeding women should use the topical cream on the smallest area of the skin for the shortest time needed while breastfeeding.  Do not apply to nipple and areola.
Birth Control Pills Pregnancy And Lactation Text: This medication should be avoided if pregnant and for the first 30 days post-partum.
Doxycycline Counseling:  Patient counseled regarding possible photosensitivity and increased risk for sunburn.  Patient instructed to avoid sunlight, if possible.  When exposed to sunlight, patients should wear protective clothing, sunglasses, and sunscreen.  The patient was instructed to call the office immediately if the following severe adverse effects occur:  hearing changes, easy bruising/bleeding, severe headache, or vision changes.  The patient verbalized understanding of the proper use and possible adverse effects of doxycycline.  All of the patient's questions and concerns were addressed.
Opzelura Pregnancy And Lactation Text: There is insufficient data to evaluate drug-associated risk for major birth defects, miscarriage, or other adverse maternal or fetal outcomes.  There is a pregnancy registry that monitors pregnancy outcomes in pregnant persons exposed to the medication during pregnancy.  It is unknown if this medication is excreted in breast milk.  Do not breastfeed during treatment and for about 4 weeks after the last dose.
Humira Counseling:  I discussed with the patient the risks of adalimumab including but not limited to myelosuppression, immunosuppression, autoimmune hepatitis, demyelinating diseases, lymphoma, and serious infections.  The patient understands that monitoring is required including a PPD at baseline and must alert us or the primary physician if symptoms of infection or other concerning signs are noted.
Adbry Pregnancy And Lactation Text: It is unknown if this medication will adversely affect pregnancy or breast feeding.
Acitretin Pregnancy And Lactation Text: This medication is Pregnancy Category X and should not be given to women who are pregnant or may become pregnant in the future. This medication is excreted in breast milk.
Imiquimod Counseling:  I discussed with the patient the risks of imiquimod including but not limited to erythema, scaling, itching, weeping, crusting, and pain.  Patient understands that the inflammatory response to imiquimod is variable from person to person and was educated regarded proper titration schedule.  If flu-like symptoms develop, patient knows to discontinue the medication and contact us.
Itraconazole Counseling:  I discussed with the patient the risks of itraconazole including but not limited to liver damage, nausea/vomiting, neuropathy, and severe allergy.  The patient understands that this medication is best absorbed when taken with acidic beverages such as non-diet cola or ginger ale.  The patient understands that monitoring is required including baseline LFTs and repeat LFTs at intervals.  The patient understands that they are to contact us or the primary physician if concerning signs are noted.
Oxybutynin Counseling:  I discussed with the patient the risks of oxybutynin including but not limited to skin rash, drowsiness, dry mouth, difficulty urinating, and blurred vision.
Libtayo Pregnancy And Lactation Text: This medication is contraindicated in pregnancy and when breast feeding.
Glycopyrrolate Counseling:  I discussed with the patient the risks of glycopyrrolate including but not limited to skin rash, drowsiness, dry mouth, difficulty urinating, and blurred vision.
Tranexamic Acid Pregnancy And Lactation Text: It is unknown if this medication is safe during pregnancy or breast feeding.
Cyclosporine Counseling:  I discussed with the patient the risks of cyclosporine including but not limited to hypertension, gingival hyperplasia,myelosuppression, immunosuppression, liver damage, kidney damage, neurotoxicity, lymphoma, and serious infections. The patient understands that monitoring is required including baseline blood pressure, CBC, CMP, lipid panel and uric acid, and then 1-2 times monthly CMP and blood pressure.
Doxepin Pregnancy And Lactation Text: This medication is Pregnancy Category C and it isn't known if it is safe during pregnancy. It is also excreted in breast milk and breast feeding isn't recommended.
Azelaic Acid Counseling: Patient counseled that medicine may cause skin irritation and to avoid applying near the eyes.  In the event of skin irritation, the patient was advised to reduce the amount of the drug applied or use it less frequently.   The patient verbalized understanding of the proper use and possible adverse effects of azelaic acid.  All of the patient's questions and concerns were addressed.
Spironolactone Counseling: Patient advised regarding risks of diarrhea, abdominal pain, hyperkalemia, birth defects (for female patients), liver toxicity and renal toxicity. The patient may need blood work to monitor liver and kidney function and potassium levels while on therapy. The patient verbalized understanding of the proper use and possible adverse effects of spironolactone.  All of the patient's questions and concerns were addressed.
Cantharidin Pregnancy And Lactation Text: The use of this medication during pregnancy or lactation is not recommended as there is insufficient data.
Sarecycline Counseling: Patient advised regarding possible photosensitivity and discoloration of the teeth, skin, lips, tongue and gums.  Patient instructed to avoid sunlight, if possible.  When exposed to sunlight, patients should wear protective clothing, sunglasses, and sunscreen.  The patient was instructed to call the office immediately if the following severe adverse effects occur:  hearing changes, easy bruising/bleeding, severe headache, or vision changes.  The patient verbalized understanding of the proper use and possible adverse effects of sarecycline.  All of the patient's questions and concerns were addressed.
Olumiant Pregnancy And Lactation Text: Based on animal studies, Olumiant may cause embryo-fetal harm when administered to pregnant women.  The medication should not be used in pregnancy.  Breastfeeding is not recommended during treatment.
Niacinamide Pregnancy And Lactation Text: These medications are considered safe during pregnancy.
Erythromycin Pregnancy And Lactation Text: This medication is Pregnancy Category B and is considered safe during pregnancy. It is also excreted in breast milk.
Azithromycin Counseling:  I discussed with the patient the risks of azithromycin including but not limited to GI upset, allergic reaction, drug rash, diarrhea, and yeast infections.
Topical Steroids Counseling: I discussed with the patient that prolonged use of topical steroids can result in the increased appearance of superficial blood vessels (telangiectasias), lightening (hypopigmentation) and thinning of the skin (atrophy).  Patient understands to avoid using high potency steroids in skin folds, the groin or the face.  The patient verbalized understanding of the proper use and possible adverse effects of topical steroids.  All of the patient's questions and concerns were addressed.
Simponi Counseling:  I discussed with the patient the risks of golimumab including but not limited to myelosuppression, immunosuppression, autoimmune hepatitis, demyelinating diseases, lymphoma, and serious infections.  The patient understands that monitoring is required including a PPD at baseline and must alert us or the primary physician if symptoms of infection or other concerning signs are noted.
Bexarotene Counseling:  I discussed with the patient the risks of bexarotene including but not limited to hair loss, dry lips/skin/eyes, liver abnormalities, hyperlipidemia, pancreatitis, depression/suicidal ideation, photosensitivity, drug rash/allergic reactions, hypothyroidism, anemia, leukopenia, infection, cataracts, and teratogenicity.  Patient understands that they will need regular blood tests to check lipid profile, liver function tests, white blood cell count, thyroid function tests and pregnancy test if applicable.
Solaraze Pregnancy And Lactation Text: This medication is Pregnancy Category B and is considered safe. There is some data to suggest avoiding during the third trimester. It is unknown if this medication is excreted in breast milk.

## 2023-03-06 ENCOUNTER — APPOINTMENT (RX ONLY)
Dept: URBAN - METROPOLITAN AREA CLINIC 155 | Facility: CLINIC | Age: 73
Setting detail: DERMATOLOGY
End: 2023-03-06

## 2023-03-06 PROBLEM — C44.519 BASAL CELL CARCINOMA OF SKIN OF OTHER PART OF TRUNK: Status: ACTIVE | Noted: 2023-03-06

## 2023-03-06 PROCEDURE — ? MOHS SURGERY

## 2023-03-06 PROCEDURE — 13101 CMPLX RPR TRUNK 2.6-7.5 CM: CPT

## 2023-03-06 PROCEDURE — 17313 MOHS 1 STAGE T/A/L: CPT

## 2023-03-06 PROCEDURE — ? PRESCRIPTION

## 2023-03-06 RX ORDER — FLUCONAZOLE 150 MG/1
TABLET ORAL
Qty: 1 | Refills: 2 | Status: ERX | COMMUNITY
Start: 2023-03-06

## 2023-03-06 RX ORDER — MUPIROCIN 20 MG/G
OINTMENT TOPICAL
Qty: 22 | Refills: 1 | Status: ERX

## 2023-03-06 RX ORDER — CEPHALEXIN 500 MG/1
CAPSULE ORAL
Qty: 21 | Refills: 0 | Status: ERX | COMMUNITY
Start: 2023-03-06

## 2023-03-06 RX ADMIN — FLUCONAZOLE 1: 150 TABLET ORAL at 00:00

## 2023-03-06 RX ADMIN — CEPHALEXIN: 500 CAPSULE ORAL at 00:00

## 2023-03-06 NOTE — PROCEDURE: MOHS SURGERY
Mohs Case Number: [id number]
Biopsy Photograph Reviewed: Yes
Consent Type: Consent 1 (Standard)
Eye Shield Used: No
Initial Size Of Lesion: 4
X Size Of Lesion In Cm (Optional): 1
Primary Defect Length In Cm (Final Defect Size - Required For Flaps/Grafts): 4.5
Primary Defect Width In Cm (Final Defect Size - Required For Flaps/Grafts): 1.5
Repair Type: Complex Repair
Oculoplastic Surgeon Procedure Text (A): After obtaining clear surgical margins the patient was sent to oculoplastics for surgical repair.  The patient understands they will receive post-surgical care and follow-up from the referring physician's office.
Oculoplastic Surgeon Procedure Text (B): After obtaining clear surgical margins the patient was sent to oculoplastics for surgical repair.  The patient understands they will receive post-surgical care and follow-up from the referring physician's office.
Otolaryngologist Procedure Text (A): After obtaining clear surgical margins the patient was sent to otolaryngology for surgical repair.  The patient understands they will receive post-surgical care and follow-up from the referring physician's office.
Otolaryngologist Procedure Text (B): After obtaining clear surgical margins the patient was sent to otolaryngology for surgical repair.  The patient understands they will receive post-surgical care and follow-up from the referring physician's office.
Plastic Surgeon Procedure Text (A): After obtaining clear surgical margins the patient was sent to plastics for surgical repair.  The patient understands they will receive post-surgical care and follow-up from the referring physician's office.
Plastic Surgeon Procedure Text (B): After obtaining clear surgical margins the patient was sent to plastics for surgical repair.  The patient understands they will receive post-surgical care and follow-up from the referring physician's office.
Mid-Level Procedure Text (A): After obtaining clear surgical margins the patient was sent to a mid-level provider for surgical repair.  The patient understands they will receive post-surgical care and follow-up from the mid-level provider.
Mid-Level Procedure Text (B): After obtaining clear surgical margins the patient was sent to a mid-level provider for surgical repair.  The patient understands they will receive post-surgical care and follow-up from the mid-level provider.
Provider Procedure Text (A): After obtaining clear surgical margins the defect was repaired by another provider.
Asc Procedure Text (A): After obtaining clear surgical margins the patient was sent to an ASC for surgical repair.  The patient understands they will receive post-surgical care and follow-up from the ASC physician.
Asc Procedure Text (B): After obtaining clear surgical margins the patient was sent to an ASC for surgical repair.  The patient understands they will receive post-surgical care and follow-up from the ASC physician.
Simple / Intermediate / Complex Repair - Final Wound Length In Cm: 6
Suturegard Retention Suture: 2-0 Nylon
Retention Suture Bite Size: 3 mm
Length To Time In Minutes Device Was In Place: 10
Width Of Defect Perpendicular To Closure In Cm (Required): 1.7
Distance Of Undermining In Cm (Required): 1.9
Undermining Type: Entire Wound
Debridement Text: The wound edges were debrided prior to proceeding with the closure to facilitate wound healing.
Helical Rim Text: The closure involved the helical rim.
Vermilion Border Text: The closure involved the vermilion border.
Nostril Rim Text: The closure involved the nostril rim.
Retention Suture Text: Retention sutures were placed to support the closure and prevent dehiscence.
Secondary Defect Length In Cm (Required For Flaps): 0
Area H Indication Text: Tumors in this location are included in Area H (eyelids, eyebrows, nose, lips, chin, ear, pre-auricular, post-auricular, temple, genitalia, hands, feet, ankles and areola).  Tissue conservation is critical in these anatomic locations.
Area M Indication Text: Tumors in this location are included in Area M (cheek, forehead, scalp, neck, jawline and pretibial skin).  Mohs surgery is indicated for tumors in these anatomic locations.
Area L Indication Text: Tumors in this location are included in Area L (trunk and extremities).  Mohs surgery is indicated for larger tumors, or tumors with aggressive histologic features, in these anatomic locations.
Special Stains Stage 1 - Results: Base On Clearance Noted Above
Stage 2: Additional Anesthesia Type: 1% lidocaine with epinephrine
Include Tumor Staging In Mohs Note?: Please Select the Appropriate Response
Staging Info: By selecting yes to the question above you will include information on AJCC 8 tumor staging in your Mohs note. Information on tumor staging will be automatically added for SCCs on the head and neck. AJCC 8 includes tumor size, tumor depth, perineural involvement and bone invasion.
Tumor Depth: Less than 6mm from granular layer and no invasion beyond the subcutaneous fat
Medical Necessity Statement: Based on my medical judgement, Mohs surgery is the most appropriate treatment for this cancer compared to other treatments.
Alternatives Discussed Intro (Do Not Add Period): I discussed alternative treatments to Mohs surgery and specifically discussed the risks and benefits of
Consent 1/Introductory Paragraph: The rationale for Mohs was explained to the patient and consent was obtained. The risks, benefits and alternatives to therapy were discussed in detail. Specifically, the risks of infection, scarring, bleeding, prolonged wound healing, incomplete removal, allergy to anesthesia, nerve injury and recurrence were addressed. Prior to the procedure, the treatment site was clearly identified and confirmed by the patient. All components of Universal Protocol/PAUSE Rule completed.
Consent 2/Introductory Paragraph: Mohs surgery was explained to the patient and consent was obtained. The risks, benefits and alternatives to therapy were discussed in detail. Specifically, the risks of infection, scarring, bleeding, prolonged wound healing, incomplete removal, allergy to anesthesia, nerve injury and recurrence were addressed. Prior to the procedure, the treatment site was clearly identified and confirmed by the patient. All components of Universal Protocol/PAUSE Rule completed.
Consent 3/Introductory Paragraph: I gave the patient a chance to ask questions they had about the procedure.  Following this I explained the Mohs procedure and consent was obtained. The risks, benefits and alternatives to therapy were discussed in detail. Specifically, the risks of infection, scarring, bleeding, prolonged wound healing, incomplete removal, allergy to anesthesia, nerve injury and recurrence were addressed. Prior to the procedure, the treatment site was clearly identified and confirmed by the patient. All components of Universal Protocol/PAUSE Rule completed.
Consent (Temporal Branch)/Introductory Paragraph: The rationale for Mohs was explained to the patient and consent was obtained. The risks, benefits and alternatives to therapy were discussed in detail. Specifically, the risks of damage to the temporal branch of the facial nerve, infection, scarring, bleeding, prolonged wound healing, incomplete removal, allergy to anesthesia, and recurrence were addressed. Prior to the procedure, the treatment site was clearly identified and confirmed by the patient. All components of Universal Protocol/PAUSE Rule completed.
Consent (Marginal Mandibular)/Introductory Paragraph: The rationale for Mohs was explained to the patient and consent was obtained. The risks, benefits and alternatives to therapy were discussed in detail. Specifically, the risks of damage to the marginal mandibular branch of the facial nerve, infection, scarring, bleeding, prolonged wound healing, incomplete removal, allergy to anesthesia, and recurrence were addressed. Prior to the procedure, the treatment site was clearly identified and confirmed by the patient. All components of Universal Protocol/PAUSE Rule completed.
Consent (Spinal Accessory)/Introductory Paragraph: The rationale for Mohs was explained to the patient and consent was obtained. The risks, benefits and alternatives to therapy were discussed in detail. Specifically, the risks of damage to the spinal accessory nerve, infection, scarring, bleeding, prolonged wound healing, incomplete removal, allergy to anesthesia, and recurrence were addressed. Prior to the procedure, the treatment site was clearly identified and confirmed by the patient. All components of Universal Protocol/PAUSE Rule completed.
Consent (Near Eyelid Margin)/Introductory Paragraph: The rationale for Mohs was explained to the patient and consent was obtained. The risks, benefits and alternatives to therapy were discussed in detail. Specifically, the risks of ectropion or eyelid deformity, infection, scarring, bleeding, prolonged wound healing, incomplete removal, allergy to anesthesia, nerve injury and recurrence were addressed. Prior to the procedure, the treatment site was clearly identified and confirmed by the patient. All components of Universal Protocol/PAUSE Rule completed.
Consent (Ear)/Introductory Paragraph: The rationale for Mohs was explained to the patient and consent was obtained. The risks, benefits and alternatives to therapy were discussed in detail. Specifically, the risks of ear deformity, infection, scarring, bleeding, prolonged wound healing, incomplete removal, allergy to anesthesia, nerve injury and recurrence were addressed. Prior to the procedure, the treatment site was clearly identified and confirmed by the patient. All components of Universal Protocol/PAUSE Rule completed.
Consent (Nose)/Introductory Paragraph: The rationale for Mohs was explained to the patient and consent was obtained. The risks, benefits and alternatives to therapy were discussed in detail. Specifically, the risks of nasal deformity, changes in the flow of air through the nose, infection, scarring, bleeding, prolonged wound healing, incomplete removal, allergy to anesthesia, nerve injury and recurrence were addressed. Prior to the procedure, the treatment site was clearly identified and confirmed by the patient. All components of Universal Protocol/PAUSE Rule completed.
Consent (Lip)/Introductory Paragraph: The rationale for Mohs was explained to the patient and consent was obtained. The risks, benefits and alternatives to therapy were discussed in detail. Specifically, the risks of lip deformity, changes in the oral aperture, infection, scarring, bleeding, prolonged wound healing, incomplete removal, allergy to anesthesia, nerve injury and recurrence were addressed. Prior to the procedure, the treatment site was clearly identified and confirmed by the patient. All components of Universal Protocol/PAUSE Rule completed.
Consent (Scalp)/Introductory Paragraph: The rationale for Mohs was explained to the patient and consent was obtained. The risks, benefits and alternatives to therapy were discussed in detail. Specifically, the risks of changes in hair growth pattern secondary to repair, infection, scarring, bleeding, prolonged wound healing, incomplete removal, allergy to anesthesia, nerve injury and recurrence were addressed. Prior to the procedure, the treatment site was clearly identified and confirmed by the patient. All components of Universal Protocol/PAUSE Rule completed.
Detail Level: Detailed
Postop Diagnosis: same
Anesthesia Volume In Cc: 12
Hemostasis: Electrocautery
Estimated Blood Loss (Cc): minimal
Brow Lift Text: A midfrontal incision was made medially to the defect to allow access to the tissues just superior to the left eyebrow. Following careful dissection inferiorly in a supraperiosteal plane to the level of the left eyebrow, several 3-0 monocryl sutures were used to resuspend the eyebrow orbicularis oculi muscular unit to the superior frontal bone periosteum. This resulted in an appropriate reapproximation of static eyebrow symmetry and correction of the left brow ptosis.
Deep Sutures: 3-0 Vicryl
Epidermal Sutures: 3-0 Ethilon
Additional Epidermal Sutures: 4-0 Prolene
Number Of Epidermal Sutures (Optional): 5
Epidermal Closure: running horizontal mattress
Additional Epidermal Closure (Optional): retention
Suturegard Intro: Intraoperative tissue expansion was performed, utilizing the SUTUREGARD device, in order to reduce wound tension.
Suturegard Body: The suture ends were repeatedly re-tightened and re-clamped to achieve the desired tissue expansion.
Hemigard Intro: Due to skin fragility and wound tension, it was decided to use HEMIGARD adhesive retention suture devices to permit a linear closure. The skin was cleaned and dried for a 6cm distance away from the wound. Excessive hair, if present, was removed to allow for adhesion.
Hemigard Postcare Instructions: The HEMIGARD strips are to remain completely dry for at least 5-7 days.
Donor Site Anesthesia Type: same as repair anesthesia
Epidermal Closure Graft Donor Site (Optional): simple interrupted
Graft Donor Site Bandage (Optional-Leave Blank If You Don't Want In Note): Steri-strips and a pressure bandage were applied to the donor site.
Closure 2 Information: This tab is for additional flaps and grafts, including complex repair and grafts and complex repair and flaps. You can also specify a different location for the additional defect, if the location is the same you do not need to select a new one. We will insert the automated text for the repair you select below just as we do for solitary flaps and grafts. Please note that at this time if you select a location with a different insurance zone you will need to override the ICD10 and CPT if appropriate.
Closure 3 Information: This tab is for additional flaps and grafts above and beyond our usual structured repairs.  Please note if you enter information here it will not currently bill and you will need to add the billing information manually.
Closure 4 Information: This tab is for additional flaps and grafts above and beyond our usual structured repairs.  Please note if you enter information here it will not currently bill and you will need to add the billing information manually.
Wound Care: Petrolatum
Dressing: dry sterile dressing
Suture Removal: 21 days
Unna Boot Text: An Unna boot was placed to help immobilize the limb and facilitate more rapid healing.
Home Suture Removal Text: Patient was provided instructions on removing sutures and will remove their sutures at home.  If they have any questions or difficulties they will call the office.
Post-Care Instructions: I reviewed with the patient in detail post-care instructions. Patient is not to engage in any heavy lifting, exercise, or swimming for the next 14 days. Should the patient develop any fevers, chills, bleeding, severe pain patient will contact the office immediately.
Pain Refusal Text: I offered to prescribe pain medication but the patient refused to take this medication.
Mauc Instructions: By selecting yes to the question below the MAUC number will be added into the note.  This will be calculated automatically based on the diagnosis chosen, the size entered, the body zone selected (H,M,L) and the specific indications you chose. You will also have the option to override the Mohs AUC if you disagree with the automatically calculated number and this option is found in the Case Summary tab.
Where Do You Want The Question To Include Opioid Counseling Located?: Case Summary Tab
Eye Protection Verbiage: Before proceeding with the stage, a plastic scleral shield was inserted. The globe was anesthetized with a few drops of 1% lidocaine with 1:100,000 epinephrine. Then, an appropriate sized scleral shield was chosen and coated with lacrilube ointment. The shield was gently inserted and left in place for the duration of each stage. After the stage was completed, the shield was gently removed.
Mohs Method Verbiage: An incision at a 45 degree angle following the standard Mohs approach was done and the specimen was harvested as a microscopic controlled layer.
Surgeon/Pathologist Verbiage (Will Incorporate Name Of Surgeon From Intro If Not Blank): operated in two distinct and integrated capacities as the surgeon and pathologist.
Mohs Histo Method Verbiage: Each section was then chromacoded and processed in the Mohs lab using the Mohs protocol and submitted for frozen section.
Subsequent Stages Histo Method Verbiage: Using a similar technique to that described above, a thin layer of tissue was removed from all areas where tumor was visible on the previous stage.  The tissue was again oriented, mapped, dyed, and processed as above.
Mohs Rapid Report Verbiage: The area of clinically evident tumor was marked with skin marking ink and appropriately hatched.  The initial incision was made following the Mohs approach through the skin.  The specimen was taken to the lab, divided into the necessary number of pieces, chromacoded and processed according to the Mohs protocol.  This was repeated in successive stages until a tumor free defect was achieved.
Complex Repair Preamble Text (Leave Blank If You Do Not Want): Extensive wide undermining was performed.
Intermediate Repair Preamble Text (Leave Blank If You Do Not Want): Undermining was performed with blunt dissection.
Non-Graft Cartilage Fenestration Text: The cartilage was fenestrated with a 2mm punch biopsy to help facilitate healing.
Graft Cartilage Fenestration Text: The cartilage was fenestrated with a 2mm punch biopsy to help facilitate graft survival and healing.
Secondary Intention Text (Leave Blank If You Do Not Want): The defect will heal with secondary intention.
No Repair - Repaired With Adjacent Surgical Defect Text (Leave Blank If You Do Not Want): After obtaining clear surgical margins the defect was repaired concurrently with another surgical defect which was in close approximation.
Adjacent Tissue Transfer Text: The defect edges were debeveled with a #15 scalpel blade.  Given the location of the defect and the proximity to free margins an adjacent tissue transfer was deemed most appropriate.  Using a sterile surgical marker, an appropriate flap was drawn incorporating the defect and placing the expected incisions within the relaxed skin tension lines where possible.    The area thus outlined was incised deep to adipose tissue with a #15 scalpel blade.  The skin margins were undermined to an appropriate distance in all directions utilizing iris scissors.
Advancement Flap (Single) Text: The defect edges were debeveled with a #15 scalpel blade.  Given the location of the defect and the proximity to free margins a single advancement flap was deemed most appropriate.  Using a sterile surgical marker, an appropriate advancement flap was drawn incorporating the defect and placing the expected incisions within the relaxed skin tension lines where possible.    The area thus outlined was incised deep to adipose tissue with a #15 scalpel blade.  The skin margins were undermined to an appropriate distance in all directions utilizing iris scissors.
Advancement Flap (Double) Text: The defect edges were debeveled with a #15 scalpel blade.  Given the location of the defect and the proximity to free margins a double advancement flap was deemed most appropriate.  Using a sterile surgical marker, the appropriate advancement flaps were drawn incorporating the defect and placing the expected incisions within the relaxed skin tension lines where possible.    The area thus outlined was incised deep to adipose tissue with a #15 scalpel blade.  The skin margins were undermined to an appropriate distance in all directions utilizing iris scissors.
Burow's Advancement Flap Text: The defect edges were debeveled with a #15 scalpel blade.  Given the location of the defect and the proximity to free margins a Burow's advancement flap was deemed most appropriate.  Using a sterile surgical marker, the appropriate advancement flap was drawn incorporating the defect and placing the expected incisions within the relaxed skin tension lines where possible.    The area thus outlined was incised deep to adipose tissue with a #15 scalpel blade.  The skin margins were undermined to an appropriate distance in all directions utilizing iris scissors.
Chonodrocutaneous Helical Advancement Flap Text: The defect edges were debeveled with a #15 scalpel blade.  Given the location of the defect and the proximity to free margins a chondrocutaneous helical advancement flap was deemed most appropriate.  Using a sterile surgical marker, the appropriate advancement flap was drawn incorporating the defect and placing the expected incisions within the relaxed skin tension lines where possible.    The area thus outlined was incised deep to adipose tissue with a #15 scalpel blade.  The skin margins were undermined to an appropriate distance in all directions utilizing iris scissors.
Crescentic Advancement Flap Text: The defect edges were debeveled with a #15 scalpel blade.  Given the location of the defect and the proximity to free margins a crescentic advancement flap was deemed most appropriate.  Using a sterile surgical marker, the appropriate advancement flap was drawn incorporating the defect and placing the expected incisions within the relaxed skin tension lines where possible.    The area thus outlined was incised deep to adipose tissue with a #15 scalpel blade.  The skin margins were undermined to an appropriate distance in all directions utilizing iris scissors.
A-T Advancement Flap Text: The defect edges were debeveled with a #15 scalpel blade.  Given the location of the defect, shape of the defect and the proximity to free margins an A-T advancement flap was deemed most appropriate.  Using a sterile surgical marker, an appropriate advancement flap was drawn incorporating the defect and placing the expected incisions within the relaxed skin tension lines where possible.    The area thus outlined was incised deep to adipose tissue with a #15 scalpel blade.  The skin margins were undermined to an appropriate distance in all directions utilizing iris scissors.
O-T Advancement Flap Text: The defect edges were debeveled with a #15 scalpel blade.  Given the location of the defect, shape of the defect and the proximity to free margins an O-T advancement flap was deemed most appropriate.  Using a sterile surgical marker, an appropriate advancement flap was drawn incorporating the defect and placing the expected incisions within the relaxed skin tension lines where possible.    The area thus outlined was incised deep to adipose tissue with a #15 scalpel blade.  The skin margins were undermined to an appropriate distance in all directions utilizing iris scissors.
O-L Flap Text: The defect edges were debeveled with a #15 scalpel blade.  Given the location of the defect, shape of the defect and the proximity to free margins an O-L flap was deemed most appropriate.  Using a sterile surgical marker, an appropriate advancement flap was drawn incorporating the defect and placing the expected incisions within the relaxed skin tension lines where possible.    The area thus outlined was incised deep to adipose tissue with a #15 scalpel blade.  The skin margins were undermined to an appropriate distance in all directions utilizing iris scissors.
O-Z Flap Text: The defect edges were debeveled with a #15 scalpel blade.  Given the location of the defect, shape of the defect and the proximity to free margins an O-Z flap was deemed most appropriate.  Using a sterile surgical marker, an appropriate transposition flap was drawn incorporating the defect and placing the expected incisions within the relaxed skin tension lines where possible. The area thus outlined was incised deep to adipose tissue with a #15 scalpel blade.  The skin margins were undermined to an appropriate distance in all directions utilizing iris scissors.
Double O-Z Flap Text: The defect edges were debeveled with a #15 scalpel blade.  Given the location of the defect, shape of the defect and the proximity to free margins a Double O-Z flap was deemed most appropriate.  Using a sterile surgical marker, an appropriate transposition flap was drawn incorporating the defect and placing the expected incisions within the relaxed skin tension lines where possible. The area thus outlined was incised deep to adipose tissue with a #15 scalpel blade.  The skin margins were undermined to an appropriate distance in all directions utilizing iris scissors.
V-Y Flap Text: The defect edges were debeveled with a #15 scalpel blade.  Given the location of the defect, shape of the defect and the proximity to free margins a V-Y flap was deemed most appropriate.  Using a sterile surgical marker, an appropriate advancement flap was drawn incorporating the defect and placing the expected incisions within the relaxed skin tension lines where possible.    The area thus outlined was incised deep to adipose tissue with a #15 scalpel blade.  The skin margins were undermined to an appropriate distance in all directions utilizing iris scissors.
Advancement-Rotation Flap Text: The defect edges were debeveled with a #15 scalpel blade.  Given the location of the defect, shape of the defect and the proximity to free margins an advancement-rotation flap was deemed most appropriate.  Using a sterile surgical marker, an appropriate flap was drawn incorporating the defect and placing the expected incisions within the relaxed skin tension lines where possible. The area thus outlined was incised deep to adipose tissue with a #15 scalpel blade.  The skin margins were undermined to an appropriate distance in all directions utilizing iris scissors.
Mercedes Flap Text: The defect edges were debeveled with a #15 scalpel blade.  Given the location of the defect, shape of the defect and the proximity to free margins a Mercedes flap was deemed most appropriate.  Using a sterile surgical marker, an appropriate advancement flap was drawn incorporating the defect and placing the expected incisions within the relaxed skin tension lines where possible. The area thus outlined was incised deep to adipose tissue with a #15 scalpel blade.  The skin margins were undermined to an appropriate distance in all directions utilizing iris scissors.
Modified Advancement Flap Text: The defect edges were debeveled with a #15 scalpel blade.  Given the location of the defect, shape of the defect and the proximity to free margins a modified advancement flap was deemed most appropriate.  Using a sterile surgical marker, an appropriate advancement flap was drawn incorporating the defect and placing the expected incisions within the relaxed skin tension lines where possible.    The area thus outlined was incised deep to adipose tissue with a #15 scalpel blade.  The skin margins were undermined to an appropriate distance in all directions utilizing iris scissors.
Mucosal Advancement Flap Text: Given the location of the defect, shape of the defect and the proximity to free margins a mucosal advancement flap was deemed most appropriate. Incisions were made with a 15 blade scalpel in the appropriate fashion along the cutaneous vermilion border and the mucosal lip. The remaining actinically damaged mucosal tissue was excised.  The mucosal advancement flap was then elevated to the gingival sulcus with care taken to preserve the neurovascular structures and advanced into the primary defect. Care was taken to ensure that precise realignment of the vermilion border was achieved.
Peng Advancement Flap Text: The defect edges were debeveled with a #15 scalpel blade.  Given the location of the defect, shape of the defect and the proximity to free margins a Peng advancement flap was deemed most appropriate.  Using a sterile surgical marker, an appropriate advancement flap was drawn incorporating the defect and placing the expected incisions within the relaxed skin tension lines where possible. The area thus outlined was incised deep to adipose tissue with a #15 scalpel blade.  The skin margins were undermined to an appropriate distance in all directions utilizing iris scissors.
Hatchet Flap Text: The defect edges were debeveled with a #15 scalpel blade.  Given the location of the defect, shape of the defect and the proximity to free margins a hatchet flap was deemed most appropriate.  Using a sterile surgical marker, an appropriate hatchet flap was drawn incorporating the defect and placing the expected incisions within the relaxed skin tension lines where possible.    The area thus outlined was incised deep to adipose tissue with a #15 scalpel blade.  The skin margins were undermined to an appropriate distance in all directions utilizing iris scissors.
Rotation Flap Text: The defect edges were debeveled with a #15 scalpel blade.  Given the location of the defect, shape of the defect and the proximity to free margins a rotation flap was deemed most appropriate.  Using a sterile surgical marker, an appropriate rotation flap was drawn incorporating the defect and placing the expected incisions within the relaxed skin tension lines where possible.    The area thus outlined was incised deep to adipose tissue with a #15 scalpel blade.  The skin margins were undermined to an appropriate distance in all directions utilizing iris scissors.
Spiral Flap Text: The defect edges were debeveled with a #15 scalpel blade.  Given the location of the defect, shape of the defect and the proximity to free margins a spiral flap was deemed most appropriate.  Using a sterile surgical marker, an appropriate rotation flap was drawn incorporating the defect and placing the expected incisions within the relaxed skin tension lines where possible. The area thus outlined was incised deep to adipose tissue with a #15 scalpel blade.  The skin margins were undermined to an appropriate distance in all directions utilizing iris scissors.
Staged Advancement Flap Text: The defect edges were debeveled with a #15 scalpel blade.  Given the location of the defect, shape of the defect and the proximity to free margins a staged advancement flap was deemed most appropriate.  Using a sterile surgical marker, an appropriate advancement flap was drawn incorporating the defect and placing the expected incisions within the relaxed skin tension lines where possible. The area thus outlined was incised deep to adipose tissue with a #15 scalpel blade.  The skin margins were undermined to an appropriate distance in all directions utilizing iris scissors.
Star Wedge Flap Text: The defect edges were debeveled with a #15 scalpel blade.  Given the location of the defect, shape of the defect and the proximity to free margins a star wedge flap was deemed most appropriate.  Using a sterile surgical marker, an appropriate rotation flap was drawn incorporating the defect and placing the expected incisions within the relaxed skin tension lines where possible. The area thus outlined was incised deep to adipose tissue with a #15 scalpel blade.  The skin margins were undermined to an appropriate distance in all directions utilizing iris scissors.
Transposition Flap Text: The defect edges were debeveled with a #15 scalpel blade.  Given the location of the defect and the proximity to free margins a transposition flap was deemed most appropriate.  Using a sterile surgical marker, an appropriate transposition flap was drawn incorporating the defect.    The area thus outlined was incised deep to adipose tissue with a #15 scalpel blade.  The skin margins were undermined to an appropriate distance in all directions utilizing iris scissors.
Muscle Hinge Flap Text: The defect edges were debeveled with a #15 scalpel blade.  Given the size, depth and location of the defect and the proximity to free margins a muscle hinge flap was deemed most appropriate.  Using a sterile surgical marker, an appropriate hinge flap was drawn incorporating the defect. The area thus outlined was incised with a #15 scalpel blade.  The skin margins were undermined to an appropriate distance in all directions utilizing iris scissors.
Mustarde Flap Text: The defect edges were debeveled with a #15 scalpel blade.  Given the size, depth and location of the defect and the proximity to free margins a Mustarde flap was deemed most appropriate.  Using a sterile surgical marker, an appropriate flap was drawn incorporating the defect. The area thus outlined was incised with a #15 scalpel blade.  The skin margins were undermined to an appropriate distance in all directions utilizing iris scissors.
Nasal Turnover Hinge Flap Text: The defect edges were debeveled with a #15 scalpel blade.  Given the size, depth, location of the defect and the defect being full thickness a nasal turnover hinge flap was deemed most appropriate.  Using a sterile surgical marker, an appropriate hinge flap was drawn incorporating the defect. The area thus outlined was incised with a #15 scalpel blade. The flap was designed to recreate the nasal mucosal lining and the alar rim. The skin margins were undermined to an appropriate distance in all directions utilizing iris scissors.
Nasalis-Muscle-Based Myocutaneous Island Pedicle Flap Text: Using a #15 blade, an incision was made around the donor flap to the level of the nasalis muscle. Wide lateral undermining was then performed in both the subcutaneous plane above the nasalis muscle, and in a submuscular plane just above periosteum. This allowed the formation of a free nasalis muscle axial pedicle (based on the angular artery) which was still attached to the actual cutaneous flap, increasing its mobility and vascular viability. Hemostasis was obtained with pinpoint electrocoagulation. The flap was mobilized into position and the pivotal anchor points positioned and stabilized with buried interrupted sutures. Subcutaneous and dermal tissues were closed in a multilayered fashion with sutures. Tissue redundancies were excised, and the epidermal edges were apposed without significant tension and sutured with sutures.
Orbicularis Oris Muscle Flap Text: The defect edges were debeveled with a #15 scalpel blade.  Given that the defect affected the competency of the oral sphincter an orbicularis oris muscle flap was deemed most appropriate to restore this competency and normal muscle function.  Using a sterile surgical marker, an appropriate flap was drawn incorporating the defect. The area thus outlined was incised with a #15 scalpel blade.
Melolabial Transposition Flap Text: The defect edges were debeveled with a #15 scalpel blade.  Given the location of the defect and the proximity to free margins a melolabial flap was deemed most appropriate.  Using a sterile surgical marker, an appropriate melolabial transposition flap was drawn incorporating the defect.    The area thus outlined was incised deep to adipose tissue with a #15 scalpel blade.  The skin margins were undermined to an appropriate distance in all directions utilizing iris scissors.
Rhombic Flap Text: The defect edges were debeveled with a #15 scalpel blade.  Given the location of the defect and the proximity to free margins a rhombic flap was deemed most appropriate.  Using a sterile surgical marker, an appropriate rhombic flap was drawn incorporating the defect.    The area thus outlined was incised deep to adipose tissue with a #15 scalpel blade.  The skin margins were undermined to an appropriate distance in all directions utilizing iris scissors.
Rhomboid Transposition Flap Text: The defect edges were debeveled with a #15 scalpel blade.  Given the location of the defect and the proximity to free margins a rhomboid transposition flap was deemed most appropriate.  Using a sterile surgical marker, an appropriate rhomboid flap was drawn incorporating the defect.    The area thus outlined was incised deep to adipose tissue with a #15 scalpel blade.  The skin margins were undermined to an appropriate distance in all directions utilizing iris scissors.
Bi-Rhombic Flap Text: The defect edges were debeveled with a #15 scalpel blade.  Given the location of the defect and the proximity to free margins a bi-rhombic flap was deemed most appropriate.  Using a sterile surgical marker, an appropriate rhombic flap was drawn incorporating the defect. The area thus outlined was incised deep to adipose tissue with a #15 scalpel blade.  The skin margins were undermined to an appropriate distance in all directions utilizing iris scissors.
Helical Rim Advancement Flap Text: The defect edges were debeveled with a #15 blade scalpel.  Given the location of the defect and the proximity to free margins (helical rim) a double helical rim advancement flap was deemed most appropriate.  Using a sterile surgical marker, the appropriate advancement flaps were drawn incorporating the defect and placing the expected incisions between the helical rim and antihelix where possible.  The area thus outlined was incised through and through with a #15 scalpel blade.  With a skin hook and iris scissors, the flaps were gently and sharply undermined and freed up.
Bilateral Helical Rim Advancement Flap Text: The defect edges were debeveled with a #15 blade scalpel.  Given the location of the defect and the proximity to free margins (helical rim) a bilateral helical rim advancement flap was deemed most appropriate.  Using a sterile surgical marker, the appropriate advancement flaps were drawn incorporating the defect and placing the expected incisions between the helical rim and antihelix where possible.  The area thus outlined was incised through and through with a #15 scalpel blade.  With a skin hook and iris scissors, the flaps were gently and sharply undermined and freed up.
Ear Star Wedge Flap Text: The defect edges were debeveled with a #15 blade scalpel.  Given the location of the defect and the proximity to free margins (helical rim) an ear star wedge flap was deemed most appropriate.  Using a sterile surgical marker, the appropriate flap was drawn incorporating the defect and placing the expected incisions between the helical rim and antihelix where possible.  The area thus outlined was incised through and through with a #15 scalpel blade.
Banner Transposition Flap Text: The defect edges were debeveled with a #15 scalpel blade.  Given the location of the defect and the proximity to free margins a Banner transposition flap was deemed most appropriate.  Using a sterile surgical marker, an appropriate flap drawn around the defect. The area thus outlined was incised deep to adipose tissue with a #15 scalpel blade.  The skin margins were undermined to an appropriate distance in all directions utilizing iris scissors.
Bilobed Flap Text: The defect edges were debeveled with a #15 scalpel blade.  Given the location of the defect and the proximity to free margins a bilobe flap was deemed most appropriate.  Using a sterile surgical marker, an appropriate bilobe flap drawn around the defect.    The area thus outlined was incised deep to adipose tissue with a #15 scalpel blade.  The skin margins were undermined to an appropriate distance in all directions utilizing iris scissors.
Bilobed Transposition Flap Text: The defect edges were debeveled with a #15 scalpel blade.  Given the location of the defect and the proximity to free margins a bilobed transposition flap was deemed most appropriate.  Using a sterile surgical marker, an appropriate bilobe flap drawn around the defect.    The area thus outlined was incised deep to adipose tissue with a #15 scalpel blade.  The skin margins were undermined to an appropriate distance in all directions utilizing iris scissors.
Trilobed Flap Text: The defect edges were debeveled with a #15 scalpel blade.  Given the location of the defect and the proximity to free margins a trilobed flap was deemed most appropriate.  Using a sterile surgical marker, an appropriate trilobed flap drawn around the defect.    The area thus outlined was incised deep to adipose tissue with a #15 scalpel blade.  The skin margins were undermined to an appropriate distance in all directions utilizing iris scissors.
Dorsal Nasal Flap Text: The defect edges were debeveled with a #15 scalpel blade.  Given the location of the defect and the proximity to free margins a dorsal nasal flap was deemed most appropriate.  Using a sterile surgical marker, an appropriate dorsal nasal flap was drawn around the defect.    The area thus outlined was incised deep to adipose tissue with a #15 scalpel blade.  The skin margins were undermined to an appropriate distance in all directions utilizing iris scissors.
Island Pedicle Flap Text: The defect edges were debeveled with a #15 scalpel blade.  Given the location of the defect, shape of the defect and the proximity to free margins an island pedicle advancement flap was deemed most appropriate.  Using a sterile surgical marker, an appropriate advancement flap was drawn incorporating the defect, outlining the appropriate donor tissue and placing the expected incisions within the relaxed skin tension lines where possible.    The area thus outlined was incised deep to adipose tissue with a #15 scalpel blade.  The skin margins were undermined to an appropriate distance in all directions around the primary defect and laterally outward around the island pedicle utilizing iris scissors.  There was minimal undermining beneath the pedicle flap.
Island Pedicle Flap With Canthal Suspension Text: The defect edges were debeveled with a #15 scalpel blade.  Given the location of the defect, shape of the defect and the proximity to free margins an island pedicle advancement flap was deemed most appropriate.  Using a sterile surgical marker, an appropriate advancement flap was drawn incorporating the defect, outlining the appropriate donor tissue and placing the expected incisions within the relaxed skin tension lines where possible. The area thus outlined was incised deep to adipose tissue with a #15 scalpel blade.  The skin margins were undermined to an appropriate distance in all directions around the primary defect and laterally outward around the island pedicle utilizing iris scissors.  There was minimal undermining beneath the pedicle flap. A suspension suture was placed in the canthal tendon to prevent tension and prevent ectropion.
Alar Island Pedicle Flap Text: The defect edges were debeveled with a #15 scalpel blade.  Given the location of the defect, shape of the defect and the proximity to the alar rim an island pedicle advancement flap was deemed most appropriate.  Using a sterile surgical marker, an appropriate advancement flap was drawn incorporating the defect, outlining the appropriate donor tissue and placing the expected incisions within the nasal ala running parallel to the alar rim. The area thus outlined was incised with a #15 scalpel blade.  The skin margins were undermined minimally to an appropriate distance in all directions around the primary defect and laterally outward around the island pedicle utilizing iris scissors.  There was minimal undermining beneath the pedicle flap.
Double Island Pedicle Flap Text: The defect edges were debeveled with a #15 scalpel blade.  Given the location of the defect, shape of the defect and the proximity to free margins a double island pedicle advancement flap was deemed most appropriate.  Using a sterile surgical marker, an appropriate advancement flap was drawn incorporating the defect, outlining the appropriate donor tissue and placing the expected incisions within the relaxed skin tension lines where possible.    The area thus outlined was incised deep to adipose tissue with a #15 scalpel blade.  The skin margins were undermined to an appropriate distance in all directions around the primary defect and laterally outward around the island pedicle utilizing iris scissors.  There was minimal undermining beneath the pedicle flap.
Island Pedicle Flap-Requiring Vessel Identification Text: The defect edges were debeveled with a #15 scalpel blade.  Given the location of the defect, shape of the defect and the proximity to free margins an island pedicle advancement flap was deemed most appropriate.  Using a sterile surgical marker, an appropriate advancement flap was drawn, based on the axial vessel mentioned above, incorporating the defect, outlining the appropriate donor tissue and placing the expected incisions within the relaxed skin tension lines where possible.    The area thus outlined was incised deep to adipose tissue with a #15 scalpel blade.  The skin margins were undermined to an appropriate distance in all directions around the primary defect and laterally outward around the island pedicle utilizing iris scissors.  There was minimal undermining beneath the pedicle flap.
Keystone Flap Text: The defect edges were debeveled with a #15 scalpel blade.  Given the location of the defect, shape of the defect a keystone flap was deemed most appropriate.  Using a sterile surgical marker, an appropriate keystone flap was drawn incorporating the defect, outlining the appropriate donor tissue and placing the expected incisions within the relaxed skin tension lines where possible. The area thus outlined was incised deep to adipose tissue with a #15 scalpel blade.  The skin margins were undermined to an appropriate distance in all directions around the primary defect and laterally outward around the flap utilizing iris scissors.
O-T Plasty Text: The defect edges were debeveled with a #15 scalpel blade.  Given the location of the defect, shape of the defect and the proximity to free margins an O-T plasty was deemed most appropriate.  Using a sterile surgical marker, an appropriate O-T plasty was drawn incorporating the defect and placing the expected incisions within the relaxed skin tension lines where possible.    The area thus outlined was incised deep to adipose tissue with a #15 scalpel blade.  The skin margins were undermined to an appropriate distance in all directions utilizing iris scissors.
O-Z Plasty Text: The defect edges were debeveled with a #15 scalpel blade.  Given the location of the defect, shape of the defect and the proximity to free margins an O-Z plasty (double transposition flap) was deemed most appropriate.  Using a sterile surgical marker, the appropriate transposition flaps were drawn incorporating the defect and placing the expected incisions within the relaxed skin tension lines where possible.    The area thus outlined was incised deep to adipose tissue with a #15 scalpel blade.  The skin margins were undermined to an appropriate distance in all directions utilizing iris scissors.  Hemostasis was achieved with electrocautery.  The flaps were then transposed into place, one clockwise and the other counterclockwise, and anchored with interrupted buried subcutaneous sutures.
Double O-Z Plasty Text: The defect edges were debeveled with a #15 scalpel blade.  Given the location of the defect, shape of the defect and the proximity to free margins a Double O-Z plasty (double transposition flap) was deemed most appropriate.  Using a sterile surgical marker, the appropriate transposition flaps were drawn incorporating the defect and placing the expected incisions within the relaxed skin tension lines where possible. The area thus outlined was incised deep to adipose tissue with a #15 scalpel blade.  The skin margins were undermined to an appropriate distance in all directions utilizing iris scissors.  Hemostasis was achieved with electrocautery.  The flaps were then transposed into place, one clockwise and the other counterclockwise, and anchored with interrupted buried subcutaneous sutures.
V-Y Plasty Text: The defect edges were debeveled with a #15 scalpel blade.  Given the location of the defect, shape of the defect and the proximity to free margins an V-Y advancement flap was deemed most appropriate.  Using a sterile surgical marker, an appropriate advancement flap was drawn incorporating the defect and placing the expected incisions within the relaxed skin tension lines where possible.    The area thus outlined was incised deep to adipose tissue with a #15 scalpel blade.  The skin margins were undermined to an appropriate distance in all directions utilizing iris scissors.
H Plasty Text: Given the location of the defect, shape of the defect and the proximity to free margins a H-plasty was deemed most appropriate for repair.  Using a sterile surgical marker, the appropriate advancement arms of the H-plasty were drawn incorporating the defect and placing the expected incisions within the relaxed skin tension lines where possible. The area thus outlined was incised deep to adipose tissue with a #15 scalpel blade. The skin margins were undermined to an appropriate distance in all directions utilizing iris scissors.  The opposing advancement arms were then advanced into place in opposite direction and anchored with interrupted buried subcutaneous sutures.
W Plasty Text: The lesion was extirpated to the level of the fat with a #15 scalpel blade.  Given the location of the defect, shape of the defect and the proximity to free margins a W-plasty was deemed most appropriate for repair.  Using a sterile surgical marker, the appropriate transposition arms of the W-plasty were drawn incorporating the defect and placing the expected incisions within the relaxed skin tension lines where possible.    The area thus outlined was incised deep to adipose tissue with a #15 scalpel blade.  The skin margins were undermined to an appropriate distance in all directions utilizing iris scissors.  The opposing transposition arms were then transposed into place in opposite direction and anchored with interrupted buried subcutaneous sutures.
Z Plasty Text: The lesion was extirpated to the level of the fat with a #15 scalpel blade.  Given the location of the defect, shape of the defect and the proximity to free margins a Z-plasty was deemed most appropriate for repair.  Using a sterile surgical marker, the appropriate transposition arms of the Z-plasty were drawn incorporating the defect and placing the expected incisions within the relaxed skin tension lines where possible.    The area thus outlined was incised deep to adipose tissue with a #15 scalpel blade.  The skin margins were undermined to an appropriate distance in all directions utilizing iris scissors.  The opposing transposition arms were then transposed into place in opposite direction and anchored with interrupted buried subcutaneous sutures.
Zygomaticofacial Flap Text: Given the location of the defect, shape of the defect and the proximity to free margins a zygomaticofacial flap was deemed most appropriate for repair.  Using a sterile surgical marker, the appropriate flap was drawn incorporating the defect and placing the expected incisions within the relaxed skin tension lines where possible. The area thus outlined was incised deep to adipose tissue with a #15 scalpel blade with preservation of a vascular pedicle.  The skin margins were undermined to an appropriate distance in all directions utilizing iris scissors.  The flap was then placed into the defect and anchored with interrupted buried subcutaneous sutures.
Cheek Interpolation Flap Text: A decision was made to reconstruct the defect utilizing an interpolation axial flap and a staged reconstruction.  A telfa template was made of the defect.  This telfa template was then used to outline the Cheek Interpolation flap.  The donor area for the pedicle flap was then injected with anesthesia.  The flap was excised through the skin and subcutaneous tissue down to the layer of the underlying musculature.  The interpolation flap was carefully excised within this deep plane to maintain its blood supply.  The edges of the donor site were undermined.   The donor site was closed in a primary fashion.  The pedicle was then rotated into position and sutured.  Once the tube was sutured into place, adequate blood supply was confirmed with blanching and refill.  The pedicle was then wrapped with xeroform gauze and dressed appropriately with a telfa and gauze bandage to ensure continued blood supply and protect the attached pedicle.
Cheek-To-Nose Interpolation Flap Text: A decision was made to reconstruct the defect utilizing an interpolation axial flap and a staged reconstruction.  A telfa template was made of the defect.  This telfa template was then used to outline the Cheek-To-Nose Interpolation flap.  The donor area for the pedicle flap was then injected with anesthesia.  The flap was excised through the skin and subcutaneous tissue down to the layer of the underlying musculature.  The interpolation flap was carefully excised within this deep plane to maintain its blood supply.  The edges of the donor site were undermined.   The donor site was closed in a primary fashion.  The pedicle was then rotated into position and sutured.  Once the tube was sutured into place, adequate blood supply was confirmed with blanching and refill.  The pedicle was then wrapped with xeroform gauze and dressed appropriately with a telfa and gauze bandage to ensure continued blood supply and protect the attached pedicle.
Interpolation Flap Text: A decision was made to reconstruct the defect utilizing an interpolation axial flap and a staged reconstruction.  A telfa template was made of the defect.  This telfa template was then used to outline the interpolation flap.  The donor area for the pedicle flap was then injected with anesthesia.  The flap was excised through the skin and subcutaneous tissue down to the layer of the underlying musculature.  The interpolation flap was carefully excised within this deep plane to maintain its blood supply.  The edges of the donor site were undermined.   The donor site was closed in a primary fashion.  The pedicle was then rotated into position and sutured.  Once the tube was sutured into place, adequate blood supply was confirmed with blanching and refill.  The pedicle was then wrapped with xeroform gauze and dressed appropriately with a telfa and gauze bandage to ensure continued blood supply and protect the attached pedicle.
Melolabial Interpolation Flap Text: A decision was made to reconstruct the defect utilizing an interpolation axial flap and a staged reconstruction.  A telfa template was made of the defect.  This telfa template was then used to outline the melolabial interpolation flap.  The donor area for the pedicle flap was then injected with anesthesia.  The flap was excised through the skin and subcutaneous tissue down to the layer of the underlying musculature.  The pedicle flap was carefully excised within this deep plane to maintain its blood supply.  The edges of the donor site were undermined.   The donor site was closed in a primary fashion.  The pedicle was then rotated into position and sutured.  Once the tube was sutured into place, adequate blood supply was confirmed with blanching and refill.  The pedicle was then wrapped with xeroform gauze and dressed appropriately with a telfa and gauze bandage to ensure continued blood supply and protect the attached pedicle.
Mastoid Interpolation Flap Text: A decision was made to reconstruct the defect utilizing an interpolation axial flap and a staged reconstruction.  A telfa template was made of the defect.  This telfa template was then used to outline the mastoid interpolation flap.  The donor area for the pedicle flap was then injected with anesthesia.  The flap was excised through the skin and subcutaneous tissue down to the layer of the underlying musculature.  The pedicle flap was carefully excised within this deep plane to maintain its blood supply.  The edges of the donor site were undermined.   The donor site was closed in a primary fashion.  The pedicle was then rotated into position and sutured.  Once the tube was sutured into place, adequate blood supply was confirmed with blanching and refill.  The pedicle was then wrapped with xeroform gauze and dressed appropriately with a telfa and gauze bandage to ensure continued blood supply and protect the attached pedicle.
Posterior Auricular Interpolation Flap Text: A decision was made to reconstruct the defect utilizing an interpolation axial flap and a staged reconstruction.  A telfa template was made of the defect.  This telfa template was then used to outline the posterior auricular interpolation flap.  The donor area for the pedicle flap was then injected with anesthesia.  The flap was excised through the skin and subcutaneous tissue down to the layer of the underlying musculature.  The pedicle flap was carefully excised within this deep plane to maintain its blood supply.  The edges of the donor site were undermined.   The donor site was closed in a primary fashion.  The pedicle was then rotated into position and sutured.  Once the tube was sutured into place, adequate blood supply was confirmed with blanching and refill.  The pedicle was then wrapped with xeroform gauze and dressed appropriately with a telfa and gauze bandage to ensure continued blood supply and protect the attached pedicle.
Paramedian Forehead Flap Text: A decision was made to reconstruct the defect utilizing an interpolation axial flap and a staged reconstruction.  A telfa template was made of the defect.  This telfa template was then used to outline the paramedian forehead pedicle flap.  The donor area for the pedicle flap was then injected with anesthesia.  The flap was excised through the skin and subcutaneous tissue down to the layer of the underlying musculature.  The pedicle flap was carefully excised within this deep plane to maintain its blood supply.  The edges of the donor site were undermined.   The donor site was closed in a primary fashion.  The pedicle was then rotated into position and sutured.  Once the tube was sutured into place, adequate blood supply was confirmed with blanching and refill.  The pedicle was then wrapped with xeroform gauze and dressed appropriately with a telfa and gauze bandage to ensure continued blood supply and protect the attached pedicle.
Abbe Flap (Upper To Lower Lip) Text: The defect of the lower lip was assessed and measured.  Given the location and size of the defect, an Abbe flap was deemed most appropriate.  Using a sterile surgical marker, an appropriate Abbe flap was measured and drawn on the upper lip. Local anesthesia was then infiltrated.  A scalpel was then used to incise the upper lip through and through the skin, vermilion, muscle and mucosa, leaving the flap pedicled on the opposite side.  The flap was then rotated and transferred to the lower lip defect.  The flap was then sutured into place with a three layer technique, closing the orbicularis oris muscle layer with subcutaneous buried sutures, followed by a mucosal layer and an epidermal layer.
Abbe Flap (Lower To Upper Lip) Text: The defect of the upper lip was assessed and measured.  Given the location and size of the defect, an Abbe flap was deemed most appropriate.  Using a sterile surgical marker, an appropriate Abbe flap was measured and drawn on the lower lip. Local anesthesia was then infiltrated. A scalpel was then used to incise the upper lip through and through the skin, vermilion, muscle and mucosa, leaving the flap pedicled on the opposite side.  The flap was then rotated and transferred to the lower lip defect.  The flap was then sutured into place with a three layer technique, closing the orbicularis oris muscle layer with subcutaneous buried sutures, followed by a mucosal layer and an epidermal layer.
Estlander Flap (Upper To Lower Lip) Text: The defect of the lower lip was assessed and measured.  Given the location and size of the defect, an Estlander flap was deemed most appropriate.  Using a sterile surgical marker, an appropriate Estlander flap was measured and drawn on the upper lip. Local anesthesia was then infiltrated. A scalpel was then used to incise the lateral aspect of the flap, through skin, muscle and mucosa, leaving the flap pedicled medially.  The flap was then rotated and positioned to fill the lower lip defect.  The flap was then sutured into place with a three layer technique, closing the orbicularis oris muscle layer with subcutaneous buried sutures, followed by a mucosal layer and an epidermal layer.
Estlander Flap (Lower To Upper Lip) Text: The defect of the lower lip was assessed and measured.  Given the location and size of the defect, an Estlander flap was deemed most appropriate.  Using a sterile surgical marker, an appropriate Estlander flap was measured and drawn on the upper lip. Local anesthesia was then infiltrated. A scalpel was then used to incise the lateral aspect of the flap, through skin, muscle and mucosa, leaving the flap pedicled medially.  The flap was then rotated and positioned to fill the lower lip defect.  The flap was then sutured into place with a three layer technique, closing the orbicularis oris muscle layer with subcutaneous buried sutures, followed by a mucosal layer and an epidermal layer.
Cheiloplasty (Less Than 50%) Text: A decision was made to reconstruct the defect with a  cheiloplasty.  The defect was undermined extensively.  Additional obicularis oris muscle was excised with a 15 blade scalpel.  The defect was converted into a full thickness wedge, of less than 50% of the vertical height of the lip, to facilite a better cosmetic result.  Small vessels were then tied off with 5-0 monocyrl. The obicularis oris, superficial fascia, adipose and dermis were then reapproximated.  After the deeper layers were approximated the epidermis was reapproximated with particular care given to realign the vermilion border.
Cheiloplasty (Complex) Text: A decision was made to reconstruct the defect with a  cheiloplasty.  The defect was undermined extensively.  Additional obicularis oris muscle was excised with a 15 blade scalpel.  The defect was converted into a full thickness wedge to facilite a better cosmetic result.  Small vessels were then tied off with 5-0 monocyrl. The obicularis oris, superficial fascia, adipose and dermis were then reapproximated.  After the deeper layers were approximated the epidermis was reapproximated with particular care given to realign the vermilion border.
Ear Wedge Repair Text: A wedge excision was completed by carrying down an excision through the full thickness of the ear and cartilage with an inward facing Burow's triangle. The wound was then closed in a layered fashion.
Full Thickness Lip Wedge Repair (Flap) Text: Given the location of the defect and the proximity to free margins a full thickness wedge repair was deemed most appropriate.  Using a sterile surgical marker, the appropriate repair was drawn incorporating the defect and placing the expected incisions perpendicular to the vermilion border.  The vermilion border was also meticulously outlined to ensure appropriate reapproximation during the repair.  The area thus outlined was incised through and through with a #15 scalpel blade.  The muscularis and dermis were reaproximated with deep sutures following hemostasis. Care was taken to realign the vermilion border before proceeding with the superficial closure.  Once the vermilion was realigned the superfical and mucosal closure was finished.
Ftsg Text: The defect edges were debeveled with a #15 scalpel blade.  Given the location of the defect, shape of the defect and the proximity to free margins a full thickness skin graft was deemed most appropriate.  Using a sterile surgical marker, the primary defect shape was transferred to the donor site. The area thus outlined was incised deep to adipose tissue with a #15 scalpel blade.  The harvested graft was then trimmed of adipose tissue until only dermis and epidermis was left.  The skin margins of the secondary defect were undermined to an appropriate distance in all directions utilizing iris scissors.  The secondary defect was closed with interrupted buried subcutaneous sutures.  The skin edges were then re-apposed with running  sutures.  The skin graft was then placed in the primary defect and oriented appropriately.
Split-Thickness Skin Graft Text: The defect edges were debeveled with a #15 scalpel blade.  Given the location of the defect, shape of the defect and the proximity to free margins a split thickness skin graft was deemed most appropriate.  Using a sterile surgical marker, the primary defect shape was transferred to the donor site. The split thickness graft was then harvested.  The skin graft was then placed in the primary defect and oriented appropriately.
Burow's Graft Text: The defect edges were debeveled with a #15 scalpel blade.  Given the location of the defect, shape of the defect, the proximity to free margins and the presence of a standing cone deformity a Burow's skin graft was deemed most appropriate. The standing cone was removed and this tissue was then trimmed to the shape of the primary defect. The adipose tissue was also removed until only dermis and epidermis were left.  The skin margins of the secondary defect were undermined to an appropriate distance in all directions utilizing iris scissors.  The secondary defect was closed with interrupted buried subcutaneous sutures.  The skin edges were then re-apposed with running  sutures.  The skin graft was then placed in the primary defect and oriented appropriately.
Cartilage Graft Text: The defect edges were debeveled with a #15 scalpel blade.  Given the location of the defect, shape of the defect, the fact the defect involved a full thickness cartilage defect a cartilage graft was deemed most appropriate.  An appropriate donor site was identified, cleansed, and anesthetized. The cartilage graft was then harvested and transferred to the recipient site, oriented appropriately and then sutured into place.  The secondary defect was then repaired using a primary closure.
Composite Graft Text: The defect edges were debeveled with a #15 scalpel blade.  Given the location of the defect, shape of the defect, the proximity to free margins and the fact the defect was full thickness a composite graft was deemed most appropriate.  The defect was outline and then transferred to the donor site.  A full thickness graft was then excised from the donor site. The graft was then placed in the primary defect, oriented appropriately and then sutured into place.  The secondary defect was then repaired using a primary closure.
Epidermal Autograft Text: The defect edges were debeveled with a #15 scalpel blade.  Given the location of the defect, shape of the defect and the proximity to free margins an epidermal autograft was deemed most appropriate.  Using a sterile surgical marker, the primary defect shape was transferred to the donor site. The epidermal graft was then harvested.  The skin graft was then placed in the primary defect and oriented appropriately.
Dermal Autograft Text: The defect edges were debeveled with a #15 scalpel blade.  Given the location of the defect, shape of the defect and the proximity to free margins a dermal autograft was deemed most appropriate.  Using a sterile surgical marker, the primary defect shape was transferred to the donor site. The area thus outlined was incised deep to adipose tissue with a #15 scalpel blade.  The harvested graft was then trimmed of adipose and epidermal tissue until only dermis was left.  The skin graft was then placed in the primary defect and oriented appropriately.
Skin Substitute Text: The defect edges were debeveled with a #15 scalpel blade.  Given the location of the defect, shape of the defect and the proximity to free margins a skin substitute graft was deemed most appropriate.  The graft material was trimmed to fit the size of the defect. The graft was then placed in the primary defect and oriented appropriately.
Tissue Cultured Epidermal Autograft Text: The defect edges were debeveled with a #15 scalpel blade.  Given the location of the defect, shape of the defect and the proximity to free margins a tissue cultured epidermal autograft was deemed most appropriate.  The graft was then trimmed to fit the size of the defect.  The graft was then placed in the primary defect and oriented appropriately.
Xenograft Text: The defect edges were debeveled with a #15 scalpel blade.  Given the location of the defect, shape of the defect and the proximity to free margins a xenograft was deemed most appropriate.  The graft was then trimmed to fit the size of the defect.  The graft was then placed in the primary defect and oriented appropriately.
Purse String (Simple) Text: Given the location of the defect and the characteristics of the surrounding skin a purse string closure was deemed most appropriate.  Undermining was performed circumfirentially around the surgical defect.  A purse string suture was then placed and tightened.
Purse String (Intermediate) Text: Given the location of the defect and the characteristics of the surrounding skin a purse string intermediate closure was deemed most appropriate.  Undermining was performed circumfirentially around the surgical defect.  A purse string suture was then placed and tightened.
Partial Purse String (Simple) Text: Given the location of the defect and the characteristics of the surrounding skin a simple purse string closure was deemed most appropriate.  Undermining was performed circumfirentially around the surgical defect.  A purse string suture was then placed and tightened. Wound tension only allowed a partial closure of the circular defect.
Partial Purse String (Intermediate) Text: Given the location of the defect and the characteristics of the surrounding skin an intermediate purse string closure was deemed most appropriate.  Undermining was performed circumfirentially around the surgical defect.  A purse string suture was then placed and tightened. Wound tension only allowed a partial closure of the circular defect.
Localized Dermabrasion Text: The patient was draped in routine manner.  Localized dermabrasion using 3 x 17 mm wire brush was performed in routine manner to papillary dermis. This spot dermabrasion is being performed to complete skin cancer reconstruction. It also will eliminate the other sun damaged precancerous cells that are known to be part of the regional effect of a lifetime's worth of sun exposure. This localized dermabrasion is therapeutic and should not be considered cosmetic in any regard.
Tarsorrhaphy Text: A tarsorrhaphy was performed using Frost sutures.
Complex Repair And Flap Additional Text (Will Appearing After The Standard Complex Repair Text): The complex repair was not sufficient to completely close the primary defect. The remaining additional defect was repaired with the flap mentioned below.
Complex Repair And Graft Additional Text (Will Appearing After The Standard Complex Repair Text): The complex repair was not sufficient to completely close the primary defect. The remaining additional defect was repaired with the graft mentioned below.
Manual Repair Warning Statement: We plan on removing the manually selected variable below in favor of our much easier automatic structured text blocks found in the previous tab. We decided to do this to help make the flow better and give you the full power of structured data. Manual selection is never going to be ideal in our platform and I would encourage you to avoid using manual selection from this point on, especially since I will be sunsetting this feature. It is important that you do one of two things with the customized text below. First, you can save all of the text in a word file so you can have it for future reference. Second, transfer the text to the appropriate area in the Library tab. Lastly, if there is a flap or graft type which we do not have you need to let us know right away so I can add it in before the variable is hidden. No need to panic, we plan to give you roughly 6 months to make the change.
Same Histology In Subsequent Stages Text: The pattern and morphology of the tumor is as described in the first stage.
No Residual Tumor Seen Histology Text: There were no malignant cells seen in the sections examined.
Inflammation Suggestive Of Cancer Camouflage Histology Text: There was a dense lymphocytic infiltrate which prevented adequate histologic evaluation of adjacent structures.
Bcc Histology Text: There were numerous aggregates of basaloid cells.
Bcc Infiltrative Histology Text: There were numerous aggregates of basaloid cells demonstrating an infiltrative pattern.
Mart-1 - Positive Histology Text: MART-1 staining demonstrates areas of higher density and clustering of melanocytes with Pagetoid spread upwards within the epidermis. The surgical margins are positive for tumor cells.
Mart-1 - Negative Histology Text: MART-1 staining demonstrates a normal density and pattern of melanocytes along the dermal-epidermal junction. The surgical margins are negative for tumor cells.
Clia Id #: [id number]
Mohs : Rolan Da Silva DO
Information: Selecting Yes will display possible errors in your note based on the variables you have selected. This validation is only offered as a suggestion for you. PLEASE NOTE THAT THE VALIDATION TEXT WILL BE REMOVED WHEN YOU FINALIZE YOUR NOTE. IF YOU WANT TO FAX A PRELIMINARY NOTE YOU WILL NEED TO TOGGLE THIS TO 'NO' IF YOU DO NOT WANT IT IN YOUR FAXED NOTE.

## 2023-03-30 ENCOUNTER — APPOINTMENT (RX ONLY)
Dept: URBAN - METROPOLITAN AREA CLINIC 156 | Facility: CLINIC | Age: 73
Setting detail: DERMATOLOGY
End: 2023-03-30

## 2023-03-30 DIAGNOSIS — Z85.828 PERSONAL HISTORY OF OTHER MALIGNANT NEOPLASM OF SKIN: ICD-10-CM | Status: STABLE

## 2023-03-30 DIAGNOSIS — L57.8 OTHER SKIN CHANGES DUE TO CHRONIC EXPOSURE TO NONIONIZING RADIATION: ICD-10-CM | Status: INADEQUATELY CONTROLLED

## 2023-03-30 DIAGNOSIS — Z48.02 ENCOUNTER FOR REMOVAL OF SUTURES: ICD-10-CM | Status: IMPROVED

## 2023-03-30 DIAGNOSIS — L82.1 OTHER SEBORRHEIC KERATOSIS: ICD-10-CM | Status: STABLE

## 2023-03-30 DIAGNOSIS — D18.0 HEMANGIOMA: ICD-10-CM | Status: STABLE

## 2023-03-30 DIAGNOSIS — D22 MELANOCYTIC NEVI: ICD-10-CM | Status: STABLE

## 2023-03-30 DIAGNOSIS — Z85.820 PERSONAL HISTORY OF MALIGNANT MELANOMA OF SKIN: ICD-10-CM | Status: STABLE

## 2023-03-30 PROBLEM — D22.72 MELANOCYTIC NEVI OF LEFT LOWER LIMB, INCLUDING HIP: Status: ACTIVE | Noted: 2023-03-30

## 2023-03-30 PROBLEM — D22.5 MELANOCYTIC NEVI OF TRUNK: Status: ACTIVE | Noted: 2023-03-30

## 2023-03-30 PROBLEM — D22.39 MELANOCYTIC NEVI OF OTHER PARTS OF FACE: Status: ACTIVE | Noted: 2023-03-30

## 2023-03-30 PROBLEM — D18.01 HEMANGIOMA OF SKIN AND SUBCUTANEOUS TISSUE: Status: ACTIVE | Noted: 2023-03-30

## 2023-03-30 PROCEDURE — 99213 OFFICE O/P EST LOW 20 MIN: CPT

## 2023-03-30 PROCEDURE — ? COUNSELING

## 2023-03-30 PROCEDURE — ? SUTURE REMOVAL (NO GLOBAL PERIOD)

## 2023-03-30 ASSESSMENT — LOCATION DETAILED DESCRIPTION DERM
LOCATION DETAILED: RIGHT ANTERIOR PROXIMAL THIGH
LOCATION DETAILED: INFERIOR MID FOREHEAD
LOCATION DETAILED: LEFT DISTAL PRETIBIAL REGION
LOCATION DETAILED: LEFT INFERIOR LATERAL MIDBACK
LOCATION DETAILED: LEFT SUPERIOR ANTERIOR NECK
LOCATION DETAILED: RIGHT LATERAL EYEBROW
LOCATION DETAILED: LEFT ANTERIOR PROXIMAL THIGH
LOCATION DETAILED: RIGHT DISTAL LATERAL POSTERIOR UPPER ARM
LOCATION DETAILED: RIGHT PROXIMAL DORSAL FOREARM
LOCATION DETAILED: PERIUMBILICAL SKIN
LOCATION DETAILED: UPPER STERNUM
LOCATION DETAILED: RIGHT INFERIOR LATERAL MALAR CHEEK
LOCATION DETAILED: LEFT LATERAL SUPERIOR CHEST
LOCATION DETAILED: RIGHT DISTAL PRETIBIAL REGION
LOCATION DETAILED: LEFT DISTAL POSTERIOR UPPER ARM
LOCATION DETAILED: LEFT INFERIOR UPPER BACK
LOCATION DETAILED: LEFT PROXIMAL POSTERIOR UPPER ARM
LOCATION DETAILED: LEFT PROXIMAL CALF
LOCATION DETAILED: INFERIOR THORACIC SPINE
LOCATION DETAILED: LEFT SUPERIOR UPPER BACK
LOCATION DETAILED: GLABELLA
LOCATION DETAILED: SUPERIOR THORACIC SPINE
LOCATION DETAILED: RIGHT DISTAL POSTERIOR UPPER ARM

## 2023-03-30 ASSESSMENT — LOCATION SIMPLE DESCRIPTION DERM
LOCATION SIMPLE: LEFT POSTERIOR UPPER ARM
LOCATION SIMPLE: LEFT THIGH
LOCATION SIMPLE: INFERIOR FOREHEAD
LOCATION SIMPLE: LEFT CALF
LOCATION SIMPLE: LEFT PRETIBIAL REGION
LOCATION SIMPLE: LEFT UPPER BACK
LOCATION SIMPLE: UPPER BACK
LOCATION SIMPLE: RIGHT FOREARM
LOCATION SIMPLE: RIGHT PRETIBIAL REGION
LOCATION SIMPLE: LEFT LOWER BACK
LOCATION SIMPLE: RIGHT CHEEK
LOCATION SIMPLE: CHEST
LOCATION SIMPLE: RIGHT POSTERIOR UPPER ARM
LOCATION SIMPLE: ABDOMEN
LOCATION SIMPLE: RIGHT EYEBROW
LOCATION SIMPLE: GLABELLA
LOCATION SIMPLE: RIGHT THIGH
LOCATION SIMPLE: LEFT ANTERIOR NECK

## 2023-03-30 ASSESSMENT — LOCATION ZONE DERM
LOCATION ZONE: ARM
LOCATION ZONE: LEG
LOCATION ZONE: TRUNK
LOCATION ZONE: FACE
LOCATION ZONE: NECK

## 2023-03-30 NOTE — HPI: EVALUATION OF SKIN LESION(S)
What Type Of Note Output Would You Prefer (Optional)?: Standard Output
How Severe Are Your Spot(S)?: moderate
Have Your Spot(S) Been Treated In The Past?: has not been treated
Hpi Title: Evaluation of Skin Lesions
Location: Rt forearm
Year Removed: 2009

## 2023-03-30 NOTE — PROCEDURE: COUNSELING
Detail Level: Detailed
When Should The Patient Follow-Up For Their Next Full-Body Skin Exam?: 1 Year
Quality 137: Melanoma: Continuity Of Care - Recall System: Patient information entered into a recall system that includes: target date for the next exam specified AND a process to follow up with patients regarding missed or unscheduled appointments
Detail Level: Simple
Detail Level: Zone

## 2023-04-28 ENCOUNTER — OFFICE VISIT (OUTPATIENT)
Dept: ELECTROPHYSIOLOGY | Facility: CLINIC | Age: 73
End: 2023-04-28
Payer: MEDICAID

## 2023-04-28 ENCOUNTER — HOSPITAL ENCOUNTER (OUTPATIENT)
Dept: CARDIOLOGY | Facility: CLINIC | Age: 73
Discharge: HOME | End: 2023-04-28
Payer: MEDICAID

## 2023-04-28 VITALS
BODY MASS INDEX: 27.88 KG/M2 | DIASTOLIC BLOOD PRESSURE: 80 MMHG | HEIGHT: 66 IN | HEART RATE: 77 BPM | WEIGHT: 173.5 LBS | SYSTOLIC BLOOD PRESSURE: 128 MMHG

## 2023-04-28 DIAGNOSIS — I97.89 POSTOPERATIVE ATRIAL FIBRILLATION: ICD-10-CM

## 2023-04-28 DIAGNOSIS — K31.89 GASTRIC VOLVULUS: ICD-10-CM

## 2023-04-28 DIAGNOSIS — E66.3 OVERWEIGHT (BMI 25.0-29.9): ICD-10-CM

## 2023-04-28 DIAGNOSIS — E78.2 MIXED HYPERLIPIDEMIA: ICD-10-CM

## 2023-04-28 DIAGNOSIS — I48.91 POSTOPERATIVE ATRIAL FIBRILLATION: ICD-10-CM

## 2023-04-28 DIAGNOSIS — Z87.19 HISTORY OF REPAIR OF HIATAL HERNIA: ICD-10-CM

## 2023-04-28 DIAGNOSIS — Z98.890 HISTORY OF REPAIR OF HIATAL HERNIA: ICD-10-CM

## 2023-04-28 DIAGNOSIS — I47.10 SVT (SUPRAVENTRICULAR TACHYCARDIA): Primary | ICD-10-CM

## 2023-04-28 DIAGNOSIS — I10 PRIMARY HYPERTENSION: ICD-10-CM

## 2023-04-28 DIAGNOSIS — I10 HTN (HYPERTENSION), BENIGN: ICD-10-CM

## 2023-04-28 DIAGNOSIS — I48.0 PAF (PAROXYSMAL ATRIAL FIBRILLATION): ICD-10-CM

## 2023-04-28 DIAGNOSIS — E11.9 TYPE 2 DIABETES MELLITUS WITHOUT COMPLICATION, WITHOUT LONG-TERM CURRENT USE OF INSULIN: ICD-10-CM

## 2023-04-28 DIAGNOSIS — I49.9 CARDIAC ARRHYTHMIA, UNSPECIFIED CARDIAC ARRHYTHMIA TYPE: ICD-10-CM

## 2023-04-28 PROCEDURE — 99999 PR PBB SHADOW E&M-EST. PATIENT-LVL IV: ICD-10-PCS | Mod: PBBFAC,,, | Performed by: STUDENT IN AN ORGANIZED HEALTH CARE EDUCATION/TRAINING PROGRAM

## 2023-04-28 PROCEDURE — 1101F PT FALLS ASSESS-DOCD LE1/YR: CPT | Mod: CPTII,,, | Performed by: STUDENT IN AN ORGANIZED HEALTH CARE EDUCATION/TRAINING PROGRAM

## 2023-04-28 PROCEDURE — 4010F ACE/ARB THERAPY RXD/TAKEN: CPT | Mod: CPTII,,, | Performed by: STUDENT IN AN ORGANIZED HEALTH CARE EDUCATION/TRAINING PROGRAM

## 2023-04-28 PROCEDURE — 93010 ELECTROCARDIOGRAM REPORT: CPT | Mod: S$PBB,,, | Performed by: INTERNAL MEDICINE

## 2023-04-28 PROCEDURE — 1126F AMNT PAIN NOTED NONE PRSNT: CPT | Mod: CPTII,,, | Performed by: STUDENT IN AN ORGANIZED HEALTH CARE EDUCATION/TRAINING PROGRAM

## 2023-04-28 PROCEDURE — 1159F MED LIST DOCD IN RCRD: CPT | Mod: CPTII,,, | Performed by: STUDENT IN AN ORGANIZED HEALTH CARE EDUCATION/TRAINING PROGRAM

## 2023-04-28 PROCEDURE — 99214 OFFICE O/P EST MOD 30 MIN: CPT | Mod: PBBFAC | Performed by: STUDENT IN AN ORGANIZED HEALTH CARE EDUCATION/TRAINING PROGRAM

## 2023-04-28 PROCEDURE — 3074F PR MOST RECENT SYSTOLIC BLOOD PRESSURE < 130 MM HG: ICD-10-PCS | Mod: CPTII,,, | Performed by: STUDENT IN AN ORGANIZED HEALTH CARE EDUCATION/TRAINING PROGRAM

## 2023-04-28 PROCEDURE — 93010 RHYTHM STRIP: ICD-10-PCS | Mod: S$PBB,,, | Performed by: INTERNAL MEDICINE

## 2023-04-28 PROCEDURE — 99999 PR PBB SHADOW E&M-EST. PATIENT-LVL IV: CPT | Mod: PBBFAC,,, | Performed by: STUDENT IN AN ORGANIZED HEALTH CARE EDUCATION/TRAINING PROGRAM

## 2023-04-28 PROCEDURE — 3288F FALL RISK ASSESSMENT DOCD: CPT | Mod: CPTII,,, | Performed by: STUDENT IN AN ORGANIZED HEALTH CARE EDUCATION/TRAINING PROGRAM

## 2023-04-28 PROCEDURE — 1159F PR MEDICATION LIST DOCUMENTED IN MEDICAL RECORD: ICD-10-PCS | Mod: CPTII,,, | Performed by: STUDENT IN AN ORGANIZED HEALTH CARE EDUCATION/TRAINING PROGRAM

## 2023-04-28 PROCEDURE — 99214 OFFICE O/P EST MOD 30 MIN: CPT | Mod: S$PBB,,, | Performed by: STUDENT IN AN ORGANIZED HEALTH CARE EDUCATION/TRAINING PROGRAM

## 2023-04-28 PROCEDURE — 3079F PR MOST RECENT DIASTOLIC BLOOD PRESSURE 80-89 MM HG: ICD-10-PCS | Mod: CPTII,,, | Performed by: STUDENT IN AN ORGANIZED HEALTH CARE EDUCATION/TRAINING PROGRAM

## 2023-04-28 PROCEDURE — 3008F PR BODY MASS INDEX (BMI) DOCUMENTED: ICD-10-PCS | Mod: CPTII,,, | Performed by: STUDENT IN AN ORGANIZED HEALTH CARE EDUCATION/TRAINING PROGRAM

## 2023-04-28 PROCEDURE — 1101F PR PT FALLS ASSESS DOC 0-1 FALLS W/OUT INJ PAST YR: ICD-10-PCS | Mod: CPTII,,, | Performed by: STUDENT IN AN ORGANIZED HEALTH CARE EDUCATION/TRAINING PROGRAM

## 2023-04-28 PROCEDURE — 1126F PR PAIN SEVERITY QUANTIFIED, NO PAIN PRESENT: ICD-10-PCS | Mod: CPTII,,, | Performed by: STUDENT IN AN ORGANIZED HEALTH CARE EDUCATION/TRAINING PROGRAM

## 2023-04-28 PROCEDURE — 4010F PR ACE/ARB THEARPY RXD/TAKEN: ICD-10-PCS | Mod: CPTII,,, | Performed by: STUDENT IN AN ORGANIZED HEALTH CARE EDUCATION/TRAINING PROGRAM

## 2023-04-28 PROCEDURE — 3288F PR FALLS RISK ASSESSMENT DOCUMENTED: ICD-10-PCS | Mod: CPTII,,, | Performed by: STUDENT IN AN ORGANIZED HEALTH CARE EDUCATION/TRAINING PROGRAM

## 2023-04-28 PROCEDURE — 3079F DIAST BP 80-89 MM HG: CPT | Mod: CPTII,,, | Performed by: STUDENT IN AN ORGANIZED HEALTH CARE EDUCATION/TRAINING PROGRAM

## 2023-04-28 PROCEDURE — 93005 ELECTROCARDIOGRAM TRACING: CPT | Mod: PBBFAC | Performed by: INTERNAL MEDICINE

## 2023-04-28 PROCEDURE — 3074F SYST BP LT 130 MM HG: CPT | Mod: CPTII,,, | Performed by: STUDENT IN AN ORGANIZED HEALTH CARE EDUCATION/TRAINING PROGRAM

## 2023-04-28 PROCEDURE — 99214 PR OFFICE/OUTPT VISIT, EST, LEVL IV, 30-39 MIN: ICD-10-PCS | Mod: S$PBB,,, | Performed by: STUDENT IN AN ORGANIZED HEALTH CARE EDUCATION/TRAINING PROGRAM

## 2023-04-28 PROCEDURE — 3008F BODY MASS INDEX DOCD: CPT | Mod: CPTII,,, | Performed by: STUDENT IN AN ORGANIZED HEALTH CARE EDUCATION/TRAINING PROGRAM

## 2023-04-28 RX ORDER — ASPIRIN 81 MG/1
81 TABLET ORAL
COMMUNITY
Start: 2022-12-20

## 2023-04-28 RX ORDER — TRIAMCINOLONE ACETONIDE 1 MG/G
CREAM TOPICAL
COMMUNITY
Start: 2022-12-27

## 2023-04-28 RX ORDER — SEMAGLUTIDE 1.34 MG/ML
0.5 INJECTION, SOLUTION SUBCUTANEOUS
COMMUNITY
Start: 2023-04-03

## 2023-04-28 RX ORDER — GABAPENTIN 300 MG/1
300 CAPSULE ORAL DAILY PRN
COMMUNITY
Start: 2023-03-22

## 2023-04-28 RX ORDER — OMEPRAZOLE 40 MG/1
40 CAPSULE, DELAYED RELEASE ORAL
COMMUNITY
Start: 2023-04-25

## 2023-04-28 RX ORDER — TRAZODONE HYDROCHLORIDE 50 MG/1
50 TABLET ORAL NIGHTLY
COMMUNITY
Start: 2023-04-05

## 2023-04-28 RX ORDER — LOSARTAN POTASSIUM 25 MG/1
25 TABLET ORAL
COMMUNITY
Start: 2023-04-25

## 2023-04-28 RX ORDER — ATORVASTATIN CALCIUM 40 MG/1
40 TABLET, FILM COATED ORAL
COMMUNITY
Start: 2023-02-02

## 2023-04-28 RX ORDER — FAMOTIDINE 20 MG/1
20 TABLET, FILM COATED ORAL
COMMUNITY
Start: 2022-12-19

## 2023-04-28 RX ORDER — DICLOFENAC SODIUM 10 MG/G
GEL TOPICAL
COMMUNITY
Start: 2023-04-03

## 2023-04-28 RX ORDER — CLOTRIMAZOLE 1 %
CREAM (GRAM) TOPICAL NIGHTLY
COMMUNITY
Start: 2022-12-27

## 2023-04-28 RX ORDER — CLOBETASOL PROPIONATE 0.46 MG/ML
SOLUTION TOPICAL
COMMUNITY
Start: 2022-12-12

## 2023-04-28 NOTE — PROGRESS NOTES
Electrophysiology Clinic Note    Reason for follow-up patient visit: Ongoing evaluation and recommendations regarding postoperative SVT degenerating into atrial fibrillation following administration of adenosine.     PRESENTING HISTORY:     History of Present Illness:  Ms. Vidya Vazquez is a genesis 73-year-old woman who returns to clinic today for ongoing evaluation and recommendations regarding postoperative SVT degenerating into atrial fibrillation following administration of adenosine. She has a past medical history significant for postoperative SVT and atrial fibrillation, hypertension, type II diabetes mellitus, history of prior hiatal hernia s/p repair approximately 10 years ago, and overweight BMI.      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 She underwent reduction of her volvulus, with repeat hiatal hernia repair and laparoscopic Toupet fundoplication on 4/26/2022. 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 Following administration of adenosine, this rhythm degenerated into atrial fibrillation. She was initiated on rate control with diltiazem, in addition to initiation of oral anticoagulation with apixaban. She recently completed a 48-hour Holter monitor revealing rare PVCs with an overall PVC burden of 0.2% and very rare PACs with an overall PAC burden of 0%. There were no atrial or ventricular arrhythmias. She was safely discontinued on her apixaban following no recurrent arrhythmia, and has since discontinued diltiazem therapy. She denies any recurrent events of palpitations and reports that she has been feeling overall well since her volvulus surgery.     In discussion with Ms. Vazquez today, she tells me that she is feeling overall quite  well. She denies any episodes of dizziness, lightheadedness, syncope/presyncope, chest pain or chest discomfort, palpitations, nausea or vomiting, orthopnea, lower extremity edema, or PND. She reports baseline mild shortness of breath and dyspnea with exertion. She can climb 1 flight of stairs prior to needing to take a break, and can walk for 1-2 blocks without limitation following her surgery. She denies any adverse bleeding events while she was previously maintained on oral anticoagulation.     Review of Systems:  Review of Systems   Constitutional:  Negative for activity change.   HENT:  Negative for nasal congestion, nosebleeds, postnasal drip, rhinorrhea, sinus pressure/congestion, sneezing and sore throat.    Respiratory:  Positive for shortness of breath. Negative for apnea, cough, chest tightness and wheezing.    Cardiovascular:  Negative for chest pain, palpitations and leg swelling.   Gastrointestinal:  Negative for abdominal distention, abdominal pain, blood in stool, change in bowel habit, constipation, diarrhea, nausea, vomiting and change in bowel habit.   Genitourinary:  Negative for dysuria and hematuria.   Musculoskeletal:  Positive for arthralgias. Negative for gait problem.   Neurological:  Negative for dizziness, seizures, syncope, weakness, light-headedness, headaches, coordination difficulties and coordination difficulties.      PAST HISTORY:     No past medical history on file.    Past Surgical History:   Procedure Laterality Date    ESOPHAGOGASTRODUODENOSCOPY N/A 4/26/2022    Procedure: EGD (ESOPHAGOGASTRODUODENOSCOPY);  Surgeon: David Mike Jr., MD;  Location: [address];  Service: General;  Laterality: N/A;    ESOPHAGOGASTRODUODENOSCOPY N/A 4/25/2022    Procedure: EGD (ESOPHAGOGASTRODUODENOSCOPY);  Surgeon: Cynthia Sumner MD;  Location: [facility];  Service: Gastroenterology;  Laterality: N/A;    LAPAROSCOPIC TOUPET FUNDOPLICATION N/A 4/26/2022    Procedure: FUNDOPLICATION,  "LAPAROSCOPIC, TOUPET;  Surgeon: David Mike Jr., MD;  Location: [facility] OR [address] [place] [address];  Service: General;  Laterality: N/A;       Family History:  No family history on file.    Social History:  She  reports that she has never smoked. She has never used smokeless tobacco. No history on file for alcohol use and drug use.      MEDICATIONS & ALLERGIES:     Review of patient's allergies indicates:  No Known Allergies    Current Outpatient Medications on File Prior to Visit   Medication Sig Dispense Refill    atorvastatin (LIPITOR) 20 MG tablet       ergocalciferol (ERGOCALCIFEROL) 50,000 unit Cap Take 50,000 Units by mouth every 7 days.      hydrOXYzine HCL (ATARAX) 25 MG tablet Take 25 mg by mouth once daily.      metFORMIN (GLUCOPHAGE) 500 MG tablet Take 500 mg by mouth once daily.      ondansetron (ZOFRAN-ODT) 4 MG TbDL Take 1 tablet (4 mg total) by mouth every 8 (eight) hours as needed (nausea). 90 tablet 2     No current facility-administered medications on file prior to visit.        OBJECTIVE:     Vital Signs:  /80   Pulse 77   Ht 5' 6" (1.676 m)   Wt 78.7 kg (173 lb 8 oz)   BMI 28.00 kg/m²     Physical Exam:  Physical Exam  Constitutional:       General: She is not in acute distress.     Appearance: Normal appearance. She is not ill-appearing or diaphoretic.      Comments: Well-appearing elderly woman in NAD.   HENT:      Head: Normocephalic and atraumatic.      Nose: Nose normal.      Mouth/Throat:      Mouth: Mucous membranes are moist.      Pharynx: Oropharynx is clear.   Eyes:      Pupils: Pupils are equal, round, and reactive to light.   Cardiovascular:      Rate and Rhythm: Normal rate and regular rhythm.      Pulses: Normal pulses.      Heart sounds: Normal heart sounds. No murmur heard.    No friction rub. No gallop.   Pulmonary:      Effort: Pulmonary effort is normal. No respiratory distress.      Breath sounds: Normal breath sounds. No wheezing, rhonchi or rales.   Chest:      Chest wall: " No tenderness.   Abdominal:      General: There is no distension.      Palpations: Abdomen is soft.      Tenderness: There is no abdominal tenderness.   Musculoskeletal:         General: No swelling or tenderness.      Cervical back: Normal range of motion.      Right lower leg: No edema.      Left lower leg: No edema.   Skin:     General: Skin is warm and dry.      Findings: No erythema, lesion or rash.   Neurological:      General: No focal deficit present.      Mental Status: She is alert and oriented to person, place, and time. Mental status is at baseline.      Motor: No weakness.      Gait: Gait normal.   Psychiatric:         Mood and Affect: Mood normal.         Behavior: Behavior normal.      Laboratory Data:  Lab Results   Component Value Date    WBC 7.42 05/01/2022    HGB 10.4 (L) 05/01/2022    HCT 32.2 (L) 05/01/2022    MCV 83 05/01/2022     05/01/2022     Lab Results   Component Value Date     05/01/2022     05/01/2022    K 4.1 05/01/2022     05/01/2022    CO2 24 05/01/2022    BUN 6 (L) 05/01/2022    CREATININE 0.6 05/01/2022    CALCIUM 8.6 (L) 05/01/2022    MG 1.7 05/01/2022     No results found for: INR, PROTIME    Pertinent Cardiac Data:  ECG: Normal sinus rhythm with rate of 77 bpm,  ms, QRS 84 ms, QT/QTc 384/434 ms, nonspecific STT changes.     Resting 2D Transthoracic Echocardiogram - 4/28/2022:  The left ventricle is normal in size with concentric remodeling and normal systolic function. The estimated ejection fraction is 65%.  Normal right ventricular size with normal right ventricular systolic function.  Grade II left ventricular diastolic dysfunction.  Mild left atrial enlargement.  Pulmonary arterial systolic pressure is greater than 38 mmHg.    48-Hour Holter Monitor - 8/12/2022:  Baseline rhythm was normal sinus rhythm with normal intervals and an average heart rate of 76 bpm.  There were no reported symptoms.  There were rare PVCs with an overall PVC burden  of 0.2%. There were very rare PACs with an overall PAC burden of 0%.  There were no atrial or ventricular arrhythmias.      ASSESSMENT & PLAN:   Ms. Vidya Vazquez is a genesis 73-year-old woman who returns to clinic today for ongoing evaluation and recommendations regarding postoperative SVT degenerating into atrial fibrillation following administration of adenosine. She has a past medical history significant for postoperative SVT and atrial fibrillation, hypertension, type II diabetes mellitus, history of prior hiatal hernia s/p repair approximately 10 years ago, and overweight BMI. 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 Following administration of adenosine, this rhythm degenerated into atrial fibrillation.      - We discussed the pathophysiology of SVT and the fact that this rhythm degenerated into atrial fibrillation following adenosine administration. We discussed the most likely etiology of her SVT - AVNRT. A rudimentary diagram was drawn for educational purposes.  - We discussed that postoperative SVT and atrial fibrillation may have been driven by her surgery and post-inflammatory state.   - We reviewed the results of her 48-hour Holter monitor that did not reveal any subsequent events of arrhythmia, with no evidence of atrial fibrillation. She denies any recurrent palpitations and has remained in sinus rhythm on all subsequent ECGs. She was safely discontinued from apixaban therapy following three months after her surgery with no recurrent events.   - She was previously maintained on diltiazem 30mg po q6 hours, which has been safely discontinued with no subsequent events of arrhythmia.   - We discussed that the biggest risk associated with paroxysmal atrial fibrillation is an increased risk of CVA. Her [id number] is 4 (HTN, T2DM, female gender,  age 65-74), portending an annual adjusted risk of CVA of 4%. Should she evidence future recurrent atrial fibrillation, we would advise resumption of her oral anticoagulation.     This patient will return to clinic with me PRN in the event she has recurrent symptoms or evidence of paroxysmal atrial fibrillation. She will continue to follow with her PCP for ongoing management of her chronic medical conditions. All questions and concerns were addressed at this encounter.     Signing Physician:       GISELLE Das MD  Electrophysiology Attending

## 2023-06-28 ENCOUNTER — APPOINTMENT (RX ONLY)
Dept: URBAN - METROPOLITAN AREA CLINIC 156 | Facility: CLINIC | Age: 73
Setting detail: DERMATOLOGY
End: 2023-06-28

## 2023-06-28 DIAGNOSIS — L57.8 OTHER SKIN CHANGES DUE TO CHRONIC EXPOSURE TO NONIONIZING RADIATION: ICD-10-CM | Status: INADEQUATELY CONTROLLED

## 2023-06-28 DIAGNOSIS — L57.0 ACTINIC KERATOSIS: ICD-10-CM | Status: INADEQUATELY CONTROLLED

## 2023-06-28 DIAGNOSIS — Z85.828 PERSONAL HISTORY OF OTHER MALIGNANT NEOPLASM OF SKIN: ICD-10-CM | Status: STABLE

## 2023-06-28 DIAGNOSIS — D22 MELANOCYTIC NEVI: ICD-10-CM | Status: STABLE

## 2023-06-28 DIAGNOSIS — Z85.820 PERSONAL HISTORY OF MALIGNANT MELANOMA OF SKIN: ICD-10-CM | Status: STABLE

## 2023-06-28 DIAGNOSIS — D18.0 HEMANGIOMA: ICD-10-CM | Status: STABLE

## 2023-06-28 DIAGNOSIS — L71.8 OTHER ROSACEA: ICD-10-CM | Status: IMPROVED

## 2023-06-28 DIAGNOSIS — L82.1 OTHER SEBORRHEIC KERATOSIS: ICD-10-CM | Status: STABLE

## 2023-06-28 PROBLEM — D22.39 MELANOCYTIC NEVI OF OTHER PARTS OF FACE: Status: ACTIVE | Noted: 2023-06-28

## 2023-06-28 PROBLEM — D22.72 MELANOCYTIC NEVI OF LEFT LOWER LIMB, INCLUDING HIP: Status: ACTIVE | Noted: 2023-06-28

## 2023-06-28 PROBLEM — D48.5 NEOPLASM OF UNCERTAIN BEHAVIOR OF SKIN: Status: ACTIVE | Noted: 2023-06-28

## 2023-06-28 PROBLEM — D22.5 MELANOCYTIC NEVI OF TRUNK: Status: ACTIVE | Noted: 2023-06-28

## 2023-06-28 PROBLEM — D18.01 HEMANGIOMA OF SKIN AND SUBCUTANEOUS TISSUE: Status: ACTIVE | Noted: 2023-06-28

## 2023-06-28 PROCEDURE — ? PRESCRIPTION MEDICATION MANAGEMENT

## 2023-06-28 PROCEDURE — ? LIQUID NITROGEN

## 2023-06-28 PROCEDURE — 17003 DESTRUCT PREMALG LES 2-14: CPT

## 2023-06-28 PROCEDURE — ? OBSERVATION AND MEASURE

## 2023-06-28 PROCEDURE — ? COUNSELING

## 2023-06-28 PROCEDURE — 17000 DESTRUCT PREMALG LESION: CPT

## 2023-06-28 PROCEDURE — ? MEDICATION COUNSELING

## 2023-06-28 PROCEDURE — ? ADDITIONAL NOTES

## 2023-06-28 PROCEDURE — 99214 OFFICE O/P EST MOD 30 MIN: CPT | Mod: 25

## 2023-06-28 ASSESSMENT — LOCATION DETAILED DESCRIPTION DERM
LOCATION DETAILED: LEFT LATERAL SUPERIOR CHEST
LOCATION DETAILED: PERIUMBILICAL SKIN
LOCATION DETAILED: LEFT INFERIOR UPPER BACK
LOCATION DETAILED: RIGHT LATERAL EYEBROW
LOCATION DETAILED: RIGHT INFERIOR CENTRAL MALAR CHEEK
LOCATION DETAILED: LEFT PROXIMAL POSTERIOR UPPER ARM
LOCATION DETAILED: LEFT DISTAL POSTERIOR UPPER ARM
LOCATION DETAILED: LEFT DISTAL PRETIBIAL REGION
LOCATION DETAILED: INFERIOR MID FOREHEAD
LOCATION DETAILED: LEFT ANTERIOR PROXIMAL THIGH
LOCATION DETAILED: LEFT CENTRAL MALAR CHEEK
LOCATION DETAILED: LEFT SUPERIOR UPPER BACK
LOCATION DETAILED: LEFT PROXIMAL CALF
LOCATION DETAILED: SUPERIOR THORACIC SPINE
LOCATION DETAILED: GLABELLA
LOCATION DETAILED: RIGHT PROXIMAL DORSAL FOREARM
LOCATION DETAILED: LEFT SUPERIOR ANTERIOR NECK
LOCATION DETAILED: LEFT INFERIOR LATERAL MIDBACK
LOCATION DETAILED: RIGHT DISTAL LATERAL POSTERIOR UPPER ARM
LOCATION DETAILED: RIGHT INFERIOR LATERAL MALAR CHEEK
LOCATION DETAILED: RIGHT CENTRAL TEMPLE
LOCATION DETAILED: RIGHT ANTERIOR PROXIMAL THIGH
LOCATION DETAILED: RIGHT DISTAL POSTERIOR UPPER ARM
LOCATION DETAILED: NASAL DORSUM
LOCATION DETAILED: UPPER STERNUM
LOCATION DETAILED: INFERIOR THORACIC SPINE
LOCATION DETAILED: RIGHT DISTAL PRETIBIAL REGION

## 2023-06-28 ASSESSMENT — LOCATION SIMPLE DESCRIPTION DERM
LOCATION SIMPLE: LEFT THIGH
LOCATION SIMPLE: ABDOMEN
LOCATION SIMPLE: LEFT CHEEK
LOCATION SIMPLE: RIGHT CHEEK
LOCATION SIMPLE: INFERIOR FOREHEAD
LOCATION SIMPLE: CHEST
LOCATION SIMPLE: RIGHT TEMPLE
LOCATION SIMPLE: RIGHT FOREARM
LOCATION SIMPLE: RIGHT POSTERIOR UPPER ARM
LOCATION SIMPLE: LEFT UPPER BACK
LOCATION SIMPLE: LEFT PRETIBIAL REGION
LOCATION SIMPLE: LEFT CALF
LOCATION SIMPLE: LEFT POSTERIOR UPPER ARM
LOCATION SIMPLE: RIGHT THIGH
LOCATION SIMPLE: UPPER BACK
LOCATION SIMPLE: RIGHT PRETIBIAL REGION
LOCATION SIMPLE: NOSE
LOCATION SIMPLE: RIGHT EYEBROW
LOCATION SIMPLE: GLABELLA
LOCATION SIMPLE: LEFT ANTERIOR NECK
LOCATION SIMPLE: LEFT LOWER BACK

## 2023-06-28 ASSESSMENT — LOCATION ZONE DERM
LOCATION ZONE: FACE
LOCATION ZONE: NECK
LOCATION ZONE: LEG
LOCATION ZONE: ARM
LOCATION ZONE: TRUNK
LOCATION ZONE: NOSE

## 2023-06-28 NOTE — PROCEDURE: MEDICATION COUNSELING
Picato Counseling:  I discussed with the patient the risks of Picato including but not limited to erythema, scaling, itching, weeping, crusting, and pain.
Cyclosporine Pregnancy And Lactation Text: This medication is Pregnancy Category C and it isn't know if it is safe during pregnancy. This medication is excreted in breast milk.
Cimzia Counseling:  I discussed with the patient the risks of Cimzia including but not limited to immunosuppression, allergic reactions and infections.  The patient understands that monitoring is required including a PPD at baseline and must alert us or the primary physician if symptoms of infection or other concerning signs are noted.
Imiquimod Pregnancy And Lactation Text: This medication is Pregnancy Category C. It is unknown if this medication is excreted in breast milk.
Oxybutynin Pregnancy And Lactation Text: This medication is Pregnancy Category B and is considered safe during pregnancy. It is unknown if it is excreted in breast milk.
Drysol Counseling:  I discussed with the patient the risks of drysol/aluminum chloride including but not limited to skin rash, itching, irritation, burning.
Doxycycline Pregnancy And Lactation Text: This medication is Pregnancy Category D and not consider safe during pregnancy. It is also excreted in breast milk but is considered safe for shorter treatment courses.
Hydroxyzine Counseling: Patient advised that the medication is sedating and not to drive a car after taking this medication.  Patient informed of potential adverse effects including but not limited to dry mouth, urinary retention, and blurry vision.  The patient verbalized understanding of the proper use and possible adverse effects of hydroxyzine.  All of the patient's questions and concerns were addressed.
Sarecycline Pregnancy And Lactation Text: This medication is Pregnancy Category D and not consider safe during pregnancy. It is also excreted in breast milk.
Nsaids Counseling: NSAID Counseling: I discussed with the patient that NSAIDs should be taken with food. Prolonged use of NSAIDs can result in the development of stomach ulcers.  Patient advised to stop taking NSAIDs if abdominal pain occurs.  The patient verbalized understanding of the proper use and possible adverse effects of NSAIDs.  All of the patient's questions and concerns were addressed.
Rinvoq Counseling: I discussed with the patient the risks of Rinvoq therapy including but not limited to upper respiratory tract infections, shingles, cold sores, bronchitis, nausea, cough, fever, acne, and headache. Live vaccines should be avoided.  This medication has been linked to serious infections; higher rate of mortality; malignancy and lymphoproliferative disorders; major adverse cardiovascular events; thrombosis; thrombocytopenia, anemia, and neutropenia; lipid elevations; liver enzyme elevations; and gastrointestinal perforations.
Odomzo Counseling- I discussed with the patient the risks of Odomzo including but not limited to nausea, vomiting, diarrhea, constipation, weight loss, changes in the sense of taste, decreased appetite, muscle spasms, and hair loss.  The patient verbalized understanding of the proper use and possible adverse effects of Odomzo.  All of the patient's questions and concerns were addressed.
Clofazimine Counseling:  I discussed with the patient the risks of clofazimine including but not limited to skin and eye pigmentation, liver damage, nausea/vomiting, gastrointestinal bleeding and allergy.
Valtrex Counseling: I discussed with the patient the risks of valacyclovir including but not limited to kidney damage, nausea, vomiting and severe allergy.  The patient understands that if the infection seems to be worsening or is not improving, they are to call.
Zoryve Counseling:  I discussed with the patient that Zoryve is not for use in the eyes, mouth or vagina. The most commonly reported side effects include diarrhea, headache, insomnia, application site pain, upper respiratory tract infections, and urinary tract infections.  All of the patient's questions and concerns were addressed.
Metronidazole Counseling:  I discussed with the patient the risks of metronidazole including but not limited to seizures, nausea/vomiting, a metallic taste in the mouth, nausea/vomiting and severe allergy.
Topical Steroids Applications Pregnancy And Lactation Text: Most topical steroids are considered safe to use during pregnancy and lactation.  Any topical steroid applied to the breast or nipple should be washed off before breastfeeding.
Spironolactone Pregnancy And Lactation Text: This medication can cause feminization of the male fetus and should be avoided during pregnancy. The active metabolite is also found in breast milk.
Azelaic Acid Pregnancy And Lactation Text: This medication is considered safe during pregnancy and breast feeding.
5-Fu Counseling: 5-Fluorouracil Counseling:  I discussed with the patient the risks of 5-fluorouracil including but not limited to erythema, scaling, itching, weeping, crusting, and pain.
Azithromycin Pregnancy And Lactation Text: This medication is considered safe during pregnancy and is also secreted in breast milk.
Simponi Pregnancy And Lactation Text: The risk during pregnancy and breastfeeding is uncertain with this medication.
Topical Retinoid counseling:  Patient advised to apply a pea-sized amount only at bedtime and wait 30 minutes after washing their face before applying.  If too drying, patient may add a non-comedogenic moisturizer. The patient verbalized understanding of the proper use and possible adverse effects of retinoids.  All of the patient's questions and concerns were addressed.
Ketoconazole Counseling:   Patient counseled regarding improving absorption with orange juice.  Adverse effects include but are not limited to breast enlargement, headache, diarrhea, nausea, upset stomach, liver function test abnormalities, taste disturbance, and stomach pain.  There is a rare possibility of liver failure that can occur when taking ketoconazole. The patient understands that monitoring of LFTs may be required, especially at baseline. The patient verbalized understanding of the proper use and possible adverse effects of ketoconazole.  All of the patient's questions and concerns were addressed.
Methotrexate Counseling:  Patient counseled regarding adverse effects of methotrexate including but not limited to nausea, vomiting, abnormalities in liver function tests. Patients may develop mouth sores, rash, diarrhea, and abnormalities in blood counts. The patient understands that monitoring is required including LFT's and blood counts.  There is a rare possibility of scarring of the liver and lung problems that can occur when taking methotrexate. Persistent nausea, loss of appetite, pale stools, dark urine, cough, and shortness of breath should be reported immediately. Patient advised to discontinue methotrexate treatment at least three months before attempting to become pregnant.  I discussed the need for folate supplements while taking methotrexate.  These supplements can decrease side effects during methotrexate treatment. The patient verbalized understanding of the proper use and possible adverse effects of methotrexate.  All of the patient's questions and concerns were addressed.
Ilumya Counseling: I discussed with the patient the risks of tildrakizumab including but not limited to immunosuppression, malignancy, posterior leukoencephalopathy syndrome, and serious infections.  The patient understands that monitoring is required including a PPD at baseline and must alert us or the primary physician if symptoms of infection or other concerning signs are noted.
Bexarotene Pregnancy And Lactation Text: This medication is Pregnancy Category X and should not be given to women who are pregnant or may become pregnant. This medication should not be used if you are breast feeding.
Erythromycin Counseling:  I discussed with the patient the risks of erythromycin including but not limited to GI upset, allergic reaction, drug rash, diarrhea, increase in liver enzymes, and yeast infections.
Cimzia Pregnancy And Lactation Text: This medication crosses the placenta but can be considered safe in certain situations. Cimzia may be excreted in breast milk.
Clofazimine Pregnancy And Lactation Text: This medication is Pregnancy Category C and isn't considered safe during pregnancy. It is excreted in breast milk.
Klisyri Counseling:  I discussed with the patient the risks of Klisyri including but not limited to erythema, scaling, itching, weeping, crusting, and pain.
Hydroxyzine Pregnancy And Lactation Text: This medication is not safe during pregnancy and should not be taken. It is also excreted in breast milk and breast feeding isn't recommended.
Nsaids Pregnancy And Lactation Text: These medications are considered safe up to 30 weeks gestation. It is excreted in breast milk.
Rinvoq Pregnancy And Lactation Text: Based on animal studies, Rinvoq may cause embryo-fetal harm when administered to pregnant women.  The medication should not be used in pregnancy.  Breastfeeding is not recommended during treatment and for 6 days after the last dose.
Propranolol Counseling:  I discussed with the patient the risks of propranolol including but not limited to low heart rate, low blood pressure, low blood sugar, restlessness and increased cold sensitivity. They should call the office if they experience any of these side effects.
Tetracycline Counseling: Patient counseled regarding possible photosensitivity and increased risk for sunburn.  Patient instructed to avoid sunlight, if possible.  When exposed to sunlight, patients should wear protective clothing, sunglasses, and sunscreen.  The patient was instructed to call the office immediately if the following severe adverse effects occur:  hearing changes, easy bruising/bleeding, severe headache, or vision changes.  The patient verbalized understanding of the proper use and possible adverse effects of tetracycline.  All of the patient's questions and concerns were addressed. Patient understands to avoid pregnancy while on therapy due to potential birth defects.
Valtrex Pregnancy And Lactation Text: this medication is Pregnancy Category B and is considered safe during pregnancy. This medication is not directly found in breast milk but it's metabolite acyclovir is present.
5-Fu Pregnancy And Lactation Text: This medication is Pregnancy Category X and contraindicated in pregnancy and in women who may become pregnant. It is unknown if this medication is excreted in breast milk.
Zoryve Pregnancy And Lactation Text: It is unknown if this medication can cause problems during pregnancy and breastfeeding.
Odomzo Pregnancy And Lactation Text: This medication is Pregnancy Category X and is absolutely contraindicated during pregnancy. It is unknown if it is excreted in breast milk.
Topical Sulfur Applications Counseling: Topical Sulfur Counseling: Patient counseled that this medication may cause skin irritation or allergic reactions.  In the event of skin irritation, the patient was advised to reduce the amount of the drug applied or use it less frequently.   The patient verbalized understanding of the proper use and possible adverse effects of topical sulfur application.  All of the patient's questions and concerns were addressed.
Skyrizi Counseling: I discussed with the patient the risks of risankizumab-rzaa including but not limited to immunosuppression, and serious infections.  The patient understands that monitoring is required including a PPD at baseline and must alert us or the primary physician if symptoms of infection or other concerning signs are noted.
Ketoconazole Pregnancy And Lactation Text: This medication is Pregnancy Category C and it isn't know if it is safe during pregnancy. It is also excreted in breast milk and breast feeding isn't recommended.
Metronidazole Pregnancy And Lactation Text: This medication is Pregnancy Category B and considered safe during pregnancy.  It is also excreted in breast milk.
Isotretinoin Counseling: Patient should get monthly blood tests, not donate blood, not drive at night if vision affected, not share medication, and not undergo elective surgery for 6 months after tx completed. Side effects reviewed, pt to contact office should one occur.
Benzoyl Peroxide Counseling: Patient counseled that medicine may cause skin irritation and bleach clothing.  In the event of skin irritation, the patient was advised to reduce the amount of the drug applied or use it less frequently.   The patient verbalized understanding of the proper use and possible adverse effects of benzoyl peroxide.  All of the patient's questions and concerns were addressed.
Protopic Counseling: Patient may experience a mild burning sensation during topical application. Protopic is not approved in children less than 2 years of age. There have been case reports of hematologic and skin malignancies in patients using topical calcineurin inhibitors although causality is questionable.
Use Enhanced Medication Counseling?: No
Azathioprine Counseling:  I discussed with the patient the risks of azathioprine including but not limited to myelosuppression, immunosuppression, hepatotoxicity, lymphoma, and infections.  The patient understands that monitoring is required including baseline LFTs, Creatinine, possible TPMP genotyping and weekly CBCs for the first month and then every 2 weeks thereafter.  The patient verbalized understanding of the proper use and possible adverse effects of azathioprine.  All of the patient's questions and concerns were addressed.
Methotrexate Pregnancy And Lactation Text: This medication is Pregnancy Category X and is known to cause fetal harm. This medication is excreted in breast milk.
Cosentyx Counseling:  I discussed with the patient the risks of Cosentyx including but not limited to worsening of Crohn's disease, immunosuppression, allergic reactions and infections.  The patient understands that monitoring is required including a PPD at baseline and must alert us or the primary physician if symptoms of infection or other concerning signs are noted.
Klisyri Pregnancy And Lactation Text: It is unknown if this medication can harm a developing fetus or if it is excreted in breast milk.
Bactrim Counseling:  I discussed with the patient the risks of sulfa antibiotics including but not limited to GI upset, allergic reaction, drug rash, diarrhea, dizziness, photosensitivity, and yeast infections.  Rarely, more serious reactions can occur including but not limited to aplastic anemia, agranulocytosis, methemoglobinemia, blood dyscrasias, liver or kidney failure, lung infiltrates or desquamative/blistering drug rashes.
Zyclara Counseling:  I discussed with the patient the risks of imiquimod including but not limited to erythema, scaling, itching, weeping, crusting, and pain.  Patient understands that the inflammatory response to imiquimod is variable from person to person and was educated regarded proper titration schedule.  If flu-like symptoms develop, patient knows to discontinue the medication and contact us.
Elidel Counseling: Patient may experience a mild burning sensation during topical application. Elidel is not approved in children less than 2 years of age. There have been case reports of hematologic and skin malignancies in patients using topical calcineurin inhibitors although causality is questionable.
Olanzapine Counseling- I discussed with the patient the common side effects of olanzapine including but are not limited to: lack of energy, dry mouth, increased appetite, sleepiness, tremor, constipation, dizziness, changes in behavior, or restlessness.  Explained that teenagers are more likely to experience headaches, abdominal pain, pain in the arms or legs, tiredness, and sleepiness.  Serious side effects include but are not limited: increased risk of death in elderly patients who are confused, have memory loss, or dementia-related psychosis; hyperglycemia; increased cholesterol and triglycerides; and weight gain.
Glycopyrrolate Pregnancy And Lactation Text: This medication is Pregnancy Category B and is considered safe during pregnancy. It is unknown if it is excreted breast milk.
Propranolol Pregnancy And Lactation Text: This medication is Pregnancy Category C and it isn't known if it is safe during pregnancy. It is excreted in breast milk.
Colchicine Counseling:  Patient counseled regarding adverse effects including but not limited to stomach upset (nausea, vomiting, stomach pain, or diarrhea).  Patient instructed to limit alcohol consumption while taking this medication.  Colchicine may reduce blood counts especially with prolonged use.  The patient understands that monitoring of kidney function and blood counts may be required, especially at baseline. The patient verbalized understanding of the proper use and possible adverse effects of colchicine.  All of the patient's questions and concerns were addressed.
Minocycline Counseling: Patient advised regarding possible photosensitivity and discoloration of the teeth, skin, lips, tongue and gums.  Patient instructed to avoid sunlight, if possible.  When exposed to sunlight, patients should wear protective clothing, sunglasses, and sunscreen.  The patient was instructed to call the office immediately if the following severe adverse effects occur:  hearing changes, easy bruising/bleeding, severe headache, or vision changes.  The patient verbalized understanding of the proper use and possible adverse effects of minocycline.  All of the patient's questions and concerns were addressed.
Terbinafine Counseling: Patient counseling regarding adverse effects of terbinafine including but not limited to headache, diarrhea, rash, upset stomach, liver function test abnormalities, itching, taste/smell disturbance, nausea, abdominal pain, and flatulence.  There is a rare possibility of liver failure that can occur when taking terbinafine.  The patient understands that a baseline LFT and kidney function test may be required. The patient verbalized understanding of the proper use and possible adverse effects of terbinafine.  All of the patient's questions and concerns were addressed.
Albendazole Counseling:  I discussed with the patient the risks of albendazole including but not limited to cytopenia, kidney damage, nausea/vomiting and severe allergy.  The patient understands that this medication is being used in an off-label manner.
Sotyktu Counseling:  I discussed the most common side effects of Sotyktu including: common cold, sore throat, sinus infections, cold sores, canker sores, folliculitis, and acne.  I also discussed more serious side effects of Sotyktu including but not limited to: serious allergic reactions; increased risk for infections such as TB; cancers such as lymphomas; rhabdomyolysis and elevated CPK; and elevated triglycerides and liver enzymes. 
Topical Sulfur Applications Pregnancy And Lactation Text: This medication is considered safe during pregnancy and breast feeding secondary to limited systemic absorption.
Benzoyl Peroxide Pregnancy And Lactation Text: This medication is Pregnancy Category C. It is unknown if benzoyl peroxide is excreted in breast milk.
Protopic Pregnancy And Lactation Text: This medication is Pregnancy Category C. It is unknown if this medication is excreted in breast milk when applied topically.
Tazorac Counseling:  Patient advised that medication is irritating and drying.  Patient may need to apply sparingly and wash off after an hour before eventually leaving it on overnight.  The patient verbalized understanding of the proper use and possible adverse effects of tazorac.  All of the patient's questions and concerns were addressed.
Infliximab Counseling:  I discussed with the patient the risks of infliximab including but not limited to myelosuppression, immunosuppression, autoimmune hepatitis, demyelinating diseases, lymphoma, and serious infections.  The patient understands that monitoring is required including a PPD at baseline and must alert us or the primary physician if symptoms of infection or other concerning signs are noted.
Bactrim Pregnancy And Lactation Text: This medication is Pregnancy Category D and is known to cause fetal risk.  It is also excreted in breast milk.
Cosentyx Pregnancy And Lactation Text: This medication is Pregnancy Category B and is considered safe during pregnancy. It is unknown if this medication is excreted in breast milk.
Dutasteride Counseling: Dustasteride Counseling:  I discussed with the patient the risks of use of dutasteride including but not limited to decreased libido, decreased ejaculate volume, and gynecomastia. Women who can become pregnant should not handle medication.  All of the patient's questions and concerns were addressed.
Isotretinoin Pregnancy And Lactation Text: This medication is Pregnancy Category X and is considered extremely dangerous during pregnancy. It is unknown if it is excreted in breast milk.
Xolair Counseling:  Patient informed of potential adverse effects including but not limited to fever, muscle aches, rash and allergic reactions.  The patient verbalized understanding of the proper use and possible adverse effects of Xolair.  All of the patient's questions and concerns were addressed.
Minoxidil Counseling: Minoxidil is a topical medication which can increase blood flow where it is applied. It is uncertain how this medication increases hair growth. Side effects are uncommon and include stinging and allergic reactions.
SSKI Counseling:  I discussed with the patient the risks of SSKI including but not limited to thyroid abnormalities, metallic taste, GI upset, fever, headache, acne, arthralgias, paraesthesias, lymphadenopathy, easy bleeding, arrhythmias, and allergic reaction.
Azathioprine Pregnancy And Lactation Text: This medication is Pregnancy Category D and isn't considered safe during pregnancy. It is unknown if this medication is excreted in breast milk.
Prednisone Counseling:  I discussed with the patient the risks of prolonged use of prednisone including but not limited to weight gain, insomnia, osteoporosis, mood changes, diabetes, susceptibility to infection, glaucoma and high blood pressure.  In cases where prednisone use is prolonged, patients should be monitored with blood pressure checks, serum glucose levels and an eye exam.  Additionally, the patient may need to be placed on GI prophylaxis, PCP prophylaxis, and calcium and vitamin D supplementation and/or a bisphosphonate.  The patient verbalized understanding of the proper use and the possible adverse effects of prednisone.  All of the patient's questions and concerns were addressed.
Hydroxychloroquine Counseling:  I discussed with the patient that a baseline ophthalmologic exam is needed at the start of therapy and every year thereafter while on therapy. A CBC may also be warranted for monitoring.  The side effects of this medication were discussed with the patient, including but not limited to agranulocytosis, aplastic anemia, seizures, rashes, retinopathy, and liver toxicity. Patient instructed to call the office should any adverse effect occur.  The patient verbalized understanding of the proper use and possible adverse effects of Plaquenil.  All the patient's questions and concerns were addressed.
Sotyktu Pregnancy And Lactation Text: There is insufficient data to evaluate whether or not Sotyktu is safe to use during pregnancy.   It is not known if Sotyktu passes into breast milk and whether or not it is safe to use when breastfeeding.  
Olanzapine Pregnancy And Lactation Text: This medication is pregnancy category C.   There are no adequate and well controlled trials with olanzapine in pregnant females.  Olanzapine should be used during pregnancy only if the potential benefit justifies the potential risk to the fetus.   In a study in lactating healthy women, olanzapine was excreted in breast milk.  It is recommended that women taking olanzapine should not breast feed.
Albendazole Pregnancy And Lactation Text: This medication is Pregnancy Category C and it isn't known if it is safe during pregnancy. It is also excreted in breast milk.
Carac Counseling:  I discussed with the patient the risks of Carac including but not limited to erythema, scaling, itching, weeping, crusting, and pain.
Cephalexin Counseling: I counseled the patient regarding use of cephalexin as an antibiotic for prophylactic and/or therapeutic purposes. Cephalexin (commonly prescribed under brand name Keflex) is a cephalosporin antibiotic which is active against numerous classes of bacteria, including most skin bacteria. Side effects may include nausea, diarrhea, gastrointestinal upset, rash, hives, yeast infections, and in rare cases, hepatitis, kidney disease, seizures, fever, confusion, neurologic symptoms, and others. Patients with severe allergies to penicillin medications are cautioned that there is about a 10% incidence of cross-reactivity with cephalosporins. When possible, patients with penicillin allergies should use alternatives to cephalosporins for antibiotic therapy.
Wartpeel Counseling:  I discussed with the patient the risks of Wartpeel including but not limited to erythema, scaling, itching, weeping, crusting, and pain.
Stelara Counseling:  I discussed with the patient the risks of ustekinumab including but not limited to immunosuppression, malignancy, posterior leukoencephalopathy syndrome, and serious infections.  The patient understands that monitoring is required including a PPD at baseline and must alert us or the primary physician if symptoms of infection or other concerning signs are noted.
Tazorac Pregnancy And Lactation Text: This medication is not safe during pregnancy. It is unknown if this medication is excreted in breast milk.
Terbinafine Pregnancy And Lactation Text: This medication is Pregnancy Category B and is considered safe during pregnancy. It is also excreted in breast milk and breast feeding isn't recommended.
High Dose Vitamin A Counseling: Side effects reviewed, pt to contact office should one occur.
Fluconazole Counseling:  Patient counseled regarding adverse effects of fluconazole including but not limited to headache, diarrhea, nausea, upset stomach, liver function test abnormalities, taste disturbance, and stomach pain.  There is a rare possibility of liver failure that can occur when taking fluconazole.  The patient understands that monitoring of LFTs and kidney function test may be required, especially at baseline. The patient verbalized understanding of the proper use and possible adverse effects of fluconazole.  All of the patient's questions and concerns were addressed.
Dutasteride Pregnancy And Lactation Text: This medication is absolutely contraindicated in women, especially during pregnancy and breast feeding. Feminization of male fetuses is possible if taking while pregnant.
Dupixent Counseling: I discussed with the patient the risks of dupilumab including but not limited to eye infection and irritation, cold sores, injection site reactions, worsening of asthma, allergic reactions and increased risk of parasitic infection.  Live vaccines should be avoided while taking dupilumab. Dupilumab will also interact with certain medications such as warfarin and cyclosporine. The patient understands that monitoring is required and they must alert us or the primary physician if symptoms of infection or other concerning signs are noted.
Qbrexza Counseling:  I discussed with the patient the risks of Qbrexza including but not limited to headache, mydriasis, blurred vision, dry eyes, nasal dryness, dry mouth, dry throat, dry skin, urinary hesitation, and constipation.  Local skin reactions including erythema, burning, stinging, and itching can also occur.
Xolair Pregnancy And Lactation Text: This medication is Pregnancy Category B and is considered safe during pregnancy. This medication is excreted in breast milk.
Sski Pregnancy And Lactation Text: This medication is Pregnancy Category D and isn't considered safe during pregnancy. It is excreted in breast milk.
Eucrisa Counseling: Patient may experience a mild burning sensation during topical application. Eucrisa is not approved in children less than 2 years of age.
Oral Minoxidil Counseling- I discussed with the patient the risks of oral minoxidil including but not limited to shortness of breath, swelling of the feet or ankles, dizziness, lightheadedness, unwanted hair growth and allergic reaction.  The patient verbalized understanding of the proper use and possible adverse effects of oral minoxidil.  All of the patient's questions and concerns were addressed.
Xeljanz Counseling: I discussed with the patient the risks of Xeljanz therapy including increased risk of infection, liver issues, headache, diarrhea, or cold symptoms. Live vaccines should be avoided. They were instructed to call if they have any problems.
Dapsone Counseling: I discussed with the patient the risks of dapsone including but not limited to hemolytic anemia, agranulocytosis, rashes, methemoglobinemia, kidney failure, peripheral neuropathy, headaches, GI upset, and liver toxicity.  Patients who start dapsone require monitoring including baseline LFTs and weekly CBCs for the first month, then every month thereafter.  The patient verbalized understanding of the proper use and possible adverse effects of dapsone.  All of the patient's questions and concerns were addressed.
Minoxidil Pregnancy And Lactation Text: This medication has not been assigned a Pregnancy Risk Category but animal studies failed to show danger with the topical medication. It is unknown if the medication is excreted in breast milk.
Cellcept Counseling:  I discussed with the patient the risks of mycophenolate mofetil including but not limited to infection/immunosuppression, GI upset, hypokalemia, hypercholesterolemia, bone marrow suppression, lymphoproliferative disorders, malignancy, GI ulceration/bleed/perforation, colitis, interstitial lung disease, kidney failure, progressive multifocal leukoencephalopathy, and birth defects.  The patient understands that monitoring is required including a baseline creatinine and regular CBC testing. In addition, patient must alert us immediately if symptoms of infection or other concerning signs are noted.
Quinolones Counseling:  I discussed with the patient the risks of fluoroquinolones including but not limited to GI upset, allergic reaction, drug rash, diarrhea, dizziness, photosensitivity, yeast infections, liver function test abnormalities, tendonitis/tendon rupture.
Detail Level: Simple
Hydroxychloroquine Pregnancy And Lactation Text: This medication has been shown to cause fetal harm but it isn't assigned a Pregnancy Risk Category. There are small amounts excreted in breast milk.
Ivermectin Counseling:  Patient instructed to take medication on an empty stomach with a full glass of water.  Patient informed of potential adverse effects including but not limited to nausea, diarrhea, dizziness, itching, and swelling of the extremities or lymph nodes.  The patient verbalized understanding of the proper use and possible adverse effects of ivermectin.  All of the patient's questions and concerns were addressed.
Finasteride Counseling:  I discussed with the patient the risks of use of finasteride including but not limited to decreased libido, decreased ejaculate volume, gynecomastia, and depression. Women should not handle medication.  All of the patient's questions and concerns were addressed.
Opioid Counseling: I discussed with the patient the potential side effects of opioids including but not limited to addiction, altered mental status, and depression. I stressed avoiding alcohol, benzodiazepines, muscle relaxants and sleep aids unless specifically okayed by a physician. The patient verbalized understanding of the proper use and possible adverse effects of opioids. All of the patient's questions and concerns were addressed. They were instructed to flush the remaining pills down the toilet if they did not need them for pain.
Topical Clindamycin Counseling: Patient counseled that this medication may cause skin irritation or allergic reactions.  In the event of skin irritation, the patient was advised to reduce the amount of the drug applied or use it less frequently.   The patient verbalized understanding of the proper use and possible adverse effects of clindamycin.  All of the patient's questions and concerns were addressed.
Fluconazole Pregnancy And Lactation Text: This medication is Pregnancy Category C and it isn't know if it is safe during pregnancy. It is also excreted in breast milk.
Rituxan Counseling:  I discussed with the patient the risks of Rituxan infusions. Side effects can include infusion reactions, severe drug rashes including mucocutaneous reactions, reactivation of latent hepatitis and other infections and rarely progressive multifocal leukoencephalopathy.  All of the patient's questions and concerns were addressed.
High Dose Vitamin A Pregnancy And Lactation Text: High dose vitamin A therapy is contraindicated during pregnancy and breast feeding.
Qbrexza Pregnancy And Lactation Text: There is no available data on Qbrexza use in pregnant women.  There is no available data on Qbrexza use in lactation.
Dupixent Pregnancy And Lactation Text: This medication likely crosses the placenta but the risk for the fetus is uncertain. This medication is excreted in breast milk.
Mirvaso Counseling: Mirvaso is a topical medication which can decrease superficial blood flow where applied. Side effects are uncommon and include stinging, redness and allergic reactions.
Oral Minoxidil Pregnancy And Lactation Text: This medication should only be used when clearly needed if you are pregnant, attempting to become pregnant or breast feeding.
Cimetidine Counseling:  I discussed with the patient the risks of Cimetidine including but not limited to gynecomastia, headache, diarrhea, nausea, drowsiness, arrhythmias, pancreatitis, skin rashes, psychosis, bone marrow suppression and kidney toxicity.
Cephalexin Pregnancy And Lactation Text: This medication is Pregnancy Category B and considered safe during pregnancy.  It is also excreted in breast milk but can be used safely for shorter doses.
Dapsone Pregnancy And Lactation Text: This medication is Pregnancy Category C and is not considered safe during pregnancy or breast feeding.
Cibinqo Counseling: I discussed with the patient the risks of Cibinqo therapy including but not limited to common cold, nausea, headache, cold sores, increased blood CPK levels, dizziness, UTIs, fatigue, acne, and vomitting. Live vaccines should be avoided.  This medication has been linked to serious infections; higher rate of mortality; malignancy and lymphoproliferative disorders; major adverse cardiovascular events; thrombosis; thrombocytopenia and lymphopenia; lipid elevations; and retinal detachment.
Low Dose Naltrexone Counseling- I discussed with the patient the potential risks and side effects of low dose naltrexone including but not limited to: more vivid dreams, headaches, nausea, vomiting, abdominal pain, fatigue, dizziness, and anxiety.
Xeldamariz Pregnancy And Lactation Text: This medication is Pregnancy Category D and is not considered safe during pregnancy.  The risk during breast feeding is also uncertain.
Thalidomide Counseling: I discussed with the patient the risks of thalidomide including but not limited to birth defects, anxiety, weakness, chest pain, dizziness, cough and severe allergy.
Erivedge Counseling- I discussed with the patient the risks of Erivedge including but not limited to nausea, vomiting, diarrhea, constipation, weight loss, changes in the sense of taste, decreased appetite, muscle spasms, and hair loss.  The patient verbalized understanding of the proper use and possible adverse effects of Erivedge.  All of the patient's questions and concerns were addressed.
Winlevi Counseling:  I discussed with the patient the risks of topical clascoterone including but not limited to erythema, scaling, itching, and stinging. Patient voiced their understanding.
Taltz Counseling: I discussed with the patient the risks of ixekizumab including but not limited to immunosuppression, serious infections, worsening of inflammatory bowel disease and drug reactions.  The patient understands that monitoring is required including a PPD at baseline and must alert us or the primary physician if symptoms of infection or other concerning signs are noted.
Finasteride Pregnancy And Lactation Text: This medication is absolutely contraindicated during pregnancy. It is unknown if it is excreted in breast milk.
Opioid Pregnancy And Lactation Text: These medications can lead to premature delivery and should be avoided during pregnancy. These medications are also present in breast milk in small amounts.
Rhofade Counseling: Rhofade is a topical medication which can decrease superficial blood flow where applied. Side effects are uncommon and include stinging, redness and allergic reactions.
Rituxan Pregnancy And Lactation Text: This medication is Pregnancy Category C and it isn't know if it is safe during pregnancy. It is unknown if this medication is excreted in breast milk but similar antibodies are known to be excreted.
Cyclophosphamide Counseling:  I discussed with the patient the risks of cyclophosphamide including but not limited to hair loss, hormonal abnormalities, decreased fertility, abdominal pain, diarrhea, nausea and vomiting, bone marrow suppression and infection. The patient understands that monitoring is required while taking this medication.
Griseofulvin Counseling:  I discussed with the patient the risks of griseofulvin including but not limited to photosensitivity, cytopenia, liver damage, nausea/vomiting and severe allergy.  The patient understands that this medication is best absorbed when taken with a fatty meal (e.g., ice cream or french fries).
Enbrel Counseling:  I discussed with the patient the risks of etanercept including but not limited to myelosuppression, immunosuppression, autoimmune hepatitis, demyelinating diseases, lymphoma, and infections.  The patient understands that monitoring is required including a PPD at baseline and must alert us or the primary physician if symptoms of infection or other concerning signs are noted.
Clindamycin Counseling: I counseled the patient regarding use of clindamycin as an antibiotic for prophylactic and/or therapeutic purposes. Clindamycin is active against numerous classes of bacteria, including skin bacteria. Side effects may include nausea, diarrhea, gastrointestinal upset, rash, hives, yeast infections, and in rare cases, colitis.
Mirvaso Pregnancy And Lactation Text: This medication has not been assigned a Pregnancy Risk Category. It is unknown if the medication is excreted in breast milk.
Hydroquinone Counseling:  Patient advised that medication may result in skin irritation, lightening (hypopigmentation), dryness, and burning.  In the event of skin irritation, the patient was advised to reduce the amount of the drug applied or use it less frequently.  Rarely, spots that are treated with hydroquinone can become darker (pseudoochronosis).  Should this occur, patient instructed to stop medication and call the office. The patient verbalized understanding of the proper use and possible adverse effects of hydroquinone.  All of the patient's questions and concerns were addressed.
Low Dose Naltrexone Pregnancy And Lactation Text: Naltrexone is pregnancy category C.  There have been no adequate and well-controlled studies in pregnant women.  It should be used in pregnancy only if the potential benefit justifies the potential risk to the fetus.   Limited data indicates that naltrexone is minimally excreted into breastmilk.
Gabapentin Counseling: I discussed with the patient the risks of gabapentin including but not limited to dizziness, somnolence, fatigue and ataxia.
Rifampin Counseling: I discussed with the patient the risks of rifampin including but not limited to liver damage, kidney damage, red-orange body fluids, nausea/vomiting and severe allergy.
Cibinqo Pregnancy And Lactation Text: It is unknown if this medication will adversely affect pregnancy or breast feeding.  You should not take this medication if you are currently pregnant or planning a pregnancy or while breastfeeding.
Winlevi Pregnancy And Lactation Text: This medication is considered safe during pregnancy and breastfeeding.
Otezla Counseling: The side effects of Otezla were discussed with the patient, including but not limited to worsening or new depression, weight loss, diarrhea, nausea, upper respiratory tract infection, and headache. Patient instructed to call the office should any adverse effect occur.  The patient verbalized understanding of the proper use and possible adverse effects of Otezla.  All the patient's questions and concerns were addressed.
Clindamycin Pregnancy And Lactation Text: This medication can be used in pregnancy if certain situations. Clindamycin is also present in breast milk.
Siliq Counseling:  I discussed with the patient the risks of Siliq including but not limited to new or worsening depression, suicidal thoughts and behavior, immunosuppression, malignancy, posterior leukoencephalopathy syndrome, and serious infections.  The patient understands that monitoring is required including a PPD at baseline and must alert us or the primary physician if symptoms of infection or other concerning signs are noted. There is also a special program designed to monitor depression which is required with Siliq.
Topical Ketoconazole Counseling: Patient counseled that this medication may cause skin irritation or allergic reactions.  In the event of skin irritation, the patient was advised to reduce the amount of the drug applied or use it less frequently.   The patient verbalized understanding of the proper use and possible adverse effects of ketoconazole.  All of the patient's questions and concerns were addressed.
Griseofulvin Pregnancy And Lactation Text: This medication is Pregnancy Category X and is known to cause serious birth defects. It is unknown if this medication is excreted in breast milk but breast feeding should be avoided.
Acitretin Counseling:  I discussed with the patient the risks of acitretin including but not limited to hair loss, dry lips/skin/eyes, liver damage, hyperlipidemia, depression/suicidal ideation, photosensitivity.  Serious rare side effects can include but are not limited to pancreatitis, pseudotumor cerebri, bony changes, clot formation/stroke/heart attack.  Patient understands that alcohol is contraindicated since it can result in liver toxicity and significantly prolong the elimination of the drug by many years.
Birth Control Pills Counseling: Birth Control Pill Counseling: I discussed with the patient the potential side effects of OCPs including but not limited to increased risk of stroke, heart attack, thrombophlebitis, deep venous thrombosis, hepatic adenomas, breast changes, GI upset, headaches, and depression.  The patient verbalized understanding of the proper use and possible adverse effects of OCPs. All of the patient's questions and concerns were addressed.
Opzelura Counseling:  I discussed with the patient the risks of Opzelura including but not limited to nasopharngitis, bronchitis, ear infection, eosinophila, hives, diarrhea, folliculitis, tonsillitis, and rhinorrhea.  Taken orally, this medication has been linked to serious infections; higher rate of mortality; malignancy and lymphoproliferative disorders; major adverse cardiovascular events; thrombosis; thrombocytopenia, anemia, and neutropenia; and lipid elevations.
Aklief counseling:  Patient advised to apply a pea-sized amount only at bedtime and wait 30 minutes after washing their face before applying.  If too drying, patient may add a non-comedogenic moisturizer.  The most commonly reported side effects including irritation, redness, scaling, dryness, stinging, burning, itching, and increased risk of sunburn.  The patient verbalized understanding of the proper use and possible adverse effects of retinoids.  All of the patient's questions and concerns were addressed.
Doxepin Counseling:  Patient advised that the medication is sedating and not to drive a car after taking this medication. Patient informed of potential adverse effects including but not limited to dry mouth, urinary retention, and blurry vision.  The patient verbalized understanding of the proper use and possible adverse effects of doxepin.  All of the patient's questions and concerns were addressed.
Tranexamic Acid Counseling:  Patient advised of the small risk of bleeding problems with tranexamic acid. They were also instructed to call if they developed any nausea, vomiting or diarrhea. All of the patient's questions and concerns were addressed.
Cyclophosphamide Pregnancy And Lactation Text: This medication is Pregnancy Category D and it isn't considered safe during pregnancy. This medication is excreted in breast milk.
Adbry Counseling: I discussed with the patient the risks of tralokinumab including but not limited to eye infection and irritation, cold sores, injection site reactions, worsening of asthma, allergic reactions and increased risk of parasitic infection.  Live vaccines should be avoided while taking tralokinumab. The patient understands that monitoring is required and they must alert us or the primary physician if symptoms of infection or other concerning signs are noted.
VTAMA Counseling: I discussed with the patient that VTAMA is not for use in the eyes, mouth or mouth. They should call the office if they develop any signs of allergic reactions to VTAMA. The patient verbalized understanding of the proper use and possible adverse effects of VTAMA.  All of the patient's questions and concerns were addressed.
Niacinamide Counseling: I recommended taking niacin or niacinamide, also know as vitamin B3, twice daily. Recent evidence suggests that taking vitamin B3 (500 mg twice daily) can reduce the risk of actinic keratoses and non-melanoma skin cancers. Side effects of vitamin B3 include flushing and headache.
Otezla Pregnancy And Lactation Text: This medication is Pregnancy Category C and it isn't known if it is safe during pregnancy. It is unknown if it is excreted in breast milk.
Rifampin Pregnancy And Lactation Text: This medication is Pregnancy Category C and it isn't know if it is safe during pregnancy. It is also excreted in breast milk and should not be used if you are breast feeding.
Libtayo Counseling- I discussed with the patient the risks of Libtayo including but not limited to nausea, vomiting, diarrhea, and bone or muscle pain.  The patient verbalized understanding of the proper use and possible adverse effects of Libtayo.  All of the patient's questions and concerns were addressed.
Arava Counseling:  Patient counseled regarding adverse effects of Arava including but not limited to nausea, vomiting, abnormalities in liver function tests. Patients may develop mouth sores, rash, diarrhea, and abnormalities in blood counts. The patient understands that monitoring is required including LFTs and blood counts.  There is a rare possibility of scarring of the liver and lung problems that can occur when taking methotrexate. Persistent nausea, loss of appetite, pale stools, dark urine, cough, and shortness of breath should be reported immediately. Patient advised to discontinue Arava treatment and consult with a physician prior to attempting conception. The patient will have to undergo a treatment to eliminate Arava from the body prior to conception.
Tremfya Counseling: I discussed with the patient the risks of guselkumab including but not limited to immunosuppression, serious infections, and drug reactions.  The patient understands that monitoring is required including a PPD at baseline and must alert us or the primary physician if symptoms of infection or other concerning signs are noted.
Olumiant Counseling: I discussed with the patient the risks of Olumiant therapy including but not limited to upper respiratory tract infections, shingles, cold sores, and nausea. Live vaccines should be avoided.  This medication has been linked to serious infections; higher rate of mortality; malignancy and lymphoproliferative disorders; major adverse cardiovascular events; thrombosis; gastrointestinal perforations; neutropenia; lymphopenia; anemia; liver enzyme elevations; and lipid elevations.
Solaraze Counseling:  I discussed with the patient the risks of Solaraze including but not limited to erythema, scaling, itching, weeping, crusting, and pain.
Aklief Pregnancy And Lactation Text: It is unknown if this medication is safe to use during pregnancy.  It is unknown if this medication is excreted in breast milk.  Breastfeeding women should use the topical cream on the smallest area of the skin for the shortest time needed while breastfeeding.  Do not apply to nipple and areola.
Birth Control Pills Pregnancy And Lactation Text: This medication should be avoided if pregnant and for the first 30 days post-partum.
Doxycycline Counseling:  Patient counseled regarding possible photosensitivity and increased risk for sunburn.  Patient instructed to avoid sunlight, if possible.  When exposed to sunlight, patients should wear protective clothing, sunglasses, and sunscreen.  The patient was instructed to call the office immediately if the following severe adverse effects occur:  hearing changes, easy bruising/bleeding, severe headache, or vision changes.  The patient verbalized understanding of the proper use and possible adverse effects of doxycycline.  All of the patient's questions and concerns were addressed.
Opzelura Pregnancy And Lactation Text: There is insufficient data to evaluate drug-associated risk for major birth defects, miscarriage, or other adverse maternal or fetal outcomes.  There is a pregnancy registry that monitors pregnancy outcomes in pregnant persons exposed to the medication during pregnancy.  It is unknown if this medication is excreted in breast milk.  Do not breastfeed during treatment and for about 4 weeks after the last dose.
Humira Counseling:  I discussed with the patient the risks of adalimumab including but not limited to myelosuppression, immunosuppression, autoimmune hepatitis, demyelinating diseases, lymphoma, and serious infections.  The patient understands that monitoring is required including a PPD at baseline and must alert us or the primary physician if symptoms of infection or other concerning signs are noted.
Adbry Pregnancy And Lactation Text: It is unknown if this medication will adversely affect pregnancy or breast feeding.
Acitretin Pregnancy And Lactation Text: This medication is Pregnancy Category X and should not be given to women who are pregnant or may become pregnant in the future. This medication is excreted in breast milk.
Imiquimod Counseling:  I discussed with the patient the risks of imiquimod including but not limited to erythema, scaling, itching, weeping, crusting, and pain.  Patient understands that the inflammatory response to imiquimod is variable from person to person and was educated regarded proper titration schedule.  If flu-like symptoms develop, patient knows to discontinue the medication and contact us.
Itraconazole Counseling:  I discussed with the patient the risks of itraconazole including but not limited to liver damage, nausea/vomiting, neuropathy, and severe allergy.  The patient understands that this medication is best absorbed when taken with acidic beverages such as non-diet cola or ginger ale.  The patient understands that monitoring is required including baseline LFTs and repeat LFTs at intervals.  The patient understands that they are to contact us or the primary physician if concerning signs are noted.
Oxybutynin Counseling:  I discussed with the patient the risks of oxybutynin including but not limited to skin rash, drowsiness, dry mouth, difficulty urinating, and blurred vision.
Libtayo Pregnancy And Lactation Text: This medication is contraindicated in pregnancy and when breast feeding.
Glycopyrrolate Counseling:  I discussed with the patient the risks of glycopyrrolate including but not limited to skin rash, drowsiness, dry mouth, difficulty urinating, and blurred vision.
Tranexamic Acid Pregnancy And Lactation Text: It is unknown if this medication is safe during pregnancy or breast feeding.
Cyclosporine Counseling:  I discussed with the patient the risks of cyclosporine including but not limited to hypertension, gingival hyperplasia,myelosuppression, immunosuppression, liver damage, kidney damage, neurotoxicity, lymphoma, and serious infections. The patient understands that monitoring is required including baseline blood pressure, CBC, CMP, lipid panel and uric acid, and then 1-2 times monthly CMP and blood pressure.
Doxepin Pregnancy And Lactation Text: This medication is Pregnancy Category C and it isn't known if it is safe during pregnancy. It is also excreted in breast milk and breast feeding isn't recommended.
Azelaic Acid Counseling: Patient counseled that medicine may cause skin irritation and to avoid applying near the eyes.  In the event of skin irritation, the patient was advised to reduce the amount of the drug applied or use it less frequently.   The patient verbalized understanding of the proper use and possible adverse effects of azelaic acid.  All of the patient's questions and concerns were addressed.
Spironolactone Counseling: Patient advised regarding risks of diarrhea, abdominal pain, hyperkalemia, birth defects (for female patients), liver toxicity and renal toxicity. The patient may need blood work to monitor liver and kidney function and potassium levels while on therapy. The patient verbalized understanding of the proper use and possible adverse effects of spironolactone.  All of the patient's questions and concerns were addressed.
Sarecycline Counseling: Patient advised regarding possible photosensitivity and discoloration of the teeth, skin, lips, tongue and gums.  Patient instructed to avoid sunlight, if possible.  When exposed to sunlight, patients should wear protective clothing, sunglasses, and sunscreen.  The patient was instructed to call the office immediately if the following severe adverse effects occur:  hearing changes, easy bruising/bleeding, severe headache, or vision changes.  The patient verbalized understanding of the proper use and possible adverse effects of sarecycline.  All of the patient's questions and concerns were addressed.
Olumiant Pregnancy And Lactation Text: Based on animal studies, Olumiant may cause embryo-fetal harm when administered to pregnant women.  The medication should not be used in pregnancy.  Breastfeeding is not recommended during treatment.
Niacinamide Pregnancy And Lactation Text: These medications are considered safe during pregnancy.
Erythromycin Pregnancy And Lactation Text: This medication is Pregnancy Category B and is considered safe during pregnancy. It is also excreted in breast milk.
Azithromycin Counseling:  I discussed with the patient the risks of azithromycin including but not limited to GI upset, allergic reaction, drug rash, diarrhea, and yeast infections.
Topical Steroids Counseling: I discussed with the patient that prolonged use of topical steroids can result in the increased appearance of superficial blood vessels (telangiectasias), lightening (hypopigmentation) and thinning of the skin (atrophy).  Patient understands to avoid using high potency steroids in skin folds, the groin or the face.  The patient verbalized understanding of the proper use and possible adverse effects of topical steroids.  All of the patient's questions and concerns were addressed.
Simponi Counseling:  I discussed with the patient the risks of golimumab including but not limited to myelosuppression, immunosuppression, autoimmune hepatitis, demyelinating diseases, lymphoma, and serious infections.  The patient understands that monitoring is required including a PPD at baseline and must alert us or the primary physician if symptoms of infection or other concerning signs are noted.
Bexarotene Counseling:  I discussed with the patient the risks of bexarotene including but not limited to hair loss, dry lips/skin/eyes, liver abnormalities, hyperlipidemia, pancreatitis, depression/suicidal ideation, photosensitivity, drug rash/allergic reactions, hypothyroidism, anemia, leukopenia, infection, cataracts, and teratogenicity.  Patient understands that they will need regular blood tests to check lipid profile, liver function tests, white blood cell count, thyroid function tests and pregnancy test if applicable.
Solaraze Pregnancy And Lactation Text: This medication is Pregnancy Category B and is considered safe. There is some data to suggest avoiding during the third trimester. It is unknown if this medication is excreted in breast milk.
Calcipotriene Counseling:  I discussed with the patient the risks of calcipotriene including but not limited to erythema, scaling, itching, and irritation.
Calcipotriene Pregnancy And Lactation Text: The use of this medication during pregnancy or lactation is not recommended as there is insufficient data.
Cantharidin Counseling:  I discussed with the patient the risks of Cantharidin including but not limited to pain, redness, burning, itching, and blistering.
Cantharidin Pregnancy And Lactation Text: This medication has not been proven safe during pregnancy. It is unknown if this medication is excreted in breast milk.
Topical Metronidazole Counseling: Metronidazole is a topical antibiotic medication. You may experience burning, stinging, redness, or allergic reactions.  Please call our office if you develop any problems from using this medication.
Topical Metronidazole Pregnancy And Lactation Text: This medication is Pregnancy Category B and considered safe during pregnancy.  It is also considered safe to use while breastfeeding.
Soolantra Counseling: I discussed with the patients the risks of topial Soolantra. This is a medicine which decreases the number of mites and inflammation in the skin. You experience burning, stinging, eye irritation or allergic reactions.  Please call our office if you develop any problems from using this medication.
Soolantra Pregnancy And Lactation Text: This medication is Pregnancy Category C. This medication is considered safe during breast feeding.

## 2023-06-28 NOTE — HPI: EVALUATION OF SKIN LESION(S)
How Severe Are Your Spot(S)?: moderate
Subjective   Patient ID: Svitlana is a 47 year old female.    Chief Complaint   Patient presents with   • Cough     for 2 weeks      HPI     History obtained via . Patient is alone.     2 weeks of cough  - there are some moments where she feels well and other times where she feels worse  - last night she had a headache and pain in her back  - has tried alkaseltzer  - no congestion or rhinorrhea  - cough is mild  - last night had chills, no fever  - had nausea yesterday  - her daughter and granddaughter are sick at home  - denies any sore throat  - endorses phlegm in her throat, feels chest is congested  - no shortness of breath or chest pain    - Headache is located at the bilateral temples  - Back ain is 5-6/10  - headache started randomly yesterday and improved with 2 advil  - no neck pain  - no heavy lifting, no trauma  - no vision changes, numbness, weakness, tingling    Patient's medications, allergies, past medical, surgical, social and family histories were reviewed and updated as appropriate.    Review of Systems   10 point review of systems negative unless stated in HPI.     Objective   Physical Exam   Visit Vitals  /76 (BP Location: LUE - Left upper extremity, Patient Position: Sitting)   Pulse 57   Temp 96.2 °F (35.7 °C) (Temporal)   Wt 67.6 kg (149 lb)     Constitutional: Pt is oriented to person, place, and time. Pt appears well-developed and well-nourished.   HENT:   Head: Normocephalic and atraumatic.   Nose: Nose normal.   Eyes: Conjunctivae are normal  Ears: TMs clear bilaterally  Neck: Supple, No LAD, normal ROM without pain  Cardiovascular: Normal rate, regular rhythm, normal heart sounds and intact distal pulses.   No murmur heard.  Pulmonary/Chest: Effort normal and breath sounds normal. No stridor. No respiratory distress. No wheezes.   Musculoskeletal: Pt exhibits no edema. No deformities  Neurological: Pt is alert and oriented to person, place, and time. No focal 
Have Your Spot(S) Been Treated In The Past?: has not been treated
deficits  Skin: Skin is warm and dry. No rash noted.   Psychiatric: Pt has a normal mood and affect. Behavior is normal. Judgment and thought content normal.    Assessment   Problem List Items Addressed This Visit        Nervous    Tension headache     Do non suspect meningitis, likely tension headache with associated MSK upper back pain. Recommend naproxen 500 mg BID x 2 weeks for headache and anti-inflammatory effect. Follow up if any new or worsening symptoms.                 Respiratory    Acute bronchitis due to other specified organisms     Patient is well appearing, likely viral.  Supportive care recommended - Rest, lots of fluids, tea. Rx for claritin and flonase sent to patient's pharmacy.  Follow up if symptoms worsen or do not improve              Other Visit Diagnoses     Acute non intractable tension-type headache    -  Primary    Relevant Medications    naproxen (NAPROSYN) 500 MG tablet    Bronchitis        Relevant Medications    fluticasone (FLONASE) 50 MCG/ACT nasal spray    loratadine (CLARITIN) 10 MG tablet        Medical compliance with plan discussed and risks of non-compliance reviewed.   Patient education completed on disease process, etiology & prognosis.   Proper usage and side effects of medications reviewed & discussed.   Return to clinic as clinically indicated (worsening or new symptoms) as discussed with patient who verbalized understanding of & agreement with the plan    Yoon Hoang MD  Family Medicine PGY2    
Hpi Title: Evaluation of Skin Lesions
Location: Rt forearm
Year Removed: 2009

## 2023-06-28 NOTE — PROCEDURE: ADDITIONAL NOTES
Render Risk Assessment In Note?: no
Detail Level: Detailed
Additional Notes: We discussed bx of this lesion, she opts to monitor for now, risks discussed and she verbalizes understanding.

## 2023-06-28 NOTE — PROCEDURE: COUNSELING
Detail Level: Zone
Detail Level: Simple
When Should The Patient Follow-Up For Their Next Full-Body Skin Exam?: 1 Year
Detail Level: Detailed
Quality 137: Melanoma: Continuity Of Care - Recall System: Patient information entered into a recall system that includes: target date for the next exam specified AND a process to follow up with patients regarding missed or unscheduled appointments

## 2023-06-28 NOTE — PROCEDURE: LIQUID NITROGEN
Detail Level: Detailed
Consent: The patient's consent was obtained including but not limited to risks of crusting, scabbing, blistering, scarring, darker or lighter pigmentary change, recurrence, incomplete removal and infection.
Application Tool (Optional): Liquid Nitrogen Sprayer
Show Aperture Variable?: Yes
Aperture Size (Optional): B
Duration Of Freeze Thaw-Cycle (Seconds): 0
Render Note In Bullet Format When Appropriate: No
Post-Care Instructions: I reviewed with the patient in detail post-care instructions. Patient is to wear sunprotection, and avoid picking at any of the treated lesions. Pt may apply Vaseline to crusted or scabbing areas.

## 2023-06-28 NOTE — PROCEDURE: PRESCRIPTION MEDICATION MANAGEMENT
Continue Regimen: Ivermectin 1% / Metrogel 0.75% (0.75% metronidazole, 1% ivermectin)\\nSig: Apply to entire face in the morning and before bed.
Render In Strict Bullet Format?: No
Detail Level: Zone

## 2024-01-31 ENCOUNTER — RX ONLY (OUTPATIENT)
Age: 74
Setting detail: RX ONLY
End: 2024-01-31

## 2024-01-31 RX ORDER — PHARMACY COMPOUNDING ACCESSORY
EACH MISCELLANEOUS
Qty: 30 | Refills: 3 | Status: ERX | COMMUNITY
Start: 2024-01-31

## 2024-02-20 ENCOUNTER — APPOINTMENT (RX ONLY)
Dept: URBAN - METROPOLITAN AREA CLINIC 156 | Facility: CLINIC | Age: 74
Setting detail: DERMATOLOGY
End: 2024-02-20

## 2024-02-20 DIAGNOSIS — D22 MELANOCYTIC NEVI: ICD-10-CM | Status: STABLE

## 2024-02-20 DIAGNOSIS — Z85.828 PERSONAL HISTORY OF OTHER MALIGNANT NEOPLASM OF SKIN: ICD-10-CM | Status: STABLE

## 2024-02-20 DIAGNOSIS — L57.8 OTHER SKIN CHANGES DUE TO CHRONIC EXPOSURE TO NONIONIZING RADIATION: ICD-10-CM | Status: INADEQUATELY CONTROLLED

## 2024-02-20 DIAGNOSIS — Z85.820 PERSONAL HISTORY OF MALIGNANT MELANOMA OF SKIN: ICD-10-CM | Status: STABLE

## 2024-02-20 DIAGNOSIS — D18.0 HEMANGIOMA: ICD-10-CM | Status: STABLE

## 2024-02-20 DIAGNOSIS — L82.1 OTHER SEBORRHEIC KERATOSIS: ICD-10-CM | Status: STABLE

## 2024-02-20 DIAGNOSIS — L71.8 OTHER ROSACEA: ICD-10-CM

## 2024-02-20 PROBLEM — D18.01 HEMANGIOMA OF SKIN AND SUBCUTANEOUS TISSUE: Status: ACTIVE | Noted: 2024-02-20

## 2024-02-20 PROBLEM — D22.39 MELANOCYTIC NEVI OF OTHER PARTS OF FACE: Status: ACTIVE | Noted: 2024-02-20

## 2024-02-20 PROBLEM — D22.61 MELANOCYTIC NEVI OF RIGHT UPPER LIMB, INCLUDING SHOULDER: Status: ACTIVE | Noted: 2024-02-20

## 2024-02-20 PROBLEM — D48.5 NEOPLASM OF UNCERTAIN BEHAVIOR OF SKIN: Status: ACTIVE | Noted: 2024-02-20

## 2024-02-20 PROBLEM — D22.62 MELANOCYTIC NEVI OF LEFT UPPER LIMB, INCLUDING SHOULDER: Status: ACTIVE | Noted: 2024-02-20

## 2024-02-20 PROCEDURE — ? MEDICATION COUNSELING

## 2024-02-20 PROCEDURE — ? VISIT COMPLEXITY

## 2024-02-20 PROCEDURE — G2211 COMPLEX E/M VISIT ADD ON: HCPCS

## 2024-02-20 PROCEDURE — ? DEFER

## 2024-02-20 PROCEDURE — ? COUNSELING

## 2024-02-20 PROCEDURE — ? PRESCRIPTION MEDICATION MANAGEMENT

## 2024-02-20 PROCEDURE — 99214 OFFICE O/P EST MOD 30 MIN: CPT

## 2024-02-20 PROCEDURE — ? OBSERVATION AND MEASURE

## 2024-02-20 PROCEDURE — ? ADDITIONAL NOTES

## 2024-02-20 ASSESSMENT — LOCATION SIMPLE DESCRIPTION DERM
LOCATION SIMPLE: GLABELLA
LOCATION SIMPLE: CHEST
LOCATION SIMPLE: RIGHT HAND
LOCATION SIMPLE: UPPER BACK
LOCATION SIMPLE: RIGHT FOREHEAD
LOCATION SIMPLE: RIGHT FOREARM
LOCATION SIMPLE: LEFT CHEEK
LOCATION SIMPLE: RIGHT CHEEK
LOCATION SIMPLE: LEFT FOREARM
LOCATION SIMPLE: RIGHT EYEBROW
LOCATION SIMPLE: LEFT FOREHEAD
LOCATION SIMPLE: NOSE
LOCATION SIMPLE: RIGHT NOSE
LOCATION SIMPLE: LEFT HAND

## 2024-02-20 ASSESSMENT — LOCATION DETAILED DESCRIPTION DERM
LOCATION DETAILED: RIGHT MEDIAL FOREHEAD
LOCATION DETAILED: RIGHT LATERAL EYEBROW
LOCATION DETAILED: INFERIOR THORACIC SPINE
LOCATION DETAILED: LEFT MEDIAL MALAR CHEEK
LOCATION DETAILED: GLABELLA
LOCATION DETAILED: LEFT DISTAL DORSAL FOREARM
LOCATION DETAILED: RIGHT NASAL ALAR GROOVE
LOCATION DETAILED: LEFT RADIAL DORSAL HAND
LOCATION DETAILED: RIGHT PROXIMAL DORSAL FOREARM
LOCATION DETAILED: LEFT MEDIAL FOREHEAD
LOCATION DETAILED: NASAL DORSUM
LOCATION DETAILED: RIGHT DISTAL DORSAL FOREARM
LOCATION DETAILED: LEFT LATERAL SUPERIOR CHEST
LOCATION DETAILED: RIGHT MEDIAL MALAR CHEEK
LOCATION DETAILED: RIGHT PROXIMAL RADIAL DORSAL FOREARM
LOCATION DETAILED: RIGHT RADIAL DORSAL HAND
LOCATION DETAILED: LEFT PROXIMAL DORSAL FOREARM
LOCATION DETAILED: LEFT VENTRAL LATERAL DISTAL FOREARM

## 2024-02-20 ASSESSMENT — LOCATION ZONE DERM
LOCATION ZONE: HAND
LOCATION ZONE: ARM
LOCATION ZONE: FACE
LOCATION ZONE: NOSE
LOCATION ZONE: TRUNK

## 2024-02-20 NOTE — PROCEDURE: MEDICATION COUNSELING
Cibinqo Pregnancy And Lactation Text: It is unknown if this medication will adversely affect pregnancy or breast feeding.  You should not take this medication if you are currently pregnant or planning a pregnancy or while breastfeeding.
Elidel Counseling: Patient may experience a mild burning sensation during topical application. Elidel is not approved in children less than 2 years of age. There have been case reports of hematologic and skin malignancies in patients using topical calcineurin inhibitors although causality is questionable.
Cyclophosphamide Counseling:  I discussed with the patient the risks of cyclophosphamide including but not limited to hair loss, hormonal abnormalities, decreased fertility, abdominal pain, diarrhea, nausea and vomiting, bone marrow suppression and infection. The patient understands that monitoring is required while taking this medication.
Rituxan Counseling:  I discussed with the patient the risks of Rituxan infusions. Side effects can include infusion reactions, severe drug rashes including mucocutaneous reactions, reactivation of latent hepatitis and other infections and rarely progressive multifocal leukoencephalopathy.  All of the patient's questions and concerns were addressed.
Finasteride Pregnancy And Lactation Text: This medication is absolutely contraindicated during pregnancy. It is unknown if it is excreted in breast milk.
Hydroxychloroquine Pregnancy And Lactation Text: This medication has been shown to cause fetal harm but it isn't assigned a Pregnancy Risk Category. There are small amounts excreted in breast milk.
Ivermectin Counseling:  Patient instructed to take medication on an empty stomach with a full glass of water.  Patient informed of potential adverse effects including but not limited to nausea, diarrhea, dizziness, itching, and swelling of the extremities or lymph nodes.  The patient verbalized understanding of the proper use and possible adverse effects of ivermectin.  All of the patient's questions and concerns were addressed.
Isotretinoin Pregnancy And Lactation Text: This medication is Pregnancy Category X and is considered extremely dangerous during pregnancy. It is unknown if it is excreted in breast milk.
Protopic Pregnancy And Lactation Text: This medication is Pregnancy Category C. It is unknown if this medication is excreted in breast milk when applied topically.
Minocycline Pregnancy And Lactation Text: This medication is Pregnancy Category D and not consider safe during pregnancy. It is also excreted in breast milk.
Fluconazole Counseling:  Patient counseled regarding adverse effects of fluconazole including but not limited to headache, diarrhea, nausea, upset stomach, liver function test abnormalities, taste disturbance, and stomach pain.  There is a rare possibility of liver failure that can occur when taking fluconazole.  The patient understands that monitoring of LFTs and kidney function test may be required, especially at baseline. The patient verbalized understanding of the proper use and possible adverse effects of fluconazole.  All of the patient's questions and concerns were addressed.
Thalidomide Counseling: I discussed with the patient the risks of thalidomide including but not limited to birth defects, anxiety, weakness, chest pain, dizziness, cough and severe allergy.
Clindamycin Counseling: I counseled the patient regarding use of clindamycin as an antibiotic for prophylactic and/or therapeutic purposes. Clindamycin is active against numerous classes of bacteria, including skin bacteria. Side effects may include nausea, diarrhea, gastrointestinal upset, rash, hives, yeast infections, and in rare cases, colitis.
Minoxidil Counseling: Minoxidil is a topical medication which can increase blood flow where it is applied. It is uncertain how this medication increases hair growth. Side effects are uncommon and include stinging and allergic reactions.
Libtayo Pregnancy And Lactation Text: This medication is contraindicated in pregnancy and when breast feeding.
Colchicine Counseling:  Patient counseled regarding adverse effects including but not limited to stomach upset (nausea, vomiting, stomach pain, or diarrhea).  Patient instructed to limit alcohol consumption while taking this medication.  Colchicine may reduce blood counts especially with prolonged use.  The patient understands that monitoring of kidney function and blood counts may be required, especially at baseline. The patient verbalized understanding of the proper use and possible adverse effects of colchicine.  All of the patient's questions and concerns were addressed.
Taltz Counseling: I discussed with the patient the risks of ixekizumab including but not limited to immunosuppression, serious infections, worsening of inflammatory bowel disease and drug reactions.  The patient understands that monitoring is required including a PPD at baseline and must alert us or the primary physician if symptoms of infection or other concerning signs are noted.
Benzoyl Peroxide Pregnancy And Lactation Text: This medication is Pregnancy Category C. It is unknown if benzoyl peroxide is excreted in breast milk.
Zyclara Counseling:  I discussed with the patient the risks of imiquimod including but not limited to erythema, scaling, itching, weeping, crusting, and pain.  Patient understands that the inflammatory response to imiquimod is variable from person to person and was educated regarded proper titration schedule.  If flu-like symptoms develop, patient knows to discontinue the medication and contact us.
Cosentyx Pregnancy And Lactation Text: This medication is Pregnancy Category B and is considered safe during pregnancy. It is unknown if this medication is excreted in breast milk.
Oral Minoxidil Pregnancy And Lactation Text: This medication should only be used when clearly needed if you are pregnant, attempting to become pregnant or breast feeding.
Topical Steroids Counseling: I discussed with the patient that prolonged use of topical steroids can result in the increased appearance of superficial blood vessels (telangiectasias), lightening (hypopigmentation) and thinning of the skin (atrophy).  Patient understands to avoid using high potency steroids in skin folds, the groin or the face.  The patient verbalized understanding of the proper use and possible adverse effects of topical steroids.  All of the patient's questions and concerns were addressed.
Xeldmaariz Pregnancy And Lactation Text: This medication is Pregnancy Category D and is not considered safe during pregnancy.  The risk during breast feeding is also uncertain.
Rituxan Pregnancy And Lactation Text: This medication is Pregnancy Category C and it isn't know if it is safe during pregnancy. It is unknown if this medication is excreted in breast milk but similar antibodies are known to be excreted.
Birth Control Pills Counseling: Birth Control Pill Counseling: I discussed with the patient the potential side effects of OCPs including but not limited to increased risk of stroke, heart attack, thrombophlebitis, deep venous thrombosis, hepatic adenomas, breast changes, GI upset, headaches, and depression.  The patient verbalized understanding of the proper use and possible adverse effects of OCPs. All of the patient's questions and concerns were addressed.
Litfulo Counseling: I discussed with the patient the risks of Litfulo therapy including but not limited to upper respiratory tract infections, shingles, cold sores, and nausea. Live vaccines should be avoided.  This medication has been linked to serious infections; higher rate of mortality; malignancy and lymphoproliferative disorders; major adverse cardiovascular events; thrombosis; gastrointestinal perforations; neutropenia; lymphopenia; anemia; liver enzyme elevations; and lipid elevations.
Elidel Pregnancy And Lactation Text: This medication is Pregnancy Category C. It is unknown if this medication is excreted in breast milk.
Include Pregnancy/Lactation Warning?: No
Cyclophosphamide Pregnancy And Lactation Text: This medication is Pregnancy Category D and it isn't considered safe during pregnancy. This medication is excreted in breast milk.
Fluconazole Pregnancy And Lactation Text: This medication is Pregnancy Category C and it isn't know if it is safe during pregnancy. It is also excreted in breast milk.
Topical Sulfur Applications Pregnancy And Lactation Text: This medication is considered safe during pregnancy and breast feeding secondary to limited systemic absorption.
Thalidomide Pregnancy And Lactation Text: This medication is Pregnancy Category X and is absolutely contraindicated during pregnancy. It is unknown if it is excreted in breast milk.
Low Dose Naltrexone Counseling- I discussed with the patient the potential risks and side effects of low dose naltrexone including but not limited to: more vivid dreams, headaches, nausea, vomiting, abdominal pain, fatigue, dizziness, and anxiety.
Cimetidine Counseling:  I discussed with the patient the risks of Cimetidine including but not limited to gynecomastia, headache, diarrhea, nausea, drowsiness, arrhythmias, pancreatitis, skin rashes, psychosis, bone marrow suppression and kidney toxicity.
High Dose Vitamin A Counseling: Side effects reviewed, pt to contact office should one occur.
Quinolones Counseling:  I discussed with the patient the risks of fluoroquinolones including but not limited to GI upset, allergic reaction, drug rash, diarrhea, dizziness, photosensitivity, yeast infections, liver function test abnormalities, tendonitis/tendon rupture.
Taltz Pregnancy And Lactation Text: The risk during pregnancy and breastfeeding is uncertain with this medication.
Clindamycin Pregnancy And Lactation Text: This medication can be used in pregnancy if certain situations. Clindamycin is also present in breast milk.
Minoxidil Pregnancy And Lactation Text: This medication has not been assigned a Pregnancy Risk Category but animal studies failed to show danger with the topical medication. It is unknown if the medication is excreted in breast milk.
Topical Steroids Applications Pregnancy And Lactation Text: Most topical steroids are considered safe to use during pregnancy and lactation.  Any topical steroid applied to the breast or nipple should be washed off before breastfeeding.
Ivermectin Pregnancy And Lactation Text: This medication is Pregnancy Category C and it isn't known if it is safe during pregnancy. It is also excreted in breast milk.
Qbrexza Counseling:  I discussed with the patient the risks of Qbrexza including but not limited to headache, mydriasis, blurred vision, dry eyes, nasal dryness, dry mouth, dry throat, dry skin, urinary hesitation, and constipation.  Local skin reactions including erythema, burning, stinging, and itching can also occur.
Carac Counseling:  I discussed with the patient the risks of Carac including but not limited to erythema, scaling, itching, weeping, crusting, and pain.
Colchicine Pregnancy And Lactation Text: This medication is Pregnancy Category C and isn't considered safe during pregnancy. It is excreted in breast milk.
Odomzo Counseling- I discussed with the patient the risks of Odomzo including but not limited to nausea, vomiting, diarrhea, constipation, weight loss, changes in the sense of taste, decreased appetite, muscle spasms, and hair loss.  The patient verbalized understanding of the proper use and possible adverse effects of Odomzo.  All of the patient's questions and concerns were addressed.
Otezla Counseling: The side effects of Otezla were discussed with the patient, including but not limited to worsening or new depression, weight loss, diarrhea, nausea, upper respiratory tract infection, and headache. Patient instructed to call the office should any adverse effect occur.  The patient verbalized understanding of the proper use and possible adverse effects of Otezla.  All the patient's questions and concerns were addressed.
Cyclosporine Counseling:  I discussed with the patient the risks of cyclosporine including but not limited to hypertension, gingival hyperplasia,myelosuppression, immunosuppression, liver damage, kidney damage, neurotoxicity, lymphoma, and serious infections. The patient understands that monitoring is required including baseline blood pressure, CBC, CMP, lipid panel and uric acid, and then 1-2 times monthly CMP and blood pressure.
Dupixent Counseling: I discussed with the patient the risks of dupilumab including but not limited to eye infection and irritation, cold sores, injection site reactions, worsening of asthma, allergic reactions and increased risk of parasitic infection.  Live vaccines should be avoided while taking dupilumab. Dupilumab will also interact with certain medications such as warfarin and cyclosporine. The patient understands that monitoring is required and they must alert us or the primary physician if symptoms of infection or other concerning signs are noted.
Siliq Counseling:  I discussed with the patient the risks of Siliq including but not limited to new or worsening depression, suicidal thoughts and behavior, immunosuppression, malignancy, posterior leukoencephalopathy syndrome, and serious infections.  The patient understands that monitoring is required including a PPD at baseline and must alert us or the primary physician if symptoms of infection or other concerning signs are noted. There is also a special program designed to monitor depression which is required with Siliq.
Birth Control Pills Pregnancy And Lactation Text: This medication should be avoided if pregnant and for the first 30 days post-partum.
High Dose Vitamin A Pregnancy And Lactation Text: High dose vitamin A therapy is contraindicated during pregnancy and breast feeding.
Tazorac Counseling:  Patient advised that medication is irritating and drying.  Patient may need to apply sparingly and wash off after an hour before eventually leaving it on overnight.  The patient verbalized understanding of the proper use and possible adverse effects of tazorac.  All of the patient's questions and concerns were addressed.
Litfulo Pregnancy And Lactation Text: Based on animal studies, Lifulo may cause embryo-fetal harm when administered to pregnant women.  The medication should not be used in pregnancy.  Breastfeeding is not recommended during treatment.
Wartpeel Counseling:  I discussed with the patient the risks of Wartpeel including but not limited to erythema, scaling, itching, weeping, crusting, and pain.
Eucrisa Counseling: Patient may experience a mild burning sensation during topical application. Eucrisa is not approved in children less than 2 years of age.
Low Dose Naltrexone Pregnancy And Lactation Text: Naltrexone is pregnancy category C.  There have been no adequate and well-controlled studies in pregnant women.  It should be used in pregnancy only if the potential benefit justifies the potential risk to the fetus.   Limited data indicates that naltrexone is minimally excreted into breastmilk.
Carac Pregnancy And Lactation Text: This medication is Pregnancy Category X and contraindicated in pregnancy and in women who may become pregnant. It is unknown if this medication is excreted in breast milk.
Qbrexza Pregnancy And Lactation Text: There is no available data on Qbrexza use in pregnant women.  There is no available data on Qbrexza use in lactation.
Tranexamic Acid Counseling:  Patient advised of the small risk of bleeding problems with tranexamic acid. They were also instructed to call if they developed any nausea, vomiting or diarrhea. All of the patient's questions and concerns were addressed.
Dapsone Counseling: I discussed with the patient the risks of dapsone including but not limited to hemolytic anemia, agranulocytosis, rashes, methemoglobinemia, kidney failure, peripheral neuropathy, headaches, GI upset, and liver toxicity.  Patients who start dapsone require monitoring including baseline LFTs and weekly CBCs for the first month, then every month thereafter.  The patient verbalized understanding of the proper use and possible adverse effects of dapsone.  All of the patient's questions and concerns were addressed.
Griseofulvin Counseling:  I discussed with the patient the risks of griseofulvin including but not limited to photosensitivity, cytopenia, liver damage, nausea/vomiting and severe allergy.  The patient understands that this medication is best absorbed when taken with a fatty meal (e.g., ice cream or french fries).
Acitretin Counseling:  I discussed with the patient the risks of acitretin including but not limited to hair loss, dry lips/skin/eyes, liver damage, hyperlipidemia, depression/suicidal ideation, photosensitivity.  Serious rare side effects can include but are not limited to pancreatitis, pseudotumor cerebri, bony changes, clot formation/stroke/heart attack.  Patient understands that alcohol is contraindicated since it can result in liver toxicity and significantly prolong the elimination of the drug by many years.
Tremfya Counseling: I discussed with the patient the risks of guselkumab including but not limited to immunosuppression, serious infections, and drug reactions.  The patient understands that monitoring is required including a PPD at baseline and must alert us or the primary physician if symptoms of infection or other concerning signs are noted.
Cimetidine Pregnancy And Lactation Text: This medication is Pregnancy Category B and is considered safe during pregnancy. It is also excreted in breast milk and breast feeding isn't recommended.
Otezla Pregnancy And Lactation Text: This medication is Pregnancy Category C and it isn't known if it is safe during pregnancy. It is unknown if it is excreted in breast milk.
Dupixent Pregnancy And Lactation Text: This medication likely crosses the placenta but the risk for the fetus is uncertain. This medication is excreted in breast milk.
Mirvaso Counseling: Mirvaso is a topical medication which can decrease superficial blood flow where applied. Side effects are uncommon and include stinging, redness and allergic reactions.
Spironolactone Counseling: Patient advised regarding risks of diarrhea, abdominal pain, hyperkalemia, birth defects (for female patients), liver toxicity and renal toxicity. The patient may need blood work to monitor liver and kidney function and potassium levels while on therapy. The patient verbalized understanding of the proper use and possible adverse effects of spironolactone.  All of the patient's questions and concerns were addressed.
Doxycycline Counseling:  Patient counseled regarding possible photosensitivity and increased risk for sunburn.  Patient instructed to avoid sunlight, if possible.  When exposed to sunlight, patients should wear protective clothing, sunglasses, and sunscreen.  The patient was instructed to call the office immediately if the following severe adverse effects occur:  hearing changes, easy bruising/bleeding, severe headache, or vision changes.  The patient verbalized understanding of the proper use and possible adverse effects of doxycycline.  All of the patient's questions and concerns were addressed.
Topical Sulfur Applications Counseling: Topical Sulfur Counseling: Patient counseled that this medication may cause skin irritation or allergic reactions.  In the event of skin irritation, the patient was advised to reduce the amount of the drug applied or use it less frequently.   The patient verbalized understanding of the proper use and possible adverse effects of topical sulfur application.  All of the patient's questions and concerns were addressed.
Adbry Counseling: I discussed with the patient the risks of tralokinumab including but not limited to eye infection and irritation, cold sores, injection site reactions, worsening of asthma, allergic reactions and increased risk of parasitic infection.  Live vaccines should be avoided while taking tralokinumab. The patient understands that monitoring is required and they must alert us or the primary physician if symptoms of infection or other concerning signs are noted.
Niacinamide Counseling: I recommended taking niacin or niacinamide, also know as vitamin B3, twice daily. Recent evidence suggests that taking vitamin B3 (500 mg twice daily) can reduce the risk of actinic keratoses and non-melanoma skin cancers. Side effects of vitamin B3 include flushing and headache.
Cyclosporine Pregnancy And Lactation Text: This medication is Pregnancy Category C and it isn't know if it is safe during pregnancy. This medication is excreted in breast milk.
Tazorac Pregnancy And Lactation Text: This medication is not safe during pregnancy. It is unknown if this medication is excreted in breast milk.
Olumiant Counseling: I discussed with the patient the risks of Olumiant therapy including but not limited to upper respiratory tract infections, shingles, cold sores, and nausea. Live vaccines should be avoided.  This medication has been linked to serious infections; higher rate of mortality; malignancy and lymphoproliferative disorders; major adverse cardiovascular events; thrombosis; gastrointestinal perforations; neutropenia; lymphopenia; anemia; liver enzyme elevations; and lipid elevations.
Doxepin Counseling:  Patient advised that the medication is sedating and not to drive a car after taking this medication. Patient informed of potential adverse effects including but not limited to dry mouth, urinary retention, and blurry vision.  The patient verbalized understanding of the proper use and possible adverse effects of doxepin.  All of the patient's questions and concerns were addressed.
Calcipotriene Counseling:  I discussed with the patient the risks of calcipotriene including but not limited to erythema, scaling, itching, and irritation.
Rhofade Counseling: Rhofade is a topical medication which can decrease superficial blood flow where applied. Side effects are uncommon and include stinging, redness and allergic reactions.
Rifampin Counseling: I discussed with the patient the risks of rifampin including but not limited to liver damage, kidney damage, red-orange body fluids, nausea/vomiting and severe allergy.
Griseofulvin Pregnancy And Lactation Text: This medication is Pregnancy Category X and is known to cause serious birth defects. It is unknown if this medication is excreted in breast milk but breast feeding should be avoided.
Dapsone Pregnancy And Lactation Text: This medication is Pregnancy Category C and is not considered safe during pregnancy or breast feeding.
Enbrel Counseling:  I discussed with the patient the risks of etanercept including but not limited to myelosuppression, immunosuppression, autoimmune hepatitis, demyelinating diseases, lymphoma, and infections.  The patient understands that monitoring is required including a PPD at baseline and must alert us or the primary physician if symptoms of infection or other concerning signs are noted.
Opioid Counseling: I discussed with the patient the potential side effects of opioids including but not limited to addiction, altered mental status, and depression. I stressed avoiding alcohol, benzodiazepines, muscle relaxants and sleep aids unless specifically okayed by a physician. The patient verbalized understanding of the proper use and possible adverse effects of opioids. All of the patient's questions and concerns were addressed. They were instructed to flush the remaining pills down the toilet if they did not need them for pain.
Tranexamic Acid Pregnancy And Lactation Text: It is unknown if this medication is safe during pregnancy or breast feeding.
Doxycycline Pregnancy And Lactation Text: This medication is Pregnancy Category D and not consider safe during pregnancy. It is also excreted in breast milk but is considered safe for shorter treatment courses.
Simponi Counseling:  I discussed with the patient the risks of golimumab including but not limited to myelosuppression, immunosuppression, autoimmune hepatitis, demyelinating diseases, lymphoma, and serious infections.  The patient understands that monitoring is required including a PPD at baseline and must alert us or the primary physician if symptoms of infection or other concerning signs are noted.
Methotrexate Counseling:  Patient counseled regarding adverse effects of methotrexate including but not limited to nausea, vomiting, abnormalities in liver function tests. Patients may develop mouth sores, rash, diarrhea, and abnormalities in blood counts. The patient understands that monitoring is required including LFT's and blood counts.  There is a rare possibility of scarring of the liver and lung problems that can occur when taking methotrexate. Persistent nausea, loss of appetite, pale stools, dark urine, cough, and shortness of breath should be reported immediately. Patient advised to discontinue methotrexate treatment at least three months before attempting to become pregnant.  I discussed the need for folate supplements while taking methotrexate.  These supplements can decrease side effects during methotrexate treatment. The patient verbalized understanding of the proper use and possible adverse effects of methotrexate.  All of the patient's questions and concerns were addressed.
Spironolactone Pregnancy And Lactation Text: This medication can cause feminization of the male fetus and should be avoided during pregnancy. The active metabolite is also found in breast milk.
Calcipotriene Pregnancy And Lactation Text: The use of this medication during pregnancy or lactation is not recommended as there is insufficient data.
Azithromycin Counseling:  I discussed with the patient the risks of azithromycin including but not limited to GI upset, allergic reaction, drug rash, diarrhea, and yeast infections.
Topical Clindamycin Counseling: Patient counseled that this medication may cause skin irritation or allergic reactions.  In the event of skin irritation, the patient was advised to reduce the amount of the drug applied or use it less frequently.   The patient verbalized understanding of the proper use and possible adverse effects of clindamycin.  All of the patient's questions and concerns were addressed.
Mirvaso Pregnancy And Lactation Text: This medication has not been assigned a Pregnancy Risk Category. It is unknown if the medication is excreted in breast milk.
Oxybutynin Counseling:  I discussed with the patient the risks of oxybutynin including but not limited to skin rash, drowsiness, dry mouth, difficulty urinating, and blurred vision.
Olumiant Pregnancy And Lactation Text: Based on animal studies, Olumiant may cause embryo-fetal harm when administered to pregnant women.  The medication should not be used in pregnancy.  Breastfeeding is not recommended during treatment.
Itraconazole Counseling:  I discussed with the patient the risks of itraconazole including but not limited to liver damage, nausea/vomiting, neuropathy, and severe allergy.  The patient understands that this medication is best absorbed when taken with acidic beverages such as non-diet cola or ginger ale.  The patient understands that monitoring is required including baseline LFTs and repeat LFTs at intervals.  The patient understands that they are to contact us or the primary physician if concerning signs are noted.
Hydroquinone Counseling:  Patient advised that medication may result in skin irritation, lightening (hypopigmentation), dryness, and burning.  In the event of skin irritation, the patient was advised to reduce the amount of the drug applied or use it less frequently.  Rarely, spots that are treated with hydroquinone can become darker (pseudoochronosis).  Should this occur, patient instructed to stop medication and call the office. The patient verbalized understanding of the proper use and possible adverse effects of hydroquinone.  All of the patient's questions and concerns were addressed.
Winlevi Counseling:  I discussed with the patient the risks of topical clascoterone including but not limited to erythema, scaling, itching, and stinging. Patient voiced their understanding.
Niacinamide Pregnancy And Lactation Text: These medications are considered safe during pregnancy.
Rifampin Pregnancy And Lactation Text: This medication is Pregnancy Category C and it isn't know if it is safe during pregnancy. It is also excreted in breast milk and should not be used if you are breast feeding.
Dutasteride Male Counseling: Dustasteride Counseling:  I discussed with the patient the risks of use of dutasteride including but not limited to decreased libido, decreased ejaculate volume, and gynecomastia. Women who can become pregnant should not handle medication.  All of the patient's questions and concerns were addressed.
Adbry Pregnancy And Lactation Text: It is unknown if this medication will adversely affect pregnancy or breast feeding.
Doxepin Pregnancy And Lactation Text: This medication is Pregnancy Category C and it isn't known if it is safe during pregnancy. It is also excreted in breast milk and breast feeding isn't recommended.
Opioid Pregnancy And Lactation Text: These medications can lead to premature delivery and should be avoided during pregnancy. These medications are also present in breast milk in small amounts.
Erythromycin Counseling:  I discussed with the patient the risks of erythromycin including but not limited to GI upset, allergic reaction, drug rash, diarrhea, increase in liver enzymes, and yeast infections.
Cantharidin Counseling:  I discussed with the patient the risks of Cantharidin including but not limited to pain, redness, burning, itching, and blistering.
Gabapentin Counseling: I discussed with the patient the risks of gabapentin including but not limited to dizziness, somnolence, fatigue and ataxia.
Valtrex Counseling: I discussed with the patient the risks of valacyclovir including but not limited to kidney damage, nausea, vomiting and severe allergy.  The patient understands that if the infection seems to be worsening or is not improving, they are to call.
Aklief counseling:  Patient advised to apply a pea-sized amount only at bedtime and wait 30 minutes after washing their face before applying.  If too drying, patient may add a non-comedogenic moisturizer.  The most commonly reported side effects including irritation, redness, scaling, dryness, stinging, burning, itching, and increased risk of sunburn.  The patient verbalized understanding of the proper use and possible adverse effects of retinoids.  All of the patient's questions and concerns were addressed.
Opzelura Counseling:  I discussed with the patient the risks of Opzelura including but not limited to nasopharngitis, bronchitis, ear infection, eosinophila, hives, diarrhea, folliculitis, tonsillitis, and rhinorrhea.  Taken orally, this medication has been linked to serious infections; higher rate of mortality; malignancy and lymphoproliferative disorders; major adverse cardiovascular events; thrombosis; thrombocytopenia, anemia, and neutropenia; and lipid elevations.
Methotrexate Pregnancy And Lactation Text: This medication is Pregnancy Category X and is known to cause fetal harm. This medication is excreted in breast milk.
Cantharidin Pregnancy And Lactation Text: This medication has not been proven safe during pregnancy. It is unknown if this medication is excreted in breast milk.
Xolair Counseling:  Patient informed of potential adverse effects including but not limited to fever, muscle aches, rash and allergic reactions.  The patient verbalized understanding of the proper use and possible adverse effects of Xolair.  All of the patient's questions and concerns were addressed.
Oxybutynin Pregnancy And Lactation Text: This medication is Pregnancy Category B and is considered safe during pregnancy. It is unknown if it is excreted in breast milk.
Sarecycline Counseling: Patient advised regarding possible photosensitivity and discoloration of the teeth, skin, lips, tongue and gums.  Patient instructed to avoid sunlight, if possible.  When exposed to sunlight, patients should wear protective clothing, sunglasses, and sunscreen.  The patient was instructed to call the office immediately if the following severe adverse effects occur:  hearing changes, easy bruising/bleeding, severe headache, or vision changes.  The patient verbalized understanding of the proper use and possible adverse effects of sarecycline.  All of the patient's questions and concerns were addressed.
Bimzelx Counseling:  I discussed with the patient the risks of Bimzelx including but not limited to depression, immunosuppression, allergic reactions and infections.  The patient understands that monitoring is required including a PPD at baseline and must alert us or the primary physician if symptoms of infection or other concerning signs are noted.
Rinvoq Counseling: I discussed with the patient the risks of Rinvoq therapy including but not limited to upper respiratory tract infections, shingles, cold sores, bronchitis, nausea, cough, fever, acne, and headache. Live vaccines should be avoided.  This medication has been linked to serious infections; higher rate of mortality; malignancy and lymphoproliferative disorders; major adverse cardiovascular events; thrombosis; thrombocytopenia, anemia, and neutropenia; lipid elevations; liver enzyme elevations; and gastrointestinal perforations.
Dutasteride Female Counseling: Dutasteride Counseling:  I discussed with the patient the risks of use of dutasteride including but not limited to decreased libido and sexual dysfunction. Explained the teratogenic nature of the medication and stressed the importance of not getting pregnant during treatment. All of the patient's questions and concerns were addressed.
Azithromycin Pregnancy And Lactation Text: This medication is considered safe during pregnancy and is also secreted in breast milk.
5-Fu Counseling: 5-Fluorouracil Counseling:  I discussed with the patient the risks of 5-fluorouracil including but not limited to erythema, scaling, itching, weeping, crusting, and pain.
Winlevi Pregnancy And Lactation Text: This medication is considered safe during pregnancy and breastfeeding.
Nsaids Counseling: NSAID Counseling: I discussed with the patient that NSAIDs should be taken with food. Prolonged use of NSAIDs can result in the development of stomach ulcers.  Patient advised to stop taking NSAIDs if abdominal pain occurs.  The patient verbalized understanding of the proper use and possible adverse effects of NSAIDs.  All of the patient's questions and concerns were addressed.
Ilumya Counseling: I discussed with the patient the risks of tildrakizumab including but not limited to immunosuppression, malignancy, posterior leukoencephalopathy syndrome, and serious infections.  The patient understands that monitoring is required including a PPD at baseline and must alert us or the primary physician if symptoms of infection or other concerning signs are noted.
Azathioprine Counseling:  I discussed with the patient the risks of azathioprine including but not limited to myelosuppression, immunosuppression, hepatotoxicity, lymphoma, and infections.  The patient understands that monitoring is required including baseline LFTs, Creatinine, possible TPMP genotyping and weekly CBCs for the first month and then every 2 weeks thereafter.  The patient verbalized understanding of the proper use and possible adverse effects of azathioprine.  All of the patient's questions and concerns were addressed.
Valtrex Pregnancy And Lactation Text: this medication is Pregnancy Category B and is considered safe during pregnancy. This medication is not directly found in breast milk but it's metabolite acyclovir is present.
Hydroxyzine Counseling: Patient advised that the medication is sedating and not to drive a car after taking this medication.  Patient informed of potential adverse effects including but not limited to dry mouth, urinary retention, and blurry vision.  The patient verbalized understanding of the proper use and possible adverse effects of hydroxyzine.  All of the patient's questions and concerns were addressed.
Solaraze Counseling:  I discussed with the patient the risks of Solaraze including but not limited to erythema, scaling, itching, weeping, crusting, and pain.
Xolair Pregnancy And Lactation Text: This medication is Pregnancy Category B and is considered safe during pregnancy. This medication is excreted in breast milk.
Opzelura Pregnancy And Lactation Text: There is insufficient data to evaluate drug-associated risk for major birth defects, miscarriage, or other adverse maternal or fetal outcomes.  There is a pregnancy registry that monitors pregnancy outcomes in pregnant persons exposed to the medication during pregnancy.  It is unknown if this medication is excreted in breast milk.  Do not breastfeed during treatment and for about 4 weeks after the last dose.
Acitretin Pregnancy And Lactation Text: This medication is Pregnancy Category X and should not be given to women who are pregnant or may become pregnant in the future. This medication is excreted in breast milk.
Erythromycin Pregnancy And Lactation Text: This medication is Pregnancy Category B and is considered safe during pregnancy. It is also excreted in breast milk.
Humira Counseling:  I discussed with the patient the risks of adalimumab including but not limited to myelosuppression, immunosuppression, autoimmune hepatitis, demyelinating diseases, lymphoma, and serious infections.  The patient understands that monitoring is required including a PPD at baseline and must alert us or the primary physician if symptoms of infection or other concerning signs are noted.
Bactrim Counseling:  I discussed with the patient the risks of sulfa antibiotics including but not limited to GI upset, allergic reaction, drug rash, diarrhea, dizziness, photosensitivity, and yeast infections.  Rarely, more serious reactions can occur including but not limited to aplastic anemia, agranulocytosis, methemoglobinemia, blood dyscrasias, liver or kidney failure, lung infiltrates or desquamative/blistering drug rashes.
Arava Counseling:  Patient counseled regarding adverse effects of Arava including but not limited to nausea, vomiting, abnormalities in liver function tests. Patients may develop mouth sores, rash, diarrhea, and abnormalities in blood counts. The patient understands that monitoring is required including LFTs and blood counts.  There is a rare possibility of scarring of the liver and lung problems that can occur when taking methotrexate. Persistent nausea, loss of appetite, pale stools, dark urine, cough, and shortness of breath should be reported immediately. Patient advised to discontinue Arava treatment and consult with a physician prior to attempting conception. The patient will have to undergo a treatment to eliminate Arava from the body prior to conception.
Bimzelx Pregnancy And Lactation Text: This medication crosses the placenta and the safety is uncertain during pregnancy. It is unknown if this medication is present in breast milk.
Aklief Pregnancy And Lactation Text: It is unknown if this medication is safe to use during pregnancy.  It is unknown if this medication is excreted in breast milk.  Breastfeeding women should use the topical cream on the smallest area of the skin for the shortest time needed while breastfeeding.  Do not apply to nipple and areola.
Prednisone Counseling:  I discussed with the patient the risks of prolonged use of prednisone including but not limited to weight gain, insomnia, osteoporosis, mood changes, diabetes, susceptibility to infection, glaucoma and high blood pressure.  In cases where prednisone use is prolonged, patients should be monitored with blood pressure checks, serum glucose levels and an eye exam.  Additionally, the patient may need to be placed on GI prophylaxis, PCP prophylaxis, and calcium and vitamin D supplementation and/or a bisphosphonate.  The patient verbalized understanding of the proper use and the possible adverse effects of prednisone.  All of the patient's questions and concerns were addressed.
Skyrizi Counseling: I discussed with the patient the risks of risankizumab-rzaa including but not limited to immunosuppression, and serious infections.  The patient understands that monitoring is required including a PPD at baseline and must alert us or the primary physician if symptoms of infection or other concerning signs are noted.
Propranolol Counseling:  I discussed with the patient the risks of propranolol including but not limited to low heart rate, low blood pressure, low blood sugar, restlessness and increased cold sensitivity. They should call the office if they experience any of these side effects.
Topical Ketoconazole Counseling: Patient counseled that this medication may cause skin irritation or allergic reactions.  In the event of skin irritation, the patient was advised to reduce the amount of the drug applied or use it less frequently.   The patient verbalized understanding of the proper use and possible adverse effects of ketoconazole.  All of the patient's questions and concerns were addressed.
Dutasteride Pregnancy And Lactation Text: This medication is absolutely contraindicated in women, especially during pregnancy and breast feeding. Feminization of male fetuses is possible if taking while pregnant.
Solaraze Pregnancy And Lactation Text: This medication is Pregnancy Category B and is considered safe. There is some data to suggest avoiding during the third trimester. It is unknown if this medication is excreted in breast milk.
Rinvoq Pregnancy And Lactation Text: Based on animal studies, Rinvoq may cause embryo-fetal harm when administered to pregnant women.  The medication should not be used in pregnancy.  Breastfeeding is not recommended during treatment and for 6 days after the last dose.
VTAMA Counseling: I discussed with the patient that VTAMA is not for use in the eyes, mouth or mouth. They should call the office if they develop any signs of allergic reactions to VTAMA. The patient verbalized understanding of the proper use and possible adverse effects of VTAMA.  All of the patient's questions and concerns were addressed.
Imiquimod Counseling:  I discussed with the patient the risks of imiquimod including but not limited to erythema, scaling, itching, weeping, crusting, and pain.  Patient understands that the inflammatory response to imiquimod is variable from person to person and was educated regarded proper titration schedule.  If flu-like symptoms develop, patient knows to discontinue the medication and contact us.
Nsaids Pregnancy And Lactation Text: These medications are considered safe up to 30 weeks gestation. It is excreted in breast milk.
Azathioprine Pregnancy And Lactation Text: This medication is Pregnancy Category D and isn't considered safe during pregnancy. It is unknown if this medication is excreted in breast milk.
Glycopyrrolate Counseling:  I discussed with the patient the risks of glycopyrrolate including but not limited to skin rash, drowsiness, dry mouth, difficulty urinating, and blurred vision.
Ketoconazole Counseling:   Patient counseled regarding improving absorption with orange juice.  Adverse effects include but are not limited to breast enlargement, headache, diarrhea, nausea, upset stomach, liver function test abnormalities, taste disturbance, and stomach pain.  There is a rare possibility of liver failure that can occur when taking ketoconazole. The patient understands that monitoring of LFTs may be required, especially at baseline. The patient verbalized understanding of the proper use and possible adverse effects of ketoconazole.  All of the patient's questions and concerns were addressed.
Metronidazole Counseling:  I discussed with the patient the risks of metronidazole including but not limited to seizures, nausea/vomiting, a metallic taste in the mouth, nausea/vomiting and severe allergy.
Hydroxyzine Pregnancy And Lactation Text: This medication is not safe during pregnancy and should not be taken. It is also excreted in breast milk and breast feeding isn't recommended.
Bactrim Pregnancy And Lactation Text: This medication is Pregnancy Category D and is known to cause fetal risk.  It is also excreted in breast milk.
Bexarotene Counseling:  I discussed with the patient the risks of bexarotene including but not limited to hair loss, dry lips/skin/eyes, liver abnormalities, hyperlipidemia, pancreatitis, depression/suicidal ideation, photosensitivity, drug rash/allergic reactions, hypothyroidism, anemia, leukopenia, infection, cataracts, and teratogenicity.  Patient understands that they will need regular blood tests to check lipid profile, liver function tests, white blood cell count, thyroid function tests and pregnancy test if applicable.
Picato Counseling:  I discussed with the patient the risks of Picato including but not limited to erythema, scaling, itching, weeping, crusting, and pain.
Azelaic Acid Counseling: Patient counseled that medicine may cause skin irritation and to avoid applying near the eyes.  In the event of skin irritation, the patient was advised to reduce the amount of the drug applied or use it less frequently.   The patient verbalized understanding of the proper use and possible adverse effects of azelaic acid.  All of the patient's questions and concerns were addressed.
Propranolol Pregnancy And Lactation Text: This medication is Pregnancy Category C and it isn't known if it is safe during pregnancy. It is excreted in breast milk.
Erivedge Counseling- I discussed with the patient the risks of Erivedge including but not limited to nausea, vomiting, diarrhea, constipation, weight loss, changes in the sense of taste, decreased appetite, muscle spasms, and hair loss.  The patient verbalized understanding of the proper use and possible adverse effects of Erivedge.  All of the patient's questions and concerns were addressed.
Cimzia Counseling:  I discussed with the patient the risks of Cimzia including but not limited to immunosuppression, allergic reactions and infections.  The patient understands that monitoring is required including a PPD at baseline and must alert us or the primary physician if symptoms of infection or other concerning signs are noted.
Olanzapine Counseling- I discussed with the patient the common side effects of olanzapine including but are not limited to: lack of energy, dry mouth, increased appetite, sleepiness, tremor, constipation, dizziness, changes in behavior, or restlessness.  Explained that teenagers are more likely to experience headaches, abdominal pain, pain in the arms or legs, tiredness, and sleepiness.  Serious side effects include but are not limited: increased risk of death in elderly patients who are confused, have memory loss, or dementia-related psychosis; hyperglycemia; increased cholesterol and triglycerides; and weight gain.
Cellcept Counseling:  I discussed with the patient the risks of mycophenolate mofetil including but not limited to infection/immunosuppression, GI upset, hypokalemia, hypercholesterolemia, bone marrow suppression, lymphoproliferative disorders, malignancy, GI ulceration/bleed/perforation, colitis, interstitial lung disease, kidney failure, progressive multifocal leukoencephalopathy, and birth defects.  The patient understands that monitoring is required including a baseline creatinine and regular CBC testing. In addition, patient must alert us immediately if symptoms of infection or other concerning signs are noted.
Finasteride Male Counseling: Finasteride Counseling:  I discussed with the patient the risks of use of finasteride including but not limited to decreased libido, decreased ejaculate volume, gynecomastia, and depression. Women should not handle medication.  All of the patient's questions and concerns were addressed.
Tetracycline Counseling: Patient counseled regarding possible photosensitivity and increased risk for sunburn.  Patient instructed to avoid sunlight, if possible.  When exposed to sunlight, patients should wear protective clothing, sunglasses, and sunscreen.  The patient was instructed to call the office immediately if the following severe adverse effects occur:  hearing changes, easy bruising/bleeding, severe headache, or vision changes.  The patient verbalized understanding of the proper use and possible adverse effects of tetracycline.  All of the patient's questions and concerns were addressed. Patient understands to avoid pregnancy while on therapy due to potential birth defects.
Sotyktu Counseling:  I discussed the most common side effects of Sotyktu including: common cold, sore throat, sinus infections, cold sores, canker sores, folliculitis, and acne.? I also discussed more serious side effects of Sotyktu including but not limited to: serious allergic reactions; increased risk for infections such as TB; cancers such as lymphomas; rhabdomyolysis and elevated CPK; and elevated triglycerides and liver enzymes.?
Metronidazole Pregnancy And Lactation Text: This medication is Pregnancy Category B and considered safe during pregnancy.  It is also excreted in breast milk.
Vtama Pregnancy And Lactation Text: It is unknown if this medication can cause problems during pregnancy and breastfeeding.
Soolantra Counseling: I discussed with the patients the risks of topial Soolantra. This is a medicine which decreases the number of mites and inflammation in the skin. You experience burning, stinging, eye irritation or allergic reactions.  Please call our office if you develop any problems from using this medication.
Infliximab Counseling:  I discussed with the patient the risks of infliximab including but not limited to myelosuppression, immunosuppression, autoimmune hepatitis, demyelinating diseases, lymphoma, and serious infections.  The patient understands that monitoring is required including a PPD at baseline and must alert us or the primary physician if symptoms of infection or other concerning signs are noted.
Ketoconazole Pregnancy And Lactation Text: This medication is Pregnancy Category C and it isn't know if it is safe during pregnancy. It is also excreted in breast milk and breast feeding isn't recommended.
Azelaic Acid Pregnancy And Lactation Text: This medication is considered safe during pregnancy and breast feeding.
Glycopyrrolate Pregnancy And Lactation Text: This medication is Pregnancy Category B and is considered safe during pregnancy. It is unknown if it is excreted breast milk.
SSKI Counseling:  I discussed with the patient the risks of SSKI including but not limited to thyroid abnormalities, metallic taste, GI upset, fever, headache, acne, arthralgias, paraesthesias, lymphadenopathy, easy bleeding, arrhythmias, and allergic reaction.
Clofazimine Counseling:  I discussed with the patient the risks of clofazimine including but not limited to skin and eye pigmentation, liver damage, nausea/vomiting, gastrointestinal bleeding and allergy.
Drysol Counseling:  I discussed with the patient the risks of drysol/aluminum chloride including but not limited to skin rash, itching, irritation, burning.
Bexarotene Pregnancy And Lactation Text: This medication is Pregnancy Category X and should not be given to women who are pregnant or may become pregnant. This medication should not be used if you are breast feeding.
Stelara Counseling:  I discussed with the patient the risks of ustekinumab including but not limited to immunosuppression, malignancy, posterior leukoencephalopathy syndrome, and serious infections.  The patient understands that monitoring is required including a PPD at baseline and must alert us or the primary physician if symptoms of infection or other concerning signs are noted.
Cimzia Pregnancy And Lactation Text: This medication crosses the placenta but can be considered safe in certain situations. Cimzia may be excreted in breast milk.
Sotyktu Pregnancy And Lactation Text: There is insufficient data to evaluate whether or not Sotyktu is safe to use during pregnancy.? ?It is not known if Sotyktu passes into breast milk and whether or not it is safe to use when breastfeeding.??
Klisyri Counseling:  I discussed with the patient the risks of Klisyri including but not limited to erythema, scaling, itching, weeping, crusting, and pain.
Finasteride Female Counseling: Finasteride Counseling:  I discussed with the patient the risks of use of finasteride including but not limited to decreased libido and sexual dysfunction. Explained the teratogenic nature of the medication and stressed the importance of not getting pregnant during treatment. All of the patient's questions and concerns were addressed.
Cephalexin Counseling: I counseled the patient regarding use of cephalexin as an antibiotic for prophylactic and/or therapeutic purposes. Cephalexin (commonly prescribed under brand name Keflex) is a cephalosporin antibiotic which is active against numerous classes of bacteria, including most skin bacteria. Side effects may include nausea, diarrhea, gastrointestinal upset, rash, hives, yeast infections, and in rare cases, hepatitis, kidney disease, seizures, fever, confusion, neurologic symptoms, and others. Patients with severe allergies to penicillin medications are cautioned that there is about a 10% incidence of cross-reactivity with cephalosporins. When possible, patients with penicillin allergies should use alternatives to cephalosporins for antibiotic therapy.
Topical Metronidazole Counseling: Metronidazole is a topical antibiotic medication. You may experience burning, stinging, redness, or allergic reactions.  Please call our office if you develop any problems from using this medication.
Albendazole Counseling:  I discussed with the patient the risks of albendazole including but not limited to cytopenia, kidney damage, nausea/vomiting and severe allergy.  The patient understands that this medication is being used in an off-label manner.
Terbinafine Counseling: Patient counseling regarding adverse effects of terbinafine including but not limited to headache, diarrhea, rash, upset stomach, liver function test abnormalities, itching, taste/smell disturbance, nausea, abdominal pain, and flatulence.  There is a rare possibility of liver failure that can occur when taking terbinafine.  The patient understands that a baseline LFT and kidney function test may be required. The patient verbalized understanding of the proper use and possible adverse effects of terbinafine.  All of the patient's questions and concerns were addressed.
Zoryve Counseling:  I discussed with the patient that Zoryve is not for use in the eyes, mouth or vagina. The most commonly reported side effects include diarrhea, headache, insomnia, application site pain, upper respiratory tract infections, and urinary tract infections.  All of the patient's questions and concerns were addressed.
Olanzapine Pregnancy And Lactation Text: This medication is pregnancy category C.   There are no adequate and well controlled trials with olanzapine in pregnant females.  Olanzapine should be used during pregnancy only if the potential benefit justifies the potential risk to the fetus.   In a study in lactating healthy women, olanzapine was excreted in breast milk.  It is recommended that women taking olanzapine should not breast feed.
Soolantra Pregnancy And Lactation Text: This medication is Pregnancy Category C. This medication is considered safe during breast feeding.
Isotretinoin Counseling: Patient should get monthly blood tests, not donate blood, not drive at night if vision affected, not share medication, and not undergo elective surgery for 6 months after tx completed. Side effects reviewed, pt to contact office should one occur.
Minocycline Counseling: Patient advised regarding possible photosensitivity and discoloration of the teeth, skin, lips, tongue and gums.  Patient instructed to avoid sunlight, if possible.  When exposed to sunlight, patients should wear protective clothing, sunglasses, and sunscreen.  The patient was instructed to call the office immediately if the following severe adverse effects occur:  hearing changes, easy bruising/bleeding, severe headache, or vision changes.  The patient verbalized understanding of the proper use and possible adverse effects of minocycline.  All of the patient's questions and concerns were addressed.
Protopic Counseling: Patient may experience a mild burning sensation during topical application. Protopic is not approved in children less than 2 years of age. There have been case reports of hematologic and skin malignancies in patients using topical calcineurin inhibitors although causality is questionable.
Benzoyl Peroxide Counseling: Patient counseled that medicine may cause skin irritation and bleach clothing.  In the event of skin irritation, the patient was advised to reduce the amount of the drug applied or use it less frequently.   The patient verbalized understanding of the proper use and possible adverse effects of benzoyl peroxide.  All of the patient's questions and concerns were addressed.
Cibinqo Counseling: I discussed with the patient the risks of Cibinqo therapy including but not limited to common cold, nausea, headache, cold sores, increased blood CPK levels, dizziness, UTIs, fatigue, acne, and vomitting. Live vaccines should be avoided.  This medication has been linked to serious infections; higher rate of mortality; malignancy and lymphoproliferative disorders; major adverse cardiovascular events; thrombosis; thrombocytopenia and lymphopenia; lipid elevations; and retinal detachment.
Hydroxychloroquine Counseling:  I discussed with the patient that a baseline ophthalmologic exam is needed at the start of therapy and every year thereafter while on therapy. A CBC may also be warranted for monitoring.  The side effects of this medication were discussed with the patient, including but not limited to agranulocytosis, aplastic anemia, seizures, rashes, retinopathy, and liver toxicity. Patient instructed to call the office should any adverse effect occur.  The patient verbalized understanding of the proper use and possible adverse effects of Plaquenil.  All the patient's questions and concerns were addressed.
Cosentyx Counseling:  I discussed with the patient the risks of Cosentyx including but not limited to worsening of Crohn's disease, immunosuppression, allergic reactions and infections.  The patient understands that monitoring is required including a PPD at baseline and must alert us or the primary physician if symptoms of infection or other concerning signs are noted.
Libtayo Counseling- I discussed with the patient the risks of Libtayo including but not limited to nausea, vomiting, diarrhea, and bone or muscle pain.  The patient verbalized understanding of the proper use and possible adverse effects of Libtayo.  All of the patient's questions and concerns were addressed.
Sski Pregnancy And Lactation Text: This medication is Pregnancy Category D and isn't considered safe during pregnancy. It is excreted in breast milk.
Cephalexin Pregnancy And Lactation Text: This medication is Pregnancy Category B and considered safe during pregnancy.  It is also excreted in breast milk but can be used safely for shorter doses.
Topical Retinoid counseling:  Patient advised to apply a pea-sized amount only at bedtime and wait 30 minutes after washing their face before applying.  If too drying, patient may add a non-comedogenic moisturizer. The patient verbalized understanding of the proper use and possible adverse effects of retinoids.  All of the patient's questions and concerns were addressed.
Detail Level: Simple
Xeljanz Counseling: I discussed with the patient the risks of Xeljanz therapy including increased risk of infection, liver issues, headache, diarrhea, or cold symptoms. Live vaccines should be avoided. They were instructed to call if they have any problems.
Klisyri Pregnancy And Lactation Text: It is unknown if this medication can harm a developing fetus or if it is excreted in breast milk.
Topical Metronidazole Pregnancy And Lactation Text: This medication is Pregnancy Category B and considered safe during pregnancy.  It is also considered safe to use while breastfeeding.
Oral Minoxidil Counseling- I discussed with the patient the risks of oral minoxidil including but not limited to shortness of breath, swelling of the feet or ankles, dizziness, lightheadedness, unwanted hair growth and allergic reaction.  The patient verbalized understanding of the proper use and possible adverse effects of oral minoxidil.  All of the patient's questions and concerns were addressed.

## 2024-02-20 NOTE — PROCEDURE: COUNSELING
When Should The Patient Follow-Up For Their Next Full-Body Skin Exam?: 1 Year
Detail Level: Detailed
Quality 137: Melanoma: Continuity Of Care - Recall System: Patient information entered into a recall system that includes: target date for the next exam specified AND a process to follow up with patients regarding missed or unscheduled appointments
Detail Level: Simple
Detail Level: Zone
Patient Specific Counseling (Will Not Stick From Patient To Patient): She denies any systemic symptoms, no telangiectasias elsewhere.

## 2024-02-20 NOTE — PROCEDURE: DEFER
Size Of Lesion In Cm (Optional): 0.3
Procedure To Be Performed At Next Visit: Biopsy by shave method
Detail Level: Detailed
Reason To Defer Override: We discussed bx of this lesion, she opts to monitor for now, risks discussed and she verbalizes understanding.
X Size Of Lesion In Cm (Optional): 0
Introduction Text (Please End With A Colon): The following procedure was deferred:

## 2024-02-20 NOTE — PROCEDURE: ADDITIONAL NOTES
Render Risk Assessment In Note?: no
Detail Level: Detailed
Additional Notes: We discussed bx of this lesion, she opts to monitor for now, risks discussed and she verbalizes understanding.
Additional Notes: I gave her a sample of Rhofade, she will notify us if she would like a Rx.  She declines oral ABX. We discussed cleanser, advised to try Elta MD cleanser and sample provided.   She has been advised the factors that potentially exacerbate rosacea to include spicy food, and admits to noticing flares when she eats Mexican food.
Detail Level: Simple
Additional Notes: S/p excision 2022 -with positive margins, underwent Mohs with Dr Da Silva

## 2024-02-20 NOTE — PROCEDURE: PRESCRIPTION MEDICATION MANAGEMENT
Render In Strict Bullet Format?: No
Detail Level: Zone
Continue Regimen: Ivermectin / Metronidazole compound as previously prescribed.

## 2024-07-02 ENCOUNTER — APPOINTMENT (RX ONLY)
Dept: URBAN - METROPOLITAN AREA CLINIC 156 | Facility: CLINIC | Age: 74
Setting detail: DERMATOLOGY
End: 2024-07-02

## 2024-07-02 DIAGNOSIS — Z85.820 PERSONAL HISTORY OF MALIGNANT MELANOMA OF SKIN: ICD-10-CM | Status: STABLE

## 2024-07-02 DIAGNOSIS — D22 MELANOCYTIC NEVI: ICD-10-CM | Status: STABLE

## 2024-07-02 DIAGNOSIS — D18.0 HEMANGIOMA: ICD-10-CM | Status: STABLE

## 2024-07-02 DIAGNOSIS — L57.8 OTHER SKIN CHANGES DUE TO CHRONIC EXPOSURE TO NONIONIZING RADIATION: ICD-10-CM | Status: STABLE

## 2024-07-02 DIAGNOSIS — L71.8 OTHER ROSACEA: ICD-10-CM | Status: STABLE

## 2024-07-02 DIAGNOSIS — Z71.89 OTHER SPECIFIED COUNSELING: ICD-10-CM

## 2024-07-02 DIAGNOSIS — Z85.828 PERSONAL HISTORY OF OTHER MALIGNANT NEOPLASM OF SKIN: ICD-10-CM | Status: STABLE

## 2024-07-02 DIAGNOSIS — L73.8 OTHER SPECIFIED FOLLICULAR DISORDERS: ICD-10-CM | Status: STABLE

## 2024-07-02 DIAGNOSIS — L82.1 OTHER SEBORRHEIC KERATOSIS: ICD-10-CM | Status: STABLE

## 2024-07-02 PROBLEM — D48.5 NEOPLASM OF UNCERTAIN BEHAVIOR OF SKIN: Status: ACTIVE | Noted: 2024-07-02

## 2024-07-02 PROBLEM — D22.71 MELANOCYTIC NEVI OF RIGHT LOWER LIMB, INCLUDING HIP: Status: ACTIVE | Noted: 2024-07-02

## 2024-07-02 PROBLEM — D22.61 MELANOCYTIC NEVI OF RIGHT UPPER LIMB, INCLUDING SHOULDER: Status: ACTIVE | Noted: 2024-07-02

## 2024-07-02 PROBLEM — D22.5 MELANOCYTIC NEVI OF TRUNK: Status: ACTIVE | Noted: 2024-07-02

## 2024-07-02 PROBLEM — D22.62 MELANOCYTIC NEVI OF LEFT UPPER LIMB, INCLUDING SHOULDER: Status: ACTIVE | Noted: 2024-07-02

## 2024-07-02 PROBLEM — D22.72 MELANOCYTIC NEVI OF LEFT LOWER LIMB, INCLUDING HIP: Status: ACTIVE | Noted: 2024-07-02

## 2024-07-02 PROBLEM — D18.01 HEMANGIOMA OF SKIN AND SUBCUTANEOUS TISSUE: Status: ACTIVE | Noted: 2024-07-02

## 2024-07-02 PROCEDURE — ? OBSERVATION

## 2024-07-02 PROCEDURE — ? VISIT COMPLEXITY

## 2024-07-02 PROCEDURE — 99214 OFFICE O/P EST MOD 30 MIN: CPT

## 2024-07-02 PROCEDURE — G2211 COMPLEX E/M VISIT ADD ON: HCPCS

## 2024-07-02 PROCEDURE — ? PRESCRIPTION MEDICATION MANAGEMENT

## 2024-07-02 PROCEDURE — ? PHOTO-DOCUMENTATION

## 2024-07-02 PROCEDURE — ? COUNSELING

## 2024-07-02 PROCEDURE — ? MEDICATION COUNSELING

## 2024-07-02 PROCEDURE — ? ADDITIONAL NOTES

## 2024-07-02 PROCEDURE — ? EDUCATIONAL RESOURCES PROVIDED

## 2024-07-02 PROCEDURE — ? SUNSCREEN RECOMMENDATIONS

## 2024-07-02 ASSESSMENT — LOCATION SIMPLE DESCRIPTION DERM
LOCATION SIMPLE: RIGHT UPPER BACK
LOCATION SIMPLE: LEFT TEMPLE
LOCATION SIMPLE: RIGHT FOREARM
LOCATION SIMPLE: LEFT CHEEK
LOCATION SIMPLE: LEFT THIGH
LOCATION SIMPLE: ABDOMEN
LOCATION SIMPLE: RIGHT EYEBROW
LOCATION SIMPLE: RIGHT THIGH
LOCATION SIMPLE: RIGHT TEMPLE
LOCATION SIMPLE: UPPER BACK
LOCATION SIMPLE: RIGHT FOREHEAD
LOCATION SIMPLE: RIGHT UPPER ARM
LOCATION SIMPLE: RIGHT CHEEK
LOCATION SIMPLE: GLABELLA
LOCATION SIMPLE: RIGHT PRETIBIAL REGION
LOCATION SIMPLE: LEFT PRETIBIAL REGION
LOCATION SIMPLE: LEFT UPPER ARM
LOCATION SIMPLE: CHEST

## 2024-07-02 ASSESSMENT — LOCATION DETAILED DESCRIPTION DERM
LOCATION DETAILED: INFERIOR THORACIC SPINE
LOCATION DETAILED: RIGHT ANTERIOR DISTAL UPPER ARM
LOCATION DETAILED: RIGHT MEDIAL FOREHEAD
LOCATION DETAILED: EPIGASTRIC SKIN
LOCATION DETAILED: RIGHT INFERIOR CENTRAL MALAR CHEEK
LOCATION DETAILED: LEFT MEDIAL SUPERIOR CHEST
LOCATION DETAILED: LEFT LATERAL SUPERIOR CHEST
LOCATION DETAILED: RIGHT LATERAL EYEBROW
LOCATION DETAILED: RIGHT MID TEMPLE
LOCATION DETAILED: RIGHT DISTAL PRETIBIAL REGION
LOCATION DETAILED: LEFT ANTERIOR LATERAL DISTAL THIGH
LOCATION DETAILED: RIGHT SUPERIOR MEDIAL UPPER BACK
LOCATION DETAILED: GLABELLA
LOCATION DETAILED: LEFT ANTERIOR PROXIMAL UPPER ARM
LOCATION DETAILED: PERIUMBILICAL SKIN
LOCATION DETAILED: LEFT INFERIOR CENTRAL MALAR CHEEK
LOCATION DETAILED: LEFT DISTAL PRETIBIAL REGION
LOCATION DETAILED: LEFT ANTERIOR PROXIMAL THIGH
LOCATION DETAILED: LEFT MID TEMPLE
LOCATION DETAILED: SUBXIPHOID
LOCATION DETAILED: RIGHT PROXIMAL DORSAL FOREARM
LOCATION DETAILED: RIGHT ANTERIOR DISTAL THIGH
LOCATION DETAILED: RIGHT MEDIAL UPPER BACK
LOCATION DETAILED: RIGHT ANTERIOR PROXIMAL THIGH

## 2024-07-02 ASSESSMENT — LOCATION ZONE DERM
LOCATION ZONE: ARM
LOCATION ZONE: LEG
LOCATION ZONE: TRUNK
LOCATION ZONE: FACE

## 2024-07-02 ASSESSMENT — SEVERITY ASSESSMENT OVERALL AMONG ALL PATIENTS
IN YOUR EXPERIENCE, AMONG ALL PATIENTS YOU HAVE SEEN WITH THIS CONDITION, HOW SEVERE IS THIS PATIENT'S CONDITION?: MINIMAL

## 2024-07-02 ASSESSMENT — PAIN INTENSITY VAS: HOW INTENSE IS YOUR PAIN 0 BEING NO PAIN, 10 BEING THE MOST SEVERE PAIN POSSIBLE?: NO PAIN

## 2024-07-02 NOTE — PROCEDURE: ADDITIONAL NOTES
Detail Level: Simple
Additional Notes: S/p excision 2022 -with positive margins, underwent Mohs with Dr Da Silva
Render Risk Assessment In Note?: no

## 2024-07-02 NOTE — PROCEDURE: MEDICATION COUNSELING
Dupixent Counseling: I discussed with the patient the risks of dupilumab including but not limited to eye infection and irritation, cold sores, injection site reactions, worsening of asthma, allergic reactions and increased risk of parasitic infection.  Live vaccines should be avoided while taking dupilumab. Dupilumab will also interact with certain medications such as warfarin and cyclosporine. The patient understands that monitoring is required and they must alert us or the primary physician if symptoms of infection or other concerning signs are noted.
Oxybutynin Counseling:  I discussed with the patient the risks of oxybutynin including but not limited to skin rash, drowsiness, dry mouth, difficulty urinating, and blurred vision.
Cephalexin Pregnancy And Lactation Text: This medication is Pregnancy Category B and considered safe during pregnancy.  It is also excreted in breast milk but can be used safely for shorter doses.
5-Fu Pregnancy And Lactation Text: This medication is Pregnancy Category X and contraindicated in pregnancy and in women who may become pregnant. It is unknown if this medication is excreted in breast milk.
Protopic Counseling: Patient may experience a mild burning sensation during topical application. Protopic is not approved in children less than 2 years of age. There have been case reports of hematologic and skin malignancies in patients using topical calcineurin inhibitors although causality is questionable.
Methotrexate Counseling:  Patient counseled regarding adverse effects of methotrexate including but not limited to nausea, vomiting, abnormalities in liver function tests. Patients may develop mouth sores, rash, diarrhea, and abnormalities in blood counts. The patient understands that monitoring is required including LFT's and blood counts.  There is a rare possibility of scarring of the liver and lung problems that can occur when taking methotrexate. Persistent nausea, loss of appetite, pale stools, dark urine, cough, and shortness of breath should be reported immediately. Patient advised to discontinue methotrexate treatment at least three months before attempting to become pregnant.  I discussed the need for folate supplements while taking methotrexate.  These supplements can decrease side effects during methotrexate treatment. The patient verbalized understanding of the proper use and possible adverse effects of methotrexate.  All of the patient's questions and concerns were addressed.
Imiquimod Counseling:  I discussed with the patient the risks of imiquimod including but not limited to erythema, scaling, itching, weeping, crusting, and pain.  Patient understands that the inflammatory response to imiquimod is variable from person to person and was educated regarded proper titration schedule.  If flu-like symptoms develop, patient knows to discontinue the medication and contact us.
Dutasteride Pregnancy And Lactation Text: This medication is absolutely contraindicated in women, especially during pregnancy and breast feeding. Feminization of male fetuses is possible if taking while pregnant.
Soolantra Pregnancy And Lactation Text: This medication is Pregnancy Category C. This medication is considered safe during breast feeding.
Minocycline Counseling: Patient advised regarding possible photosensitivity and discoloration of the teeth, skin, lips, tongue and gums.  Patient instructed to avoid sunlight, if possible.  When exposed to sunlight, patients should wear protective clothing, sunglasses, and sunscreen.  The patient was instructed to call the office immediately if the following severe adverse effects occur:  hearing changes, easy bruising/bleeding, severe headache, or vision changes.  The patient verbalized understanding of the proper use and possible adverse effects of minocycline.  All of the patient's questions and concerns were addressed.
Ilumya Pregnancy And Lactation Text: The risk during pregnancy and breastfeeding is uncertain with this medication.
Spironolactone Pregnancy And Lactation Text: This medication can cause feminization of the male fetus and should be avoided during pregnancy. The active metabolite is also found in breast milk.
Cimetidine Counseling:  I discussed with the patient the risks of Cimetidine including but not limited to gynecomastia, headache, diarrhea, nausea, drowsiness, arrhythmias, pancreatitis, skin rashes, psychosis, bone marrow suppression and kidney toxicity.
Ivermectin Pregnancy And Lactation Text: This medication is Pregnancy Category C and it isn't known if it is safe during pregnancy. It is also excreted in breast milk.
Nsaids Pregnancy And Lactation Text: These medications are considered safe up to 30 weeks gestation. It is excreted in breast milk.
VTAMA Counseling: I discussed with the patient that VTAMA is not for use in the eyes, mouth or mouth. They should call the office if they develop any signs of allergic reactions to VTAMA. The patient verbalized understanding of the proper use and possible adverse effects of VTAMA.  All of the patient's questions and concerns were addressed.
Azelaic Acid Counseling: Patient counseled that medicine may cause skin irritation and to avoid applying near the eyes.  In the event of skin irritation, the patient was advised to reduce the amount of the drug applied or use it less frequently.   The patient verbalized understanding of the proper use and possible adverse effects of azelaic acid.  All of the patient's questions and concerns were addressed.
Bexarotene Counseling:  I discussed with the patient the risks of bexarotene including but not limited to hair loss, dry lips/skin/eyes, liver abnormalities, hyperlipidemia, pancreatitis, depression/suicidal ideation, photosensitivity, drug rash/allergic reactions, hypothyroidism, anemia, leukopenia, infection, cataracts, and teratogenicity.  Patient understands that they will need regular blood tests to check lipid profile, liver function tests, white blood cell count, thyroid function tests and pregnancy test if applicable.
Arava Pregnancy And Lactation Text: This medication is Pregnancy Category X and is absolutely contraindicated during pregnancy. It is unknown if it is excreted in breast milk.
Topical Retinoid counseling:  Patient advised to apply a pea-sized amount only at bedtime and wait 30 minutes after washing their face before applying.  If too drying, patient may add a non-comedogenic moisturizer. The patient verbalized understanding of the proper use and possible adverse effects of retinoids.  All of the patient's questions and concerns were addressed.
Finasteride Male Counseling: Finasteride Counseling:  I discussed with the patient the risks of use of finasteride including but not limited to decreased libido, decreased ejaculate volume, gynecomastia, and depression. Women should not handle medication.  All of the patient's questions and concerns were addressed.
Infliximab Counseling:  I discussed with the patient the risks of infliximab including but not limited to myelosuppression, immunosuppression, autoimmune hepatitis, demyelinating diseases, lymphoma, and serious infections.  The patient understands that monitoring is required including a PPD at baseline and must alert us or the primary physician if symptoms of infection or other concerning signs are noted.
Glycopyrrolate Counseling:  I discussed with the patient the risks of glycopyrrolate including but not limited to skin rash, drowsiness, dry mouth, difficulty urinating, and blurred vision.
Topical Metronidazole Pregnancy And Lactation Text: This medication is Pregnancy Category B and considered safe during pregnancy.  It is also considered safe to use while breastfeeding.
Olumiant Pregnancy And Lactation Text: Based on animal studies, Olumiant may cause embryo-fetal harm when administered to pregnant women.  The medication should not be used in pregnancy.  Breastfeeding is not recommended during treatment.
Detail Level: Simple
Valtrex Counseling: I discussed with the patient the risks of valacyclovir including but not limited to kidney damage, nausea, vomiting and severe allergy.  The patient understands that if the infection seems to be worsening or is not improving, they are to call.
Erivedge Counseling- I discussed with the patient the risks of Erivedge including but not limited to nausea, vomiting, diarrhea, constipation, weight loss, changes in the sense of taste, decreased appetite, muscle spasms, and hair loss.  The patient verbalized understanding of the proper use and possible adverse effects of Erivedge.  All of the patient's questions and concerns were addressed.
Protopic Pregnancy And Lactation Text: This medication is Pregnancy Category C. It is unknown if this medication is excreted in breast milk when applied topically.
Methotrexate Pregnancy And Lactation Text: This medication is Pregnancy Category X and is known to cause fetal harm. This medication is excreted in breast milk.
Dupixent Pregnancy And Lactation Text: This medication likely crosses the placenta but the risk for the fetus is uncertain. This medication is excreted in breast milk.
Glycopyrrolate Pregnancy And Lactation Text: This medication is Pregnancy Category B and is considered safe during pregnancy. It is unknown if it is excreted breast milk.
Drysol Counseling:  I discussed with the patient the risks of drysol/aluminum chloride including but not limited to skin rash, itching, irritation, burning.
Topical Steroids Counseling: I discussed with the patient that prolonged use of topical steroids can result in the increased appearance of superficial blood vessels (telangiectasias), lightening (hypopigmentation) and thinning of the skin (atrophy).  Patient understands to avoid using high potency steroids in skin folds, the groin or the face.  The patient verbalized understanding of the proper use and possible adverse effects of topical steroids.  All of the patient's questions and concerns were addressed.
Clindamycin Counseling: I counseled the patient regarding use of clindamycin as an antibiotic for prophylactic and/or therapeutic purposes. Clindamycin is active against numerous classes of bacteria, including skin bacteria. Side effects may include nausea, diarrhea, gastrointestinal upset, rash, hives, yeast infections, and in rare cases, colitis.
Stelara Counseling:  I discussed with the patient the risks of ustekinumab including but not limited to immunosuppression, malignancy, posterior leukoencephalopathy syndrome, and serious infections.  The patient understands that monitoring is required including a PPD at baseline and must alert us or the primary physician if symptoms of infection or other concerning signs are noted.
Adbry Counseling: I discussed with the patient the risks of tralokinumab including but not limited to eye infection and irritation, cold sores, injection site reactions, worsening of asthma, allergic reactions and increased risk of parasitic infection.  Live vaccines should be avoided while taking tralokinumab. The patient understands that monitoring is required and they must alert us or the primary physician if symptoms of infection or other concerning signs are noted.
Cimetidine Pregnancy And Lactation Text: This medication is Pregnancy Category B and is considered safe during pregnancy. It is also excreted in breast milk and breast feeding isn't recommended.
Olanzapine Counseling- I discussed with the patient the common side effects of olanzapine including but are not limited to: lack of energy, dry mouth, increased appetite, sleepiness, tremor, constipation, dizziness, changes in behavior, or restlessness.  Explained that teenagers are more likely to experience headaches, abdominal pain, pain in the arms or legs, tiredness, and sleepiness.  Serious side effects include but are not limited: increased risk of death in elderly patients who are confused, have memory loss, or dementia-related psychosis; hyperglycemia; increased cholesterol and triglycerides; and weight gain.
Vtama Pregnancy And Lactation Text: It is unknown if this medication can cause problems during pregnancy and breastfeeding.
Fluconazole Counseling:  Patient counseled regarding adverse effects of fluconazole including but not limited to headache, diarrhea, nausea, upset stomach, liver function test abnormalities, taste disturbance, and stomach pain.  There is a rare possibility of liver failure that can occur when taking fluconazole.  The patient understands that monitoring of LFTs and kidney function test may be required, especially at baseline. The patient verbalized understanding of the proper use and possible adverse effects of fluconazole.  All of the patient's questions and concerns were addressed.
Oxybutynin Pregnancy And Lactation Text: This medication is Pregnancy Category B and is considered safe during pregnancy. It is unknown if it is excreted in breast milk.
Minocycline Pregnancy And Lactation Text: This medication is Pregnancy Category D and not consider safe during pregnancy. It is also excreted in breast milk.
Imiquimod Pregnancy And Lactation Text: This medication is Pregnancy Category C. It is unknown if this medication is excreted in breast milk.
Quinolones Counseling:  I discussed with the patient the risks of fluoroquinolones including but not limited to GI upset, allergic reaction, drug rash, diarrhea, dizziness, photosensitivity, yeast infections, liver function test abnormalities, tendonitis/tendon rupture.
Infliximab Pregnancy And Lactation Text: This medication is Pregnancy Category B and is considered safe during pregnancy. It is unknown if this medication is excreted in breast milk.
Finasteride Female Counseling: Finasteride Counseling:  I discussed with the patient the risks of use of finasteride including but not limited to decreased libido and sexual dysfunction. Explained the teratogenic nature of the medication and stressed the importance of not getting pregnant during treatment. All of the patient's questions and concerns were addressed.
Olanzapine Pregnancy And Lactation Text: This medication is pregnancy category C.   There are no adequate and well controlled trials with olanzapine in pregnant females.  Olanzapine should be used during pregnancy only if the potential benefit justifies the potential risk to the fetus.   In a study in lactating healthy women, olanzapine was excreted in breast milk.  It is recommended that women taking olanzapine should not breast feed.
Doxepin Counseling:  Patient advised that the medication is sedating and not to drive a car after taking this medication. Patient informed of potential adverse effects including but not limited to dry mouth, urinary retention, and blurry vision.  The patient verbalized understanding of the proper use and possible adverse effects of doxepin.  All of the patient's questions and concerns were addressed.
Zoryve Counseling:  I discussed with the patient that Zoryve is not for use in the eyes, mouth or vagina. The most commonly reported side effects include diarrhea, headache, insomnia, application site pain, upper respiratory tract infections, and urinary tract infections.  All of the patient's questions and concerns were addressed.
Propranolol Counseling:  I discussed with the patient the risks of propranolol including but not limited to low heart rate, low blood pressure, low blood sugar, restlessness and increased cold sensitivity. They should call the office if they experience any of these side effects.
Klisyri Counseling:  I discussed with the patient the risks of Klisyri including but not limited to erythema, scaling, itching, weeping, crusting, and pain.
Fluconazole Pregnancy And Lactation Text: This medication is Pregnancy Category C and it isn't know if it is safe during pregnancy. It is also excreted in breast milk.
Valtrex Pregnancy And Lactation Text: this medication is Pregnancy Category B and is considered safe during pregnancy. This medication is not directly found in breast milk but it's metabolite acyclovir is present.
Bexarotene Pregnancy And Lactation Text: This medication is Pregnancy Category X and should not be given to women who are pregnant or may become pregnant. This medication should not be used if you are breast feeding.
Azathioprine Counseling:  I discussed with the patient the risks of azathioprine including but not limited to myelosuppression, immunosuppression, hepatotoxicity, lymphoma, and infections.  The patient understands that monitoring is required including baseline LFTs, Creatinine, possible TPMP genotyping and weekly CBCs for the first month and then every 2 weeks thereafter.  The patient verbalized understanding of the proper use and possible adverse effects of azathioprine.  All of the patient's questions and concerns were addressed.
Rinvoq Counseling: I discussed with the patient the risks of Rinvoq therapy including but not limited to upper respiratory tract infections, shingles, cold sores, bronchitis, nausea, cough, fever, acne, and headache. Live vaccines should be avoided.  This medication has been linked to serious infections; higher rate of mortality; malignancy and lymphoproliferative disorders; major adverse cardiovascular events; thrombosis; thrombocytopenia, anemia, and neutropenia; lipid elevations; liver enzyme elevations; and gastrointestinal perforations.
Include Pregnancy/Lactation Warning?: No
Clofazimine Counseling:  I discussed with the patient the risks of clofazimine including but not limited to skin and eye pigmentation, liver damage, nausea/vomiting, gastrointestinal bleeding and allergy.
Azelaic Acid Pregnancy And Lactation Text: This medication is considered safe during pregnancy and breast feeding.
Rinvoq Pregnancy And Lactation Text: Based on animal studies, Rinvoq may cause embryo-fetal harm when administered to pregnant women.  The medication should not be used in pregnancy.  Breastfeeding is not recommended during treatment and for 6 days after the last dose.
Benzoyl Peroxide Counseling: Patient counseled that medicine may cause skin irritation and bleach clothing.  In the event of skin irritation, the patient was advised to reduce the amount of the drug applied or use it less frequently.   The patient verbalized understanding of the proper use and possible adverse effects of benzoyl peroxide.  All of the patient's questions and concerns were addressed.
Isotretinoin Counseling: Patient should get monthly blood tests, not donate blood, not drive at night if vision affected, not share medication, and not undergo elective surgery for 6 months after tx completed. Side effects reviewed, pt to contact office should one occur.
Azathioprine Pregnancy And Lactation Text: This medication is Pregnancy Category D and isn't considered safe during pregnancy. It is unknown if this medication is excreted in breast milk.
Prednisone Counseling:  I discussed with the patient the risks of prolonged use of prednisone including but not limited to weight gain, insomnia, osteoporosis, mood changes, diabetes, susceptibility to infection, glaucoma and high blood pressure.  In cases where prednisone use is prolonged, patients should be monitored with blood pressure checks, serum glucose levels and an eye exam.  Additionally, the patient may need to be placed on GI prophylaxis, PCP prophylaxis, and calcium and vitamin D supplementation and/or a bisphosphonate.  The patient verbalized understanding of the proper use and the possible adverse effects of prednisone.  All of the patient's questions and concerns were addressed.
Qbrexza Counseling:  I discussed with the patient the risks of Qbrexza including but not limited to headache, mydriasis, blurred vision, dry eyes, nasal dryness, dry mouth, dry throat, dry skin, urinary hesitation, and constipation.  Local skin reactions including erythema, burning, stinging, and itching can also occur.
Clindamycin Pregnancy And Lactation Text: This medication can be used in pregnancy if certain situations. Clindamycin is also present in breast milk.
Enbrel Counseling:  I discussed with the patient the risks of etanercept including but not limited to myelosuppression, immunosuppression, autoimmune hepatitis, demyelinating diseases, lymphoma, and infections.  The patient understands that monitoring is required including a PPD at baseline and must alert us or the primary physician if symptoms of infection or other concerning signs are noted.
Hydroxychloroquine Counseling:  I discussed with the patient that a baseline ophthalmologic exam is needed at the start of therapy and every year thereafter while on therapy. A CBC may also be warranted for monitoring.  The side effects of this medication were discussed with the patient, including but not limited to agranulocytosis, aplastic anemia, seizures, rashes, retinopathy, and liver toxicity. Patient instructed to call the office should any adverse effect occur.  The patient verbalized understanding of the proper use and possible adverse effects of Plaquenil.  All the patient's questions and concerns were addressed.
Topical Steroids Applications Pregnancy And Lactation Text: Most topical steroids are considered safe to use during pregnancy and lactation.  Any topical steroid applied to the breast or nipple should be washed off before breastfeeding.
Klisyri Pregnancy And Lactation Text: It is unknown if this medication can harm a developing fetus or if it is excreted in breast milk.
Griseofulvin Counseling:  I discussed with the patient the risks of griseofulvin including but not limited to photosensitivity, cytopenia, liver damage, nausea/vomiting and severe allergy.  The patient understands that this medication is best absorbed when taken with a fatty meal (e.g., ice cream or french fries).
Qbrexza Pregnancy And Lactation Text: There is no available data on Qbrexza use in pregnant women.  There is no available data on Qbrexza use in lactation.
Adbry Pregnancy And Lactation Text: It is unknown if this medication will adversely affect pregnancy or breast feeding.
Libtayo Counseling- I discussed with the patient the risks of Libtayo including but not limited to nausea, vomiting, diarrhea, and bone or muscle pain.  The patient verbalized understanding of the proper use and possible adverse effects of Libtayo.  All of the patient's questions and concerns were addressed.
Clofazimine Pregnancy And Lactation Text: This medication is Pregnancy Category C and isn't considered safe during pregnancy. It is excreted in breast milk.
Finasteride Pregnancy And Lactation Text: This medication is absolutely contraindicated during pregnancy. It is unknown if it is excreted in breast milk.
Tazorac Counseling:  Patient advised that medication is irritating and drying.  Patient may need to apply sparingly and wash off after an hour before eventually leaving it on overnight.  The patient verbalized understanding of the proper use and possible adverse effects of tazorac.  All of the patient's questions and concerns were addressed.
Rituxan Counseling:  I discussed with the patient the risks of Rituxan infusions. Side effects can include infusion reactions, severe drug rashes including mucocutaneous reactions, reactivation of latent hepatitis and other infections and rarely progressive multifocal leukoencephalopathy.  All of the patient's questions and concerns were addressed.
Doxepin Pregnancy And Lactation Text: This medication is Pregnancy Category C and it isn't known if it is safe during pregnancy. It is also excreted in breast milk and breast feeding isn't recommended.
Propranolol Pregnancy And Lactation Text: This medication is Pregnancy Category C and it isn't known if it is safe during pregnancy. It is excreted in breast milk.
Topical Sulfur Applications Counseling: Topical Sulfur Counseling: Patient counseled that this medication may cause skin irritation or allergic reactions.  In the event of skin irritation, the patient was advised to reduce the amount of the drug applied or use it less frequently.   The patient verbalized understanding of the proper use and possible adverse effects of topical sulfur application.  All of the patient's questions and concerns were addressed.
Taltz Counseling: I discussed with the patient the risks of ixekizumab including but not limited to immunosuppression, serious infections, worsening of inflammatory bowel disease and drug reactions.  The patient understands that monitoring is required including a PPD at baseline and must alert us or the primary physician if symptoms of infection or other concerning signs are noted.
Cellcept Counseling:  I discussed with the patient the risks of mycophenolate mofetil including but not limited to infection/immunosuppression, GI upset, hypokalemia, hypercholesterolemia, bone marrow suppression, lymphoproliferative disorders, malignancy, GI ulceration/bleed/perforation, colitis, interstitial lung disease, kidney failure, progressive multifocal leukoencephalopathy, and birth defects.  The patient understands that monitoring is required including a baseline creatinine and regular CBC testing. In addition, patient must alert us immediately if symptoms of infection or other concerning signs are noted.
Bimzelx Counseling:  I discussed with the patient the risks of Bimzelx including but not limited to depression, immunosuppression, allergic reactions and infections.  The patient understands that monitoring is required including a PPD at baseline and must alert us or the primary physician if symptoms of infection or other concerning signs are noted.
Libtayo Pregnancy And Lactation Text: This medication is contraindicated in pregnancy and when breast feeding.
Benzoyl Peroxide Pregnancy And Lactation Text: This medication is Pregnancy Category C. It is unknown if benzoyl peroxide is excreted in breast milk.
Colchicine Counseling:  Patient counseled regarding adverse effects including but not limited to stomach upset (nausea, vomiting, stomach pain, or diarrhea).  Patient instructed to limit alcohol consumption while taking this medication.  Colchicine may reduce blood counts especially with prolonged use.  The patient understands that monitoring of kidney function and blood counts may be required, especially at baseline. The patient verbalized understanding of the proper use and possible adverse effects of colchicine.  All of the patient's questions and concerns were addressed.
Tazorac Pregnancy And Lactation Text: This medication is not safe during pregnancy. It is unknown if this medication is excreted in breast milk.
Rituxan Pregnancy And Lactation Text: This medication is Pregnancy Category C and it isn't know if it is safe during pregnancy. It is unknown if this medication is excreted in breast milk but similar antibodies are known to be excreted.
Hydroxyzine Counseling: Patient advised that the medication is sedating and not to drive a car after taking this medication.  Patient informed of potential adverse effects including but not limited to dry mouth, urinary retention, and blurry vision.  The patient verbalized understanding of the proper use and possible adverse effects of hydroxyzine.  All of the patient's questions and concerns were addressed.
Hydroxychloroquine Pregnancy And Lactation Text: This medication has been shown to cause fetal harm but it isn't assigned a Pregnancy Risk Category. There are small amounts excreted in breast milk.
Prednisone Pregnancy And Lactation Text: This medication is Pregnancy Category C and it isn't know if it is safe during pregnancy. This medication is excreted in breast milk.
Doxycycline Counseling:  Patient counseled regarding possible photosensitivity and increased risk for sunburn.  Patient instructed to avoid sunlight, if possible.  When exposed to sunlight, patients should wear protective clothing, sunglasses, and sunscreen.  The patient was instructed to call the office immediately if the following severe adverse effects occur:  hearing changes, easy bruising/bleeding, severe headache, or vision changes.  The patient verbalized understanding of the proper use and possible adverse effects of doxycycline.  All of the patient's questions and concerns were addressed.
Isotretinoin Pregnancy And Lactation Text: This medication is Pregnancy Category X and is considered extremely dangerous during pregnancy. It is unknown if it is excreted in breast milk.
Elidel Counseling: Patient may experience a mild burning sensation during topical application. Elidel is not approved in children less than 2 years of age. There have been case reports of hematologic and skin malignancies in patients using topical calcineurin inhibitors although causality is questionable.
Sotyktu Counseling:  I discussed the most common side effects of Sotyktu including: common cold, sore throat, sinus infections, cold sores, canker sores, folliculitis, and acne.? I also discussed more serious side effects of Sotyktu including but not limited to: serious allergic reactions; increased risk for infections such as TB; cancers such as lymphomas; rhabdomyolysis and elevated CPK; and elevated triglycerides and liver enzymes.?
Rhofade Counseling: Rhofade is a topical medication which can decrease superficial blood flow where applied. Side effects are uncommon and include stinging, redness and allergic reactions.
Doxycycline Pregnancy And Lactation Text: This medication is Pregnancy Category D and not consider safe during pregnancy. It is also excreted in breast milk but is considered safe for shorter treatment courses.
Griseofulvin Pregnancy And Lactation Text: This medication is Pregnancy Category X and is known to cause serious birth defects. It is unknown if this medication is excreted in breast milk but breast feeding should be avoided.
Low Dose Naltrexone Counseling- I discussed with the patient the potential risks and side effects of low dose naltrexone including but not limited to: more vivid dreams, headaches, nausea, vomiting, abdominal pain, fatigue, dizziness, and anxiety.
Humira Counseling:  I discussed with the patient the risks of adalimumab including but not limited to myelosuppression, immunosuppression, autoimmune hepatitis, demyelinating diseases, lymphoma, and serious infections.  The patient understands that monitoring is required including a PPD at baseline and must alert us or the primary physician if symptoms of infection or other concerning signs are noted.
Rifampin Counseling: I discussed with the patient the risks of rifampin including but not limited to liver damage, kidney damage, red-orange body fluids, nausea/vomiting and severe allergy.
SSKI Counseling:  I discussed with the patient the risks of SSKI including but not limited to thyroid abnormalities, metallic taste, GI upset, fever, headache, acne, arthralgias, paraesthesias, lymphadenopathy, easy bleeding, arrhythmias, and allergic reaction.
Birth Control Pills Counseling: Birth Control Pill Counseling: I discussed with the patient the potential side effects of OCPs including but not limited to increased risk of stroke, heart attack, thrombophlebitis, deep venous thrombosis, hepatic adenomas, breast changes, GI upset, headaches, and depression.  The patient verbalized understanding of the proper use and possible adverse effects of OCPs. All of the patient's questions and concerns were addressed.
Minoxidil Counseling: Minoxidil is a topical medication which can increase blood flow where it is applied. It is uncertain how this medication increases hair growth. Side effects are uncommon and include stinging and allergic reactions.
Zyclara Counseling:  I discussed with the patient the risks of imiquimod including but not limited to erythema, scaling, itching, weeping, crusting, and pain.  Patient understands that the inflammatory response to imiquimod is variable from person to person and was educated regarded proper titration schedule.  If flu-like symptoms develop, patient knows to discontinue the medication and contact us.
Carac Counseling:  I discussed with the patient the risks of Carac including but not limited to erythema, scaling, itching, weeping, crusting, and pain.
Rifampin Pregnancy And Lactation Text: This medication is Pregnancy Category C and it isn't know if it is safe during pregnancy. It is also excreted in breast milk and should not be used if you are breast feeding.
Hydroxyzine Pregnancy And Lactation Text: This medication is not safe during pregnancy and should not be taken. It is also excreted in breast milk and breast feeding isn't recommended.
Cibinqo Counseling: I discussed with the patient the risks of Cibinqo therapy including but not limited to common cold, nausea, headache, cold sores, increased blood CPK levels, dizziness, UTIs, fatigue, acne, and vomitting. Live vaccines should be avoided.  This medication has been linked to serious infections; higher rate of mortality; malignancy and lymphoproliferative disorders; major adverse cardiovascular events; thrombosis; thrombocytopenia and lymphopenia; lipid elevations; and retinal detachment.
Topical Clindamycin Counseling: Patient counseled that this medication may cause skin irritation or allergic reactions.  In the event of skin irritation, the patient was advised to reduce the amount of the drug applied or use it less frequently.   The patient verbalized understanding of the proper use and possible adverse effects of clindamycin.  All of the patient's questions and concerns were addressed.
Sski Pregnancy And Lactation Text: This medication is Pregnancy Category D and isn't considered safe during pregnancy. It is excreted in breast milk.
Minoxidil Pregnancy And Lactation Text: This medication has not been assigned a Pregnancy Risk Category but animal studies failed to show danger with the topical medication. It is unknown if the medication is excreted in breast milk.
Siliq Counseling:  I discussed with the patient the risks of Siliq including but not limited to new or worsening depression, suicidal thoughts and behavior, immunosuppression, malignancy, posterior leukoencephalopathy syndrome, and serious infections.  The patient understands that monitoring is required including a PPD at baseline and must alert us or the primary physician if symptoms of infection or other concerning signs are noted. There is also a special program designed to monitor depression which is required with Siliq.
Birth Control Pills Pregnancy And Lactation Text: This medication should be avoided if pregnant and for the first 30 days post-partum.
High Dose Vitamin A Counseling: Side effects reviewed, pt to contact office should one occur.
Topical Sulfur Applications Pregnancy And Lactation Text: This medication is considered safe during pregnancy and breast feeding secondary to limited systemic absorption.
Sotyktu Pregnancy And Lactation Text: There is insufficient data to evaluate whether or not Sotyktu is safe to use during pregnancy.? ?It is not known if Sotyktu passes into breast milk and whether or not it is safe to use when breastfeeding.??
Mirvaso Pregnancy And Lactation Text: This medication has not been assigned a Pregnancy Risk Category. It is unknown if the medication is excreted in breast milk.
Bimzelx Pregnancy And Lactation Text: This medication crosses the placenta and the safety is uncertain during pregnancy. It is unknown if this medication is present in breast milk.
Azithromycin Counseling:  I discussed with the patient the risks of azithromycin including but not limited to GI upset, allergic reaction, drug rash, diarrhea, and yeast infections.
Odomzo Counseling- I discussed with the patient the risks of Odomzo including but not limited to nausea, vomiting, diarrhea, constipation, weight loss, changes in the sense of taste, decreased appetite, muscle spasms, and hair loss.  The patient verbalized understanding of the proper use and possible adverse effects of Odomzo.  All of the patient's questions and concerns were addressed.
Xeljanz Counseling: I discussed with the patient the risks of Xeljanz therapy including increased risk of infection, liver issues, headache, diarrhea, or cold symptoms. Live vaccines should be avoided. They were instructed to call if they have any problems.
Eucrisa Counseling: Patient may experience a mild burning sensation during topical application. Eucrisa is not approved in children less than 2 years of age.
Cyclophosphamide Counseling:  I discussed with the patient the risks of cyclophosphamide including but not limited to hair loss, hormonal abnormalities, decreased fertility, abdominal pain, diarrhea, nausea and vomiting, bone marrow suppression and infection. The patient understands that monitoring is required while taking this medication.
Wartpeel Counseling:  I discussed with the patient the risks of Wartpeel including but not limited to erythema, scaling, itching, weeping, crusting, and pain.
Opzelura Counseling:  I discussed with the patient the risks of Opzelura including but not limited to nasopharngitis, bronchitis, ear infection, eosinophila, hives, diarrhea, folliculitis, tonsillitis, and rhinorrhea.  Taken orally, this medication has been linked to serious infections; higher rate of mortality; malignancy and lymphoproliferative disorders; major adverse cardiovascular events; thrombosis; thrombocytopenia, anemia, and neutropenia; and lipid elevations.
High Dose Vitamin A Pregnancy And Lactation Text: High dose vitamin A therapy is contraindicated during pregnancy and breast feeding.
Cimzia Counseling:  I discussed with the patient the risks of Cimzia including but not limited to immunosuppression, allergic reactions and infections.  The patient understands that monitoring is required including a PPD at baseline and must alert us or the primary physician if symptoms of infection or other concerning signs are noted.
Tremfya Counseling: I discussed with the patient the risks of guselkumab including but not limited to immunosuppression, serious infections, and drug reactions.  The patient understands that monitoring is required including a PPD at baseline and must alert us or the primary physician if symptoms of infection or other concerning signs are noted.
Itraconazole Counseling:  I discussed with the patient the risks of itraconazole including but not limited to liver damage, nausea/vomiting, neuropathy, and severe allergy.  The patient understands that this medication is best absorbed when taken with acidic beverages such as non-diet cola or ginger ale.  The patient understands that monitoring is required including baseline LFTs and repeat LFTs at intervals.  The patient understands that they are to contact us or the primary physician if concerning signs are noted.
Erythromycin Counseling:  I discussed with the patient the risks of erythromycin including but not limited to GI upset, allergic reaction, drug rash, diarrhea, increase in liver enzymes, and yeast infections.
Low Dose Naltrexone Pregnancy And Lactation Text: Naltrexone is pregnancy category C.  There have been no adequate and well-controlled studies in pregnant women.  It should be used in pregnancy only if the potential benefit justifies the potential risk to the fetus.   Limited data indicates that naltrexone is minimally excreted into breastmilk.
Oral Minoxidil Counseling- I discussed with the patient the risks of oral minoxidil including but not limited to shortness of breath, swelling of the feet or ankles, dizziness, lightheadedness, unwanted hair growth and allergic reaction.  The patient verbalized understanding of the proper use and possible adverse effects of oral minoxidil.  All of the patient's questions and concerns were addressed.
Mirvaso Counseling: Mirvaso is a topical medication which can decrease superficial blood flow where applied. Side effects are uncommon and include stinging, redness and allergic reactions.
Hyrimoz Counseling:  I discussed with the patient the risks of adalimumab including but not limited to myelosuppression, immunosuppression, autoimmune hepatitis, demyelinating diseases, lymphoma, and serious infections.  The patient understands that monitoring is required including a PPD at baseline and must alert us or the primary physician if symptoms of infection or other concerning signs are noted.
Dapsone Counseling: I discussed with the patient the risks of dapsone including but not limited to hemolytic anemia, agranulocytosis, rashes, methemoglobinemia, kidney failure, peripheral neuropathy, headaches, GI upset, and liver toxicity.  Patients who start dapsone require monitoring including baseline LFTs and weekly CBCs for the first month, then every month thereafter.  The patient verbalized understanding of the proper use and possible adverse effects of dapsone.  All of the patient's questions and concerns were addressed.
Azithromycin Pregnancy And Lactation Text: This medication is considered safe during pregnancy and is also secreted in breast milk.
Thalidomide Counseling: I discussed with the patient the risks of thalidomide including but not limited to birth defects, anxiety, weakness, chest pain, dizziness, cough and severe allergy.
Sarecycline Counseling: Patient advised regarding possible photosensitivity and discoloration of the teeth, skin, lips, tongue and gums.  Patient instructed to avoid sunlight, if possible.  When exposed to sunlight, patients should wear protective clothing, sunglasses, and sunscreen.  The patient was instructed to call the office immediately if the following severe adverse effects occur:  hearing changes, easy bruising/bleeding, severe headache, or vision changes.  The patient verbalized understanding of the proper use and possible adverse effects of sarecycline.  All of the patient's questions and concerns were addressed.
Cibinqo Pregnancy And Lactation Text: It is unknown if this medication will adversely affect pregnancy or breast feeding.  You should not take this medication if you are currently pregnant or planning a pregnancy or while breastfeeding.
Albendazole Counseling:  I discussed with the patient the risks of albendazole including but not limited to cytopenia, kidney damage, nausea/vomiting and severe allergy.  The patient understands that this medication is being used in an off-label manner.
Opioid Counseling: I discussed with the patient the potential side effects of opioids including but not limited to addiction, altered mental status, and depression. I stressed avoiding alcohol, benzodiazepines, muscle relaxants and sleep aids unless specifically okayed by a physician. The patient verbalized understanding of the proper use and possible adverse effects of opioids. All of the patient's questions and concerns were addressed. They were instructed to flush the remaining pills down the toilet if they did not need them for pain.
Cimzia Pregnancy And Lactation Text: This medication crosses the placenta but can be considered safe in certain situations. Cimzia may be excreted in breast milk.
Dapsone Pregnancy And Lactation Text: This medication is Pregnancy Category C and is not considered safe during pregnancy or breast feeding.
Calcipotriene Counseling:  I discussed with the patient the risks of calcipotriene including but not limited to erythema, scaling, itching, and irritation.
Bactrim Counseling:  I discussed with the patient the risks of sulfa antibiotics including but not limited to GI upset, allergic reaction, drug rash, diarrhea, dizziness, photosensitivity, and yeast infections.  Rarely, more serious reactions can occur including but not limited to aplastic anemia, agranulocytosis, methemoglobinemia, blood dyscrasias, liver or kidney failure, lung infiltrates or desquamative/blistering drug rashes.
Opzelura Pregnancy And Lactation Text: There is insufficient data to evaluate drug-associated risk for major birth defects, miscarriage, or other adverse maternal or fetal outcomes.  There is a pregnancy registry that monitors pregnancy outcomes in pregnant persons exposed to the medication during pregnancy.  It is unknown if this medication is excreted in breast milk.  Do not breastfeed during treatment and for about 4 weeks after the last dose.
Cyclophosphamide Pregnancy And Lactation Text: This medication is Pregnancy Category D and it isn't considered safe during pregnancy. This medication is excreted in breast milk.
Niacinamide Counseling: I recommended taking niacin or niacinamide, also know as vitamin B3, twice daily. Recent evidence suggests that taking vitamin B3 (500 mg twice daily) can reduce the risk of actinic keratoses and non-melanoma skin cancers. Side effects of vitamin B3 include flushing and headache.
Erythromycin Pregnancy And Lactation Text: This medication is Pregnancy Category B and is considered safe during pregnancy. It is also excreted in breast milk.
Terbinafine Counseling: Patient counseling regarding adverse effects of terbinafine including but not limited to headache, diarrhea, rash, upset stomach, liver function test abnormalities, itching, taste/smell disturbance, nausea, abdominal pain, and flatulence.  There is a rare possibility of liver failure that can occur when taking terbinafine.  The patient understands that a baseline LFT and kidney function test may be required. The patient verbalized understanding of the proper use and possible adverse effects of terbinafine.  All of the patient's questions and concerns were addressed.
Solaraze Counseling:  I discussed with the patient the risks of Solaraze including but not limited to erythema, scaling, itching, weeping, crusting, and pain.
Oral Minoxidil Pregnancy And Lactation Text: This medication should only be used when clearly needed if you are pregnant, attempting to become pregnant or breast feeding.
Xeldamariz Pregnancy And Lactation Text: This medication is Pregnancy Category D and is not considered safe during pregnancy.  The risk during breast feeding is also uncertain.
Metronidazole Counseling:  I discussed with the patient the risks of metronidazole including but not limited to seizures, nausea/vomiting, a metallic taste in the mouth, nausea/vomiting and severe allergy.
Dutasteride Male Counseling: Dustasteride Counseling:  I discussed with the patient the risks of use of dutasteride including but not limited to decreased libido, decreased ejaculate volume, and gynecomastia. Women who can become pregnant should not handle medication.  All of the patient's questions and concerns were addressed.
Ketoconazole Counseling:   Patient counseled regarding improving absorption with orange juice.  Adverse effects include but are not limited to breast enlargement, headache, diarrhea, nausea, upset stomach, liver function test abnormalities, taste disturbance, and stomach pain.  There is a rare possibility of liver failure that can occur when taking ketoconazole. The patient understands that monitoring of LFTs may be required, especially at baseline. The patient verbalized understanding of the proper use and possible adverse effects of ketoconazole.  All of the patient's questions and concerns were addressed.
Hydroquinone Counseling:  Patient advised that medication may result in skin irritation, lightening (hypopigmentation), dryness, and burning.  In the event of skin irritation, the patient was advised to reduce the amount of the drug applied or use it less frequently.  Rarely, spots that are treated with hydroquinone can become darker (pseudoochronosis).  Should this occur, patient instructed to stop medication and call the office. The patient verbalized understanding of the proper use and possible adverse effects of hydroquinone.  All of the patient's questions and concerns were addressed.
Otezla Counseling: The side effects of Otezla were discussed with the patient, including but not limited to worsening or new depression, weight loss, diarrhea, nausea, upper respiratory tract infection, and headache. Patient instructed to call the office should any adverse effect occur.  The patient verbalized understanding of the proper use and possible adverse effects of Otezla.  All the patient's questions and concerns were addressed.
Calcipotriene Pregnancy And Lactation Text: The use of this medication during pregnancy or lactation is not recommended as there is insufficient data.
Solaraze Pregnancy And Lactation Text: This medication is Pregnancy Category B and is considered safe. There is some data to suggest avoiding during the third trimester. It is unknown if this medication is excreted in breast milk.
Litfulo Counseling: I discussed with the patient the risks of Litfulo therapy including but not limited to upper respiratory tract infections, shingles, cold sores, and nausea. Live vaccines should be avoided.  This medication has been linked to serious infections; higher rate of mortality; malignancy and lymphoproliferative disorders; major adverse cardiovascular events; thrombosis; gastrointestinal perforations; neutropenia; lymphopenia; anemia; liver enzyme elevations; and lipid elevations.
Topical Ketoconazole Counseling: Patient counseled that this medication may cause skin irritation or allergic reactions.  In the event of skin irritation, the patient was advised to reduce the amount of the drug applied or use it less frequently.   The patient verbalized understanding of the proper use and possible adverse effects of ketoconazole.  All of the patient's questions and concerns were addressed.
Simponi Counseling:  I discussed with the patient the risks of golimumab including but not limited to myelosuppression, immunosuppression, autoimmune hepatitis, demyelinating diseases, lymphoma, and serious infections.  The patient understands that monitoring is required including a PPD at baseline and must alert us or the primary physician if symptoms of infection or other concerning signs are noted.
Cantharidin Counseling:  I discussed with the patient the risks of Cantharidin including but not limited to pain, redness, burning, itching, and blistering.
Cyclosporine Counseling:  I discussed with the patient the risks of cyclosporine including but not limited to hypertension, gingival hyperplasia,myelosuppression, immunosuppression, liver damage, kidney damage, neurotoxicity, lymphoma, and serious infections. The patient understands that monitoring is required including baseline blood pressure, CBC, CMP, lipid panel and uric acid, and then 1-2 times monthly CMP and blood pressure.
Litfulo Pregnancy And Lactation Text: Based on animal studies, Lifulo may cause embryo-fetal harm when administered to pregnant women.  The medication should not be used in pregnancy.  Breastfeeding is not recommended during treatment.
Picato Counseling:  I discussed with the patient the risks of Picato including but not limited to erythema, scaling, itching, weeping, crusting, and pain.
Niacinamide Pregnancy And Lactation Text: These medications are considered safe during pregnancy.
Xolair Counseling:  Patient informed of potential adverse effects including but not limited to fever, muscle aches, rash and allergic reactions.  The patient verbalized understanding of the proper use and possible adverse effects of Xolair.  All of the patient's questions and concerns were addressed.
Winlevi Counseling:  I discussed with the patient the risks of topical clascoterone including but not limited to erythema, scaling, itching, and stinging. Patient voiced their understanding.
Cosentyx Counseling:  I discussed with the patient the risks of Cosentyx including but not limited to worsening of Crohn's disease, immunosuppression, allergic reactions and infections.  The patient understands that monitoring is required including a PPD at baseline and must alert us or the primary physician if symptoms of infection or other concerning signs are noted.
Bactrim Pregnancy And Lactation Text: This medication is Pregnancy Category D and is known to cause fetal risk.  It is also excreted in breast milk.
Opioid Pregnancy And Lactation Text: These medications can lead to premature delivery and should be avoided during pregnancy. These medications are also present in breast milk in small amounts.
Gabapentin Counseling: I discussed with the patient the risks of gabapentin including but not limited to dizziness, somnolence, fatigue and ataxia.
Dutasteride Female Counseling: Dutasteride Counseling:  I discussed with the patient the risks of use of dutasteride including but not limited to decreased libido and sexual dysfunction. Explained the teratogenic nature of the medication and stressed the importance of not getting pregnant during treatment. All of the patient's questions and concerns were addressed.
Soolantra Counseling: I discussed with the patients the risks of topial Soolantra. This is a medicine which decreases the number of mites and inflammation in the skin. You experience burning, stinging, eye irritation or allergic reactions.  Please call our office if you develop any problems from using this medication.
Ilumya Counseling: I discussed with the patient the risks of tildrakizumab including but not limited to immunosuppression, malignancy, posterior leukoencephalopathy syndrome, and serious infections.  The patient understands that monitoring is required including a PPD at baseline and must alert us or the primary physician if symptoms of infection or other concerning signs are noted.
Spironolactone Counseling: Patient advised regarding risks of diarrhea, abdominal pain, hyperkalemia, birth defects (for female patients), liver toxicity and renal toxicity. The patient may need blood work to monitor liver and kidney function and potassium levels while on therapy. The patient verbalized understanding of the proper use and possible adverse effects of spironolactone.  All of the patient's questions and concerns were addressed.
Nsaids Counseling: NSAID Counseling: I discussed with the patient that NSAIDs should be taken with food. Prolonged use of NSAIDs can result in the development of stomach ulcers.  Patient advised to stop taking NSAIDs if abdominal pain occurs.  The patient verbalized understanding of the proper use and possible adverse effects of NSAIDs.  All of the patient's questions and concerns were addressed.
Otezla Pregnancy And Lactation Text: This medication is Pregnancy Category C and it isn't known if it is safe during pregnancy. It is unknown if it is excreted in breast milk.
Winlevi Pregnancy And Lactation Text: This medication is considered safe during pregnancy and breastfeeding.
Ivermectin Counseling:  Patient instructed to take medication on an empty stomach with a full glass of water.  Patient informed of potential adverse effects including but not limited to nausea, diarrhea, dizziness, itching, and swelling of the extremities or lymph nodes.  The patient verbalized understanding of the proper use and possible adverse effects of ivermectin.  All of the patient's questions and concerns were addressed.
Metronidazole Pregnancy And Lactation Text: This medication is Pregnancy Category B and considered safe during pregnancy.  It is also excreted in breast milk.
Xolair Pregnancy And Lactation Text: This medication is Pregnancy Category B and is considered safe during pregnancy. This medication is excreted in breast milk.
Acitretin Counseling:  I discussed with the patient the risks of acitretin including but not limited to hair loss, dry lips/skin/eyes, liver damage, hyperlipidemia, depression/suicidal ideation, photosensitivity.  Serious rare side effects can include but are not limited to pancreatitis, pseudotumor cerebri, bony changes, clot formation/stroke/heart attack.  Patient understands that alcohol is contraindicated since it can result in liver toxicity and significantly prolong the elimination of the drug by many years.
Ketoconazole Pregnancy And Lactation Text: This medication is Pregnancy Category C and it isn't know if it is safe during pregnancy. It is also excreted in breast milk and breast feeding isn't recommended.
Tetracycline Counseling: Patient counseled regarding possible photosensitivity and increased risk for sunburn.  Patient instructed to avoid sunlight, if possible.  When exposed to sunlight, patients should wear protective clothing, sunglasses, and sunscreen.  The patient was instructed to call the office immediately if the following severe adverse effects occur:  hearing changes, easy bruising/bleeding, severe headache, or vision changes.  The patient verbalized understanding of the proper use and possible adverse effects of tetracycline.  All of the patient's questions and concerns were addressed. Patient understands to avoid pregnancy while on therapy due to potential birth defects.
Tranexamic Acid Counseling:  Patient advised of the small risk of bleeding problems with tranexamic acid. They were also instructed to call if they developed any nausea, vomiting or diarrhea. All of the patient's questions and concerns were addressed.
Cantharidin Pregnancy And Lactation Text: This medication has not been proven safe during pregnancy. It is unknown if this medication is excreted in breast milk.
Aklief counseling:  Patient advised to apply a pea-sized amount only at bedtime and wait 30 minutes after washing their face before applying.  If too drying, patient may add a non-comedogenic moisturizer.  The most commonly reported side effects including irritation, redness, scaling, dryness, stinging, burning, itching, and increased risk of sunburn.  The patient verbalized understanding of the proper use and possible adverse effects of retinoids.  All of the patient's questions and concerns were addressed.
Skyrizi Counseling: I discussed with the patient the risks of risankizumab-rzaa including but not limited to immunosuppression, and serious infections.  The patient understands that monitoring is required including a PPD at baseline and must alert us or the primary physician if symptoms of infection or other concerning signs are noted.
Arava Counseling:  Patient counseled regarding adverse effects of Arava including but not limited to nausea, vomiting, abnormalities in liver function tests. Patients may develop mouth sores, rash, diarrhea, and abnormalities in blood counts. The patient understands that monitoring is required including LFTs and blood counts.  There is a rare possibility of scarring of the liver and lung problems that can occur when taking methotrexate. Persistent nausea, loss of appetite, pale stools, dark urine, cough, and shortness of breath should be reported immediately. Patient advised to discontinue Arava treatment and consult with a physician prior to attempting conception. The patient will have to undergo a treatment to eliminate Arava from the body prior to conception.
Tranexamic Acid Pregnancy And Lactation Text: It is unknown if this medication is safe during pregnancy or breast feeding.
Aklief Pregnancy And Lactation Text: It is unknown if this medication is safe to use during pregnancy.  It is unknown if this medication is excreted in breast milk.  Breastfeeding women should use the topical cream on the smallest area of the skin for the shortest time needed while breastfeeding.  Do not apply to nipple and areola.
Acitretin Pregnancy And Lactation Text: This medication is Pregnancy Category X and should not be given to women who are pregnant or may become pregnant in the future. This medication is excreted in breast milk.
Cephalexin Counseling: I counseled the patient regarding use of cephalexin as an antibiotic for prophylactic and/or therapeutic purposes. Cephalexin (commonly prescribed under brand name Keflex) is a cephalosporin antibiotic which is active against numerous classes of bacteria, including most skin bacteria. Side effects may include nausea, diarrhea, gastrointestinal upset, rash, hives, yeast infections, and in rare cases, hepatitis, kidney disease, seizures, fever, confusion, neurologic symptoms, and others. Patients with severe allergies to penicillin medications are cautioned that there is about a 10% incidence of cross-reactivity with cephalosporins. When possible, patients with penicillin allergies should use alternatives to cephalosporins for antibiotic therapy.
5-Fu Counseling: 5-Fluorouracil Counseling:  I discussed with the patient the risks of 5-fluorouracil including but not limited to erythema, scaling, itching, weeping, crusting, and pain.
Topical Metronidazole Counseling: Metronidazole is a topical antibiotic medication. You may experience burning, stinging, redness, or allergic reactions.  Please call our office if you develop any problems from using this medication.
Olumiant Counseling: I discussed with the patient the risks of Olumiant therapy including but not limited to upper respiratory tract infections, shingles, cold sores, and nausea. Live vaccines should be avoided.  This medication has been linked to serious infections; higher rate of mortality; malignancy and lymphoproliferative disorders; major adverse cardiovascular events; thrombosis; gastrointestinal perforations; neutropenia; lymphopenia; anemia; liver enzyme elevations; and lipid elevations.

## 2024-07-02 NOTE — PROCEDURE: PRESCRIPTION MEDICATION MANAGEMENT
Samples Given: Rhofade- apply thin layer to face once daily prn
Detail Level: Zone
Render In Strict Bullet Format?: No
Continue Regimen: Double rosacea cream- apply thin layer to face once daily.

## 2024-07-02 NOTE — HPI: EVALUATION OF SKIN LESION(S)
What Type Of Note Output Would You Prefer (Optional)?: Standard Output
How Severe Are Your Spot(S)?: mild
Have Your Spot(S) Been Treated In The Past?: has not been treated
Hpi Title: Evaluation of Skin Lesions
Location: Right forearm
Year Removed: 2009

## 2024-08-30 NOTE — PROCEDURE: SUNSCREEN RECOMMENDATIONS
-- DO NOT REPLY / DO NOT REPLY ALL --  -- This inbox is not monitored. If this was sent to the wrong provider or department, reroute message to [facility] [employer] pool. --  -- Message is from [facility] ([facility]) --    General Patient Message: Patient is asking for a callback, she would like to know her blood type   Caller Information       Contact Date/Time Type Contact Phone/Fax    08/30/2024 12:45 PM CDT Phone (Incoming) Venita Newby (Self) [phone] (M)            Alternative phone number: no    Can a detailed message be left? Yes - [employer] Message   Patient has been advised the message will be addressed within 2-3 business days.                
Pt's blood type is B positive.  Pt verbalizes understanding.  
Detail Level: Detailed
General Sunscreen Counseling: I recommended a broad spectrum sunscreen with a SPF of 30 or higher.  I explained that SPF 30 sunscreens block approximately 97 percent of the sun's harmful rays.  Sunscreens should be applied at least 15 minutes prior to expected sun exposure and then every 2 hours after that as long as sun exposure continues. If swimming or exercising sunscreen should be reapplied every 45 minutes to an hour after getting wet or sweating.  One ounce, or the equivalent of a shot glass full of sunscreen, is adequate to protect the skin not covered by a bathing suit. I also recommended a lip balm with a sunscreen as well. Sun protective clothing can be used in lieu of sunscreen but must be worn the entire time you are exposed to the sun's rays.

## 2025-01-20 ENCOUNTER — APPOINTMENT (OUTPATIENT)
Dept: URBAN - METROPOLITAN AREA CLINIC 156 | Facility: CLINIC | Age: 75
Setting detail: DERMATOLOGY
End: 2025-01-20

## 2025-01-20 DIAGNOSIS — Z85.828 PERSONAL HISTORY OF OTHER MALIGNANT NEOPLASM OF SKIN: ICD-10-CM | Status: STABLE

## 2025-01-20 DIAGNOSIS — L57.8 OTHER SKIN CHANGES DUE TO CHRONIC EXPOSURE TO NONIONIZING RADIATION: ICD-10-CM | Status: INADEQUATELY CONTROLLED

## 2025-01-20 DIAGNOSIS — D18.0 HEMANGIOMA: ICD-10-CM | Status: STABLE

## 2025-01-20 DIAGNOSIS — L57.0 ACTINIC KERATOSIS: ICD-10-CM | Status: INADEQUATELY CONTROLLED

## 2025-01-20 DIAGNOSIS — L82.1 OTHER SEBORRHEIC KERATOSIS: ICD-10-CM | Status: STABLE

## 2025-01-20 DIAGNOSIS — Z85.820 PERSONAL HISTORY OF MALIGNANT MELANOMA OF SKIN: ICD-10-CM | Status: STABLE

## 2025-01-20 DIAGNOSIS — D22 MELANOCYTIC NEVI: ICD-10-CM | Status: STABLE

## 2025-01-20 DIAGNOSIS — L74.0 MILIARIA RUBRA: ICD-10-CM | Status: INADEQUATELY CONTROLLED

## 2025-01-20 PROBLEM — D18.01 HEMANGIOMA OF SKIN AND SUBCUTANEOUS TISSUE: Status: ACTIVE | Noted: 2025-01-20

## 2025-01-20 PROBLEM — D22.5 MELANOCYTIC NEVI OF TRUNK: Status: ACTIVE | Noted: 2025-01-20

## 2025-01-20 PROBLEM — L30.9 DERMATITIS, UNSPECIFIED: Status: ACTIVE | Noted: 2025-01-20

## 2025-01-20 PROBLEM — D22.39 MELANOCYTIC NEVI OF OTHER PARTS OF FACE: Status: ACTIVE | Noted: 2025-01-20

## 2025-01-20 PROBLEM — D22.72 MELANOCYTIC NEVI OF LEFT LOWER LIMB, INCLUDING HIP: Status: ACTIVE | Noted: 2025-01-20

## 2025-01-20 PROCEDURE — ? MEDICATION COUNSELING

## 2025-01-20 PROCEDURE — 99214 OFFICE O/P EST MOD 30 MIN: CPT

## 2025-01-20 PROCEDURE — G2211 COMPLEX E/M VISIT ADD ON: HCPCS

## 2025-01-20 PROCEDURE — ? PRESCRIPTION MEDICATION MANAGEMENT

## 2025-01-20 PROCEDURE — ? PRESCRIPTION

## 2025-01-20 PROCEDURE — ? COUNSELING

## 2025-01-20 PROCEDURE — ? VISIT COMPLEXITY

## 2025-01-20 RX ORDER — FLUOROURACIL 2 G/40G
CREAM TOPICAL BID
Qty: 40 | Refills: 0 | Status: ERX | COMMUNITY
Start: 2025-01-20

## 2025-01-20 RX ORDER — TRIAMCINOLONE ACETONIDE 1 MG/G
CREAM TOPICAL
Qty: 454 | Refills: 0 | Status: ERX | COMMUNITY
Start: 2025-01-20

## 2025-01-20 RX ADMIN — FLUOROURACIL: 2 CREAM TOPICAL at 00:00

## 2025-01-20 RX ADMIN — TRIAMCINOLONE ACETONIDE: 1 CREAM TOPICAL at 00:00

## 2025-01-20 ASSESSMENT — LOCATION DETAILED DESCRIPTION DERM
LOCATION DETAILED: LEFT PROXIMAL DORSAL FOREARM
LOCATION DETAILED: LEFT PROXIMAL POSTERIOR UPPER ARM
LOCATION DETAILED: LEFT PROXIMAL PRETIBIAL REGION
LOCATION DETAILED: NASAL DORSUM
LOCATION DETAILED: LEFT ANTERIOR PROXIMAL THIGH
LOCATION DETAILED: RIGHT MEDIAL SUPERIOR CHEST
LOCATION DETAILED: RIGHT PROXIMAL CALF
LOCATION DETAILED: SUPERIOR THORACIC SPINE
LOCATION DETAILED: RIGHT RIB CAGE
LOCATION DETAILED: RIGHT DISTAL DORSAL FOREARM
LOCATION DETAILED: INFERIOR LUMBAR SPINE
LOCATION DETAILED: RIGHT DISTAL POSTERIOR UPPER ARM
LOCATION DETAILED: GLABELLA
LOCATION DETAILED: RIGHT RADIAL VENTRAL WRIST
LOCATION DETAILED: RIGHT NASAL ALAR GROOVE
LOCATION DETAILED: LEFT LATERAL SUPERIOR CHEST
LOCATION DETAILED: RIGHT SUPERIOR HELIX
LOCATION DETAILED: RIGHT LATERAL EYEBROW
LOCATION DETAILED: RIGHT ANTERIOR PROXIMAL THIGH
LOCATION DETAILED: UPPER STERNUM
LOCATION DETAILED: LEFT PROXIMAL CALF
LOCATION DETAILED: LEFT ULNAR DORSAL HAND
LOCATION DETAILED: RIGHT PROXIMAL DORSAL FOREARM
LOCATION DETAILED: LEFT DISTAL DORSAL FOREARM
LOCATION DETAILED: LEFT MEDIAL FOREHEAD
LOCATION DETAILED: LEFT DISTAL CALF
LOCATION DETAILED: RIGHT RADIAL DORSAL HAND
LOCATION DETAILED: RIGHT PROXIMAL PRETIBIAL REGION
LOCATION DETAILED: LEFT SUPERIOR CRUS OF ANTIHELIX
LOCATION DETAILED: LEFT INFERIOR LATERAL MIDBACK
LOCATION DETAILED: RIGHT INFERIOR LATERAL MALAR CHEEK

## 2025-01-20 ASSESSMENT — LOCATION SIMPLE DESCRIPTION DERM
LOCATION SIMPLE: RIGHT POSTERIOR UPPER ARM
LOCATION SIMPLE: LEFT FOREARM
LOCATION SIMPLE: RIGHT THIGH
LOCATION SIMPLE: RIGHT EAR
LOCATION SIMPLE: RIGHT CALF
LOCATION SIMPLE: LOWER BACK
LOCATION SIMPLE: GLABELLA
LOCATION SIMPLE: RIGHT HAND
LOCATION SIMPLE: RIGHT NOSE
LOCATION SIMPLE: RIGHT FOREARM
LOCATION SIMPLE: ABDOMEN
LOCATION SIMPLE: LEFT EAR
LOCATION SIMPLE: CHEST
LOCATION SIMPLE: NOSE
LOCATION SIMPLE: LEFT FOREHEAD
LOCATION SIMPLE: LEFT CALF
LOCATION SIMPLE: LEFT HAND
LOCATION SIMPLE: LEFT POSTERIOR UPPER ARM
LOCATION SIMPLE: RIGHT EYEBROW
LOCATION SIMPLE: RIGHT ARM
LOCATION SIMPLE: RIGHT PRETIBIAL REGION
LOCATION SIMPLE: LEFT PRETIBIAL REGION
LOCATION SIMPLE: RIGHT CHEEK
LOCATION SIMPLE: LEFT THIGH
LOCATION SIMPLE: UPPER BACK
LOCATION SIMPLE: LEFT LOWER BACK

## 2025-01-20 ASSESSMENT — LOCATION ZONE DERM
LOCATION ZONE: HAND
LOCATION ZONE: NOSE
LOCATION ZONE: EAR
LOCATION ZONE: ARM
LOCATION ZONE: TRUNK
LOCATION ZONE: LEG
LOCATION ZONE: FACE

## 2025-01-20 NOTE — PROCEDURE: PRESCRIPTION MEDICATION MANAGEMENT
Detail Level: Simple
Initiate Treatment: Efudex 5 % topical cream BID\\nQuantity: 40.0 g\\nSig: apply thin layer to scaly areas on nose and wrist twice daily for 14 days. Avoid eyes and mouth. Avoid sun until healed.
Render In Strict Bullet Format?: No
Initiate Treatment: triamcinolone acetonide 0.1 % topical cream \\nQuantity: 454.0 g  Days Supply: 30\\nSig: Apply to irritated rash twice a day for 1 week then stop. Do not use for more than 1 week straight, break for 1 week between uses. Repeat regimen as needed.

## 2025-01-20 NOTE — HPI: EVALUATION OF SKIN LESION(S)
How Severe Are Your Spot(S)?: moderate
Have Your Spot(S) Been Treated In The Past?: has not been treated
Hpi Title: Evaluation of Skin Lesions
Location: Rt forearm
Year Removed: 2009

## 2025-01-20 NOTE — PROCEDURE: MEDICATION COUNSELING
Erythromycin Pregnancy And Lactation Text: This medication is Pregnancy Category B and is considered safe during pregnancy. It is also excreted in breast milk.
Adbry Pregnancy And Lactation Text: It is unknown if this medication will adversely affect pregnancy or breast feeding.
Picato Pregnancy And Lactation Text: This medication is Pregnancy Category C. It is unknown if this medication is excreted in breast milk.
Enbrel Counseling:  I discussed with the patient the risks of etanercept including but not limited to myelosuppression, immunosuppression, autoimmune hepatitis, demyelinating diseases, lymphoma, and infections.  The patient understands that monitoring is required including a PPD at baseline and must alert us or the primary physician if symptoms of infection or other concerning signs are noted.
Simponi Counseling:  I discussed with the patient the risks of golimumab including but not limited to myelosuppression, immunosuppression, autoimmune hepatitis, demyelinating diseases, lymphoma, and serious infections.  The patient understands that monitoring is required including a PPD at baseline and must alert us or the primary physician if symptoms of infection or other concerning signs are noted.
5-Fu Pregnancy And Lactation Text: This medication is Pregnancy Category X and contraindicated in pregnancy and in women who may become pregnant. It is unknown if this medication is excreted in breast milk.
Dapsone Pregnancy And Lactation Text: This medication is Pregnancy Category C and is not considered safe during pregnancy or breast feeding.
Winlevi Counseling:  I discussed with the patient the risks of topical clascoterone including but not limited to erythema, scaling, itching, and stinging. Patient voiced their understanding.
Thalidomide Counseling: I discussed with the patient the risks of thalidomide including but not limited to birth defects, anxiety, weakness, chest pain, dizziness, cough and severe allergy.
Topical Metronidazole Counseling: Metronidazole is a topical antibiotic medication. You may experience burning, stinging, redness, or allergic reactions.  Please call our office if you develop any problems from using this medication.
Imiquimod Counseling:  I discussed with the patient the risks of imiquimod including but not limited to erythema, scaling, itching, weeping, crusting, and pain.  Patient understands that the inflammatory response to imiquimod is variable from person to person and was educated regarded proper titration schedule.  If flu-like symptoms develop, patient knows to discontinue the medication and contact us.
Enbrel Pregnancy And Lactation Text: This medication is Pregnancy Category B and is considered safe during pregnancy. It is unknown if this medication is excreted in breast milk.
Opioid Pregnancy And Lactation Text: These medications can lead to premature delivery and should be avoided during pregnancy. These medications are also present in breast milk in small amounts.
Winlevi Pregnancy And Lactation Text: This medication is considered safe during pregnancy and breastfeeding.
Cibinqo Counseling: I discussed with the patient the risks of Cibinqo therapy including but not limited to common cold, nausea, headache, cold sores, increased blood CPK levels, dizziness, UTIs, fatigue, acne, and vomitting. Live vaccines should be avoided.  This medication has been linked to serious infections; higher rate of mortality; malignancy and lymphoproliferative disorders; major adverse cardiovascular events; thrombosis; thrombocytopenia and lymphopenia; lipid elevations; and retinal detachment.
Simponi Pregnancy And Lactation Text: The risk during pregnancy and breastfeeding is uncertain with this medication.
Protopic Counseling: Patient may experience a mild burning sensation during topical application. Protopic is not approved in children less than 2 years of age. There have been case reports of hematologic and skin malignancies in patients using topical calcineurin inhibitors although causality is questionable.
Thalidomide Pregnancy And Lactation Text: This medication is Pregnancy Category X and is absolutely contraindicated during pregnancy. It is unknown if it is excreted in breast milk.
Metronidazole Counseling:  I discussed with the patient the risks of metronidazole including but not limited to seizures, nausea/vomiting, a metallic taste in the mouth, nausea/vomiting and severe allergy.
Gabapentin Counseling: I discussed with the patient the risks of gabapentin including but not limited to dizziness, somnolence, fatigue and ataxia.
Cyclophosphamide Counseling:  I discussed with the patient the risks of cyclophosphamide including but not limited to hair loss, hormonal abnormalities, decreased fertility, abdominal pain, diarrhea, nausea and vomiting, bone marrow suppression and infection. The patient understands that monitoring is required while taking this medication.
Tranexamic Acid Counseling:  Patient advised of the small risk of bleeding problems with tranexamic acid. They were also instructed to call if they developed any nausea, vomiting or diarrhea. All of the patient's questions and concerns were addressed.
Metronidazole Pregnancy And Lactation Text: This medication is Pregnancy Category B and considered safe during pregnancy.  It is also excreted in breast milk.
Protopic Pregnancy And Lactation Text: This medication is Pregnancy Category C. It is unknown if this medication is excreted in breast milk when applied topically.
Skyrizi Counseling: I discussed with the patient the risks of risankizumab-rzaa including but not limited to immunosuppression, and serious infections.  The patient understands that monitoring is required including a PPD at baseline and must alert us or the primary physician if symptoms of infection or other concerning signs are noted.
Topical Metronidazole Pregnancy And Lactation Text: This medication is Pregnancy Category B and considered safe during pregnancy.  It is also considered safe to use while breastfeeding.
Humira Counseling:  I discussed with the patient the risks of adalimumab including but not limited to myelosuppression, immunosuppression, autoimmune hepatitis, demyelinating diseases, lymphoma, and serious infections.  The patient understands that monitoring is required including a PPD at baseline and must alert us or the primary physician if symptoms of infection or other concerning signs are noted.
Cibinqo Pregnancy And Lactation Text: It is unknown if this medication will adversely affect pregnancy or breast feeding.  You should not take this medication if you are currently pregnant or planning a pregnancy or while breastfeeding.
VTAMA Counseling: I discussed with the patient that VTAMA is not for use in the eyes, mouth or mouth. They should call the office if they develop any signs of allergic reactions to VTAMA. The patient verbalized understanding of the proper use and possible adverse effects of VTAMA.  All of the patient's questions and concerns were addressed.
Niacinamide Counseling: I recommended taking niacin or niacinamide, also know as vitamin B3, twice daily. Recent evidence suggests that taking vitamin B3 (500 mg twice daily) can reduce the risk of actinic keratoses and non-melanoma skin cancers. Side effects of vitamin B3 include flushing and headache.
Gabapentin Pregnancy And Lactation Text: This medication is Pregnancy Category C and isn't considered safe during pregnancy. It is excreted in breast milk.
Qbrexza Counseling:  I discussed with the patient the risks of Qbrexza including but not limited to headache, mydriasis, blurred vision, dry eyes, nasal dryness, dry mouth, dry throat, dry skin, urinary hesitation, and constipation.  Local skin reactions including erythema, burning, stinging, and itching can also occur.
Detail Level: Simple
Topical Steroids Counseling: I discussed with the patient that prolonged use of topical steroids can result in the increased appearance of superficial blood vessels (telangiectasias), lightening (hypopigmentation) and thinning of the skin (atrophy).  Patient understands to avoid using high potency steroids in skin folds, the groin or the face.  The patient verbalized understanding of the proper use and possible adverse effects of topical steroids.  All of the patient's questions and concerns were addressed.
Glycopyrrolate Counseling:  I discussed with the patient the risks of glycopyrrolate including but not limited to skin rash, drowsiness, dry mouth, difficulty urinating, and blurred vision.
Minocycline Counseling: Patient advised regarding possible photosensitivity and discoloration of the teeth, skin, lips, tongue and gums.  Patient instructed to avoid sunlight, if possible.  When exposed to sunlight, patients should wear protective clothing, sunglasses, and sunscreen.  The patient was instructed to call the office immediately if the following severe adverse effects occur:  hearing changes, easy bruising/bleeding, severe headache, or vision changes.  The patient verbalized understanding of the proper use and possible adverse effects of minocycline.  All of the patient's questions and concerns were addressed.
Tranexamic Acid Pregnancy And Lactation Text: It is unknown if this medication is safe during pregnancy or breast feeding.
Cyclophosphamide Pregnancy And Lactation Text: This medication is Pregnancy Category D and it isn't considered safe during pregnancy. This medication is excreted in breast milk.
Dutasteride Male Counseling: Dustasteride Counseling:  I discussed with the patient the risks of use of dutasteride including but not limited to decreased libido, decreased ejaculate volume, and gynecomastia. Women who can become pregnant should not handle medication.  All of the patient's questions and concerns were addressed.
Albendazole Counseling:  I discussed with the patient the risks of albendazole including but not limited to cytopenia, kidney damage, nausea/vomiting and severe allergy.  The patient understands that this medication is being used in an off-label manner.
Vtama Pregnancy And Lactation Text: It is unknown if this medication can cause problems during pregnancy and breastfeeding.
Cyclosporine Counseling:  I discussed with the patient the risks of cyclosporine including but not limited to hypertension, gingival hyperplasia,myelosuppression, immunosuppression, liver damage, kidney damage, neurotoxicity, lymphoma, and serious infections. The patient understands that monitoring is required including baseline blood pressure, CBC, CMP, lipid panel and uric acid, and then 1-2 times monthly CMP and blood pressure.
Topical Steroids Applications Pregnancy And Lactation Text: Most topical steroids are considered safe to use during pregnancy and lactation.  Any topical steroid applied to the breast or nipple should be washed off before breastfeeding.
Litfulo Counseling: I discussed with the patient the risks of Litfulo therapy including but not limited to upper respiratory tract infections, shingles, cold sores, and nausea. Live vaccines should be avoided.  This medication has been linked to serious infections; higher rate of mortality; malignancy and lymphoproliferative disorders; major adverse cardiovascular events; thrombosis; gastrointestinal perforations; neutropenia; lymphopenia; anemia; liver enzyme elevations; and lipid elevations.
Niacinamide Pregnancy And Lactation Text: These medications are considered safe during pregnancy.
Spevigo Counseling: I discussed with the patient the risks of Spevigo including but not limited to fatigue, nasuea, vomiting, headache, pruritus, urinary tract infection, an infusion related reactions.  The patient understands that monitoring is required including screening for tuberculosis at baseline and yearly screening thereafter while continuing Spevigo therapy. They should contact us if symptoms of infection or other concerning signs are noted.
Minocycline Pregnancy And Lactation Text: This medication is Pregnancy Category D and not consider safe during pregnancy. It is also excreted in breast milk.
Qbrexza Pregnancy And Lactation Text: There is no available data on Qbrexza use in pregnant women.  There is no available data on Qbrexza use in lactation.
Hyrimoz Counseling:  I discussed with the patient the risks of adalimumab including but not limited to myelosuppression, immunosuppression, autoimmune hepatitis, demyelinating diseases, lymphoma, and serious infections.  The patient understands that monitoring is required including a PPD at baseline and must alert us or the primary physician if symptoms of infection or other concerning signs are noted.
Ozempic Counseling: I reviewed the possible side effects including: thyroid tumors, kidney disease, gallbladder disease, abdominal pain, constipation, diarrhea, nausea, vomiting and pancreatitis. Do not take this medication if you have a history or family history of multiple endocrine neoplasia syndrome type 2. Side effects reviewed, pt to contact office should one occur.
Glycopyrrolate Pregnancy And Lactation Text: This medication is Pregnancy Category B and is considered safe during pregnancy. It is unknown if it is excreted breast milk.
Valtrex Counseling: I discussed with the patient the risks of valacyclovir including but not limited to kidney damage, nausea, vomiting and severe allergy.  The patient understands that if the infection seems to be worsening or is not improving, they are to call.
Dutasteride Female Counseling: Dutasteride Counseling:  I discussed with the patient the risks of use of dutasteride including but not limited to decreased libido and sexual dysfunction. Explained the teratogenic nature of the medication and stressed the importance of not getting pregnant during treatment. All of the patient's questions and concerns were addressed.
Include Pregnancy/Lactation Warning?: No
Dupixent Counseling: I discussed with the patient the risks of dupilumab including but not limited to eye infection and irritation, cold sores, injection site reactions, worsening of asthma, allergic reactions and increased risk of parasitic infection.  Live vaccines should be avoided while taking dupilumab. Dupilumab will also interact with certain medications such as warfarin and cyclosporine. The patient understands that monitoring is required and they must alert us or the primary physician if symptoms of infection or other concerning signs are noted.
Terbinafine Pregnancy And Lactation Text: This medication is Pregnancy Category B and is considered safe during pregnancy. It is also excreted in breast milk and breast feeding isn't recommended.
Tazorac Pregnancy And Lactation Text: This medication is not safe during pregnancy. It is unknown if this medication is excreted in breast milk.
Mirvaso Pregnancy And Lactation Text: This medication has not been assigned a Pregnancy Risk Category. It is unknown if the medication is excreted in breast milk.
Clindamycin Pregnancy And Lactation Text: This medication can be used in pregnancy if certain situations. Clindamycin is also present in breast milk.
Siliq Counseling:  I discussed with the patient the risks of Siliq including but not limited to new or worsening depression, suicidal thoughts and behavior, immunosuppression, malignancy, posterior leukoencephalopathy syndrome, and serious infections.  The patient understands that monitoring is required including a PPD at baseline and must alert us or the primary physician if symptoms of infection or other concerning signs are noted. There is also a special program designed to monitor depression which is required with Siliq.
Calcipotriene Counseling:  I discussed with the patient the risks of calcipotriene including but not limited to erythema, scaling, itching, and irritation.
Topical Sulfur Applications Counseling: Topical Sulfur Counseling: Patient counseled that this medication may cause skin irritation or allergic reactions.  In the event of skin irritation, the patient was advised to reduce the amount of the drug applied or use it less frequently.   The patient verbalized understanding of the proper use and possible adverse effects of topical sulfur application.  All of the patient's questions and concerns were addressed.
Propranolol Counseling:  I discussed with the patient the risks of propranolol including but not limited to low heart rate, low blood pressure, low blood sugar, restlessness and increased cold sensitivity. They should call the office if they experience any of these side effects.
Topical Clindamycin Counseling: Patient counseled that this medication may cause skin irritation or allergic reactions.  In the event of skin irritation, the patient was advised to reduce the amount of the drug applied or use it less frequently.   The patient verbalized understanding of the proper use and possible adverse effects of clindamycin.  All of the patient's questions and concerns were addressed.
Dupixent Pregnancy And Lactation Text: This medication likely crosses the placenta but the risk for the fetus is uncertain. This medication is excreted in breast milk.
Eucrisa Counseling: Patient may experience a mild burning sensation during topical application. Eucrisa is not approved in children less than 2 years of age.
Calcipotriene Pregnancy And Lactation Text: The use of this medication during pregnancy or lactation is not recommended as there is insufficient data.
Topical Sulfur Applications Pregnancy And Lactation Text: This medication is considered safe during pregnancy and breast feeding secondary to limited systemic absorption.
Opzelura Counseling:  I discussed with the patient the risks of Opzelura including but not limited to nasopharngitis, bronchitis, ear infection, eosinophila, hives, diarrhea, folliculitis, tonsillitis, and rhinorrhea.  Taken orally, this medication has been linked to serious infections; higher rate of mortality; malignancy and lymphoproliferative disorders; major adverse cardiovascular events; thrombosis; thrombocytopenia, anemia, and neutropenia; and lipid elevations.
Propranolol Pregnancy And Lactation Text: This medication is Pregnancy Category C and it isn't known if it is safe during pregnancy. It is excreted in breast milk.
Doxycycline Counseling:  Patient counseled regarding possible photosensitivity and increased risk for sunburn.  Patient instructed to avoid sunlight, if possible.  When exposed to sunlight, patients should wear protective clothing, sunglasses, and sunscreen.  The patient was instructed to call the office immediately if the following severe adverse effects occur:  hearing changes, easy bruising/bleeding, severe headache, or vision changes.  The patient verbalized understanding of the proper use and possible adverse effects of doxycycline.  All of the patient's questions and concerns were addressed.
Colchicine Counseling:  Patient counseled regarding adverse effects including but not limited to stomach upset (nausea, vomiting, stomach pain, or diarrhea).  Patient instructed to limit alcohol consumption while taking this medication.  Colchicine may reduce blood counts especially with prolonged use.  The patient understands that monitoring of kidney function and blood counts may be required, especially at baseline. The patient verbalized understanding of the proper use and possible adverse effects of colchicine.  All of the patient's questions and concerns were addressed.
SSKI Counseling:  I discussed with the patient the risks of SSKI including but not limited to thyroid abnormalities, metallic taste, GI upset, fever, headache, acne, arthralgias, paraesthesias, lymphadenopathy, easy bleeding, arrhythmias, and allergic reaction.
Doxycycline Pregnancy And Lactation Text: This medication is Pregnancy Category D and not consider safe during pregnancy. It is also excreted in breast milk but is considered safe for shorter treatment courses.
Topical Clindamycin Pregnancy And Lactation Text: This medication is Pregnancy Category B and is considered safe during pregnancy. It is unknown if it is excreted in breast milk.
Eucrisa Pregnancy And Lactation Text: This medication has not been assigned a Pregnancy Risk Category but animal studies failed to show danger with the topical medication. It is unknown if the medication is excreted in breast milk.
Simlandi Counseling:  I discussed with the patient the risks of adalimumab including but not limited to myelosuppression, immunosuppression, autoimmune hepatitis, demyelinating diseases, lymphoma, and serious infections.  The patient understands that monitoring is required including a PPD at baseline and must alert us or the primary physician if symptoms of infection or other concerning signs are noted.
Opzelura Pregnancy And Lactation Text: There is insufficient data to evaluate drug-associated risk for major birth defects, miscarriage, or other adverse maternal or fetal outcomes.  There is a pregnancy registry that monitors pregnancy outcomes in pregnant persons exposed to the medication during pregnancy.  It is unknown if this medication is excreted in breast milk.  Do not breastfeed during treatment and for about 4 weeks after the last dose.
Ebglyss Counseling: I discussed with the patient the risks of lebrikizumab including but not limited to eye inflammation and irritation, cold sores, injection site reactions, allergic reactions and increased risk of parasitic infection. The patient understands that monitoring is required and they must alert us or the primary physician if symptoms of infection or other concerning signs are noted.
Hydroquinone Counseling:  Patient advised that medication may result in skin irritation, lightening (hypopigmentation), dryness, and burning.  In the event of skin irritation, the patient was advised to reduce the amount of the drug applied or use it less frequently.  Rarely, spots that are treated with hydroquinone can become darker (pseudoochronosis).  Should this occur, patient instructed to stop medication and call the office. The patient verbalized understanding of the proper use and possible adverse effects of hydroquinone.  All of the patient's questions and concerns were addressed.
Wartpeel Counseling:  I discussed with the patient the risks of Wartpeel including but not limited to erythema, scaling, itching, weeping, crusting, and pain.
Cantharidin Counseling:  I discussed with the patient the risks of Cantharidin including but not limited to pain, redness, burning, itching, and blistering.
Erythromycin Counseling:  I discussed with the patient the risks of erythromycin including but not limited to GI upset, allergic reaction, drug rash, diarrhea, increase in liver enzymes, and yeast infections.
Picato Counseling:  I discussed with the patient the risks of Picato including but not limited to erythema, scaling, itching, weeping, crusting, and pain.
Adbry Counseling: I discussed with the patient the risks of tralokinumab including but not limited to eye infection and irritation, cold sores, injection site reactions, worsening of asthma, allergic reactions and increased risk of parasitic infection.  Live vaccines should be avoided while taking tralokinumab. The patient understands that monitoring is required and they must alert us or the primary physician if symptoms of infection or other concerning signs are noted.
Ebglyss Pregnancy And Lactation Text: This medication likely crosses the placenta but the risk for the fetus is uncertain. It is unknown if this medication is excreted in breast milk.
Topical Ketoconazole Counseling: Patient counseled that this medication may cause skin irritation or allergic reactions.  In the event of skin irritation, the patient was advised to reduce the amount of the drug applied or use it less frequently.   The patient verbalized understanding of the proper use and possible adverse effects of ketoconazole.  All of the patient's questions and concerns were addressed.
5-Fu Counseling: 5-Fluorouracil Counseling:  I discussed with the patient the risks of 5-fluorouracil including but not limited to erythema, scaling, itching, weeping, crusting, and pain.
Sski Pregnancy And Lactation Text: This medication is Pregnancy Category D and isn't considered safe during pregnancy. It is excreted in breast milk.
Dapsone Counseling: I discussed with the patient the risks of dapsone including but not limited to hemolytic anemia, agranulocytosis, rashes, methemoglobinemia, kidney failure, peripheral neuropathy, headaches, GI upset, and liver toxicity.  Patients who start dapsone require monitoring including baseline LFTs and weekly CBCs for the first month, then every month thereafter.  The patient verbalized understanding of the proper use and possible adverse effects of dapsone.  All of the patient's questions and concerns were addressed.
Opioid Counseling: I discussed with the patient the potential side effects of opioids including but not limited to addiction, altered mental status, and depression. I stressed avoiding alcohol, benzodiazepines, muscle relaxants and sleep aids unless specifically okayed by a physician. The patient verbalized understanding of the proper use and possible adverse effects of opioids. All of the patient's questions and concerns were addressed. They were instructed to flush the remaining pills down the toilet if they did not need them for pain.
Bactrim Counseling:  I discussed with the patient the risks of sulfa antibiotics including but not limited to GI upset, allergic reaction, drug rash, diarrhea, dizziness, photosensitivity, and yeast infections.  Rarely, more serious reactions can occur including but not limited to aplastic anemia, agranulocytosis, methemoglobinemia, blood dyscrasias, liver or kidney failure, lung infiltrates or desquamative/blistering drug rashes.
Birth Control Pills Counseling: Birth Control Pill Counseling: I discussed with the patient the potential side effects of OCPs including but not limited to increased risk of stroke, heart attack, thrombophlebitis, deep venous thrombosis, hepatic adenomas, breast changes, GI upset, headaches, and depression.  The patient verbalized understanding of the proper use and possible adverse effects of OCPs. All of the patient's questions and concerns were addressed.
Oral Minoxidil Pregnancy And Lactation Text: This medication should only be used when clearly needed if you are pregnant, attempting to become pregnant or breast feeding.
Soolantra Counseling: I discussed with the patients the risks of topial Soolantra. This is a medicine which decreases the number of mites and inflammation in the skin. You experience burning, stinging, eye irritation or allergic reactions.  Please call our office if you develop any problems from using this medication.
Bimzelx Pregnancy And Lactation Text: This medication crosses the placenta and the safety is uncertain during pregnancy. It is unknown if this medication is present in breast milk.
Sotyktu Counseling:  I discussed the most common side effects of Sotyktu including: common cold, sore throat, sinus infections, cold sores, canker sores, folliculitis, and acne.? I also discussed more serious side effects of Sotyktu including but not limited to: serious allergic reactions; increased risk for infections such as TB; cancers such as lymphomas; rhabdomyolysis and elevated CPK; and elevated triglycerides and liver enzymes.?
Prednisone Pregnancy And Lactation Text: This medication is Pregnancy Category C and it isn't know if it is safe during pregnancy. This medication is excreted in breast milk.
Klisyri Counseling:  I discussed with the patient the risks of Klisyri including but not limited to erythema, scaling, itching, weeping, crusting, and pain.
Semaglutide Pregnancy And Lactation Text: The fetal risk of this medication is unknown and taking while pregnant is not recommended. It is unknown if this medication is present in breast milk.
Sarecycline Counseling: Patient advised regarding possible photosensitivity and discoloration of the teeth, skin, lips, tongue and gums.  Patient instructed to avoid sunlight, if possible.  When exposed to sunlight, patients should wear protective clothing, sunglasses, and sunscreen.  The patient was instructed to call the office immediately if the following severe adverse effects occur:  hearing changes, easy bruising/bleeding, severe headache, or vision changes.  The patient verbalized understanding of the proper use and possible adverse effects of sarecycline.  All of the patient's questions and concerns were addressed.
Doxepin Pregnancy And Lactation Text: This medication is Pregnancy Category C and it isn't known if it is safe during pregnancy. It is also excreted in breast milk and breast feeding isn't recommended.
Wegovy Counseling: I reviewed the possible side effects including: thyroid tumors, kidney disease, gallbladder disease, abdominal pain, constipation, diarrhea, nausea, vomiting and pancreatitis. Do not take this medication if you have a history or family history of multiple endocrine neoplasia syndrome type 2. Side effects reviewed, pt to contact office should one occur.
Odomzo Counseling- I discussed with the patient the risks of Odomzo including but not limited to nausea, vomiting, diarrhea, constipation, weight loss, changes in the sense of taste, decreased appetite, muscle spasms, and hair loss.  The patient verbalized understanding of the proper use and possible adverse effects of Odomzo.  All of the patient's questions and concerns were addressed.
Birth Control Pills Pregnancy And Lactation Text: This medication should be avoided if pregnant and for the first 30 days post-partum.
Azelaic Acid Pregnancy And Lactation Text: This medication is considered safe during pregnancy and breast feeding.
Cimzia Counseling:  I discussed with the patient the risks of Cimzia including but not limited to immunosuppression, allergic reactions and infections.  The patient understands that monitoring is required including a PPD at baseline and must alert us or the primary physician if symptoms of infection or other concerning signs are noted.
Soolantra Pregnancy And Lactation Text: This medication is Pregnancy Category C. This medication is considered safe during breast feeding.
Itraconazole Pregnancy And Lactation Text: This medication is Pregnancy Category C and it isn't know if it is safe during pregnancy. It is also excreted in breast milk.
Isotretinoin Pregnancy And Lactation Text: This medication is Pregnancy Category X and is considered extremely dangerous during pregnancy. It is unknown if it is excreted in breast milk.
Klisyri Pregnancy And Lactation Text: It is unknown if this medication can harm a developing fetus or if it is excreted in breast milk.
Bactrim Pregnancy And Lactation Text: This medication is Pregnancy Category D and is known to cause fetal risk.  It is also excreted in breast milk.
Sotyktu Pregnancy And Lactation Text: There is insufficient data to evaluate whether or not Sotyktu is safe to use during pregnancy.? ?It is not known if Sotyktu passes into breast milk and whether or not it is safe to use when breastfeeding.??
Tremfya Counseling: I discussed with the patient the risks of guselkumab including but not limited to immunosuppression, serious infections, and drug reactions.  The patient understands that monitoring is required including a PPD at baseline and must alert us or the primary physician if symptoms of infection or other concerning signs are noted.
Benzoyl Peroxide Counseling: Patient counseled that medicine may cause skin irritation and bleach clothing.  In the event of skin irritation, the patient was advised to reduce the amount of the drug applied or use it less frequently.   The patient verbalized understanding of the proper use and possible adverse effects of benzoyl peroxide.  All of the patient's questions and concerns were addressed.
Nemluvio Counseling: I discussed with the patient the risks of nemolizumab including but not limited to headache, gastrointestinal complaints, nasopharyngitis, musculoskeletal complaints, injection site reactions, and allergic reactions. The patient understands that monitoring is required and they must alert us or the primary physician if any side effects are noted.
Hydroxyzine Counseling: Patient advised that the medication is sedating and not to drive a car after taking this medication.  Patient informed of potential adverse effects including but not limited to dry mouth, urinary retention, and blurry vision.  The patient verbalized understanding of the proper use and possible adverse effects of hydroxyzine.  All of the patient's questions and concerns were addressed.
Otezla Counseling: The side effects of Otezla were discussed with the patient, including but not limited to worsening or new depression, weight loss, diarrhea, nausea, upper respiratory tract infection, and headache. Patient instructed to call the office should any adverse effect occur.  The patient verbalized understanding of the proper use and possible adverse effects of Otezla.  All the patient's questions and concerns were addressed.
Topical Retinoid counseling:  Patient advised to apply a pea-sized amount only at bedtime and wait 30 minutes after washing their face before applying.  If too drying, patient may add a non-comedogenic moisturizer. The patient verbalized understanding of the proper use and possible adverse effects of retinoids.  All of the patient's questions and concerns were addressed.
Tetracycline Counseling: Patient counseled regarding possible photosensitivity and increased risk for sunburn.  Patient instructed to avoid sunlight, if possible.  When exposed to sunlight, patients should wear protective clothing, sunglasses, and sunscreen.  The patient was instructed to call the office immediately if the following severe adverse effects occur:  hearing changes, easy bruising/bleeding, severe headache, or vision changes.  The patient verbalized understanding of the proper use and possible adverse effects of tetracycline.  All of the patient's questions and concerns were addressed. Patient understands to avoid pregnancy while on therapy due to potential birth defects.
Spironolactone Counseling: Patient advised regarding risks of diarrhea, abdominal pain, hyperkalemia, birth defects (for female patients), liver toxicity and renal toxicity. The patient may need blood work to monitor liver and kidney function and potassium levels while on therapy. The patient verbalized understanding of the proper use and possible adverse effects of spironolactone.  All of the patient's questions and concerns were addressed.
Ketoconazole Counseling:   Patient counseled regarding improving absorption with orange juice.  Adverse effects include but are not limited to breast enlargement, headache, diarrhea, nausea, upset stomach, liver function test abnormalities, taste disturbance, and stomach pain.  There is a rare possibility of liver failure that can occur when taking ketoconazole. The patient understands that monitoring of LFTs may be required, especially at baseline. The patient verbalized understanding of the proper use and possible adverse effects of ketoconazole.  All of the patient's questions and concerns were addressed.
High Dose Vitamin A Counseling: Side effects reviewed, pt to contact office should one occur.
Nemluvio Pregnancy And Lactation Text: It is not known if Nemluvio causes fetal harm or is present in breast milk. Please proceed with caution if patients who are pregnant or breastfeeding.
Cimzia Pregnancy And Lactation Text: This medication crosses the placenta but can be considered safe in certain situations. Cimzia may be excreted in breast milk.
Drysol Counseling:  I discussed with the patient the risks of drysol/aluminum chloride including but not limited to skin rash, itching, irritation, burning.
Benzoyl Peroxide Pregnancy And Lactation Text: This medication is Pregnancy Category C. It is unknown if benzoyl peroxide is excreted in breast milk.
High Dose Vitamin A Pregnancy And Lactation Text: High dose vitamin A therapy is contraindicated during pregnancy and breast feeding.
Ketoconazole Pregnancy And Lactation Text: This medication is Pregnancy Category C and it isn't know if it is safe during pregnancy. It is also excreted in breast milk and breast feeding isn't recommended.
Minoxidil Counseling: Minoxidil is a topical medication which can increase blood flow where it is applied. It is uncertain how this medication increases hair growth. Side effects are uncommon and include stinging and allergic reactions.
Otezla Pregnancy And Lactation Text: This medication is Pregnancy Category C and it isn't known if it is safe during pregnancy. It is unknown if it is excreted in breast milk.
Hydroxyzine Pregnancy And Lactation Text: This medication is not safe during pregnancy and should not be taken. It is also excreted in breast milk and breast feeding isn't recommended.
Cephalexin Counseling: I counseled the patient regarding use of cephalexin as an antibiotic for prophylactic and/or therapeutic purposes. Cephalexin (commonly prescribed under brand name Keflex) is a cephalosporin antibiotic which is active against numerous classes of bacteria, including most skin bacteria. Side effects may include nausea, diarrhea, gastrointestinal upset, rash, hives, yeast infections, and in rare cases, hepatitis, kidney disease, seizures, fever, confusion, neurologic symptoms, and others. Patients with severe allergies to penicillin medications are cautioned that there is about a 10% incidence of cross-reactivity with cephalosporins. When possible, patients with penicillin allergies should use alternatives to cephalosporins for antibiotic therapy.
Xeljanz Counseling: I discussed with the patient the risks of Xeljanz therapy including increased risk of infection, liver issues, headache, diarrhea, or cold symptoms. Live vaccines should be avoided. They were instructed to call if they have any problems.
Arava Counseling:  Patient counseled regarding adverse effects of Arava including but not limited to nausea, vomiting, abnormalities in liver function tests. Patients may develop mouth sores, rash, diarrhea, and abnormalities in blood counts. The patient understands that monitoring is required including LFTs and blood counts.  There is a rare possibility of scarring of the liver and lung problems that can occur when taking methotrexate. Persistent nausea, loss of appetite, pale stools, dark urine, cough, and shortness of breath should be reported immediately. Patient advised to discontinue Arava treatment and consult with a physician prior to attempting conception. The patient will have to undergo a treatment to eliminate Arava from the body prior to conception.
Xolair Counseling:  Patient informed of potential adverse effects including but not limited to fever, muscle aches, rash and allergic reactions.  The patient verbalized understanding of the proper use and possible adverse effects of Xolair.  All of the patient's questions and concerns were addressed.
Zepbound Counseling: I reviewed the possible side effects including: thyroid tumors, kidney disease, gallbladder disease, abdominal pain, constipation, diarrhea, nausea, vomiting and pancreatitis. Do not take this medication if you have a history or family history of multiple endocrine neoplasia syndrome type 2. Side effects reviewed, pt to contact office should one occur.
Rituxan Counseling:  I discussed with the patient the risks of Rituxan infusions. Side effects can include infusion reactions, severe drug rashes including mucocutaneous reactions, reactivation of latent hepatitis and other infections and rarely progressive multifocal leukoencephalopathy.  All of the patient's questions and concerns were addressed.
Cephalexin Pregnancy And Lactation Text: This medication is Pregnancy Category B and considered safe during pregnancy.  It is also excreted in breast milk but can be used safely for shorter doses.
Xeldamariz Pregnancy And Lactation Text: This medication is Pregnancy Category D and is not considered safe during pregnancy.  The risk during breast feeding is also uncertain.
Spironolactone Pregnancy And Lactation Text: This medication can cause feminization of the male fetus and should be avoided during pregnancy. The active metabolite is also found in breast milk.
Cosentyx Counseling:  I discussed with the patient the risks of Cosentyx including but not limited to worsening of Crohn's disease, immunosuppression, allergic reactions and infections.  The patient understands that monitoring is required including a PPD at baseline and must alert us or the primary physician if symptoms of infection or other concerning signs are noted.
Terbinafine Counseling: Patient counseling regarding adverse effects of terbinafine including but not limited to headache, diarrhea, rash, upset stomach, liver function test abnormalities, itching, taste/smell disturbance, nausea, abdominal pain, and flatulence.  There is a rare possibility of liver failure that can occur when taking terbinafine.  The patient understands that a baseline LFT and kidney function test may be required. The patient verbalized understanding of the proper use and possible adverse effects of terbinafine.  All of the patient's questions and concerns were addressed.
Elidel Counseling: Patient may experience a mild burning sensation during topical application. Elidel is not approved in children less than 2 years of age. There have been case reports of hematologic and skin malignancies in patients using topical calcineurin inhibitors although causality is questionable.
Oxybutynin Counseling:  I discussed with the patient the risks of oxybutynin including but not limited to skin rash, drowsiness, dry mouth, difficulty urinating, and blurred vision.
Carac Counseling:  I discussed with the patient the risks of Carac including but not limited to erythema, scaling, itching, weeping, crusting, and pain.
Clindamycin Counseling: I counseled the patient regarding use of clindamycin as an antibiotic for prophylactic and/or therapeutic purposes. Clindamycin is active against numerous classes of bacteria, including skin bacteria. Side effects may include nausea, diarrhea, gastrointestinal upset, rash, hives, yeast infections, and in rare cases, colitis.
Rituxan Pregnancy And Lactation Text: This medication is Pregnancy Category C and it isn't know if it is safe during pregnancy. It is unknown if this medication is excreted in breast milk but similar antibodies are known to be excreted.
Tazorac Counseling:  Patient advised that medication is irritating and drying.  Patient may need to apply sparingly and wash off after an hour before eventually leaving it on overnight.  The patient verbalized understanding of the proper use and possible adverse effects of tazorac.  All of the patient's questions and concerns were addressed.
Mirvaso Counseling: Mirvaso is a topical medication which can decrease superficial blood flow where applied. Side effects are uncommon and include stinging, redness and allergic reactions.
Clofazimine Counseling:  I discussed with the patient the risks of clofazimine including but not limited to skin and eye pigmentation, liver damage, nausea/vomiting, gastrointestinal bleeding and allergy.
Xolair Pregnancy And Lactation Text: This medication is Pregnancy Category B and is considered safe during pregnancy. This medication is excreted in breast milk.
Azathioprine Counseling:  I discussed with the patient the risks of azathioprine including but not limited to myelosuppression, immunosuppression, hepatotoxicity, lymphoma, and infections.  The patient understands that monitoring is required including baseline LFTs, Creatinine, possible TPMP genotyping and weekly CBCs for the first month and then every 2 weeks thereafter.  The patient verbalized understanding of the proper use and possible adverse effects of azathioprine.  All of the patient's questions and concerns were addressed.
Fluconazole Counseling:  Patient counseled regarding adverse effects of fluconazole including but not limited to headache, diarrhea, nausea, upset stomach, liver function test abnormalities, taste disturbance, and stomach pain.  There is a rare possibility of liver failure that can occur when taking fluconazole.  The patient understands that monitoring of LFTs and kidney function test may be required, especially at baseline. The patient verbalized understanding of the proper use and possible adverse effects of fluconazole.  All of the patient's questions and concerns were addressed.
Acitretin Counseling:  I discussed with the patient the risks of acitretin including but not limited to hair loss, dry lips/skin/eyes, liver damage, hyperlipidemia, depression/suicidal ideation, photosensitivity.  Serious rare side effects can include but are not limited to pancreatitis, pseudotumor cerebri, bony changes, clot formation/stroke/heart attack.  Patient understands that alcohol is contraindicated since it can result in liver toxicity and significantly prolong the elimination of the drug by many years.
Zoryve Counseling:  I discussed with the patient that Zoryve is not for use in the eyes, mouth or vagina. The most commonly reported side effects include diarrhea, headache, insomnia, application site pain, upper respiratory tract infections, and urinary tract infections.  All of the patient's questions and concerns were addressed.
Nsaids Counseling: NSAID Counseling: I discussed with the patient that NSAIDs should be taken with food. Prolonged use of NSAIDs can result in the development of stomach ulcers.  Patient advised to stop taking NSAIDs if abdominal pain occurs.  The patient verbalized understanding of the proper use and possible adverse effects of NSAIDs.  All of the patient's questions and concerns were addressed.
Litfulo Pregnancy And Lactation Text: Based on animal studies, Lifulo may cause embryo-fetal harm when administered to pregnant women.  The medication should not be used in pregnancy.  Breastfeeding is not recommended during treatment.
Albendazole Pregnancy And Lactation Text: This medication is Pregnancy Category C and it isn't known if it is safe during pregnancy. It is also excreted in breast milk.
Hydroxychloroquine Counseling:  I discussed with the patient that a baseline ophthalmologic exam is needed at the start of therapy and every year thereafter while on therapy. A CBC may also be warranted for monitoring.  The side effects of this medication were discussed with the patient, including but not limited to agranulocytosis, aplastic anemia, seizures, rashes, retinopathy, and liver toxicity. Patient instructed to call the office should any adverse effect occur.  The patient verbalized understanding of the proper use and possible adverse effects of Plaquenil.  All the patient's questions and concerns were addressed.
Nsaids Pregnancy And Lactation Text: These medications are considered safe up to 30 weeks gestation. It is excreted in breast milk.
Dutasteride Pregnancy And Lactation Text: This medication is absolutely contraindicated in women, especially during pregnancy and breast feeding. Feminization of male fetuses is possible if taking while pregnant.
Spevigo Pregnancy And Lactation Text: The risk during pregnancy and breastfeeding is uncertain with this medication. This medication does cross the placenta. It is unknown if this medication is found in breast milk.
Rhofade Counseling: Rhofade is a topical medication which can decrease superficial blood flow where applied. Side effects are uncommon and include stinging, redness and allergic reactions.
Olumiant Counseling: I discussed with the patient the risks of Olumiant therapy including but not limited to upper respiratory tract infections, shingles, cold sores, and nausea. Live vaccines should be avoided.  This medication has been linked to serious infections; higher rate of mortality; malignancy and lymphoproliferative disorders; major adverse cardiovascular events; thrombosis; gastrointestinal perforations; neutropenia; lymphopenia; anemia; liver enzyme elevations; and lipid elevations.
Valtrex Pregnancy And Lactation Text: this medication is Pregnancy Category B and is considered safe during pregnancy. This medication is not directly found in breast milk but it's metabolite acyclovir is present.
Quinolones Counseling:  I discussed with the patient the risks of fluoroquinolones including but not limited to GI upset, allergic reaction, drug rash, diarrhea, dizziness, photosensitivity, yeast infections, liver function test abnormalities, tendonitis/tendon rupture.
Methotrexate Counseling:  Patient counseled regarding adverse effects of methotrexate including but not limited to nausea, vomiting, abnormalities in liver function tests. Patients may develop mouth sores, rash, diarrhea, and abnormalities in blood counts. The patient understands that monitoring is required including LFT's and blood counts.  There is a rare possibility of scarring of the liver and lung problems that can occur when taking methotrexate. Persistent nausea, loss of appetite, pale stools, dark urine, cough, and shortness of breath should be reported immediately. Patient advised to discontinue methotrexate treatment at least three months before attempting to become pregnant.  I discussed the need for folate supplements while taking methotrexate.  These supplements can decrease side effects during methotrexate treatment. The patient verbalized understanding of the proper use and possible adverse effects of methotrexate.  All of the patient's questions and concerns were addressed.
Saxenda Counseling: I reviewed the possible side effects including: thyroid tumors, kidney disease, gallbladder disease, abdominal pain, constipation, diarrhea, nausea, vomiting and pancreatitis. Do not take this medication if you have a history or family history of multiple endocrine neoplasia syndrome type 2. Side effects reviewed, pt to contact office should one occur.
Erivedge Counseling- I discussed with the patient the risks of Erivedge including but not limited to nausea, vomiting, diarrhea, constipation, weight loss, changes in the sense of taste, decreased appetite, muscle spasms, and hair loss.  The patient verbalized understanding of the proper use and possible adverse effects of Erivedge.  All of the patient's questions and concerns were addressed.
Finasteride Male Counseling: Finasteride Counseling:  I discussed with the patient the risks of use of finasteride including but not limited to decreased libido, decreased ejaculate volume, gynecomastia, and depression. Women should not handle medication.  All of the patient's questions and concerns were addressed.
Azathioprine Pregnancy And Lactation Text: This medication is Pregnancy Category D and isn't considered safe during pregnancy. It is unknown if this medication is excreted in breast milk.
Stelara Counseling:  I discussed with the patient the risks of ustekinumab including but not limited to immunosuppression, malignancy, posterior leukoencephalopathy syndrome, and serious infections.  The patient understands that monitoring is required including a PPD at baseline and must alert us or the primary physician if symptoms of infection or other concerning signs are noted.
Ivermectin Counseling:  Patient instructed to take medication on an empty stomach with a full glass of water.  Patient informed of potential adverse effects including but not limited to nausea, diarrhea, dizziness, itching, and swelling of the extremities or lymph nodes.  The patient verbalized understanding of the proper use and possible adverse effects of ivermectin.  All of the patient's questions and concerns were addressed.
Ilumya Counseling: I discussed with the patient the risks of tildrakizumab including but not limited to immunosuppression, malignancy, posterior leukoencephalopathy syndrome, and serious infections.  The patient understands that monitoring is required including a PPD at baseline and must alert us or the primary physician if symptoms of infection or other concerning signs are noted.
Acitretin Pregnancy And Lactation Text: This medication is Pregnancy Category X and should not be given to women who are pregnant or may become pregnant in the future. This medication is excreted in breast milk.
Zyclara Counseling:  I discussed with the patient the risks of imiquimod including but not limited to erythema, scaling, itching, weeping, crusting, and pain.  Patient understands that the inflammatory response to imiquimod is variable from person to person and was educated regarded proper titration schedule.  If flu-like symptoms develop, patient knows to discontinue the medication and contact us.
Olumiant Pregnancy And Lactation Text: Based on animal studies, Olumiant may cause embryo-fetal harm when administered to pregnant women.  The medication should not be used in pregnancy.  Breastfeeding is not recommended during treatment.
Griseofulvin Counseling:  I discussed with the patient the risks of griseofulvin including but not limited to photosensitivity, cytopenia, liver damage, nausea/vomiting and severe allergy.  The patient understands that this medication is best absorbed when taken with a fatty meal (e.g., ice cream or french fries).
Cellcept Counseling:  I discussed with the patient the risks of mycophenolate mofetil including but not limited to infection/immunosuppression, GI upset, hypokalemia, hypercholesterolemia, bone marrow suppression, lymphoproliferative disorders, malignancy, GI ulceration/bleed/perforation, colitis, interstitial lung disease, kidney failure, progressive multifocal leukoencephalopathy, and birth defects.  The patient understands that monitoring is required including a baseline creatinine and regular CBC testing. In addition, patient must alert us immediately if symptoms of infection or other concerning signs are noted.
Aklief counseling:  Patient advised to apply a pea-sized amount only at bedtime and wait 30 minutes after washing their face before applying.  If too drying, patient may add a non-comedogenic moisturizer.  The most commonly reported side effects including irritation, redness, scaling, dryness, stinging, burning, itching, and increased risk of sunburn.  The patient verbalized understanding of the proper use and possible adverse effects of retinoids.  All of the patient's questions and concerns were addressed.
Cimetidine Counseling:  I discussed with the patient the risks of Cimetidine including but not limited to gynecomastia, headache, diarrhea, nausea, drowsiness, arrhythmias, pancreatitis, skin rashes, psychosis, bone marrow suppression and kidney toxicity.
Hydroxychloroquine Pregnancy And Lactation Text: This medication has been shown to cause fetal harm but it isn't assigned a Pregnancy Risk Category. There are small amounts excreted in breast milk.
Olanzapine Counseling- I discussed with the patient the common side effects of olanzapine including but are not limited to: lack of energy, dry mouth, increased appetite, sleepiness, tremor, constipation, dizziness, changes in behavior, or restlessness.  Explained that teenagers are more likely to experience headaches, abdominal pain, pain in the arms or legs, tiredness, and sleepiness.  Serious side effects include but are not limited: increased risk of death in elderly patients who are confused, have memory loss, or dementia-related psychosis; hyperglycemia; increased cholesterol and triglycerides; and weight gain.
Solaraze Counseling:  I discussed with the patient the risks of Solaraze including but not limited to erythema, scaling, itching, weeping, crusting, and pain.
Low Dose Naltrexone Counseling- I discussed with the patient the potential risks and side effects of low dose naltrexone including but not limited to: more vivid dreams, headaches, nausea, vomiting, abdominal pain, fatigue, dizziness, and anxiety.
Rifampin Counseling: I discussed with the patient the risks of rifampin including but not limited to liver damage, kidney damage, red-orange body fluids, nausea/vomiting and severe allergy.
Olanzapine Pregnancy And Lactation Text: This medication is pregnancy category C.   There are no adequate and well controlled trials with olanzapine in pregnant females.  Olanzapine should be used during pregnancy only if the potential benefit justifies the potential risk to the fetus.   In a study in lactating healthy women, olanzapine was excreted in breast milk.  It is recommended that women taking olanzapine should not breast feed.
Finasteride Female Counseling: Finasteride Counseling:  I discussed with the patient the risks of use of finasteride including but not limited to decreased libido and sexual dysfunction. Explained the teratogenic nature of the medication and stressed the importance of not getting pregnant during treatment. All of the patient's questions and concerns were addressed.
Bexarotene Counseling:  I discussed with the patient the risks of bexarotene including but not limited to hair loss, dry lips/skin/eyes, liver abnormalities, hyperlipidemia, pancreatitis, depression/suicidal ideation, photosensitivity, drug rash/allergic reactions, hypothyroidism, anemia, leukopenia, infection, cataracts, and teratogenicity.  Patient understands that they will need regular blood tests to check lipid profile, liver function tests, white blood cell count, thyroid function tests and pregnancy test if applicable.
Methotrexate Pregnancy And Lactation Text: This medication is Pregnancy Category X and is known to cause fetal harm. This medication is excreted in breast milk.
Aklief Pregnancy And Lactation Text: It is unknown if this medication is safe to use during pregnancy.  It is unknown if this medication is excreted in breast milk.  Breastfeeding women should use the topical cream on the smallest area of the skin for the shortest time needed while breastfeeding.  Do not apply to nipple and areola.
Bexarotene Pregnancy And Lactation Text: This medication is Pregnancy Category X and should not be given to women who are pregnant or may become pregnant. This medication should not be used if you are breast feeding.
Griseofulvin Pregnancy And Lactation Text: This medication is Pregnancy Category X and is known to cause serious birth defects. It is unknown if this medication is excreted in breast milk but breast feeding should be avoided.
Azithromycin Counseling:  I discussed with the patient the risks of azithromycin including but not limited to GI upset, allergic reaction, drug rash, diarrhea, and yeast infections.
Libtayo Counseling- I discussed with the patient the risks of Libtayo including but not limited to nausea, vomiting, diarrhea, and bone or muscle pain.  The patient verbalized understanding of the proper use and possible adverse effects of Libtayo.  All of the patient's questions and concerns were addressed.
Rinvoq Counseling: I discussed with the patient the risks of Rinvoq therapy including but not limited to upper respiratory tract infections, shingles, cold sores, bronchitis, nausea, cough, fever, acne, and headache. Live vaccines should be avoided.  This medication has been linked to serious infections; higher rate of mortality; malignancy and lymphoproliferative disorders; major adverse cardiovascular events; thrombosis; thrombocytopenia, anemia, and neutropenia; lipid elevations; liver enzyme elevations; and gastrointestinal perforations.
Low Dose Naltrexone Pregnancy And Lactation Text: Naltrexone is pregnancy category C.  There have been no adequate and well-controlled studies in pregnant women.  It should be used in pregnancy only if the potential benefit justifies the potential risk to the fetus.   Limited data indicates that naltrexone is minimally excreted into breastmilk.
Taltz Counseling: I discussed with the patient the risks of ixekizumab including but not limited to immunosuppression, serious infections, worsening of inflammatory bowel disease and drug reactions.  The patient understands that monitoring is required including a PPD at baseline and must alert us or the primary physician if symptoms of infection or other concerning signs are noted.
Rifampin Pregnancy And Lactation Text: This medication is Pregnancy Category C and it isn't know if it is safe during pregnancy. It is also excreted in breast milk and should not be used if you are breast feeding.
Infliximab Counseling:  I discussed with the patient the risks of infliximab including but not limited to myelosuppression, immunosuppression, autoimmune hepatitis, demyelinating diseases, lymphoma, and serious infections.  The patient understands that monitoring is required including a PPD at baseline and must alert us or the primary physician if symptoms of infection or other concerning signs are noted.
Semaglutide Counseling: I reviewed the possible side effects including: thyroid tumors, kidney disease, gallbladder disease, abdominal pain, constipation, diarrhea, nausea, vomiting and pancreatitis. Do not take this medication if you have a history or family history of multiple endocrine neoplasia syndrome type 2. Side effects reviewed, pt to contact office should one occur.
Bimzelx Counseling:  I discussed with the patient the risks of Bimzelx including but not limited to depression, immunosuppression, allergic reactions and infections.  The patient understands that monitoring is required including a PPD at baseline and must alert us or the primary physician if symptoms of infection or other concerning signs are noted.
Azithromycin Pregnancy And Lactation Text: This medication is considered safe during pregnancy and is also secreted in breast milk.
Solaraze Pregnancy And Lactation Text: This medication is Pregnancy Category B and is considered safe. There is some data to suggest avoiding during the third trimester. It is unknown if this medication is excreted in breast milk.
Cantharidin Pregnancy And Lactation Text: This medication has not been proven safe during pregnancy. It is unknown if this medication is excreted in breast milk.
Finasteride Pregnancy And Lactation Text: This medication is absolutely contraindicated during pregnancy. It is unknown if it is excreted in breast milk.
Prednisone Counseling:  I discussed with the patient the risks of prolonged use of prednisone including but not limited to weight gain, insomnia, osteoporosis, mood changes, diabetes, susceptibility to infection, glaucoma and high blood pressure.  In cases where prednisone use is prolonged, patients should be monitored with blood pressure checks, serum glucose levels and an eye exam.  Additionally, the patient may need to be placed on GI prophylaxis, PCP prophylaxis, and calcium and vitamin D supplementation and/or a bisphosphonate.  The patient verbalized understanding of the proper use and the possible adverse effects of prednisone.  All of the patient's questions and concerns were addressed.
Doxepin Counseling:  Patient advised that the medication is sedating and not to drive a car after taking this medication. Patient informed of potential adverse effects including but not limited to dry mouth, urinary retention, and blurry vision.  The patient verbalized understanding of the proper use and possible adverse effects of doxepin.  All of the patient's questions and concerns were addressed.
Itraconazole Counseling:  I discussed with the patient the risks of itraconazole including but not limited to liver damage, nausea/vomiting, neuropathy, and severe allergy.  The patient understands that this medication is best absorbed when taken with acidic beverages such as non-diet cola or ginger ale.  The patient understands that monitoring is required including baseline LFTs and repeat LFTs at intervals.  The patient understands that they are to contact us or the primary physician if concerning signs are noted.
Libtayo Pregnancy And Lactation Text: This medication is contraindicated in pregnancy and when breast feeding.
Isotretinoin Counseling: Patient should get monthly blood tests, not donate blood, not drive at night if vision affected, not share medication, and not undergo elective surgery for 6 months after tx completed. Side effects reviewed, pt to contact office should one occur.
Oral Minoxidil Counseling- I discussed with the patient the risks of oral minoxidil including but not limited to shortness of breath, swelling of the feet or ankles, dizziness, lightheadedness, unwanted hair growth and allergic reaction.  The patient verbalized understanding of the proper use and possible adverse effects of oral minoxidil.  All of the patient's questions and concerns were addressed.
Azelaic Acid Counseling: Patient counseled that medicine may cause skin irritation and to avoid applying near the eyes.  In the event of skin irritation, the patient was advised to reduce the amount of the drug applied or use it less frequently.   The patient verbalized understanding of the proper use and possible adverse effects of azelaic acid.  All of the patient's questions and concerns were addressed.
Rinvoq Pregnancy And Lactation Text: Based on animal studies, Rinvoq may cause embryo-fetal harm when administered to pregnant women.  The medication should not be used in pregnancy.  Breastfeeding is not recommended during treatment and for 6 days after the last dose.

## 2025-04-21 ENCOUNTER — APPOINTMENT (OUTPATIENT)
Age: 75
Setting detail: DERMATOLOGY
End: 2025-04-21

## 2025-04-21 DIAGNOSIS — Z85.820 PERSONAL HISTORY OF MALIGNANT MELANOMA OF SKIN: ICD-10-CM | Status: STABLE

## 2025-04-21 DIAGNOSIS — L57.0 ACTINIC KERATOSIS: ICD-10-CM | Status: IMPROVED

## 2025-04-21 DIAGNOSIS — Z85.828 PERSONAL HISTORY OF OTHER MALIGNANT NEOPLASM OF SKIN: ICD-10-CM | Status: STABLE

## 2025-04-21 PROCEDURE — ? COUNSELING

## 2025-04-21 PROCEDURE — 99213 OFFICE O/P EST LOW 20 MIN: CPT

## 2025-04-21 PROCEDURE — ? MEDICATION COUNSELING

## 2025-04-21 PROCEDURE — ? ADDITIONAL NOTES

## 2025-04-21 ASSESSMENT — LOCATION SIMPLE DESCRIPTION DERM
LOCATION SIMPLE: GLABELLA
LOCATION SIMPLE: RIGHT FOREARM
LOCATION SIMPLE: RIGHT EYEBROW
LOCATION SIMPLE: NOSE
LOCATION SIMPLE: SCALP
LOCATION SIMPLE: RIGHT FOREHEAD
LOCATION SIMPLE: LEFT FOREHEAD

## 2025-04-21 ASSESSMENT — LOCATION DETAILED DESCRIPTION DERM
LOCATION DETAILED: NASAL DORSUM
LOCATION DETAILED: RIGHT PROXIMAL DORSAL FOREARM
LOCATION DETAILED: LEFT SUPERIOR PARIETAL SCALP
LOCATION DETAILED: RIGHT FOREHEAD
LOCATION DETAILED: RIGHT LATERAL EYEBROW
LOCATION DETAILED: GLABELLA
LOCATION DETAILED: LEFT FOREHEAD

## 2025-04-21 ASSESSMENT — LOCATION ZONE DERM
LOCATION ZONE: SCALP
LOCATION ZONE: NOSE
LOCATION ZONE: ARM
LOCATION ZONE: FACE

## 2025-04-21 NOTE — PROCEDURE: MEDICATION COUNSELING
Qbrexza Pregnancy And Lactation Text: There is no available data on Qbrexza use in pregnant women.  There is no available data on Qbrexza use in lactation.
Terbinafine Pregnancy And Lactation Text: This medication is Pregnancy Category B and is considered safe during pregnancy. It is also excreted in breast milk and breast feeding isn't recommended.
Carac Counseling:  I discussed with the patient the risks of Carac including but not limited to erythema, scaling, itching, weeping, crusting, and pain.
Topical Steroids Applications Pregnancy And Lactation Text: Most topical steroids are considered safe to use during pregnancy and lactation.  Any topical steroid applied to the breast or nipple should be washed off before breastfeeding.
Rituxan Counseling:  I discussed with the patient the risks of Rituxan infusions. Side effects can include infusion reactions, severe drug rashes including mucocutaneous reactions, reactivation of latent hepatitis and other infections and rarely progressive multifocal leukoencephalopathy.  All of the patient's questions and concerns were addressed.
Cosentyx Pregnancy And Lactation Text: This medication is Pregnancy Category B and is considered safe during pregnancy. It is unknown if this medication is excreted in breast milk.
Oxybutynin Pregnancy And Lactation Text: This medication is Pregnancy Category B and is considered safe during pregnancy. It is unknown if it is excreted in breast milk.
Clindamycin Pregnancy And Lactation Text: This medication can be used in pregnancy if certain situations. Clindamycin is also present in breast milk.
Minoxidil Counseling: Minoxidil is a topical medication which can increase blood flow where it is applied. It is uncertain how this medication increases hair growth. Side effects are uncommon and include stinging and allergic reactions.
Clofazimine Counseling:  I discussed with the patient the risks of clofazimine including but not limited to skin and eye pigmentation, liver damage, nausea/vomiting, gastrointestinal bleeding and allergy.
Xolair Pregnancy And Lactation Text: This medication is Pregnancy Category B and is considered safe during pregnancy. This medication is excreted in breast milk.
Topical Sulfur Applications Counseling: Topical Sulfur Counseling: Patient counseled that this medication may cause skin irritation or allergic reactions.  In the event of skin irritation, the patient was advised to reduce the amount of the drug applied or use it less frequently.   The patient verbalized understanding of the proper use and possible adverse effects of topical sulfur application.  All of the patient's questions and concerns were addressed.
Ozempic Counseling: I reviewed the possible side effects including: thyroid tumors, kidney disease, gallbladder disease, abdominal pain, constipation, diarrhea, nausea, vomiting and pancreatitis. Do not take this medication if you have a history or family history of multiple endocrine neoplasia syndrome type 2. Side effects reviewed, pt to contact office should one occur.
Clofazimine Pregnancy And Lactation Text: This medication is Pregnancy Category C and isn't considered safe during pregnancy. It is excreted in breast milk.
Rhofade Counseling: Rhofade is a topical medication which can decrease superficial blood flow where applied. Side effects are uncommon and include stinging, redness and allergic reactions.
Rituxan Pregnancy And Lactation Text: This medication is Pregnancy Category C and it isn't know if it is safe during pregnancy. It is unknown if this medication is excreted in breast milk but similar antibodies are known to be excreted.
Carac Pregnancy And Lactation Text: This medication is Pregnancy Category X and contraindicated in pregnancy and in women who may become pregnant. It is unknown if this medication is excreted in breast milk.
Dupixent Counseling: I discussed with the patient the risks of dupilumab including but not limited to eye infection and irritation, cold sores, injection site reactions, worsening of asthma, allergic reactions and increased risk of parasitic infection.  Live vaccines should be avoided while taking dupilumab. Dupilumab will also interact with certain medications such as warfarin and cyclosporine. The patient understands that monitoring is required and they must alert us or the primary physician if symptoms of infection or other concerning signs are noted.
Calcipotriene Counseling:  I discussed with the patient the risks of calcipotriene including but not limited to erythema, scaling, itching, and irritation.
Doxycycline Counseling:  Patient counseled regarding possible photosensitivity and increased risk for sunburn.  Patient instructed to avoid sunlight, if possible.  When exposed to sunlight, patients should wear protective clothing, sunglasses, and sunscreen.  The patient was instructed to call the office immediately if the following severe adverse effects occur:  hearing changes, easy bruising/bleeding, severe headache, or vision changes.  The patient verbalized understanding of the proper use and possible adverse effects of doxycycline.  All of the patient's questions and concerns were addressed.
Propranolol Counseling:  I discussed with the patient the risks of propranolol including but not limited to low heart rate, low blood pressure, low blood sugar, restlessness and increased cold sensitivity. They should call the office if they experience any of these side effects.
Acitretin Counseling:  I discussed with the patient the risks of acitretin including but not limited to hair loss, dry lips/skin/eyes, liver damage, hyperlipidemia, depression/suicidal ideation, photosensitivity.  Serious rare side effects can include but are not limited to pancreatitis, pseudotumor cerebri, bony changes, clot formation/stroke/heart attack.  Patient understands that alcohol is contraindicated since it can result in liver toxicity and significantly prolong the elimination of the drug by many years.
Rhofade Pregnancy And Lactation Text: This medication has not been assigned a Pregnancy Risk Category. It is unknown if the medication is excreted in breast milk.
Colchicine Counseling:  Patient counseled regarding adverse effects including but not limited to stomach upset (nausea, vomiting, stomach pain, or diarrhea).  Patient instructed to limit alcohol consumption while taking this medication.  Colchicine may reduce blood counts especially with prolonged use.  The patient understands that monitoring of kidney function and blood counts may be required, especially at baseline. The patient verbalized understanding of the proper use and possible adverse effects of colchicine.  All of the patient's questions and concerns were addressed.
Siliq Counseling:  I discussed with the patient the risks of Siliq including but not limited to new or worsening depression, suicidal thoughts and behavior, immunosuppression, malignancy, posterior leukoencephalopathy syndrome, and serious infections.  The patient understands that monitoring is required including a PPD at baseline and must alert us or the primary physician if symptoms of infection or other concerning signs are noted. There is also a special program designed to monitor depression which is required with Siliq.
Topical Sulfur Applications Pregnancy And Lactation Text: This medication is considered safe during pregnancy and breast feeding secondary to limited systemic absorption.
Oxempic Pregnancy And Lactation Text: The fetal risk of this medication is unknown and taking while pregnant is not recommended. It is unknown if this medication is present in breast milk.
Acitretin Pregnancy And Lactation Text: This medication is Pregnancy Category X and should not be given to women who are pregnant or may become pregnant in the future. This medication is excreted in breast milk.
Siliq Pregnancy And Lactation Text: The risk during pregnancy and breastfeeding is uncertain with this medication.
Dupixent Pregnancy And Lactation Text: This medication likely crosses the placenta but the risk for the fetus is uncertain. This medication is excreted in breast milk.
Calcipotriene Pregnancy And Lactation Text: The use of this medication during pregnancy or lactation is not recommended as there is insufficient data.
Doxycycline Pregnancy And Lactation Text: This medication is Pregnancy Category D and not consider safe during pregnancy. It is also excreted in breast milk but is considered safe for shorter treatment courses.
Propranolol Pregnancy And Lactation Text: This medication is Pregnancy Category C and it isn't known if it is safe during pregnancy. It is excreted in breast milk.
Erythromycin Counseling:  I discussed with the patient the risks of erythromycin including but not limited to GI upset, allergic reaction, drug rash, diarrhea, increase in liver enzymes, and yeast infections.
SSKI Counseling:  I discussed with the patient the risks of SSKI including but not limited to thyroid abnormalities, metallic taste, GI upset, fever, headache, acne, arthralgias, paraesthesias, lymphadenopathy, easy bleeding, arrhythmias, and allergic reaction.
Saxenda Counseling: I reviewed the possible side effects including: thyroid tumors, kidney disease, gallbladder disease, abdominal pain, constipation, diarrhea, nausea, vomiting and pancreatitis. Do not take this medication if you have a history or family history of multiple endocrine neoplasia syndrome type 2. Side effects reviewed, pt to contact office should one occur.
Wartpeel Counseling:  I discussed with the patient the risks of Wartpeel including but not limited to erythema, scaling, itching, weeping, crusting, and pain.
Solaraze Counseling:  I discussed with the patient the risks of Solaraze including but not limited to erythema, scaling, itching, weeping, crusting, and pain.
Solaraze Pregnancy And Lactation Text: This medication is Pregnancy Category B and is considered safe. There is some data to suggest avoiding during the third trimester. It is unknown if this medication is excreted in breast milk.
Bexarotene Counseling:  I discussed with the patient the risks of bexarotene including but not limited to hair loss, dry lips/skin/eyes, liver abnormalities, hyperlipidemia, pancreatitis, depression/suicidal ideation, photosensitivity, drug rash/allergic reactions, hypothyroidism, anemia, leukopenia, infection, cataracts, and teratogenicity.  Patient understands that they will need regular blood tests to check lipid profile, liver function tests, white blood cell count, thyroid function tests and pregnancy test if applicable.
Cantharidin Counseling:  I discussed with the patient the risks of Cantharidin including but not limited to pain, redness, burning, itching, and blistering.
Simlandi Counseling:  I discussed with the patient the risks of adalimumab including but not limited to myelosuppression, immunosuppression, autoimmune hepatitis, demyelinating diseases, lymphoma, and serious infections.  The patient understands that monitoring is required including a PPD at baseline and must alert us or the primary physician if symptoms of infection or other concerning signs are noted.
Ebglyss Counseling: I discussed with the patient the risks of lebrikizumab including but not limited to eye inflammation and irritation, cold sores, injection site reactions, allergic reactions and increased risk of parasitic infection. The patient understands that monitoring is required and they must alert us or the primary physician if symptoms of infection or other concerning signs are noted.
Bactrim Counseling:  I discussed with the patient the risks of sulfa antibiotics including but not limited to GI upset, allergic reaction, drug rash, diarrhea, dizziness, photosensitivity, and yeast infections.  Rarely, more serious reactions can occur including but not limited to aplastic anemia, agranulocytosis, methemoglobinemia, blood dyscrasias, liver or kidney failure, lung infiltrates or desquamative/blistering drug rashes.
Oral Minoxidil Counseling- I discussed with the patient the risks of oral minoxidil including but not limited to shortness of breath, swelling of the feet or ankles, dizziness, lightheadedness, unwanted hair growth and allergic reaction.  The patient verbalized understanding of the proper use and possible adverse effects of oral minoxidil.  All of the patient's questions and concerns were addressed.
Doxepin Counseling:  Patient advised that the medication is sedating and not to drive a car after taking this medication. Patient informed of potential adverse effects including but not limited to dry mouth, urinary retention, and blurry vision.  The patient verbalized understanding of the proper use and possible adverse effects of doxepin.  All of the patient's questions and concerns were addressed.
Prednisone Counseling:  I discussed with the patient the risks of prolonged use of prednisone including but not limited to weight gain, insomnia, osteoporosis, mood changes, diabetes, susceptibility to infection, glaucoma and high blood pressure.  In cases where prednisone use is prolonged, patients should be monitored with blood pressure checks, serum glucose levels and an eye exam.  Additionally, the patient may need to be placed on GI prophylaxis, PCP prophylaxis, and calcium and vitamin D supplementation and/or a bisphosphonate.  The patient verbalized understanding of the proper use and the possible adverse effects of prednisone.  All of the patient's questions and concerns were addressed.
Low Dose Naltrexone Pregnancy And Lactation Text: Naltrexone is pregnancy category C.  There have been no adequate and well-controlled studies in pregnant women.  It should be used in pregnancy only if the potential benefit justifies the potential risk to the fetus.   Limited data indicates that naltrexone is minimally excreted into breastmilk.
Hydroquinone Pregnancy And Lactation Text: This medication has not been assigned a Pregnancy Risk Category but animal studies failed to show danger with the topical medication. It is unknown if the medication is excreted in breast milk.
Topical Ketoconazole Counseling: Patient counseled that this medication may cause skin irritation or allergic reactions.  In the event of skin irritation, the patient was advised to reduce the amount of the drug applied or use it less frequently.   The patient verbalized understanding of the proper use and possible adverse effects of ketoconazole.  All of the patient's questions and concerns were addressed.
Cantharidin Pregnancy And Lactation Text: This medication has not been proven safe during pregnancy. It is unknown if this medication is excreted in breast milk.
Finasteride Pregnancy And Lactation Text: This medication is absolutely contraindicated during pregnancy. It is unknown if it is excreted in breast milk.
Sarecycline Counseling: Patient advised regarding possible photosensitivity and discoloration of the teeth, skin, lips, tongue and gums.  Patient instructed to avoid sunlight, if possible.  When exposed to sunlight, patients should wear protective clothing, sunglasses, and sunscreen.  The patient was instructed to call the office immediately if the following severe adverse effects occur:  hearing changes, easy bruising/bleeding, severe headache, or vision changes.  The patient verbalized understanding of the proper use and possible adverse effects of sarecycline.  All of the patient's questions and concerns were addressed.
Picato Counseling:  I discussed with the patient the risks of Picato including but not limited to erythema, scaling, itching, weeping, crusting, and pain.
Prednisone Pregnancy And Lactation Text: This medication is Pregnancy Category C and it isn't know if it is safe during pregnancy. This medication is excreted in breast milk.
Sarecycline Pregnancy And Lactation Text: This medication is Pregnancy Category D and not consider safe during pregnancy. It is also excreted in breast milk.
Picato Pregnancy And Lactation Text: This medication is Pregnancy Category C. It is unknown if this medication is excreted in breast milk.
Azelaic Acid Counseling: Patient counseled that medicine may cause skin irritation and to avoid applying near the eyes.  In the event of skin irritation, the patient was advised to reduce the amount of the drug applied or use it less frequently.   The patient verbalized understanding of the proper use and possible adverse effects of azelaic acid.  All of the patient's questions and concerns were addressed.
Libtayo Pregnancy And Lactation Text: This medication is contraindicated in pregnancy and when breast feeding.
Cyclophosphamide Counseling:  I discussed with the patient the risks of cyclophosphamide including but not limited to hair loss, hormonal abnormalities, decreased fertility, abdominal pain, diarrhea, nausea and vomiting, bone marrow suppression and infection. The patient understands that monitoring is required while taking this medication.
Infliximab Counseling:  I discussed with the patient the risks of infliximab including but not limited to myelosuppression, immunosuppression, autoimmune hepatitis, demyelinating diseases, lymphoma, and serious infections.  The patient understands that monitoring is required including a PPD at baseline and must alert us or the primary physician if symptoms of infection or other concerning signs are noted.
Rinvoq Pregnancy And Lactation Text: Based on animal studies, Rinvoq may cause embryo-fetal harm when administered to pregnant women.  The medication should not be used in pregnancy.  Breastfeeding is not recommended during treatment and for 6 days after the last dose.
Taltz Counseling: I discussed with the patient the risks of ixekizumab including but not limited to immunosuppression, serious infections, worsening of inflammatory bowel disease and drug reactions.  The patient understands that monitoring is required including a PPD at baseline and must alert us or the primary physician if symptoms of infection or other concerning signs are noted.
Itraconazole Pregnancy And Lactation Text: This medication is Pregnancy Category C and it isn't know if it is safe during pregnancy. It is also excreted in breast milk.
Ketoconazole Counseling:   Patient counseled regarding improving absorption with orange juice.  Adverse effects include but are not limited to breast enlargement, headache, diarrhea, nausea, upset stomach, liver function test abnormalities, taste disturbance, and stomach pain.  There is a rare possibility of liver failure that can occur when taking ketoconazole. The patient understands that monitoring of LFTs may be required, especially at baseline. The patient verbalized understanding of the proper use and possible adverse effects of ketoconazole.  All of the patient's questions and concerns were addressed.
Odomzo Counseling- I discussed with the patient the risks of Odomzo including but not limited to nausea, vomiting, diarrhea, constipation, weight loss, changes in the sense of taste, decreased appetite, muscle spasms, and hair loss.  The patient verbalized understanding of the proper use and possible adverse effects of Odomzo.  All of the patient's questions and concerns were addressed.
Xeljanz Counseling: I discussed with the patient the risks of Xeljanz therapy including increased risk of infection, liver issues, headache, diarrhea, or cold symptoms. Live vaccines should be avoided. They were instructed to call if they have any problems.
Bactrim Pregnancy And Lactation Text: This medication is Pregnancy Category D and is known to cause fetal risk.  It is also excreted in breast milk.
Oral Minoxidil Pregnancy And Lactation Text: This medication should only be used when clearly needed if you are pregnant, attempting to become pregnant or breast feeding.
Doxepin Pregnancy And Lactation Text: This medication is Pregnancy Category C and it isn't known if it is safe during pregnancy. It is also excreted in breast milk and breast feeding isn't recommended.
Imiquimod Counseling:  I discussed with the patient the risks of imiquimod including but not limited to erythema, scaling, itching, weeping, crusting, and pain.  Patient understands that the inflammatory response to imiquimod is variable from person to person and was educated regarded proper titration schedule.  If flu-like symptoms develop, patient knows to discontinue the medication and contact us.
Niacinamide Counseling: I recommended taking niacin or niacinamide, also know as vitamin B3, twice daily. Recent evidence suggests that taking vitamin B3 (500 mg twice daily) can reduce the risk of actinic keratoses and non-melanoma skin cancers. Side effects of vitamin B3 include flushing and headache.
Birth Control Pills Counseling: Birth Control Pill Counseling: I discussed with the patient the potential side effects of OCPs including but not limited to increased risk of stroke, heart attack, thrombophlebitis, deep venous thrombosis, hepatic adenomas, breast changes, GI upset, headaches, and depression.  The patient verbalized understanding of the proper use and possible adverse effects of OCPs. All of the patient's questions and concerns were addressed.
Birth Control Pills Pregnancy And Lactation Text: This medication should be avoided if pregnant and for the first 30 days post-partum.
Protopic Counseling: Patient may experience a mild burning sensation during topical application. Protopic is not approved in children less than 2 years of age. There have been case reports of hematologic and skin malignancies in patients using topical calcineurin inhibitors although causality is questionable.
Tetracycline Counseling: Patient counseled regarding possible photosensitivity and increased risk for sunburn.  Patient instructed to avoid sunlight, if possible.  When exposed to sunlight, patients should wear protective clothing, sunglasses, and sunscreen.  The patient was instructed to call the office immediately if the following severe adverse effects occur:  hearing changes, easy bruising/bleeding, severe headache, or vision changes.  The patient verbalized understanding of the proper use and possible adverse effects of tetracycline.  All of the patient's questions and concerns were addressed. Patient understands to avoid pregnancy while on therapy due to potential birth defects.
Topical Metronidazole Counseling: Metronidazole is a topical antibiotic medication. You may experience burning, stinging, redness, or allergic reactions.  Please call our office if you develop any problems from using this medication.
Cyclophosphamide Pregnancy And Lactation Text: This medication is Pregnancy Category D and it isn't considered safe during pregnancy. This medication is excreted in breast milk.
Azelaic Acid Pregnancy And Lactation Text: This medication is considered safe during pregnancy and breast feeding.
Ketoconazole Pregnancy And Lactation Text: This medication is Pregnancy Category C and it isn't know if it is safe during pregnancy. It is also excreted in breast milk and breast feeding isn't recommended.
Nemluvio Counseling: I discussed with the patient the risks of nemolizumab including but not limited to headache, gastrointestinal complaints, nasopharyngitis, musculoskeletal complaints, injection site reactions, and allergic reactions. The patient understands that monitoring is required and they must alert us or the primary physician if any side effects are noted.
Cyclosporine Counseling:  I discussed with the patient the risks of cyclosporine including but not limited to hypertension, gingival hyperplasia,myelosuppression, immunosuppression, liver damage, kidney damage, neurotoxicity, lymphoma, and serious infections. The patient understands that monitoring is required including baseline blood pressure, CBC, CMP, lipid panel and uric acid, and then 1-2 times monthly CMP and blood pressure.
Benzoyl Peroxide Counseling: Patient counseled that medicine may cause skin irritation and bleach clothing.  In the event of skin irritation, the patient was advised to reduce the amount of the drug applied or use it less frequently.   The patient verbalized understanding of the proper use and possible adverse effects of benzoyl peroxide.  All of the patient's questions and concerns were addressed.
Otezla Counseling: The side effects of Otezla were discussed with the patient, including but not limited to worsening or new depression, weight loss, diarrhea, nausea, upper respiratory tract infection, and headache. Patient instructed to call the office should any adverse effect occur.  The patient verbalized understanding of the proper use and possible adverse effects of Otezla.  All the patient's questions and concerns were addressed.
Cephalexin Counseling: I counseled the patient regarding use of cephalexin as an antibiotic for prophylactic and/or therapeutic purposes. Cephalexin (commonly prescribed under brand name Keflex) is a cephalosporin antibiotic which is active against numerous classes of bacteria, including most skin bacteria. Side effects may include nausea, diarrhea, gastrointestinal upset, rash, hives, yeast infections, and in rare cases, hepatitis, kidney disease, seizures, fever, confusion, neurologic symptoms, and others. Patients with severe allergies to penicillin medications are cautioned that there is about a 10% incidence of cross-reactivity with cephalosporins. When possible, patients with penicillin allergies should use alternatives to cephalosporins for antibiotic therapy.
Xeldamariz Pregnancy And Lactation Text: This medication is Pregnancy Category D and is not considered safe during pregnancy.  The risk during breast feeding is also uncertain.
Hydroxyzine Counseling: Patient advised that the medication is sedating and not to drive a car after taking this medication.  Patient informed of potential adverse effects including but not limited to dry mouth, urinary retention, and blurry vision.  The patient verbalized understanding of the proper use and possible adverse effects of hydroxyzine.  All of the patient's questions and concerns were addressed.
Cimzia Counseling:  I discussed with the patient the risks of Cimzia including but not limited to immunosuppression, allergic reactions and infections.  The patient understands that monitoring is required including a PPD at baseline and must alert us or the primary physician if symptoms of infection or other concerning signs are noted.
Tremfya Counseling: I discussed with the patient the risks of guselkumab including but not limited to immunosuppression, serious infections, and drug reactions.  The patient understands that monitoring is required including a PPD at baseline and must alert us or the primary physician if symptoms of infection or other concerning signs are noted.
Niacinamide Pregnancy And Lactation Text: These medications are considered safe during pregnancy.
Protopic Pregnancy And Lactation Text: This medication is Pregnancy Category C. It is unknown if this medication is excreted in breast milk when applied topically.
Klisyri Counseling:  I discussed with the patient the risks of Klisyri including but not limited to erythema, scaling, itching, weeping, crusting, and pain.
Arava Counseling:  Patient counseled regarding adverse effects of Arava including but not limited to nausea, vomiting, abnormalities in liver function tests. Patients may develop mouth sores, rash, diarrhea, and abnormalities in blood counts. The patient understands that monitoring is required including LFTs and blood counts.  There is a rare possibility of scarring of the liver and lung problems that can occur when taking methotrexate. Persistent nausea, loss of appetite, pale stools, dark urine, cough, and shortness of breath should be reported immediately. Patient advised to discontinue Arava treatment and consult with a physician prior to attempting conception. The patient will have to undergo a treatment to eliminate Arava from the body prior to conception.
Spironolactone Counseling: Patient advised regarding risks of diarrhea, abdominal pain, hyperkalemia, birth defects (for female patients), liver toxicity and renal toxicity. The patient may need blood work to monitor liver and kidney function and potassium levels while on therapy. The patient verbalized understanding of the proper use and possible adverse effects of spironolactone.  All of the patient's questions and concerns were addressed.
Nsaids Counseling: NSAID Counseling: I discussed with the patient that NSAIDs should be taken with food. Prolonged use of NSAIDs can result in the development of stomach ulcers.  Patient advised to stop taking NSAIDs if abdominal pain occurs.  The patient verbalized understanding of the proper use and possible adverse effects of NSAIDs.  All of the patient's questions and concerns were addressed.
Topical Metronidazole Pregnancy And Lactation Text: This medication is Pregnancy Category B and considered safe during pregnancy.  It is also considered safe to use while breastfeeding.
Odomzo Pregnancy And Lactation Text: This medication is Pregnancy Category X and is absolutely contraindicated during pregnancy. It is unknown if it is excreted in breast milk.
Terbinafine Counseling: Patient counseling regarding adverse effects of terbinafine including but not limited to headache, diarrhea, rash, upset stomach, liver function test abnormalities, itching, taste/smell disturbance, nausea, abdominal pain, and flatulence.  There is a rare possibility of liver failure that can occur when taking terbinafine.  The patient understands that a baseline LFT and kidney function test may be required. The patient verbalized understanding of the proper use and possible adverse effects of terbinafine.  All of the patient's questions and concerns were addressed.
Nemluvio Pregnancy And Lactation Text: It is not known if Nemluvio causes fetal harm or is present in breast milk. Please proceed with caution if patients who are pregnant or breastfeeding.
Cimzia Pregnancy And Lactation Text: This medication crosses the placenta but can be considered safe in certain situations. Cimzia may be excreted in breast milk.
Benzoyl Peroxide Pregnancy And Lactation Text: This medication is Pregnancy Category C. It is unknown if benzoyl peroxide is excreted in breast milk.
Otezla Pregnancy And Lactation Text: This medication is Pregnancy Category C and it isn't known if it is safe during pregnancy. It is unknown if it is excreted in breast milk.
Cephalexin Pregnancy And Lactation Text: This medication is Pregnancy Category B and considered safe during pregnancy.  It is also excreted in breast milk but can be used safely for shorter doses.
Hydroxyzine Pregnancy And Lactation Text: This medication is not safe during pregnancy and should not be taken. It is also excreted in breast milk and breast feeding isn't recommended.
Xolair Counseling:  Patient informed of potential adverse effects including but not limited to fever, muscle aches, rash and allergic reactions.  The patient verbalized understanding of the proper use and possible adverse effects of Xolair.  All of the patient's questions and concerns were addressed.
Oxybutynin Counseling:  I discussed with the patient the risks of oxybutynin including but not limited to skin rash, drowsiness, dry mouth, difficulty urinating, and blurred vision.
Clindamycin Counseling: I counseled the patient regarding use of clindamycin as an antibiotic for prophylactic and/or therapeutic purposes. Clindamycin is active against numerous classes of bacteria, including skin bacteria. Side effects may include nausea, diarrhea, gastrointestinal upset, rash, hives, yeast infections, and in rare cases, colitis.
Cosentyx Counseling:  I discussed with the patient the risks of Cosentyx including but not limited to worsening of Crohn's disease, immunosuppression, allergic reactions and infections.  The patient understands that monitoring is required including a PPD at baseline and must alert us or the primary physician if symptoms of infection or other concerning signs are noted.
Spironolactone Pregnancy And Lactation Text: This medication can cause feminization of the male fetus and should be avoided during pregnancy. The active metabolite is also found in breast milk.
Klisyri Pregnancy And Lactation Text: It is unknown if this medication can harm a developing fetus or if it is excreted in breast milk.
Nsaids Pregnancy And Lactation Text: These medications are considered safe up to 30 weeks gestation. It is excreted in breast milk.
Topical Steroids Counseling: I discussed with the patient that prolonged use of topical steroids can result in the increased appearance of superficial blood vessels (telangiectasias), lightening (hypopigmentation) and thinning of the skin (atrophy).  Patient understands to avoid using high potency steroids in skin folds, the groin or the face.  The patient verbalized understanding of the proper use and possible adverse effects of topical steroids.  All of the patient's questions and concerns were addressed.
Qbrexza Counseling:  I discussed with the patient the risks of Qbrexza including but not limited to headache, mydriasis, blurred vision, dry eyes, nasal dryness, dry mouth, dry throat, dry skin, urinary hesitation, and constipation.  Local skin reactions including erythema, burning, stinging, and itching can also occur.
Zepbound Counseling: I reviewed the possible side effects including: thyroid tumors, kidney disease, gallbladder disease, abdominal pain, constipation, diarrhea, nausea, vomiting and pancreatitis. Do not take this medication if you have a history or family history of multiple endocrine neoplasia syndrome type 2. Side effects reviewed, pt to contact office should one occur.
Albendazole Counseling:  I discussed with the patient the risks of albendazole including but not limited to cytopenia, kidney damage, nausea/vomiting and severe allergy.  The patient understands that this medication is being used in an off-label manner.
Zoryve Counseling:  I discussed with the patient that Zoryve is not for use in the eyes, mouth or vagina. The most commonly reported side effects include diarrhea, headache, insomnia, application site pain, upper respiratory tract infections, and urinary tract infections.  All of the patient's questions and concerns were addressed.
Tazorac Counseling:  Patient advised that medication is irritating and drying.  Patient may need to apply sparingly and wash off after an hour before eventually leaving it on overnight.  The patient verbalized understanding of the proper use and possible adverse effects of tazorac.  All of the patient's questions and concerns were addressed.
Mirvaso Counseling: Mirvaso is a topical medication which can decrease superficial blood flow where applied. Side effects are uncommon and include stinging, redness and allergic reactions.
High Dose Vitamin A Pregnancy And Lactation Text: High dose vitamin A therapy is contraindicated during pregnancy and breast feeding.
Fluconazole Counseling:  Patient counseled regarding adverse effects of fluconazole including but not limited to headache, diarrhea, nausea, upset stomach, liver function test abnormalities, taste disturbance, and stomach pain.  There is a rare possibility of liver failure that can occur when taking fluconazole.  The patient understands that monitoring of LFTs and kidney function test may be required, especially at baseline. The patient verbalized understanding of the proper use and possible adverse effects of fluconazole.  All of the patient's questions and concerns were addressed.
Elidel Counseling: Patient may experience a mild burning sensation during topical application. Elidel is not approved in children less than 2 years of age. There have been case reports of hematologic and skin malignancies in patients using topical calcineurin inhibitors although causality is questionable.
Include Pregnancy/Lactation Warning?: No
Litfulo Counseling: I discussed with the patient the risks of Litfulo therapy including but not limited to upper respiratory tract infections, shingles, cold sores, and nausea. Live vaccines should be avoided.  This medication has been linked to serious infections; higher rate of mortality; malignancy and lymphoproliferative disorders; major adverse cardiovascular events; thrombosis; gastrointestinal perforations; neutropenia; lymphopenia; anemia; liver enzyme elevations; and lipid elevations.
Glycopyrrolate Pregnancy And Lactation Text: This medication is Pregnancy Category B and is considered safe during pregnancy. It is unknown if it is excreted breast milk.
Sotyktu Pregnancy And Lactation Text: There is insufficient data to evaluate whether or not Sotyktu is safe to use during pregnancy.? ?It is not known if Sotyktu passes into breast milk and whether or not it is safe to use when breastfeeding.??
Quinolones Counseling:  I discussed with the patient the risks of fluoroquinolones including but not limited to GI upset, allergic reaction, drug rash, diarrhea, dizziness, photosensitivity, yeast infections, liver function test abnormalities, tendonitis/tendon rupture.
Dutasteride Female Counseling: Dutasteride Counseling:  I discussed with the patient the risks of use of dutasteride including but not limited to decreased libido and sexual dysfunction. Explained the teratogenic nature of the medication and stressed the importance of not getting pregnant during treatment. All of the patient's questions and concerns were addressed.
Valtrex Counseling: I discussed with the patient the risks of valacyclovir including but not limited to kidney damage, nausea, vomiting and severe allergy.  The patient understands that if the infection seems to be worsening or is not improving, they are to call.
Dutasteride Pregnancy And Lactation Text: This medication is absolutely contraindicated in women, especially during pregnancy and breast feeding. Feminization of male fetuses is possible if taking while pregnant.
Tazorac Pregnancy And Lactation Text: This medication is not safe during pregnancy. It is unknown if this medication is excreted in breast milk.
Zoryve Pregnancy And Lactation Text: It is unknown if this medication can cause problems during pregnancy and breastfeeding.
Hydroxychloroquine Counseling:  I discussed with the patient that a baseline ophthalmologic exam is needed at the start of therapy and every year thereafter while on therapy. A CBC may also be warranted for monitoring.  The side effects of this medication were discussed with the patient, including but not limited to agranulocytosis, aplastic anemia, seizures, rashes, retinopathy, and liver toxicity. Patient instructed to call the office should any adverse effect occur.  The patient verbalized understanding of the proper use and possible adverse effects of Plaquenil.  All the patient's questions and concerns were addressed.
Albendazole Pregnancy And Lactation Text: This medication is Pregnancy Category C and it isn't known if it is safe during pregnancy. It is also excreted in breast milk.
Hyrimoz Counseling:  I discussed with the patient the risks of adalimumab including but not limited to myelosuppression, immunosuppression, autoimmune hepatitis, demyelinating diseases, lymphoma, and serious infections.  The patient understands that monitoring is required including a PPD at baseline and must alert us or the primary physician if symptoms of infection or other concerning signs are noted.
Azathioprine Counseling:  I discussed with the patient the risks of azathioprine including but not limited to myelosuppression, immunosuppression, hepatotoxicity, lymphoma, and infections.  The patient understands that monitoring is required including baseline LFTs, Creatinine, possible TPMP genotyping and weekly CBCs for the first month and then every 2 weeks thereafter.  The patient verbalized understanding of the proper use and possible adverse effects of azathioprine.  All of the patient's questions and concerns were addressed.
Litfulo Pregnancy And Lactation Text: Based on animal studies, Lifulo may cause embryo-fetal harm when administered to pregnant women.  The medication should not be used in pregnancy.  Breastfeeding is not recommended during treatment.
Spevigo Counseling: I discussed with the patient the risks of Spevigo including but not limited to fatigue, nasuea, vomiting, headache, pruritus, urinary tract infection, an infusion related reactions.  The patient understands that monitoring is required including screening for tuberculosis at baseline and yearly screening thereafter while continuing Spevigo therapy. They should contact us if symptoms of infection or other concerning signs are noted.
Olumiant Counseling: I discussed with the patient the risks of Olumiant therapy including but not limited to upper respiratory tract infections, shingles, cold sores, and nausea. Live vaccines should be avoided.  This medication has been linked to serious infections; higher rate of mortality; malignancy and lymphoproliferative disorders; major adverse cardiovascular events; thrombosis; gastrointestinal perforations; neutropenia; lymphopenia; anemia; liver enzyme elevations; and lipid elevations.
Spevigo Pregnancy And Lactation Text: The risk during pregnancy and breastfeeding is uncertain with this medication. This medication does cross the placenta. It is unknown if this medication is found in breast milk.
Griseofulvin Counseling:  I discussed with the patient the risks of griseofulvin including but not limited to photosensitivity, cytopenia, liver damage, nausea/vomiting and severe allergy.  The patient understands that this medication is best absorbed when taken with a fatty meal (e.g., ice cream or french fries).
Eucrisa Counseling: Patient may experience a mild burning sensation during topical application. Eucrisa is not approved in children less than 2 years of age.
Valtrex Pregnancy And Lactation Text: this medication is Pregnancy Category B and is considered safe during pregnancy. This medication is not directly found in breast milk but it's metabolite acyclovir is present.
Cimetidine Counseling:  I discussed with the patient the risks of Cimetidine including but not limited to gynecomastia, headache, diarrhea, nausea, drowsiness, arrhythmias, pancreatitis, skin rashes, psychosis, bone marrow suppression and kidney toxicity.
Finasteride Male Counseling: Finasteride Counseling:  I discussed with the patient the risks of use of finasteride including but not limited to decreased libido, decreased ejaculate volume, gynecomastia, and depression. Women should not handle medication.  All of the patient's questions and concerns were addressed.
Opzelura Counseling:  I discussed with the patient the risks of Opzelura including but not limited to nasopharngitis, bronchitis, ear infection, eosinophila, hives, diarrhea, folliculitis, tonsillitis, and rhinorrhea.  Taken orally, this medication has been linked to serious infections; higher rate of mortality; malignancy and lymphoproliferative disorders; major adverse cardiovascular events; thrombosis; thrombocytopenia, anemia, and neutropenia; and lipid elevations.
Rifampin Counseling: I discussed with the patient the risks of rifampin including but not limited to liver damage, kidney damage, red-orange body fluids, nausea/vomiting and severe allergy.
Ivermectin Counseling:  Patient instructed to take medication on an empty stomach with a full glass of water.  Patient informed of potential adverse effects including but not limited to nausea, diarrhea, dizziness, itching, and swelling of the extremities or lymph nodes.  The patient verbalized understanding of the proper use and possible adverse effects of ivermectin.  All of the patient's questions and concerns were addressed.
Methotrexate Counseling:  Patient counseled regarding adverse effects of methotrexate including but not limited to nausea, vomiting, abnormalities in liver function tests. Patients may develop mouth sores, rash, diarrhea, and abnormalities in blood counts. The patient understands that monitoring is required including LFT's and blood counts.  There is a rare possibility of scarring of the liver and lung problems that can occur when taking methotrexate. Persistent nausea, loss of appetite, pale stools, dark urine, cough, and shortness of breath should be reported immediately. Patient advised to discontinue methotrexate treatment at least three months before attempting to become pregnant.  I discussed the need for folate supplements while taking methotrexate.  These supplements can decrease side effects during methotrexate treatment. The patient verbalized understanding of the proper use and possible adverse effects of methotrexate.  All of the patient's questions and concerns were addressed.
Erivedge Counseling- I discussed with the patient the risks of Erivedge including but not limited to nausea, vomiting, diarrhea, constipation, weight loss, changes in the sense of taste, decreased appetite, muscle spasms, and hair loss.  The patient verbalized understanding of the proper use and possible adverse effects of Erivedge.  All of the patient's questions and concerns were addressed.
Zyclara Counseling:  I discussed with the patient the risks of imiquimod including but not limited to erythema, scaling, itching, weeping, crusting, and pain.  Patient understands that the inflammatory response to imiquimod is variable from person to person and was educated regarded proper titration schedule.  If flu-like symptoms develop, patient knows to discontinue the medication and contact us.
Azathioprine Pregnancy And Lactation Text: This medication is Pregnancy Category D and isn't considered safe during pregnancy. It is unknown if this medication is excreted in breast milk.
Topical Clindamycin Counseling: Patient counseled that this medication may cause skin irritation or allergic reactions.  In the event of skin irritation, the patient was advised to reduce the amount of the drug applied or use it less frequently.   The patient verbalized understanding of the proper use and possible adverse effects of clindamycin.  All of the patient's questions and concerns were addressed.
Ilumya Counseling: I discussed with the patient the risks of tildrakizumab including but not limited to immunosuppression, malignancy, posterior leukoencephalopathy syndrome, and serious infections.  The patient understands that monitoring is required including a PPD at baseline and must alert us or the primary physician if symptoms of infection or other concerning signs are noted.
Cellcept Counseling:  I discussed with the patient the risks of mycophenolate mofetil including but not limited to infection/immunosuppression, GI upset, hypokalemia, hypercholesterolemia, bone marrow suppression, lymphoproliferative disorders, malignancy, GI ulceration/bleed/perforation, colitis, interstitial lung disease, kidney failure, progressive multifocal leukoencephalopathy, and birth defects.  The patient understands that monitoring is required including a baseline creatinine and regular CBC testing. In addition, patient must alert us immediately if symptoms of infection or other concerning signs are noted.
Aklief counseling:  Patient advised to apply a pea-sized amount only at bedtime and wait 30 minutes after washing their face before applying.  If too drying, patient may add a non-comedogenic moisturizer.  The most commonly reported side effects including irritation, redness, scaling, dryness, stinging, burning, itching, and increased risk of sunburn.  The patient verbalized understanding of the proper use and possible adverse effects of retinoids.  All of the patient's questions and concerns were addressed.
Olumiant Pregnancy And Lactation Text: Based on animal studies, Olumiant may cause embryo-fetal harm when administered to pregnant women.  The medication should not be used in pregnancy.  Breastfeeding is not recommended during treatment.
Azithromycin Counseling:  I discussed with the patient the risks of azithromycin including but not limited to GI upset, allergic reaction, drug rash, diarrhea, and yeast infections.
Griseofulvin Pregnancy And Lactation Text: This medication is Pregnancy Category X and is known to cause serious birth defects. It is unknown if this medication is excreted in breast milk but breast feeding should be avoided.
Stelara Counseling:  I discussed with the patient the risks of ustekinumab including but not limited to immunosuppression, malignancy, posterior leukoencephalopathy syndrome, and serious infections.  The patient understands that monitoring is required including a PPD at baseline and must alert us or the primary physician if symptoms of infection or other concerning signs are noted.
Hydroxychloroquine Pregnancy And Lactation Text: This medication has been shown to cause fetal harm but it isn't assigned a Pregnancy Risk Category. There are small amounts excreted in breast milk.
Olanzapine Counseling- I discussed with the patient the common side effects of olanzapine including but are not limited to: lack of energy, dry mouth, increased appetite, sleepiness, tremor, constipation, dizziness, changes in behavior, or restlessness.  Explained that teenagers are more likely to experience headaches, abdominal pain, pain in the arms or legs, tiredness, and sleepiness.  Serious side effects include but are not limited: increased risk of death in elderly patients who are confused, have memory loss, or dementia-related psychosis; hyperglycemia; increased cholesterol and triglycerides; and weight gain.
Hydroquinone Counseling:  Patient advised that medication may result in skin irritation, lightening (hypopigmentation), dryness, and burning.  In the event of skin irritation, the patient was advised to reduce the amount of the drug applied or use it less frequently.  Rarely, spots that are treated with hydroquinone can become darker (pseudoochronosis).  Should this occur, patient instructed to stop medication and call the office. The patient verbalized understanding of the proper use and possible adverse effects of hydroquinone.  All of the patient's questions and concerns were addressed.
Finasteride Female Counseling: Finasteride Counseling:  I discussed with the patient the risks of use of finasteride including but not limited to decreased libido and sexual dysfunction. Explained the teratogenic nature of the medication and stressed the importance of not getting pregnant during treatment. All of the patient's questions and concerns were addressed.
Rifampin Pregnancy And Lactation Text: This medication is Pregnancy Category C and it isn't know if it is safe during pregnancy. It is also excreted in breast milk and should not be used if you are breast feeding.
Low Dose Naltrexone Counseling- I discussed with the patient the potential risks and side effects of low dose naltrexone including but not limited to: more vivid dreams, headaches, nausea, vomiting, abdominal pain, fatigue, dizziness, and anxiety.
Opzelura Pregnancy And Lactation Text: There is insufficient data to evaluate drug-associated risk for major birth defects, miscarriage, or other adverse maternal or fetal outcomes.  There is a pregnancy registry that monitors pregnancy outcomes in pregnant persons exposed to the medication during pregnancy.  It is unknown if this medication is excreted in breast milk.  Do not breastfeed during treatment and for about 4 weeks after the last dose.
Methotrexate Pregnancy And Lactation Text: This medication is Pregnancy Category X and is known to cause fetal harm. This medication is excreted in breast milk.
Rinvoq Counseling: I discussed with the patient the risks of Rinvoq therapy including but not limited to upper respiratory tract infections, shingles, cold sores, bronchitis, nausea, cough, fever, acne, and headache. Live vaccines should be avoided.  This medication has been linked to serious infections; higher rate of mortality; malignancy and lymphoproliferative disorders; major adverse cardiovascular events; thrombosis; thrombocytopenia, anemia, and neutropenia; lipid elevations; liver enzyme elevations; and gastrointestinal perforations.
Libtayo Counseling- I discussed with the patient the risks of Libtayo including but not limited to nausea, vomiting, diarrhea, and bone or muscle pain.  The patient verbalized understanding of the proper use and possible adverse effects of Libtayo.  All of the patient's questions and concerns were addressed.
Aklief Pregnancy And Lactation Text: It is unknown if this medication is safe to use during pregnancy.  It is unknown if this medication is excreted in breast milk.  Breastfeeding women should use the topical cream on the smallest area of the skin for the shortest time needed while breastfeeding.  Do not apply to nipple and areola.
Itraconazole Counseling:  I discussed with the patient the risks of itraconazole including but not limited to liver damage, nausea/vomiting, neuropathy, and severe allergy.  The patient understands that this medication is best absorbed when taken with acidic beverages such as non-diet cola or ginger ale.  The patient understands that monitoring is required including baseline LFTs and repeat LFTs at intervals.  The patient understands that they are to contact us or the primary physician if concerning signs are noted.
Azithromycin Pregnancy And Lactation Text: This medication is considered safe during pregnancy and is also secreted in breast milk.
Olanzapine Pregnancy And Lactation Text: This medication is pregnancy category C.   There are no adequate and well controlled trials with olanzapine in pregnant females.  Olanzapine should be used during pregnancy only if the potential benefit justifies the potential risk to the fetus.   In a study in lactating healthy women, olanzapine was excreted in breast milk.  It is recommended that women taking olanzapine should not breast feed.
Bexarotene Pregnancy And Lactation Text: This medication is Pregnancy Category X and should not be given to women who are pregnant or may become pregnant. This medication should not be used if you are breast feeding.
Ebglyss Pregnancy And Lactation Text: This medication likely crosses the placenta but the risk for the fetus is uncertain. It is unknown if this medication is excreted in breast milk.
Erythromycin Pregnancy And Lactation Text: This medication is Pregnancy Category B and is considered safe during pregnancy. It is also excreted in breast milk.
Sski Pregnancy And Lactation Text: This medication is Pregnancy Category D and isn't considered safe during pregnancy. It is excreted in breast milk.
Dapsone Counseling: I discussed with the patient the risks of dapsone including but not limited to hemolytic anemia, agranulocytosis, rashes, methemoglobinemia, kidney failure, peripheral neuropathy, headaches, GI upset, and liver toxicity.  Patients who start dapsone require monitoring including baseline LFTs and weekly CBCs for the first month, then every month thereafter.  The patient verbalized understanding of the proper use and possible adverse effects of dapsone.  All of the patient's questions and concerns were addressed.
Winlevi Counseling:  I discussed with the patient the risks of topical clascoterone including but not limited to erythema, scaling, itching, and stinging. Patient voiced their understanding.
Semaglutide Counseling: I reviewed the possible side effects including: thyroid tumors, kidney disease, gallbladder disease, abdominal pain, constipation, diarrhea, nausea, vomiting and pancreatitis. Do not take this medication if you have a history or family history of multiple endocrine neoplasia syndrome type 2. Side effects reviewed, pt to contact office should one occur.
Soolantra Counseling: I discussed with the patients the risks of topial Soolantra. This is a medicine which decreases the number of mites and inflammation in the skin. You experience burning, stinging, eye irritation or allergic reactions.  Please call our office if you develop any problems from using this medication.
Dapsone Pregnancy And Lactation Text: This medication is Pregnancy Category C and is not considered safe during pregnancy or breast feeding.
Opioid Counseling: I discussed with the patient the potential side effects of opioids including but not limited to addiction, altered mental status, and depression. I stressed avoiding alcohol, benzodiazepines, muscle relaxants and sleep aids unless specifically okayed by a physician. The patient verbalized understanding of the proper use and possible adverse effects of opioids. All of the patient's questions and concerns were addressed. They were instructed to flush the remaining pills down the toilet if they did not need them for pain.
Adbry Counseling: I discussed with the patient the risks of tralokinumab including but not limited to eye infection and irritation, cold sores, injection site reactions, worsening of asthma, allergic reactions and increased risk of parasitic infection.  Live vaccines should be avoided while taking tralokinumab. The patient understands that monitoring is required and they must alert us or the primary physician if symptoms of infection or other concerning signs are noted.
5-Fu Counseling: 5-Fluorouracil Counseling:  I discussed with the patient the risks of 5-fluorouracil including but not limited to erythema, scaling, itching, weeping, crusting, and pain.
Isotretinoin Counseling: Patient should get monthly blood tests, not donate blood, not drive at night if vision affected, not share medication, and not undergo elective surgery for 6 months after tx completed. Side effects reviewed, pt to contact office should one occur.
Adbry Pregnancy And Lactation Text: It is unknown if this medication will adversely affect pregnancy or breast feeding.
Enbrel Counseling:  I discussed with the patient the risks of etanercept including but not limited to myelosuppression, immunosuppression, autoimmune hepatitis, demyelinating diseases, lymphoma, and infections.  The patient understands that monitoring is required including a PPD at baseline and must alert us or the primary physician if symptoms of infection or other concerning signs are noted.
Thalidomide Counseling: I discussed with the patient the risks of thalidomide including but not limited to birth defects, anxiety, weakness, chest pain, dizziness, cough and severe allergy.
Opioid Pregnancy And Lactation Text: These medications can lead to premature delivery and should be avoided during pregnancy. These medications are also present in breast milk in small amounts.
Metronidazole Counseling:  I discussed with the patient the risks of metronidazole including but not limited to seizures, nausea/vomiting, a metallic taste in the mouth, nausea/vomiting and severe allergy.
Soolantra Pregnancy And Lactation Text: This medication is Pregnancy Category C. This medication is considered safe during breast feeding.
Gabapentin Counseling: I discussed with the patient the risks of gabapentin including but not limited to dizziness, somnolence, fatigue and ataxia.
Simponi Counseling:  I discussed with the patient the risks of golimumab including but not limited to myelosuppression, immunosuppression, autoimmune hepatitis, demyelinating diseases, lymphoma, and serious infections.  The patient understands that monitoring is required including a PPD at baseline and must alert us or the primary physician if symptoms of infection or other concerning signs are noted.
Winlevi Pregnancy And Lactation Text: This medication is considered safe during pregnancy and breastfeeding.
Cibinqo Counseling: I discussed with the patient the risks of Cibinqo therapy including but not limited to common cold, nausea, headache, cold sores, increased blood CPK levels, dizziness, UTIs, fatigue, acne, and vomitting. Live vaccines should be avoided.  This medication has been linked to serious infections; higher rate of mortality; malignancy and lymphoproliferative disorders; major adverse cardiovascular events; thrombosis; thrombocytopenia and lymphopenia; lipid elevations; and retinal detachment.
Bimzelx Counseling:  I discussed with the patient the risks of Bimzelx including but not limited to depression, immunosuppression, allergic reactions and infections.  The patient understands that monitoring is required including a PPD at baseline and must alert us or the primary physician if symptoms of infection or other concerning signs are noted.
Isotretinoin Pregnancy And Lactation Text: This medication is Pregnancy Category X and is considered extremely dangerous during pregnancy. It is unknown if it is excreted in breast milk.
Drysol Counseling:  I discussed with the patient the risks of drysol/aluminum chloride including but not limited to skin rash, itching, irritation, burning.
Metronidazole Pregnancy And Lactation Text: This medication is Pregnancy Category B and considered safe during pregnancy.  It is also excreted in breast milk.
Minocycline Counseling: Patient advised regarding possible photosensitivity and discoloration of the teeth, skin, lips, tongue and gums.  Patient instructed to avoid sunlight, if possible.  When exposed to sunlight, patients should wear protective clothing, sunglasses, and sunscreen.  The patient was instructed to call the office immediately if the following severe adverse effects occur:  hearing changes, easy bruising/bleeding, severe headache, or vision changes.  The patient verbalized understanding of the proper use and possible adverse effects of minocycline.  All of the patient's questions and concerns were addressed.
Tranexamic Acid Counseling:  Patient advised of the small risk of bleeding problems with tranexamic acid. They were also instructed to call if they developed any nausea, vomiting or diarrhea. All of the patient's questions and concerns were addressed.
VTAMA Counseling: I discussed with the patient that VTAMA is not for use in the eyes, mouth or mouth. They should call the office if they develop any signs of allergic reactions to VTAMA. The patient verbalized understanding of the proper use and possible adverse effects of VTAMA.  All of the patient's questions and concerns were addressed.
Topical Retinoid counseling:  Patient advised to apply a pea-sized amount only at bedtime and wait 30 minutes after washing their face before applying.  If too drying, patient may add a non-comedogenic moisturizer. The patient verbalized understanding of the proper use and possible adverse effects of retinoids.  All of the patient's questions and concerns were addressed.
Wegovy Counseling: I reviewed the possible side effects including: thyroid tumors, kidney disease, gallbladder disease, abdominal pain, constipation, diarrhea, nausea, vomiting and pancreatitis. Do not take this medication if you have a history or family history of multiple endocrine neoplasia syndrome type 2. Side effects reviewed, pt to contact office should one occur.
Cibinqo Pregnancy And Lactation Text: It is unknown if this medication will adversely affect pregnancy or breast feeding.  You should not take this medication if you are currently pregnant or planning a pregnancy or while breastfeeding.
Detail Level: Simple
Bimzelx Pregnancy And Lactation Text: This medication crosses the placenta and the safety is uncertain during pregnancy. It is unknown if this medication is present in breast milk.
High Dose Vitamin A Counseling: Side effects reviewed, pt to contact office should one occur.
Skyrizi Counseling: I discussed with the patient the risks of risankizumab-rzaa including but not limited to immunosuppression, and serious infections.  The patient understands that monitoring is required including a PPD at baseline and must alert us or the primary physician if symptoms of infection or other concerning signs are noted.
Humira Counseling:  I discussed with the patient the risks of adalimumab including but not limited to myelosuppression, immunosuppression, autoimmune hepatitis, demyelinating diseases, lymphoma, and serious infections.  The patient understands that monitoring is required including a PPD at baseline and must alert us or the primary physician if symptoms of infection or other concerning signs are noted.
Tranexamic Acid Pregnancy And Lactation Text: It is unknown if this medication is safe during pregnancy or breast feeding.
Glycopyrrolate Counseling:  I discussed with the patient the risks of glycopyrrolate including but not limited to skin rash, drowsiness, dry mouth, difficulty urinating, and blurred vision.
Dutasteride Male Counseling: Dustasteride Counseling:  I discussed with the patient the risks of use of dutasteride including but not limited to decreased libido, decreased ejaculate volume, and gynecomastia. Women who can become pregnant should not handle medication.  All of the patient's questions and concerns were addressed.
Sotyktu Counseling:  I discussed the most common side effects of Sotyktu including: common cold, sore throat, sinus infections, cold sores, canker sores, folliculitis, and acne.? I also discussed more serious side effects of Sotyktu including but not limited to: serious allergic reactions; increased risk for infections such as TB; cancers such as lymphomas; rhabdomyolysis and elevated CPK; and elevated triglycerides and liver enzymes.?

## 2025-04-21 NOTE — PROCEDURE: ADDITIONAL NOTES
Render Risk Assessment In Note?: no
Additional Notes: Patient has additional cream (efudex) informed to use left over cream on sun exposed areas.
Detail Level: Simple

## 2025-07-14 ENCOUNTER — APPOINTMENT (OUTPATIENT)
Age: 75
Setting detail: DERMATOLOGY
End: 2025-07-14

## 2025-07-14 DIAGNOSIS — Z85.820 PERSONAL HISTORY OF MALIGNANT MELANOMA OF SKIN: ICD-10-CM | Status: STABLE

## 2025-07-14 DIAGNOSIS — Z85.828 PERSONAL HISTORY OF OTHER MALIGNANT NEOPLASM OF SKIN: ICD-10-CM | Status: STABLE

## 2025-07-14 DIAGNOSIS — L72.8 OTHER FOLLICULAR CYSTS OF THE SKIN AND SUBCUTANEOUS TISSUE: ICD-10-CM | Status: RESOLVING

## 2025-07-14 PROCEDURE — ? PRESCRIPTION

## 2025-07-14 PROCEDURE — ? VISIT COMPLEXITY

## 2025-07-14 PROCEDURE — ? PRESCRIPTION MEDICATION MANAGEMENT

## 2025-07-14 PROCEDURE — ? MEDICATION COUNSELING

## 2025-07-14 PROCEDURE — ? COUNSELING

## 2025-07-14 RX ORDER — CLINDAMYCIN PHOSPHATE 10 MG/G
GEL TOPICAL QD
Qty: 60 | Refills: 0 | Status: ERX | COMMUNITY
Start: 2025-07-14

## 2025-07-14 RX ADMIN — CLINDAMYCIN PHOSPHATE: 10 GEL TOPICAL at 00:00

## 2025-07-14 ASSESSMENT — LOCATION ZONE DERM
LOCATION ZONE: AXILLAE
LOCATION ZONE: FACE
LOCATION ZONE: ARM

## 2025-07-14 ASSESSMENT — LOCATION DETAILED DESCRIPTION DERM
LOCATION DETAILED: RIGHT LATERAL EYEBROW
LOCATION DETAILED: RIGHT AXILLARY VAULT
LOCATION DETAILED: GLABELLA
LOCATION DETAILED: RIGHT PROXIMAL DORSAL FOREARM

## 2025-07-14 ASSESSMENT — LOCATION SIMPLE DESCRIPTION DERM
LOCATION SIMPLE: RIGHT AXILLARY VAULT
LOCATION SIMPLE: RIGHT EYEBROW
LOCATION SIMPLE: GLABELLA
LOCATION SIMPLE: RIGHT FOREARM

## 2025-07-14 NOTE — PROCEDURE: PRESCRIPTION MEDICATION MANAGEMENT
Detail Level: Zone
Initiate Treatment: clindamycin 1 % topical gel QD\\nQuantity: 60.0 g  Days Supply: 30\\nSig: Apply to affected area once daily in the morning after washing area
Render In Strict Bullet Format?: No

## 2025-07-14 NOTE — HPI: EVALUATION OF SKIN LESION(S)
What Type Of Note Output Would You Prefer (Optional)?: Standard Output
How Severe Are Your Spot(S)?: mild
Have Your Spot(S) Been Treated In The Past?: has not been treated
Hpi Title: Evaluation of Skin Lesions
Additional History: Noticed it about a week ago, it was bigger and has gone down and it has possibly been draining on its own.

## 2025-07-14 NOTE — PROCEDURE: MEDICATION COUNSELING
Cibinqo Pregnancy And Lactation Text: It is unknown if this medication will adversely affect pregnancy or breast feeding.  You should not take this medication if you are currently pregnant or planning a pregnancy or while breastfeeding.
Dupixent Counseling: I discussed with the patient the risks of dupilumab including but not limited to eye infection and irritation, cold sores, injection site reactions, worsening of asthma, allergic reactions and increased risk of parasitic infection.  Live vaccines should be avoided while taking dupilumab. Dupilumab will also interact with certain medications such as warfarin and cyclosporine. The patient understands that monitoring is required and they must alert us or the primary physician if symptoms of infection or other concerning signs are noted.
Qbrexza Counseling:  I discussed with the patient the risks of Qbrexza including but not limited to headache, mydriasis, blurred vision, dry eyes, nasal dryness, dry mouth, dry throat, dry skin, urinary hesitation, and constipation.  Local skin reactions including erythema, burning, stinging, and itching can also occur.
Cyclophosphamide Counseling:  I discussed with the patient the risks of cyclophosphamide including but not limited to hair loss, hormonal abnormalities, decreased fertility, abdominal pain, diarrhea, nausea and vomiting, bone marrow suppression and infection. The patient understands that monitoring is required while taking this medication.
Ivermectin Pregnancy And Lactation Text: This medication is Pregnancy Category C and it isn't known if it is safe during pregnancy. It is also excreted in breast milk.
Wegovy Pregnancy And Lactation Text: The fetal risk of this medication is unknown and taking while pregnant is not recommended. It is unknown if this medication is present in breast milk.
Glycopyrrolate Pregnancy And Lactation Text: This medication is Pregnancy Category B and is considered safe during pregnancy. It is unknown if it is excreted breast milk.
Topical Metronidazole Counseling: Metronidazole is a topical antibiotic medication. You may experience burning, stinging, redness, or allergic reactions.  Please call our office if you develop any problems from using this medication.
Erythromycin Counseling:  I discussed with the patient the risks of erythromycin including but not limited to GI upset, allergic reaction, drug rash, diarrhea, increase in liver enzymes, and yeast infections.
Siliq Pregnancy And Lactation Text: The risk during pregnancy and breastfeeding is uncertain with this medication.
High Dose Vitamin A Pregnancy And Lactation Text: High dose vitamin A therapy is contraindicated during pregnancy and breast feeding.
Topical Metronidazole Pregnancy And Lactation Text: This medication is Pregnancy Category B and considered safe during pregnancy.  It is also considered safe to use while breastfeeding.
Cyclophosphamide Pregnancy And Lactation Text: This medication is Pregnancy Category D and it isn't considered safe during pregnancy. This medication is excreted in breast milk.
Hydroquinone Counseling:  Patient advised that medication may result in skin irritation, lightening (hypopigmentation), dryness, and burning.  In the event of skin irritation, the patient was advised to reduce the amount of the drug applied or use it less frequently.  Rarely, spots that are treated with hydroquinone can become darker (pseudoochronosis).  Should this occur, patient instructed to stop medication and call the office. The patient verbalized understanding of the proper use and possible adverse effects of hydroquinone.  All of the patient's questions and concerns were addressed.
Drysol Counseling:  I discussed with the patient the risks of drysol/aluminum chloride including but not limited to skin rash, itching, irritation, burning.
Arava Counseling:  Patient counseled regarding adverse effects of Arava including but not limited to nausea, vomiting, abnormalities in liver function tests. Patients may develop mouth sores, rash, diarrhea, and abnormalities in blood counts. The patient understands that monitoring is required including LFTs and blood counts.  There is a rare possibility of scarring of the liver and lung problems that can occur when taking methotrexate. Persistent nausea, loss of appetite, pale stools, dark urine, cough, and shortness of breath should be reported immediately. Patient advised to discontinue Arava treatment and consult with a physician prior to attempting conception. The patient will have to undergo a treatment to eliminate Arava from the body prior to conception.
Sotyktu Counseling:  I discussed the most common side effects of Sotyktu including: common cold, sore throat, sinus infections, cold sores, canker sores, folliculitis, and acne.? I also discussed more serious side effects of Sotyktu including but not limited to: serious allergic reactions; increased risk for infections such as TB; cancers such as lymphomas; rhabdomyolysis and elevated CPK; and elevated triglycerides and liver enzymes.?
SSKI Counseling:  I discussed with the patient the risks of SSKI including but not limited to thyroid abnormalities, metallic taste, GI upset, fever, headache, acne, arthralgias, paraesthesias, lymphadenopathy, easy bleeding, arrhythmias, and allergic reaction.
Qbrexza Pregnancy And Lactation Text: There is no available data on Qbrexza use in pregnant women.  There is no available data on Qbrexza use in lactation.
Sotyktu Pregnancy And Lactation Text: There is insufficient data to evaluate whether or not Sotyktu is safe to use during pregnancy.? ?It is not known if Sotyktu passes into breast milk and whether or not it is safe to use when breastfeeding.??
Erythromycin Pregnancy And Lactation Text: This medication is Pregnancy Category B and is considered safe during pregnancy. It is also excreted in breast milk.
Zepbound Counseling: I reviewed the possible side effects including: thyroid tumors, kidney disease, gallbladder disease, abdominal pain, constipation, diarrhea, nausea, vomiting and pancreatitis. Do not take this medication if you have a history or family history of multiple endocrine neoplasia syndrome type 2. Side effects reviewed, pt to contact office should one occur.
Dupixent Pregnancy And Lactation Text: This medication likely crosses the placenta but the risk for the fetus is uncertain. This medication is excreted in breast milk.
Hydroxychloroquine Counseling:  I discussed with the patient that a baseline ophthalmologic exam is needed at the start of therapy and every year thereafter while on therapy. A CBC may also be warranted for monitoring.  The side effects of this medication were discussed with the patient, including but not limited to agranulocytosis, aplastic anemia, seizures, rashes, retinopathy, and liver toxicity. Patient instructed to call the office should any adverse effect occur.  The patient verbalized understanding of the proper use and possible adverse effects of Plaquenil.  All the patient's questions and concerns were addressed.
Litfulo Counseling: I discussed with the patient the risks of Litfulo therapy including but not limited to upper respiratory tract infections, shingles, cold sores, and nausea. Live vaccines should be avoided.  This medication has been linked to serious infections; higher rate of mortality; malignancy and lymphoproliferative disorders; major adverse cardiovascular events; thrombosis; gastrointestinal perforations; neutropenia; lymphopenia; anemia; liver enzyme elevations; and lipid elevations.
Topical Steroids Counseling: I discussed with the patient that prolonged use of topical steroids can result in the increased appearance of superficial blood vessels (telangiectasias), lightening (hypopigmentation) and thinning of the skin (atrophy).  Patient understands to avoid using high potency steroids in skin folds, the groin or the face.  The patient verbalized understanding of the proper use and possible adverse effects of topical steroids.  All of the patient's questions and concerns were addressed.
Metronidazole Counseling:  I discussed with the patient the risks of metronidazole including but not limited to seizures, nausea/vomiting, a metallic taste in the mouth, nausea/vomiting and severe allergy.
Hydroquinone Pregnancy And Lactation Text: This medication has not been assigned a Pregnancy Risk Category but animal studies failed to show danger with the topical medication. It is unknown if the medication is excreted in breast milk.
Hydroxychloroquine Pregnancy And Lactation Text: This medication has been shown to cause fetal harm but it isn't assigned a Pregnancy Risk Category. There are small amounts excreted in breast milk.
Ebglyss Counseling: I discussed with the patient the risks of lebrikizumab including but not limited to eye inflammation and irritation, cold sores, injection site reactions, allergic reactions and increased risk of parasitic infection. The patient understands that monitoring is required and they must alert us or the primary physician if symptoms of infection or other concerning signs are noted.
Litfulo Pregnancy And Lactation Text: Based on animal studies, Lifulo may cause embryo-fetal harm when administered to pregnant women.  The medication should not be used in pregnancy.  Breastfeeding is not recommended during treatment.
Sski Pregnancy And Lactation Text: This medication is Pregnancy Category D and isn't considered safe during pregnancy. It is excreted in breast milk.
Drysol Pregnancy And Lactation Text: This medication is considered safe during pregnancy and breast feeding.
Arava Pregnancy And Lactation Text: This medication is Pregnancy Category X and is absolutely contraindicated during pregnancy. It is unknown if it is excreted in breast milk.
Simlandi Counseling:  I discussed with the patient the risks of adalimumab including but not limited to myelosuppression, immunosuppression, autoimmune hepatitis, demyelinating diseases, lymphoma, and serious infections.  The patient understands that monitoring is required including a PPD at baseline and must alert us or the primary physician if symptoms of infection or other concerning signs are noted.
Cyclosporine Counseling:  I discussed with the patient the risks of cyclosporine including but not limited to hypertension, gingival hyperplasia,myelosuppression, immunosuppression, liver damage, kidney damage, neurotoxicity, lymphoma, and serious infections. The patient understands that monitoring is required including baseline blood pressure, CBC, CMP, lipid panel and uric acid, and then 1-2 times monthly CMP and blood pressure.
Rhofade Counseling: Rhofade is a topical medication which can decrease superficial blood flow where applied. Side effects are uncommon and include stinging, redness and allergic reactions.
Thalidomide Counseling: I discussed with the patient the risks of thalidomide including but not limited to birth defects, anxiety, weakness, chest pain, dizziness, cough and severe allergy.
Simlandi Pregnancy And Lactation Text: This medication is Pregnancy Category B and is considered safe during pregnancy. It is unknown if this medication is excreted in breast milk.
Spironolactone Counseling: Patient advised regarding risks of diarrhea, abdominal pain, hyperkalemia, birth defects (for female patients), liver toxicity and renal toxicity. The patient may need blood work to monitor liver and kidney function and potassium levels while on therapy. The patient verbalized understanding of the proper use and possible adverse effects of spironolactone.  All of the patient's questions and concerns were addressed.
Clofazimine Counseling:  I discussed with the patient the risks of clofazimine including but not limited to skin and eye pigmentation, liver damage, nausea/vomiting, gastrointestinal bleeding and allergy.
Low Dose Naltrexone Counseling- I discussed with the patient the potential risks and side effects of low dose naltrexone including but not limited to: more vivid dreams, headaches, nausea, vomiting, abdominal pain, fatigue, dizziness, and anxiety.
Ebglyss Pregnancy And Lactation Text: This medication likely crosses the placenta but the risk for the fetus is uncertain. It is unknown if this medication is excreted in breast milk.
Olumiant Counseling: I discussed with the patient the risks of Olumiant therapy including but not limited to upper respiratory tract infections, shingles, cold sores, and nausea. Live vaccines should be avoided.  This medication has been linked to serious infections; higher rate of mortality; malignancy and lymphoproliferative disorders; major adverse cardiovascular events; thrombosis; gastrointestinal perforations; neutropenia; lymphopenia; anemia; liver enzyme elevations; and lipid elevations.
Cyclosporine Pregnancy And Lactation Text: This medication is Pregnancy Category C and it isn't know if it is safe during pregnancy. This medication is excreted in breast milk.
Imiquimod Counseling:  I discussed with the patient the risks of imiquimod including but not limited to erythema, scaling, itching, weeping, crusting, and pain.  Patient understands that the inflammatory response to imiquimod is variable from person to person and was educated regarded proper titration schedule.  If flu-like symptoms develop, patient knows to discontinue the medication and contact us.
Topical Steroids Applications Pregnancy And Lactation Text: Most topical steroids are considered safe to use during pregnancy and lactation.  Any topical steroid applied to the breast or nipple should be washed off before breastfeeding.
Metronidazole Pregnancy And Lactation Text: This medication is Pregnancy Category B and considered safe during pregnancy.  It is also excreted in breast milk.
Spironolactone Pregnancy And Lactation Text: This medication can cause feminization of the male fetus and should be avoided during pregnancy. The active metabolite is also found in breast milk.
Simponi Counseling:  I discussed with the patient the risks of golimumab including but not limited to myelosuppression, immunosuppression, autoimmune hepatitis, demyelinating diseases, lymphoma, and serious infections.  The patient understands that monitoring is required including a PPD at baseline and must alert us or the primary physician if symptoms of infection or other concerning signs are noted.
Imiquimod Pregnancy And Lactation Text: This medication is Pregnancy Category C. It is unknown if this medication is excreted in breast milk.
Clofazimine Pregnancy And Lactation Text: This medication is Pregnancy Category C and isn't considered safe during pregnancy. It is excreted in breast milk.
Rhofade Pregnancy And Lactation Text: This medication has not been assigned a Pregnancy Risk Category. It is unknown if the medication is excreted in breast milk.
Enbrel Counseling:  I discussed with the patient the risks of etanercept including but not limited to myelosuppression, immunosuppression, autoimmune hepatitis, demyelinating diseases, lymphoma, and infections.  The patient understands that monitoring is required including a PPD at baseline and must alert us or the primary physician if symptoms of infection or other concerning signs are noted.
Olumiant Pregnancy And Lactation Text: Based on animal studies, Olumiant may cause embryo-fetal harm when administered to pregnant women.  The medication should not be used in pregnancy.  Breastfeeding is not recommended during treatment.
Colchicine Counseling:  Patient counseled regarding adverse effects including but not limited to stomach upset (nausea, vomiting, stomach pain, or diarrhea).  Patient instructed to limit alcohol consumption while taking this medication.  Colchicine may reduce blood counts especially with prolonged use.  The patient understands that monitoring of kidney function and blood counts may be required, especially at baseline. The patient verbalized understanding of the proper use and possible adverse effects of colchicine.  All of the patient's questions and concerns were addressed.
Over the Counter Salicylic Acid Counseling: Over the counter salicylic acid preparations can be used effectively to treat warts at home. There are two types of application: liquid and plaster. Liquid preparations are applied like nail polish and the plaster applications are applied like a bandage (you may need to apply duct tape over the plaster to keep it in place). Dead and macerated skin should be removed regularly with a nail file or nail clippers for best results.
Aklief counseling:  Patient advised to apply a pea-sized amount only at bedtime and wait 30 minutes after washing their face before applying.  If too drying, patient may add a non-comedogenic moisturizer.  The most commonly reported side effects including irritation, redness, scaling, dryness, stinging, burning, itching, and increased risk of sunburn.  The patient verbalized understanding of the proper use and possible adverse effects of retinoids.  All of the patient's questions and concerns were addressed.
Methotrexate Counseling:  Patient counseled regarding adverse effects of methotrexate including but not limited to nausea, vomiting, abnormalities in liver function tests. Patients may develop mouth sores, rash, diarrhea, and abnormalities in blood counts. The patient understands that monitoring is required including LFT's and blood counts.  There is a rare possibility of scarring of the liver and lung problems that can occur when taking methotrexate. Persistent nausea, loss of appetite, pale stools, dark urine, cough, and shortness of breath should be reported immediately. Patient advised to discontinue methotrexate treatment at least three months before attempting to become pregnant.  I discussed the need for folate supplements while taking methotrexate.  These supplements can decrease side effects during methotrexate treatment. The patient verbalized understanding of the proper use and possible adverse effects of methotrexate.  All of the patient's questions and concerns were addressed.
Low Dose Naltrexone Pregnancy And Lactation Text: Naltrexone is pregnancy category C.  There have been no adequate and well-controlled studies in pregnant women.  It should be used in pregnancy only if the potential benefit justifies the potential risk to the fetus.   Limited data indicates that naltrexone is minimally excreted into breastmilk.
Topical Sulfur Applications Counseling: Topical Sulfur Counseling: Patient counseled that this medication may cause skin irritation or allergic reactions.  In the event of skin irritation, the patient was advised to reduce the amount of the drug applied or use it less frequently.   The patient verbalized understanding of the proper use and possible adverse effects of topical sulfur application.  All of the patient's questions and concerns were addressed.
Minocycline Counseling: Patient advised regarding possible photosensitivity and discoloration of the teeth, skin, lips, tongue and gums.  Patient instructed to avoid sunlight, if possible.  When exposed to sunlight, patients should wear protective clothing, sunglasses, and sunscreen.  The patient was instructed to call the office immediately if the following severe adverse effects occur:  hearing changes, easy bruising/bleeding, severe headache, or vision changes.  The patient verbalized understanding of the proper use and possible adverse effects of minocycline.  All of the patient's questions and concerns were addressed.
Otezla Pregnancy And Lactation Text: This medication is Pregnancy Category C and it isn't known if it is safe during pregnancy. It is unknown if it is excreted in breast milk.
Terbinafine Pregnancy And Lactation Text: This medication is Pregnancy Category B and is considered safe during pregnancy. It is also excreted in breast milk and breast feeding isn't recommended.
Elidel Counseling: Patient may experience a mild burning sensation during topical application. Elidel is not approved in children less than 2 years of age. There have been case reports of hematologic and skin malignancies in patients using topical calcineurin inhibitors although causality is questionable.
Nemluvio Counseling: I discussed with the patient the risks of nemolizumab including but not limited to headache, gastrointestinal complaints, nasopharyngitis, musculoskeletal complaints, injection site reactions, and allergic reactions. The patient understands that monitoring is required and they must alert us or the primary physician if any side effects are noted.
Bexarotene Counseling:  I discussed with the patient the risks of bexarotene including but not limited to hair loss, dry lips/skin/eyes, liver abnormalities, hyperlipidemia, pancreatitis, depression/suicidal ideation, photosensitivity, drug rash/allergic reactions, hypothyroidism, anemia, leukopenia, infection, cataracts, and teratogenicity.  Patient understands that they will need regular blood tests to check lipid profile, liver function tests, white blood cell count, thyroid function tests and pregnancy test if applicable.
Zyclara Counseling:  I discussed with the patient the risks of imiquimod including but not limited to erythema, scaling, itching, weeping, crusting, and pain.  Patient understands that the inflammatory response to imiquimod is variable from person to person and was educated regarded proper titration schedule.  If flu-like symptoms develop, patient knows to discontinue the medication and contact us.
Calcipotriene Counseling:  I discussed with the patient the risks of calcipotriene including but not limited to erythema, scaling, itching, and irritation.
Opzelura Pregnancy And Lactation Text: There is insufficient data to evaluate drug-associated risk for major birth defects, miscarriage, or other adverse maternal or fetal outcomes.  There is a pregnancy registry that monitors pregnancy outcomes in pregnant persons exposed to the medication during pregnancy.  It is unknown if this medication is excreted in breast milk.  Do not breastfeed during treatment and for about 4 weeks after the last dose.
Cimzia Counseling:  I discussed with the patient the risks of Cimzia including but not limited to immunosuppression, allergic reactions and infections.  The patient understands that monitoring is required including a PPD at baseline and must alert us or the primary physician if symptoms of infection or other concerning signs are noted.
Azathioprine Counseling:  I discussed with the patient the risks of azathioprine including but not limited to myelosuppression, immunosuppression, hepatotoxicity, lymphoma, and infections.  The patient understands that monitoring is required including baseline LFTs, Creatinine, possible TPMP genotyping and weekly CBCs for the first month and then every 2 weeks thereafter.  The patient verbalized understanding of the proper use and possible adverse effects of azathioprine.  All of the patient's questions and concerns were addressed.
Dapsone Pregnancy And Lactation Text: This medication is Pregnancy Category C and is not considered safe during pregnancy or breast feeding.
Clindamycin Counseling: I counseled the patient regarding use of clindamycin as an antibiotic for prophylactic and/or therapeutic purposes. Clindamycin is active against numerous classes of bacteria, including skin bacteria. Side effects may include nausea, diarrhea, gastrointestinal upset, rash, hives, yeast infections, and in rare cases, colitis.
Topical Clindamycin Counseling: Patient counseled that this medication may cause skin irritation or allergic reactions.  In the event of skin irritation, the patient was advised to reduce the amount of the drug applied or use it less frequently.   The patient verbalized understanding of the proper use and possible adverse effects of clindamycin.  All of the patient's questions and concerns were addressed.
Nemluvio Pregnancy And Lactation Text: It is not known if Nemluvio causes fetal harm or is present in breast milk. Please proceed with caution if patients who are pregnant or breastfeeding.
Azathioprine Pregnancy And Lactation Text: This medication is Pregnancy Category D and isn't considered safe during pregnancy. It is unknown if this medication is excreted in breast milk.
Calcipotriene Pregnancy And Lactation Text: The use of this medication during pregnancy or lactation is not recommended as there is insufficient data.
Bexarotene Pregnancy And Lactation Text: This medication is Pregnancy Category X and should not be given to women who are pregnant or may become pregnant. This medication should not be used if you are breast feeding.
Topical Clindamycin Pregnancy And Lactation Text: This medication is Pregnancy Category B and is considered safe during pregnancy. It is unknown if it is excreted in breast milk.
Gabapentin Counseling: I discussed with the patient the risks of gabapentin including but not limited to dizziness, somnolence, fatigue and ataxia.
Oxybutynin Counseling:  I discussed with the patient the risks of oxybutynin including but not limited to skin rash, drowsiness, dry mouth, difficulty urinating, and blurred vision.
Picato Counseling:  I discussed with the patient the risks of Picato including but not limited to erythema, scaling, itching, weeping, crusting, and pain.
Xolair Counseling:  Patient informed of potential adverse effects including but not limited to fever, muscle aches, rash and allergic reactions.  The patient verbalized understanding of the proper use and possible adverse effects of Xolair.  All of the patient's questions and concerns were addressed.
Semaglutide Counseling: I reviewed the possible side effects including: thyroid tumors, kidney disease, gallbladder disease, abdominal pain, constipation, diarrhea, nausea, vomiting and pancreatitis. Do not take this medication if you have a history or family history of multiple endocrine neoplasia syndrome type 2. Side effects reviewed, pt to contact office should one occur.
Clindamycin Pregnancy And Lactation Text: This medication can be used in pregnancy if certain situations. Clindamycin is also present in breast milk.
Cimzia Pregnancy And Lactation Text: This medication crosses the placenta but can be considered safe in certain situations. Cimzia may be excreted in breast milk.
Albendazole Counseling:  I discussed with the patient the risks of albendazole including but not limited to cytopenia, kidney damage, nausea/vomiting and severe allergy.  The patient understands that this medication is being used in an off-label manner.
Doxycycline Counseling:  Patient counseled regarding possible photosensitivity and increased risk for sunburn.  Patient instructed to avoid sunlight, if possible.  When exposed to sunlight, patients should wear protective clothing, sunglasses, and sunscreen.  The patient was instructed to call the office immediately if the following severe adverse effects occur:  hearing changes, easy bruising/bleeding, severe headache, or vision changes.  The patient verbalized understanding of the proper use and possible adverse effects of doxycycline.  All of the patient's questions and concerns were addressed.
Cellcept Counseling:  I discussed with the patient the risks of mycophenolate mofetil including but not limited to infection/immunosuppression, GI upset, hypokalemia, hypercholesterolemia, bone marrow suppression, lymphoproliferative disorders, malignancy, GI ulceration/bleed/perforation, colitis, interstitial lung disease, kidney failure, progressive multifocal leukoencephalopathy, and birth defects.  The patient understands that monitoring is required including a baseline creatinine and regular CBC testing. In addition, patient must alert us immediately if symptoms of infection or other concerning signs are noted.
Cantharidin Counseling:  I discussed with the patient the risks of Cantharidin including but not limited to pain, redness, burning, itching, and blistering.
Rituxan Counseling:  I discussed with the patient the risks of Rituxan infusions. Side effects can include infusion reactions, severe drug rashes including mucocutaneous reactions, reactivation of latent hepatitis and other infections and rarely progressive multifocal leukoencephalopathy.  All of the patient's questions and concerns were addressed.
Isotretinoin Counseling: Patient should get monthly blood tests, not donate blood, not drive at night if vision affected, not share medication, and not undergo elective surgery for 6 months after tx completed. Side effects reviewed, pt to contact office should one occur.
Eucrisa Counseling: Patient may experience a mild burning sensation during topical application. Eucrisa is not approved in children less than 2 years of age.
Protopic Counseling: Patient may experience a mild burning sensation during topical application. Protopic is not approved in children less than 2 years of age. There have been case reports of hematologic and skin malignancies in patients using topical calcineurin inhibitors although causality is questionable.
Propranolol Counseling:  I discussed with the patient the risks of propranolol including but not limited to low heart rate, low blood pressure, low blood sugar, restlessness and increased cold sensitivity. They should call the office if they experience any of these side effects.
Rituxan Pregnancy And Lactation Text: This medication is Pregnancy Category C and it isn't know if it is safe during pregnancy. It is unknown if this medication is excreted in breast milk but similar antibodies are known to be excreted.
Isotretinoin Pregnancy And Lactation Text: This medication is Pregnancy Category X and is considered extremely dangerous during pregnancy. It is unknown if it is excreted in breast milk.
5-Fu Counseling: 5-Fluorouracil Counseling:  I discussed with the patient the risks of 5-fluorouracil including but not limited to erythema, scaling, itching, weeping, crusting, and pain.
Topical Ketoconazole Counseling: Patient counseled that this medication may cause skin irritation or allergic reactions.  In the event of skin irritation, the patient was advised to reduce the amount of the drug applied or use it less frequently.   The patient verbalized understanding of the proper use and possible adverse effects of ketoconazole.  All of the patient's questions and concerns were addressed.
Cosentyx Counseling:  I discussed with the patient the risks of Cosentyx including but not limited to worsening of Crohn's disease, immunosuppression, allergic reactions and infections.  The patient understands that monitoring is required including a PPD at baseline and must alert us or the primary physician if symptoms of infection or other concerning signs are noted.
Xolair Pregnancy And Lactation Text: This medication is Pregnancy Category B and is considered safe during pregnancy. This medication is excreted in breast milk.
Glycopyrrolate Counseling:  I discussed with the patient the risks of glycopyrrolate including but not limited to skin rash, drowsiness, dry mouth, difficulty urinating, and blurred vision.
Doxycycline Pregnancy And Lactation Text: This medication is Pregnancy Category D and not consider safe during pregnancy. It is also excreted in breast milk but is considered safe for shorter treatment courses.
Cibinqo Counseling: I discussed with the patient the risks of Cibinqo therapy including but not limited to common cold, nausea, headache, cold sores, increased blood CPK levels, dizziness, UTIs, fatigue, acne, and vomitting. Live vaccines should be avoided.  This medication has been linked to serious infections; higher rate of mortality; malignancy and lymphoproliferative disorders; major adverse cardiovascular events; thrombosis; thrombocytopenia and lymphopenia; lipid elevations; and retinal detachment.
Ivermectin Counseling:  Patient instructed to take medication on an empty stomach with a full glass of water.  Patient informed of potential adverse effects including but not limited to nausea, diarrhea, dizziness, itching, and swelling of the extremities or lymph nodes.  The patient verbalized understanding of the proper use and possible adverse effects of ivermectin.  All of the patient's questions and concerns were addressed.
Wegovy Counseling: I reviewed the possible side effects including: thyroid tumors, kidney disease, gallbladder disease, abdominal pain, constipation, diarrhea, nausea, vomiting and pancreatitis. Do not take this medication if you have a history or family history of multiple endocrine neoplasia syndrome type 2. Side effects reviewed, pt to contact office should one occur.
Siliq Counseling:  I discussed with the patient the risks of Siliq including but not limited to new or worsening depression, suicidal thoughts and behavior, immunosuppression, malignancy, posterior leukoencephalopathy syndrome, and serious infections.  The patient understands that monitoring is required including a PPD at baseline and must alert us or the primary physician if symptoms of infection or other concerning signs are noted. There is also a special program designed to monitor depression which is required with Siliq.
High Dose Vitamin A Counseling: Side effects reviewed, pt to contact office should one occur.
Propranolol Pregnancy And Lactation Text: This medication is Pregnancy Category C and it isn't known if it is safe during pregnancy. It is excreted in breast milk.
5-Fu Pregnancy And Lactation Text: This medication is Pregnancy Category X and contraindicated in pregnancy and in women who may become pregnant. It is unknown if this medication is excreted in breast milk.
Protopic Pregnancy And Lactation Text: This medication is Pregnancy Category C. It is unknown if this medication is excreted in breast milk when applied topically.
Soolantra Pregnancy And Lactation Text: This medication is Pregnancy Category C. This medication is considered safe during breast feeding.
Azithromycin Pregnancy And Lactation Text: This medication is considered safe during pregnancy and is also secreted in breast milk.
Stelara Counseling:  I discussed with the patient the risks of ustekinumab including but not limited to immunosuppression, malignancy, posterior leukoencephalopathy syndrome, and serious infections.  The patient understands that monitoring is required including a PPD at baseline and must alert us or the primary physician if symptoms of infection or other concerning signs are noted.
Doxepin Pregnancy And Lactation Text: This medication is Pregnancy Category C and it isn't known if it is safe during pregnancy. It is also excreted in breast milk and breast feeding isn't recommended.
Finasteride Male Counseling: Finasteride Counseling:  I discussed with the patient the risks of use of finasteride including but not limited to decreased libido, decreased ejaculate volume, gynecomastia, and depression. Women should not handle medication.  All of the patient's questions and concerns were addressed.
Olanzapine Counseling- I discussed with the patient the common side effects of olanzapine including but are not limited to: lack of energy, dry mouth, increased appetite, sleepiness, tremor, constipation, dizziness, changes in behavior, or restlessness.  Explained that teenagers are more likely to experience headaches, abdominal pain, pain in the arms or legs, tiredness, and sleepiness.  Serious side effects include but are not limited: increased risk of death in elderly patients who are confused, have memory loss, or dementia-related psychosis; hyperglycemia; increased cholesterol and triglycerides; and weight gain.
Itraconazole Counseling:  I discussed with the patient the risks of itraconazole including but not limited to liver damage, nausea/vomiting, neuropathy, and severe allergy.  The patient understands that this medication is best absorbed when taken with acidic beverages such as non-diet cola or ginger ale.  The patient understands that monitoring is required including baseline LFTs and repeat LFTs at intervals.  The patient understands that they are to contact us or the primary physician if concerning signs are noted.
Winlevi Pregnancy And Lactation Text: This medication is considered safe during pregnancy and breastfeeding.
VTAMA Counseling: I discussed with the patient that VTAMA is not for use in the eyes, mouth or mouth. They should call the office if they develop any signs of allergic reactions to VTAMA. The patient verbalized understanding of the proper use and possible adverse effects of VTAMA.  All of the patient's questions and concerns were addressed.
Olanzapine Pregnancy And Lactation Text: This medication is pregnancy category C.   There are no adequate and well controlled trials with olanzapine in pregnant females.  Olanzapine should be used during pregnancy only if the potential benefit justifies the potential risk to the fetus.   In a study in lactating healthy women, olanzapine was excreted in breast milk.  It is recommended that women taking olanzapine should not breast feed.
Finasteride Female Counseling: Finasteride Counseling:  I discussed with the patient the risks of use of finasteride including but not limited to decreased libido and sexual dysfunction. Explained the teratogenic nature of the medication and stressed the importance of not getting pregnant during treatment. All of the patient's questions and concerns were addressed.
Itraconazole Pregnancy And Lactation Text: This medication is Pregnancy Category C and it isn't know if it is safe during pregnancy. It is also excreted in breast milk.
Ilumya Counseling: I discussed with the patient the risks of tildrakizumab including but not limited to immunosuppression, malignancy, posterior leukoencephalopathy syndrome, and serious infections.  The patient understands that monitoring is required including a PPD at baseline and must alert us or the primary physician if symptoms of infection or other concerning signs are noted.
Sarecycline Counseling: Patient advised regarding possible photosensitivity and discoloration of the teeth, skin, lips, tongue and gums.  Patient instructed to avoid sunlight, if possible.  When exposed to sunlight, patients should wear protective clothing, sunglasses, and sunscreen.  The patient was instructed to call the office immediately if the following severe adverse effects occur:  hearing changes, easy bruising/bleeding, severe headache, or vision changes.  The patient verbalized understanding of the proper use and possible adverse effects of sarecycline.  All of the patient's questions and concerns were addressed.
Benzoyl Peroxide Counseling: Patient counseled that medicine may cause skin irritation and bleach clothing.  In the event of skin irritation, the patient was advised to reduce the amount of the drug applied or use it less frequently.   The patient verbalized understanding of the proper use and possible adverse effects of benzoyl peroxide.  All of the patient's questions and concerns were addressed.
Adbry Counseling: I discussed with the patient the risks of tralokinumab including but not limited to eye infection and irritation, cold sores, injection site reactions, worsening of asthma, allergic reactions and increased risk of parasitic infection.  Live vaccines should be avoided while taking tralokinumab. The patient understands that monitoring is required and they must alert us or the primary physician if symptoms of infection or other concerning signs are noted.
Hydroxyzine Counseling: Patient advised that the medication is sedating and not to drive a car after taking this medication.  Patient informed of potential adverse effects including but not limited to dry mouth, urinary retention, and blurry vision.  The patient verbalized understanding of the proper use and possible adverse effects of hydroxyzine.  All of the patient's questions and concerns were addressed.
Topical Retinoid counseling:  Patient advised to apply a pea-sized amount only at bedtime and wait 30 minutes after washing their face before applying.  If too drying, patient may add a non-comedogenic moisturizer. The patient verbalized understanding of the proper use and possible adverse effects of retinoids.  All of the patient's questions and concerns were addressed.
Bactrim Counseling:  I discussed with the patient the risks of sulfa antibiotics including but not limited to GI upset, allergic reaction, drug rash, diarrhea, dizziness, photosensitivity, and yeast infections.  Rarely, more serious reactions can occur including but not limited to aplastic anemia, agranulocytosis, methemoglobinemia, blood dyscrasias, liver or kidney failure, lung infiltrates or desquamative/blistering drug rashes.
Benzoyl Peroxide Pregnancy And Lactation Text: This medication is Pregnancy Category C. It is unknown if benzoyl peroxide is excreted in breast milk.
Ketoconazole Counseling:   Patient counseled regarding improving absorption with orange juice.  Adverse effects include but are not limited to breast enlargement, headache, diarrhea, nausea, upset stomach, liver function test abnormalities, taste disturbance, and stomach pain.  There is a rare possibility of liver failure that can occur when taking ketoconazole. The patient understands that monitoring of LFTs may be required, especially at baseline. The patient verbalized understanding of the proper use and possible adverse effects of ketoconazole.  All of the patient's questions and concerns were addressed.
Finasteride Pregnancy And Lactation Text: This medication is absolutely contraindicated during pregnancy. It is unknown if it is excreted in breast milk.
Libtayo Counseling- I discussed with the patient the risks of Libtayo including but not limited to nausea, vomiting, diarrhea, and bone or muscle pain.  The patient verbalized understanding of the proper use and possible adverse effects of Libtayo.  All of the patient's questions and concerns were addressed.
Oral Minoxidil Counseling- I discussed with the patient the risks of oral minoxidil including but not limited to shortness of breath, swelling of the feet or ankles, dizziness, lightheadedness, unwanted hair growth and allergic reaction.  The patient verbalized understanding of the proper use and possible adverse effects of oral minoxidil.  All of the patient's questions and concerns were addressed.
Sarecycline Pregnancy And Lactation Text: This medication is Pregnancy Category D and not consider safe during pregnancy. It is also excreted in breast milk.
Vtama Pregnancy And Lactation Text: It is unknown if this medication can cause problems during pregnancy and breastfeeding.
Mirvaso Counseling: Mirvaso is a topical medication which can decrease superficial blood flow where applied. Side effects are uncommon and include stinging, redness and allergic reactions.
Libtayo Pregnancy And Lactation Text: This medication is contraindicated in pregnancy and when breast feeding.
Taltz Counseling: I discussed with the patient the risks of ixekizumab including but not limited to immunosuppression, serious infections, worsening of inflammatory bowel disease and drug reactions.  The patient understands that monitoring is required including a PPD at baseline and must alert us or the primary physician if symptoms of infection or other concerning signs are noted.
Adbry Pregnancy And Lactation Text: It is unknown if this medication will adversely affect pregnancy or breast feeding.
Zoryve Counseling:  I discussed with the patient that Zoryve is not for use in the eyes, mouth or vagina. The most commonly reported side effects include diarrhea, headache, insomnia, application site pain, upper respiratory tract infections, and urinary tract infections.  All of the patient's questions and concerns were addressed.
Ozempic Counseling: I reviewed the possible side effects including: thyroid tumors, kidney disease, gallbladder disease, abdominal pain, constipation, diarrhea, nausea, vomiting and pancreatitis. Do not take this medication if you have a history or family history of multiple endocrine neoplasia syndrome type 2. Side effects reviewed, pt to contact office should one occur.
Bactrim Pregnancy And Lactation Text: This medication is Pregnancy Category D and is known to cause fetal risk.  It is also excreted in breast milk.
Hydroxyzine Pregnancy And Lactation Text: This medication is not safe during pregnancy and should not be taken. It is also excreted in breast milk and breast feeding isn't recommended.
Ketoconazole Pregnancy And Lactation Text: This medication is Pregnancy Category C and it isn't know if it is safe during pregnancy. It is also excreted in breast milk and breast feeding isn't recommended.
Tazorac Counseling:  Patient advised that medication is irritating and drying.  Patient may need to apply sparingly and wash off after an hour before eventually leaving it on overnight.  The patient verbalized understanding of the proper use and possible adverse effects of tazorac.  All of the patient's questions and concerns were addressed.
Infliximab Counseling:  I discussed with the patient the risks of infliximab including but not limited to myelosuppression, immunosuppression, autoimmune hepatitis, demyelinating diseases, lymphoma, and serious infections.  The patient understands that monitoring is required including a PPD at baseline and must alert us or the primary physician if symptoms of infection or other concerning signs are noted.
Carac Counseling:  I discussed with the patient the risks of Carac including but not limited to erythema, scaling, itching, weeping, crusting, and pain.
Oral Minoxidil Pregnancy And Lactation Text: This medication should only be used when clearly needed if you are pregnant, attempting to become pregnant or breast feeding.
Tetracycline Counseling: Patient counseled regarding possible photosensitivity and increased risk for sunburn.  Patient instructed to avoid sunlight, if possible.  When exposed to sunlight, patients should wear protective clothing, sunglasses, and sunscreen.  The patient was instructed to call the office immediately if the following severe adverse effects occur:  hearing changes, easy bruising/bleeding, severe headache, or vision changes.  The patient verbalized understanding of the proper use and possible adverse effects of tetracycline.  All of the patient's questions and concerns were addressed. Patient understands to avoid pregnancy while on therapy due to potential birth defects.
Acitretin Counseling:  I discussed with the patient the risks of acitretin including but not limited to hair loss, dry lips/skin/eyes, liver damage, hyperlipidemia, depression/suicidal ideation, photosensitivity.  Serious rare side effects can include but are not limited to pancreatitis, pseudotumor cerebri, bony changes, clot formation/stroke/heart attack.  Patient understands that alcohol is contraindicated since it can result in liver toxicity and significantly prolong the elimination of the drug by many years.
Birth Control Pills Counseling: Birth Control Pill Counseling: I discussed with the patient the potential side effects of OCPs including but not limited to increased risk of stroke, heart attack, thrombophlebitis, deep venous thrombosis, hepatic adenomas, breast changes, GI upset, headaches, and depression.  The patient verbalized understanding of the proper use and possible adverse effects of OCPs. All of the patient's questions and concerns were addressed.
Birth Control Pills Pregnancy And Lactation Text: This medication should be avoided if pregnant and for the first 30 days post-partum.
Opzelura Counseling:  I discussed with the patient the risks of Opzelura including but not limited to nasopharngitis, bronchitis, ear infection, eosinophila, hives, diarrhea, folliculitis, tonsillitis, and rhinorrhea.  Taken orally, this medication has been linked to serious infections; higher rate of mortality; malignancy and lymphoproliferative disorders; major adverse cardiovascular events; thrombosis; thrombocytopenia, anemia, and neutropenia; and lipid elevations.
Otezla Counseling: The side effects of Otezla were discussed with the patient, including but not limited to worsening or new depression, weight loss, diarrhea, nausea, upper respiratory tract infection, and headache. Patient instructed to call the office should any adverse effect occur.  The patient verbalized understanding of the proper use and possible adverse effects of Otezla.  All the patient's questions and concerns were addressed.
Odomzo Counseling- I discussed with the patient the risks of Odomzo including but not limited to nausea, vomiting, diarrhea, constipation, weight loss, changes in the sense of taste, decreased appetite, muscle spasms, and hair loss.  The patient verbalized understanding of the proper use and possible adverse effects of Odomzo.  All of the patient's questions and concerns were addressed.
Cephalexin Counseling: I counseled the patient regarding use of cephalexin as an antibiotic for prophylactic and/or therapeutic purposes. Cephalexin (commonly prescribed under brand name Keflex) is a cephalosporin antibiotic which is active against numerous classes of bacteria, including most skin bacteria. Side effects may include nausea, diarrhea, gastrointestinal upset, rash, hives, yeast infections, and in rare cases, hepatitis, kidney disease, seizures, fever, confusion, neurologic symptoms, and others. Patients with severe allergies to penicillin medications are cautioned that there is about a 10% incidence of cross-reactivity with cephalosporins. When possible, patients with penicillin allergies should use alternatives to cephalosporins for antibiotic therapy.
Bimzelx Counseling:  I discussed with the patient the risks of Bimzelx including but not limited to depression, immunosuppression, allergic reactions and infections.  The patient understands that monitoring is required including a PPD at baseline and must alert us or the primary physician if symptoms of infection or other concerning signs are noted.
Tazorac Pregnancy And Lactation Text: This medication is not safe during pregnancy. It is unknown if this medication is excreted in breast milk.
Dapsone Counseling: I discussed with the patient the risks of dapsone including but not limited to hemolytic anemia, agranulocytosis, rashes, methemoglobinemia, kidney failure, peripheral neuropathy, headaches, GI upset, and liver toxicity.  Patients who start dapsone require monitoring including baseline LFTs and weekly CBCs for the first month, then every month thereafter.  The patient verbalized understanding of the proper use and possible adverse effects of dapsone.  All of the patient's questions and concerns were addressed.
Bimzelx Pregnancy And Lactation Text: This medication crosses the placenta and the safety is uncertain during pregnancy. It is unknown if this medication is present in breast milk.
Tremfya Counseling: I discussed with the patient the risks of guselkumab including but not limited to immunosuppression, serious infections, and drug reactions.  The patient understands that monitoring is required including a PPD at baseline and must alert us or the primary physician if symptoms of infection or other concerning signs are noted.
Cephalexin Pregnancy And Lactation Text: This medication is Pregnancy Category B and considered safe during pregnancy.  It is also excreted in breast milk but can be used safely for shorter doses.
Saxenda Counseling: I reviewed the possible side effects including: thyroid tumors, kidney disease, gallbladder disease, abdominal pain, constipation, diarrhea, nausea, vomiting and pancreatitis. Do not take this medication if you have a history or family history of multiple endocrine neoplasia syndrome type 2. Side effects reviewed, pt to contact office should one occur.
Acitretin Pregnancy And Lactation Text: This medication is Pregnancy Category X and should not be given to women who are pregnant or may become pregnant in the future. This medication is excreted in breast milk.
Terbinafine Counseling: Patient counseling regarding adverse effects of terbinafine including but not limited to headache, diarrhea, rash, upset stomach, liver function test abnormalities, itching, taste/smell disturbance, nausea, abdominal pain, and flatulence.  There is a rare possibility of liver failure that can occur when taking terbinafine.  The patient understands that a baseline LFT and kidney function test may be required. The patient verbalized understanding of the proper use and possible adverse effects of terbinafine.  All of the patient's questions and concerns were addressed.
Klisyri Counseling:  I discussed with the patient the risks of Klisyri including but not limited to erythema, scaling, itching, weeping, crusting, and pain.
Topical Sulfur Applications Pregnancy And Lactation Text: This medication is considered safe during pregnancy and breast feeding secondary to limited systemic absorption.
Methotrexate Pregnancy And Lactation Text: This medication is Pregnancy Category X and is known to cause fetal harm. This medication is excreted in breast milk.
Detail Level: Simple
Tranexamic Acid Counseling:  Patient advised of the small risk of bleeding problems with tranexamic acid. They were also instructed to call if they developed any nausea, vomiting or diarrhea. All of the patient's questions and concerns were addressed.
Include Pregnancy/Lactation Warning?: Add Automatically Based on Childbearing Potential and Patient Age
Over The Counter Salicylic Acid Pregnancy And Lactation Text: It is not recommended to use high dose OTC salicylic acid while pregnant. Lower dose topical preparations are considered safe.
Aklief Pregnancy And Lactation Text: It is unknown if this medication is safe to use during pregnancy.  It is unknown if this medication is excreted in breast milk.  Breastfeeding women should use the topical cream on the smallest area of the skin for the shortest time needed while breastfeeding.  Do not apply to nipple and areola.
Niacinamide Counseling: I recommended taking niacin or niacinamide, also know as vitamin B3, twice daily. Recent evidence suggests that taking vitamin B3 (500 mg twice daily) can reduce the risk of actinic keratoses and non-melanoma skin cancers. Side effects of vitamin B3 include flushing and headache.
Fluconazole Counseling:  Patient counseled regarding adverse effects of fluconazole including but not limited to headache, diarrhea, nausea, upset stomach, liver function test abnormalities, taste disturbance, and stomach pain.  There is a rare possibility of liver failure that can occur when taking fluconazole.  The patient understands that monitoring of LFTs and kidney function test may be required, especially at baseline. The patient verbalized understanding of the proper use and possible adverse effects of fluconazole.  All of the patient's questions and concerns were addressed.
Rinvoq Counseling: I discussed with the patient the risks of Rinvoq therapy including but not limited to upper respiratory tract infections, shingles, cold sores, bronchitis, nausea, cough, fever, acne, and headache. Live vaccines should be avoided.  This medication has been linked to serious infections; higher rate of mortality; malignancy and lymphoproliferative disorders; major adverse cardiovascular events; thrombosis; thrombocytopenia, anemia, and neutropenia; lipid elevations; liver enzyme elevations; and gastrointestinal perforations.
Klisyri Pregnancy And Lactation Text: It is unknown if this medication can harm a developing fetus or if it is excreted in breast milk.
Wartpeel Counseling:  I discussed with the patient the risks of Wartpeel including but not limited to erythema, scaling, itching, weeping, crusting, and pain.
Dutasteride Male Counseling: Dustasteride Counseling:  I discussed with the patient the risks of use of dutasteride including but not limited to decreased libido, decreased ejaculate volume, and gynecomastia. Women who can become pregnant should not handle medication.  All of the patient's questions and concerns were addressed.
Niacinamide Pregnancy And Lactation Text: These medications are considered safe during pregnancy.
Humira Counseling:  I discussed with the patient the risks of adalimumab including but not limited to myelosuppression, immunosuppression, autoimmune hepatitis, demyelinating diseases, lymphoma, and serious infections.  The patient understands that monitoring is required including a PPD at baseline and must alert us or the primary physician if symptoms of infection or other concerning signs are noted.
Quinolones Counseling:  I discussed with the patient the risks of fluoroquinolones including but not limited to GI upset, allergic reaction, drug rash, diarrhea, dizziness, photosensitivity, yeast infections, liver function test abnormalities, tendonitis/tendon rupture.
Rinvoq Pregnancy And Lactation Text: Based on animal studies, Rinvoq may cause embryo-fetal harm when administered to pregnant women.  The medication should not be used in pregnancy.  Breastfeeding is not recommended during treatment and for 6 days after the last dose.
Amzeeq Counseling: Amzeeq is a topical antibiotic foam which contains minocycline.  The most commonly reported side effect of Amzeeq is headache.  The medication is flammable and should not be applied near a fire, flame, or while smoking.  Sun precautions is recommended to prevent sunburn.
Tranexamic Acid Pregnancy And Lactation Text: It is unknown if this medication is safe during pregnancy or breast feeding.
Prednisone Counseling:  I discussed with the patient the risks of prolonged use of prednisone including but not limited to weight gain, insomnia, osteoporosis, mood changes, diabetes, susceptibility to infection, glaucoma and high blood pressure.  In cases where prednisone use is prolonged, patients should be monitored with blood pressure checks, serum glucose levels and an eye exam.  Additionally, the patient may need to be placed on GI prophylaxis, PCP prophylaxis, and calcium and vitamin D supplementation and/or a bisphosphonate.  The patient verbalized understanding of the proper use and the possible adverse effects of prednisone.  All of the patient's questions and concerns were addressed.
Skyrizi Counseling: I discussed with the patient the risks of risankizumab-rzaa including but not limited to immunosuppression, and serious infections.  The patient understands that monitoring is required including a PPD at baseline and must alert us or the primary physician if symptoms of infection or other concerning signs are noted.
Valtrex Counseling: I discussed with the patient the risks of valacyclovir including but not limited to kidney damage, nausea, vomiting and severe allergy.  The patient understands that if the infection seems to be worsening or is not improving, they are to call.
Cimetidine Counseling:  I discussed with the patient the risks of Cimetidine including but not limited to gynecomastia, headache, diarrhea, nausea, drowsiness, arrhythmias, pancreatitis, skin rashes, psychosis, bone marrow suppression and kidney toxicity.
Solaraze Counseling:  I discussed with the patient the risks of Solaraze including but not limited to erythema, scaling, itching, weeping, crusting, and pain.
Xeljanz Counseling: I discussed with the patient the risks of Xeljanz therapy including increased risk of infection, liver issues, headache, diarrhea, or cold symptoms. Live vaccines should be avoided. They were instructed to call if they have any problems.
Opioid Counseling: I discussed with the patient the potential side effects of opioids including but not limited to addiction, altered mental status, and depression. I stressed avoiding alcohol, benzodiazepines, muscle relaxants and sleep aids unless specifically okayed by a physician. The patient verbalized understanding of the proper use and possible adverse effects of opioids. All of the patient's questions and concerns were addressed. They were instructed to flush the remaining pills down the toilet if they did not need them for pain.
Griseofulvin Counseling:  I discussed with the patient the risks of griseofulvin including but not limited to photosensitivity, cytopenia, liver damage, nausea/vomiting and severe allergy.  The patient understands that this medication is best absorbed when taken with a fatty meal (e.g., ice cream or french fries).
Dutasteride Female Counseling: Dutasteride Counseling:  I discussed with the patient the risks of use of dutasteride including but not limited to decreased libido and sexual dysfunction. Explained the teratogenic nature of the medication and stressed the importance of not getting pregnant during treatment. All of the patient's questions and concerns were addressed.
Nsaids Counseling: NSAID Counseling: I discussed with the patient that NSAIDs should be taken with food. Prolonged use of NSAIDs can result in the development of stomach ulcers.  Patient advised to stop taking NSAIDs if abdominal pain occurs.  The patient verbalized understanding of the proper use and possible adverse effects of NSAIDs.  All of the patient's questions and concerns were addressed.
Amzeeq Pregnancy And Lactation Text: It is not recommended to use the product if you are pregnant.
Latisse Counseling: Lattise Counseling: I reviewed the possible side-effects of Latisse including itching, eye irritation, discoloration and exacerbating glaucoma. I also discussed application methods.
Spevigo Counseling: I discussed with the patient the risks of Spevigo including but not limited to fatigue, nasuea, vomiting, headache, pruritus, urinary tract infection, an infusion related reactions.  The patient understands that monitoring is required including screening for tuberculosis at baseline and yearly screening thereafter while continuing Spevigo therapy. They should contact us if symptoms of infection or other concerning signs are noted.
Latisse Pregnancy And Lactation Text: It is not recommended to use Latisse if you are pregnant or trying to become pregnant.
Winlevi Counseling:  I discussed with the patient the risks of topical clascoterone including but not limited to erythema, scaling, itching, and stinging. Patient voiced their understanding.
Nsaids Pregnancy And Lactation Text: These medications are considered safe up to 30 weeks gestation. It is excreted in breast milk.
Cantharidin Pregnancy And Lactation Text: This medication has not been proven safe during pregnancy. It is unknown if this medication is excreted in breast milk.
Valtrex Pregnancy And Lactation Text: this medication is Pregnancy Category B and is considered safe during pregnancy. This medication is not directly found in breast milk but it's metabolite acyclovir is present.
Solaraze Pregnancy And Lactation Text: This medication is Pregnancy Category B and is considered safe. There is some data to suggest avoiding during the third trimester. It is unknown if this medication is excreted in breast milk.
Hyrimoz Counseling:  I discussed with the patient the risks of adalimumab including but not limited to myelosuppression, immunosuppression, autoimmune hepatitis, demyelinating diseases, lymphoma, and serious infections.  The patient understands that monitoring is required including a PPD at baseline and must alert us or the primary physician if symptoms of infection or other concerning signs are noted.
Griseofulvin Pregnancy And Lactation Text: This medication is Pregnancy Category X and is known to cause serious birth defects. It is unknown if this medication is excreted in breast milk but breast feeding should be avoided.
Xeldamariz Pregnancy And Lactation Text: This medication is Pregnancy Category D and is not considered safe during pregnancy.  The risk during breast feeding is also uncertain.
Soolantra Counseling: I discussed with the patients the risks of topial Soolantra. This is a medicine which decreases the number of mites and inflammation in the skin. You experience burning, stinging, eye irritation or allergic reactions.  Please call our office if you develop any problems from using this medication.
Dutasteride Pregnancy And Lactation Text: This medication is absolutely contraindicated in women, especially during pregnancy and breast feeding. Feminization of male fetuses is possible if taking while pregnant.
Azelaic Acid Counseling: Patient counseled that medicine may cause skin irritation and to avoid applying near the eyes.  In the event of skin irritation, the patient was advised to reduce the amount of the drug applied or use it less frequently.   The patient verbalized understanding of the proper use and possible adverse effects of azelaic acid.  All of the patient's questions and concerns were addressed.
Rifampin Counseling: I discussed with the patient the risks of rifampin including but not limited to liver damage, kidney damage, red-orange body fluids, nausea/vomiting and severe allergy.
Opioid Pregnancy And Lactation Text: These medications can lead to premature delivery and should be avoided during pregnancy. These medications are also present in breast milk in small amounts.
Rifampin Pregnancy And Lactation Text: This medication is Pregnancy Category C and it isn't know if it is safe during pregnancy. It is also excreted in breast milk and should not be used if you are breast feeding.
Minoxidil Counseling: Minoxidil is a topical medication which can increase blood flow where it is applied. It is uncertain how this medication increases hair growth. Side effects are uncommon and include stinging and allergic reactions.
Erivedge Counseling- I discussed with the patient the risks of Erivedge including but not limited to nausea, vomiting, diarrhea, constipation, weight loss, changes in the sense of taste, decreased appetite, muscle spasms, and hair loss.  The patient verbalized understanding of the proper use and possible adverse effects of Erivedge.  All of the patient's questions and concerns were addressed.
Spevigo Pregnancy And Lactation Text: The risk during pregnancy and breastfeeding is uncertain with this medication. This medication does cross the placenta. It is unknown if this medication is found in breast milk.
Azithromycin Counseling:  I discussed with the patient the risks of azithromycin including but not limited to GI upset, allergic reaction, drug rash, diarrhea, and yeast infections.
Use Enhanced Medication Counseling?: No
Doxepin Counseling:  Patient advised that the medication is sedating and not to drive a car after taking this medication. Patient informed of potential adverse effects including but not limited to dry mouth, urinary retention, and blurry vision.  The patient verbalized understanding of the proper use and possible adverse effects of doxepin.  All of the patient's questions and concerns were addressed.

## 2025-07-30 ENCOUNTER — RX ONLY (RX ONLY)
Age: 75
End: 2025-07-30

## 2025-07-30 RX ORDER — PHARMACY COMPOUNDING ACCESSORY
EACH MISCELLANEOUS
Qty: 30 | Refills: 3 | Status: ERX | COMMUNITY
Start: 2025-07-30

## 2025-08-03 ENCOUNTER — HOSPITAL ENCOUNTER (INPATIENT)
Facility: HOSPITAL | Age: 75
LOS: 2 days | Discharge: HOME | DRG: 418 | End: 2025-08-06
Attending: EMERGENCY MEDICINE | Admitting: HOSPITALIST
Payer: MEDICARE

## 2025-08-03 DIAGNOSIS — R07.9 CHEST PAIN: ICD-10-CM

## 2025-08-03 DIAGNOSIS — K85.10 ACUTE BILIARY PANCREATITIS WITHOUT INFECTION OR NECROSIS: ICD-10-CM

## 2025-08-03 DIAGNOSIS — K85.80 OTHER ACUTE PANCREATITIS, UNSPECIFIED COMPLICATION STATUS: Primary | ICD-10-CM

## 2025-08-03 PROBLEM — E78.5 HLD (HYPERLIPIDEMIA): Status: ACTIVE | Noted: 2025-08-03

## 2025-08-03 LAB
ABSOLUTE EOSINOPHIL (OHS): 0.02 K/UL
ABSOLUTE MONOCYTE (OHS): 0.3 K/UL (ref 0.3–1)
ABSOLUTE NEUTROPHIL COUNT (OHS): 4.87 K/UL (ref 1.8–7.7)
ALBUMIN SERPL BCP-MCNC: 3.9 G/DL (ref 3.5–5.2)
ALP SERPL-CCNC: 563 UNIT/L (ref 40–150)
ALT SERPL W/O P-5'-P-CCNC: 699 UNIT/L (ref 0–55)
ANION GAP (OHS): 13 MMOL/L (ref 8–16)
AST SERPL-CCNC: 533 UNIT/L (ref 0–50)
BACTERIA #/AREA URNS AUTO: NORMAL /HPF
BASOPHILS # BLD AUTO: 0.02 K/UL
BASOPHILS NFR BLD AUTO: 0.3 %
BILIRUB SERPL-MCNC: 2.3 MG/DL (ref 0.1–1)
BILIRUB UR QL STRIP.AUTO: NEGATIVE
BUN SERPL-MCNC: 10 MG/DL (ref 8–23)
CALCIUM SERPL-MCNC: 9.3 MG/DL (ref 8.7–10.5)
CHLORIDE SERPL-SCNC: 103 MMOL/L (ref 95–110)
CLARITY UR: CLEAR
CO2 SERPL-SCNC: 22 MMOL/L (ref 23–29)
COLOR UR AUTO: YELLOW
CREAT SERPL-MCNC: 0.8 MG/DL (ref 0.5–1.4)
EAG (OHS): 140 MG/DL (ref 68–131)
ERYTHROCYTE [DISTWIDTH] IN BLOOD BY AUTOMATED COUNT: 15.3 % (ref 11.5–14.5)
GFR SERPLBLD CREATININE-BSD FMLA CKD-EPI: >60 ML/MIN/1.73/M2
GLUCOSE SERPL-MCNC: 125 MG/DL (ref 70–110)
GLUCOSE UR QL STRIP: NEGATIVE
HBA1C MFR BLD: 6.5 % (ref 4–5.6)
HCT VFR BLD AUTO: 38.2 % (ref 37–48.5)
HCV AB SERPL QL IA: NORMAL
HGB BLD-MCNC: 12.4 GM/DL (ref 12–16)
HGB UR QL STRIP: NEGATIVE
HIV 1+2 AB+HIV1 P24 AG SERPL QL IA: NORMAL
HYALINE CASTS UR QL AUTO: 1 /LPF (ref 0–1)
IMM GRANULOCYTES # BLD AUTO: 0.06 K/UL (ref 0–0.04)
IMM GRANULOCYTES NFR BLD AUTO: 1 % (ref 0–0.5)
INFLUENZA A MOLECULAR (OHS): NEGATIVE
INFLUENZA B MOLECULAR (OHS): NEGATIVE
KETONES UR QL STRIP: ABNORMAL
LEUKOCYTE ESTERASE UR QL STRIP: ABNORMAL
LIPASE SERPL-CCNC: 2320 U/L (ref 4–60)
LYMPHOCYTES # BLD AUTO: 0.81 K/UL (ref 1–4.8)
MAGNESIUM SERPL-MCNC: 1.4 MG/DL (ref 1.6–2.6)
MCH RBC QN AUTO: 27.4 PG (ref 27–31)
MCHC RBC AUTO-ENTMCNC: 32.5 G/DL (ref 32–36)
MCV RBC AUTO: 84 FL (ref 82–98)
MICROSCOPIC COMMENT: NORMAL
NITRITE UR QL STRIP: NEGATIVE
NT-PROBNP SERPL-MCNC: 98 PG/ML
NUCLEATED RBC (/100WBC) (OHS): 0 /100 WBC
PH UR STRIP: 7 [PH]
PLATELET # BLD AUTO: 288 K/UL (ref 150–450)
PMV BLD AUTO: 10.9 FL (ref 9.2–12.9)
POCT GLUCOSE: 100 MG/DL (ref 70–110)
POCT GLUCOSE: 113 MG/DL (ref 70–110)
POTASSIUM SERPL-SCNC: 3.4 MMOL/L (ref 3.5–5.1)
PROT SERPL-MCNC: 8.2 GM/DL (ref 6–8.4)
PROT UR QL STRIP: NEGATIVE
RBC # BLD AUTO: 4.53 M/UL (ref 4–5.4)
RBC #/AREA URNS AUTO: 2 /HPF (ref 0–4)
RELATIVE EOSINOPHIL (OHS): 0.3 %
RELATIVE LYMPHOCYTE (OHS): 13.3 % (ref 18–48)
RELATIVE MONOCYTE (OHS): 4.9 % (ref 4–15)
RELATIVE NEUTROPHIL (OHS): 80.2 % (ref 38–73)
SARS-COV-2 RDRP RESP QL NAA+PROBE: NEGATIVE
SODIUM SERPL-SCNC: 138 MMOL/L (ref 136–145)
SP GR UR STRIP: 1.02
SQUAMOUS #/AREA URNS AUTO: 1 /HPF
TROPONIN I SERPL HS-MCNC: 3 NG/L
TROPONIN I SERPL HS-MCNC: <3 NG/L
UROBILINOGEN UR STRIP-ACNC: ABNORMAL EU/DL
WBC # BLD AUTO: 6.08 K/UL (ref 3.9–12.7)
WBC #/AREA URNS AUTO: 4 /HPF (ref 0–5)

## 2025-08-03 PROCEDURE — 25500020 PHARM REV CODE 255: Performed by: EMERGENCY MEDICINE

## 2025-08-03 PROCEDURE — 87502 INFLUENZA DNA AMP PROBE: CPT

## 2025-08-03 PROCEDURE — 25000003 PHARM REV CODE 250: Performed by: HOSPITALIST

## 2025-08-03 PROCEDURE — 96361 HYDRATE IV INFUSION ADD-ON: CPT

## 2025-08-03 PROCEDURE — 93005 ELECTROCARDIOGRAM TRACING: CPT

## 2025-08-03 PROCEDURE — 99223 1ST HOSP IP/OBS HIGH 75: CPT | Mod: 25,,, | Performed by: INTERNAL MEDICINE

## 2025-08-03 PROCEDURE — 93010 ELECTROCARDIOGRAM REPORT: CPT | Mod: ,,, | Performed by: INTERNAL MEDICINE

## 2025-08-03 PROCEDURE — 63600175 PHARM REV CODE 636 W HCPCS: Performed by: HOSPITALIST

## 2025-08-03 PROCEDURE — 99285 EMERGENCY DEPT VISIT HI MDM: CPT | Mod: 25

## 2025-08-03 PROCEDURE — 83880 ASSAY OF NATRIURETIC PEPTIDE: CPT

## 2025-08-03 PROCEDURE — 84484 ASSAY OF TROPONIN QUANT: CPT | Mod: 91 | Performed by: HOSPITALIST

## 2025-08-03 PROCEDURE — 81001 URINALYSIS AUTO W/SCOPE: CPT

## 2025-08-03 PROCEDURE — 96376 TX/PRO/DX INJ SAME DRUG ADON: CPT

## 2025-08-03 PROCEDURE — 86803 HEPATITIS C AB TEST: CPT | Performed by: PHYSICIAN ASSISTANT

## 2025-08-03 PROCEDURE — U0002 COVID-19 LAB TEST NON-CDC: HCPCS

## 2025-08-03 PROCEDURE — 96375 TX/PRO/DX INJ NEW DRUG ADDON: CPT

## 2025-08-03 PROCEDURE — 87389 HIV-1 AG W/HIV-1&-2 AB AG IA: CPT | Performed by: PHYSICIAN ASSISTANT

## 2025-08-03 PROCEDURE — G0378 HOSPITAL OBSERVATION PER HR: HCPCS

## 2025-08-03 PROCEDURE — 83690 ASSAY OF LIPASE: CPT

## 2025-08-03 PROCEDURE — 84484 ASSAY OF TROPONIN QUANT: CPT

## 2025-08-03 PROCEDURE — 85025 COMPLETE CBC W/AUTO DIFF WBC: CPT

## 2025-08-03 PROCEDURE — 83036 HEMOGLOBIN GLYCOSYLATED A1C: CPT | Performed by: HOSPITALIST

## 2025-08-03 PROCEDURE — 83735 ASSAY OF MAGNESIUM: CPT | Performed by: HOSPITALIST

## 2025-08-03 PROCEDURE — 80053 COMPREHEN METABOLIC PANEL: CPT

## 2025-08-03 PROCEDURE — 63600175 PHARM REV CODE 636 W HCPCS

## 2025-08-03 PROCEDURE — 82962 GLUCOSE BLOOD TEST: CPT

## 2025-08-03 RX ORDER — PANTOPRAZOLE SODIUM 40 MG/1
40 TABLET, DELAYED RELEASE ORAL DAILY
Status: DISCONTINUED | OUTPATIENT
Start: 2025-08-03 | End: 2025-08-06 | Stop reason: HOSPADM

## 2025-08-03 RX ORDER — TRAZODONE HYDROCHLORIDE 50 MG/1
50 TABLET ORAL NIGHTLY PRN
Status: DISCONTINUED | OUTPATIENT
Start: 2025-08-03 | End: 2025-08-06 | Stop reason: HOSPADM

## 2025-08-03 RX ORDER — HYDROMORPHONE HYDROCHLORIDE 1 MG/ML
0.2 INJECTION, SOLUTION INTRAMUSCULAR; INTRAVENOUS; SUBCUTANEOUS EVERY 4 HOURS PRN
Refills: 0 | Status: DISCONTINUED | OUTPATIENT
Start: 2025-08-03 | End: 2025-08-05

## 2025-08-03 RX ORDER — POTASSIUM CHLORIDE 7.45 MG/ML
10 INJECTION INTRAVENOUS ONCE
Status: COMPLETED | OUTPATIENT
Start: 2025-08-03 | End: 2025-08-03

## 2025-08-03 RX ORDER — TALC
6 POWDER (GRAM) TOPICAL NIGHTLY PRN
Status: DISCONTINUED | OUTPATIENT
Start: 2025-08-03 | End: 2025-08-06 | Stop reason: HOSPADM

## 2025-08-03 RX ORDER — ROSUVASTATIN CALCIUM 40 MG/1
40 TABLET, COATED ORAL DAILY
COMMUNITY
Start: 2025-07-20

## 2025-08-03 RX ORDER — SODIUM CHLORIDE, SODIUM LACTATE, POTASSIUM CHLORIDE, CALCIUM CHLORIDE 600; 310; 30; 20 MG/100ML; MG/100ML; MG/100ML; MG/100ML
INJECTION, SOLUTION INTRAVENOUS CONTINUOUS
Status: ACTIVE | OUTPATIENT
Start: 2025-08-03 | End: 2025-08-04

## 2025-08-03 RX ORDER — IBUPROFEN 200 MG
16 TABLET ORAL
Status: DISCONTINUED | OUTPATIENT
Start: 2025-08-03 | End: 2025-08-06 | Stop reason: HOSPADM

## 2025-08-03 RX ORDER — LOSARTAN POTASSIUM 25 MG/1
25 TABLET ORAL DAILY
Status: DISCONTINUED | OUTPATIENT
Start: 2025-08-03 | End: 2025-08-05

## 2025-08-03 RX ORDER — SODIUM CHLORIDE 9 MG/ML
1000 INJECTION, SOLUTION INTRAVENOUS
Status: DISCONTINUED | OUTPATIENT
Start: 2025-08-03 | End: 2025-08-03

## 2025-08-03 RX ORDER — ASPIRIN 81 MG/1
81 TABLET ORAL DAILY
Status: DISCONTINUED | OUTPATIENT
Start: 2025-08-03 | End: 2025-08-06 | Stop reason: HOSPADM

## 2025-08-03 RX ORDER — METOCLOPRAMIDE HYDROCHLORIDE 5 MG/ML
10 INJECTION INTRAMUSCULAR; INTRAVENOUS
Status: COMPLETED | OUTPATIENT
Start: 2025-08-03 | End: 2025-08-03

## 2025-08-03 RX ORDER — NALOXONE HCL 0.4 MG/ML
0.02 VIAL (ML) INJECTION
Status: DISCONTINUED | OUTPATIENT
Start: 2025-08-03 | End: 2025-08-06 | Stop reason: HOSPADM

## 2025-08-03 RX ORDER — SODIUM CHLORIDE 0.9 % (FLUSH) 0.9 %
10 SYRINGE (ML) INJECTION
Status: DISCONTINUED | OUTPATIENT
Start: 2025-08-03 | End: 2025-08-06 | Stop reason: HOSPADM

## 2025-08-03 RX ORDER — PROCHLORPERAZINE EDISYLATE 5 MG/ML
5 INJECTION INTRAMUSCULAR; INTRAVENOUS EVERY 6 HOURS PRN
Status: DISCONTINUED | OUTPATIENT
Start: 2025-08-03 | End: 2025-08-06 | Stop reason: HOSPADM

## 2025-08-03 RX ORDER — ONDANSETRON HYDROCHLORIDE 2 MG/ML
4 INJECTION, SOLUTION INTRAVENOUS EVERY 8 HOURS PRN
Status: DISCONTINUED | OUTPATIENT
Start: 2025-08-03 | End: 2025-08-06 | Stop reason: HOSPADM

## 2025-08-03 RX ORDER — GLUCAGON 1 MG
1 KIT INJECTION
Status: DISCONTINUED | OUTPATIENT
Start: 2025-08-03 | End: 2025-08-06 | Stop reason: HOSPADM

## 2025-08-03 RX ORDER — IBUPROFEN 200 MG
24 TABLET ORAL
Status: DISCONTINUED | OUTPATIENT
Start: 2025-08-03 | End: 2025-08-06 | Stop reason: HOSPADM

## 2025-08-03 RX ORDER — HYDROMORPHONE HYDROCHLORIDE 1 MG/ML
0.2 INJECTION, SOLUTION INTRAMUSCULAR; INTRAVENOUS; SUBCUTANEOUS EVERY 4 HOURS PRN
Status: DISCONTINUED | OUTPATIENT
Start: 2025-08-03 | End: 2025-08-03

## 2025-08-03 RX ADMIN — LOSARTAN POTASSIUM 25 MG: 25 TABLET, FILM COATED ORAL at 11:08

## 2025-08-03 RX ADMIN — METOCLOPRAMIDE 10 MG: 5 INJECTION, SOLUTION INTRAMUSCULAR; INTRAVENOUS at 08:08

## 2025-08-03 RX ADMIN — HYDROMORPHONE HYDROCHLORIDE 0.2 MG: 1 INJECTION, SOLUTION INTRAMUSCULAR; INTRAVENOUS; SUBCUTANEOUS at 01:08

## 2025-08-03 RX ADMIN — IOHEXOL 75 ML: 350 INJECTION, SOLUTION INTRAVENOUS at 08:08

## 2025-08-03 RX ADMIN — HYDROMORPHONE HYDROCHLORIDE 0.2 MG: 1 INJECTION, SOLUTION INTRAMUSCULAR; INTRAVENOUS; SUBCUTANEOUS at 08:08

## 2025-08-03 RX ADMIN — PANTOPRAZOLE SODIUM 40 MG: 40 TABLET, DELAYED RELEASE ORAL at 11:08

## 2025-08-03 RX ADMIN — SODIUM CHLORIDE, POTASSIUM CHLORIDE, SODIUM LACTATE AND CALCIUM CHLORIDE: 600; 310; 30; 20 INJECTION, SOLUTION INTRAVENOUS at 12:08

## 2025-08-03 RX ADMIN — SODIUM CHLORIDE, POTASSIUM CHLORIDE, SODIUM LACTATE AND CALCIUM CHLORIDE 1000 ML: 600; 310; 30; 20 INJECTION, SOLUTION INTRAVENOUS at 08:08

## 2025-08-03 RX ADMIN — POTASSIUM CHLORIDE 10 MEQ: 7.46 INJECTION, SOLUTION INTRAVENOUS at 01:08

## 2025-08-03 RX ADMIN — SODIUM CHLORIDE, POTASSIUM CHLORIDE, SODIUM LACTATE AND CALCIUM CHLORIDE: 600; 310; 30; 20 INJECTION, SOLUTION INTRAVENOUS at 08:08

## 2025-08-03 RX ADMIN — ONDANSETRON 4 MG: 2 INJECTION INTRAMUSCULAR; INTRAVENOUS at 01:08

## 2025-08-03 RX ADMIN — ASPIRIN 81 MG: 81 TABLET, DELAYED RELEASE ORAL at 11:08

## 2025-08-04 ENCOUNTER — ANESTHESIA (OUTPATIENT)
Dept: ENDOSCOPY | Facility: HOSPITAL | Age: 75
End: 2025-08-04
Payer: MEDICARE

## 2025-08-04 ENCOUNTER — ANESTHESIA EVENT (OUTPATIENT)
Dept: ENDOSCOPY | Facility: HOSPITAL | Age: 75
End: 2025-08-04
Payer: MEDICARE

## 2025-08-04 ENCOUNTER — ANESTHESIA EVENT (OUTPATIENT)
Dept: SURGERY | Facility: HOSPITAL | Age: 75
End: 2025-08-04
Payer: MEDICARE

## 2025-08-04 LAB
ABSOLUTE EOSINOPHIL (OHS): 0.22 K/UL
ABSOLUTE MONOCYTE (OHS): 0.38 K/UL (ref 0.3–1)
ABSOLUTE NEUTROPHIL COUNT (OHS): 4.49 K/UL (ref 1.8–7.7)
ALBUMIN SERPL BCP-MCNC: 3.4 G/DL (ref 3.5–5.2)
ALP SERPL-CCNC: 483 UNIT/L (ref 40–150)
ALT SERPL W/O P-5'-P-CCNC: 498 UNIT/L (ref 0–55)
ANION GAP (OHS): 11 MMOL/L (ref 8–16)
AST SERPL-CCNC: 283 UNIT/L (ref 0–50)
BASOPHILS # BLD AUTO: 0.03 K/UL
BASOPHILS NFR BLD AUTO: 0.5 %
BILIRUB SERPL-MCNC: 1.3 MG/DL (ref 0.1–1)
BUN SERPL-MCNC: 7 MG/DL (ref 8–23)
CALCIUM SERPL-MCNC: 9.4 MG/DL (ref 8.7–10.5)
CHLORIDE SERPL-SCNC: 105 MMOL/L (ref 95–110)
CO2 SERPL-SCNC: 24 MMOL/L (ref 23–29)
CREAT SERPL-MCNC: 0.6 MG/DL (ref 0.5–1.4)
ERYTHROCYTE [DISTWIDTH] IN BLOOD BY AUTOMATED COUNT: 15.6 % (ref 11.5–14.5)
GFR SERPLBLD CREATININE-BSD FMLA CKD-EPI: >60 ML/MIN/1.73/M2
GLUCOSE SERPL-MCNC: 94 MG/DL (ref 70–110)
HCT VFR BLD AUTO: 34.9 % (ref 37–48.5)
HGB BLD-MCNC: 11.2 GM/DL (ref 12–16)
HOLD SPECIMEN: NORMAL
IMM GRANULOCYTES # BLD AUTO: 0.01 K/UL (ref 0–0.04)
IMM GRANULOCYTES NFR BLD AUTO: 0.2 % (ref 0–0.5)
LYMPHOCYTES # BLD AUTO: 1.18 K/UL (ref 1–4.8)
MAGNESIUM SERPL-MCNC: 1.4 MG/DL (ref 1.6–2.6)
MCH RBC QN AUTO: 27.7 PG (ref 27–31)
MCHC RBC AUTO-ENTMCNC: 32.1 G/DL (ref 32–36)
MCV RBC AUTO: 86 FL (ref 82–98)
NUCLEATED RBC (/100WBC) (OHS): 0 /100 WBC
OHS QRS DURATION: 88 MS
OHS QTC CALCULATION: 469 MS
PLATELET # BLD AUTO: 257 K/UL (ref 150–450)
PMV BLD AUTO: 11.6 FL (ref 9.2–12.9)
POCT GLUCOSE: 101 MG/DL (ref 70–110)
POCT GLUCOSE: 92 MG/DL (ref 70–110)
POCT GLUCOSE: 92 MG/DL (ref 70–110)
POTASSIUM SERPL-SCNC: 3.3 MMOL/L (ref 3.5–5.1)
PROT SERPL-MCNC: 7 GM/DL (ref 6–8.4)
RBC # BLD AUTO: 4.05 M/UL (ref 4–5.4)
RELATIVE EOSINOPHIL (OHS): 3.5 %
RELATIVE LYMPHOCYTE (OHS): 18.7 % (ref 18–48)
RELATIVE MONOCYTE (OHS): 6 % (ref 4–15)
RELATIVE NEUTROPHIL (OHS): 71.1 % (ref 38–73)
SODIUM SERPL-SCNC: 140 MMOL/L (ref 136–145)
WBC # BLD AUTO: 6.31 K/UL (ref 3.9–12.7)

## 2025-08-04 PROCEDURE — 36415 COLL VENOUS BLD VENIPUNCTURE: CPT | Performed by: HOSPITALIST

## 2025-08-04 PROCEDURE — 94761 N-INVAS EAR/PLS OXIMETRY MLT: CPT

## 2025-08-04 PROCEDURE — 43262 ENDO CHOLANGIOPANCREATOGRAPH: CPT | Mod: XS,,, | Performed by: INTERNAL MEDICINE

## 2025-08-04 PROCEDURE — 63600175 PHARM REV CODE 636 W HCPCS: Performed by: HOSPITALIST

## 2025-08-04 PROCEDURE — 25500020 PHARM REV CODE 255: Performed by: INTERNAL MEDICINE

## 2025-08-04 PROCEDURE — 43262 ENDO CHOLANGIOPANCREATOGRAPH: CPT | Mod: XS | Performed by: INTERNAL MEDICINE

## 2025-08-04 PROCEDURE — 43237 ENDOSCOPIC US EXAM ESOPH: CPT | Performed by: INTERNAL MEDICINE

## 2025-08-04 PROCEDURE — 43264 ERCP REMOVE DUCT CALCULI: CPT | Mod: ,,, | Performed by: INTERNAL MEDICINE

## 2025-08-04 PROCEDURE — 43277 ERCP EA DUCT/AMPULLA DILATE: CPT | Mod: XS | Performed by: INTERNAL MEDICINE

## 2025-08-04 PROCEDURE — 25000003 PHARM REV CODE 250: Performed by: INTERNAL MEDICINE

## 2025-08-04 PROCEDURE — 83735 ASSAY OF MAGNESIUM: CPT | Performed by: HOSPITALIST

## 2025-08-04 PROCEDURE — 21400001 HC TELEMETRY ROOM

## 2025-08-04 PROCEDURE — 0FC98ZZ EXTIRPATION OF MATTER FROM COMMON BILE DUCT, VIA NATURAL OR ARTIFICIAL OPENING ENDOSCOPIC: ICD-10-PCS | Performed by: INTERNAL MEDICINE

## 2025-08-04 PROCEDURE — 27202304 HC CANNULA, ERCP: Performed by: INTERNAL MEDICINE

## 2025-08-04 PROCEDURE — 74329 X-RAY FOR PANCREAS ENDOSCOPY: CPT | Mod: 26,,, | Performed by: INTERNAL MEDICINE

## 2025-08-04 PROCEDURE — 27201674 HC SPHINCTERTOME: Performed by: INTERNAL MEDICINE

## 2025-08-04 PROCEDURE — 43237 ENDOSCOPIC US EXAM ESOPH: CPT | Mod: 51,,, | Performed by: INTERNAL MEDICINE

## 2025-08-04 PROCEDURE — 99223 1ST HOSP IP/OBS HIGH 75: CPT | Mod: ,,, | Performed by: SURGERY

## 2025-08-04 PROCEDURE — 43277 ERCP EA DUCT/AMPULLA DILATE: CPT | Mod: XS,,, | Performed by: INTERNAL MEDICINE

## 2025-08-04 PROCEDURE — 96366 THER/PROPH/DIAG IV INF ADDON: CPT

## 2025-08-04 PROCEDURE — 96365 THER/PROPH/DIAG IV INF INIT: CPT

## 2025-08-04 PROCEDURE — 37000009 HC ANESTHESIA EA ADD 15 MINS: Performed by: INTERNAL MEDICINE

## 2025-08-04 PROCEDURE — C1769 GUIDE WIRE: HCPCS | Performed by: INTERNAL MEDICINE

## 2025-08-04 PROCEDURE — 63600175 PHARM REV CODE 636 W HCPCS

## 2025-08-04 PROCEDURE — 80053 COMPREHEN METABOLIC PANEL: CPT | Performed by: HOSPITALIST

## 2025-08-04 PROCEDURE — 96361 HYDRATE IV INFUSION ADD-ON: CPT

## 2025-08-04 PROCEDURE — 25000003 PHARM REV CODE 250: Performed by: HOSPITALIST

## 2025-08-04 PROCEDURE — 37000008 HC ANESTHESIA 1ST 15 MINUTES: Performed by: INTERNAL MEDICINE

## 2025-08-04 PROCEDURE — 0F798ZZ DILATION OF COMMON BILE DUCT, VIA NATURAL OR ARTIFICIAL OPENING ENDOSCOPIC: ICD-10-PCS | Performed by: INTERNAL MEDICINE

## 2025-08-04 PROCEDURE — 43264 ERCP REMOVE DUCT CALCULI: CPT | Performed by: INTERNAL MEDICINE

## 2025-08-04 PROCEDURE — 85025 COMPLETE CBC W/AUTO DIFF WBC: CPT | Performed by: HOSPITALIST

## 2025-08-04 PROCEDURE — 96376 TX/PRO/DX INJ SAME DRUG ADON: CPT

## 2025-08-04 PROCEDURE — 74329 X-RAY FOR PANCREAS ENDOSCOPY: CPT | Mod: TC | Performed by: INTERNAL MEDICINE

## 2025-08-04 PROCEDURE — 25000003 PHARM REV CODE 250

## 2025-08-04 RX ORDER — LIDOCAINE HYDROCHLORIDE 20 MG/ML
INJECTION, SOLUTION EPIDURAL; INFILTRATION; INTRACAUDAL; PERINEURAL
Status: DISCONTINUED | OUTPATIENT
Start: 2025-08-04 | End: 2025-08-04

## 2025-08-04 RX ORDER — PROPOFOL 10 MG/ML
VIAL (ML) INTRAVENOUS
Status: DISCONTINUED | OUTPATIENT
Start: 2025-08-04 | End: 2025-08-04

## 2025-08-04 RX ORDER — MAGNESIUM SULFATE HEPTAHYDRATE 40 MG/ML
2 INJECTION, SOLUTION INTRAVENOUS ONCE
Status: COMPLETED | OUTPATIENT
Start: 2025-08-04 | End: 2025-08-04

## 2025-08-04 RX ORDER — INDOMETHACIN 50 MG/1
SUPPOSITORY RECTAL
Status: DISCONTINUED | OUTPATIENT
Start: 2025-08-04 | End: 2025-08-04 | Stop reason: HOSPADM

## 2025-08-04 RX ORDER — GLUCAGON 1 MG
1 KIT INJECTION
Status: DISCONTINUED | OUTPATIENT
Start: 2025-08-04 | End: 2025-08-04 | Stop reason: HOSPADM

## 2025-08-04 RX ORDER — ONDANSETRON HYDROCHLORIDE 2 MG/ML
4 INJECTION, SOLUTION INTRAVENOUS DAILY PRN
Status: DISCONTINUED | OUTPATIENT
Start: 2025-08-04 | End: 2025-08-04 | Stop reason: HOSPADM

## 2025-08-04 RX ORDER — POTASSIUM CHLORIDE 7.45 MG/ML
10 INJECTION INTRAVENOUS ONCE
Status: COMPLETED | OUTPATIENT
Start: 2025-08-04 | End: 2025-08-04

## 2025-08-04 RX ORDER — SODIUM CHLORIDE 0.9 % (FLUSH) 0.9 %
10 SYRINGE (ML) INJECTION
Status: DISCONTINUED | OUTPATIENT
Start: 2025-08-04 | End: 2025-08-04 | Stop reason: HOSPADM

## 2025-08-04 RX ORDER — POTASSIUM CHLORIDE 20 MEQ/1
40 TABLET, EXTENDED RELEASE ORAL ONCE
Status: DISCONTINUED | OUTPATIENT
Start: 2025-08-04 | End: 2025-08-04

## 2025-08-04 RX ORDER — PHENYLEPHRINE HCL IN 0.9% NACL 1 MG/10 ML
SYRINGE (ML) INTRAVENOUS
Status: DISCONTINUED | OUTPATIENT
Start: 2025-08-04 | End: 2025-08-04

## 2025-08-04 RX ORDER — DEXMEDETOMIDINE HYDROCHLORIDE 100 UG/ML
INJECTION, SOLUTION INTRAVENOUS
Status: DISCONTINUED | OUTPATIENT
Start: 2025-08-04 | End: 2025-08-04

## 2025-08-04 RX ADMIN — POTASSIUM CHLORIDE 10 MEQ: 7.46 INJECTION, SOLUTION INTRAVENOUS at 05:08

## 2025-08-04 RX ADMIN — HYDROMORPHONE HYDROCHLORIDE 0.2 MG: 1 INJECTION, SOLUTION INTRAMUSCULAR; INTRAVENOUS; SUBCUTANEOUS at 06:08

## 2025-08-04 RX ADMIN — SODIUM CHLORIDE, POTASSIUM CHLORIDE, SODIUM LACTATE AND CALCIUM CHLORIDE: 600; 310; 30; 20 INJECTION, SOLUTION INTRAVENOUS at 04:08

## 2025-08-04 RX ADMIN — PROPOFOL 40 MG: 10 INJECTION, EMULSION INTRAVENOUS at 11:08

## 2025-08-04 RX ADMIN — PROPOFOL 150 MCG/KG/MIN: 10 INJECTION, EMULSION INTRAVENOUS at 11:08

## 2025-08-04 RX ADMIN — LOSARTAN POTASSIUM 25 MG: 25 TABLET, FILM COATED ORAL at 08:08

## 2025-08-04 RX ADMIN — LIDOCAINE HYDROCHLORIDE 50 MG: 20 INJECTION, SOLUTION EPIDURAL; INFILTRATION; INTRACAUDAL; PERINEURAL at 11:08

## 2025-08-04 RX ADMIN — HYDROMORPHONE HYDROCHLORIDE 0.2 MG: 1 INJECTION, SOLUTION INTRAMUSCULAR; INTRAVENOUS; SUBCUTANEOUS at 09:08

## 2025-08-04 RX ADMIN — PROPOFOL 20 MG: 10 INJECTION, EMULSION INTRAVENOUS at 11:08

## 2025-08-04 RX ADMIN — PROPOFOL 10 MG: 10 INJECTION, EMULSION INTRAVENOUS at 11:08

## 2025-08-04 RX ADMIN — Medication 200 MCG: at 12:08

## 2025-08-04 RX ADMIN — MAGNESIUM SULFATE HEPTAHYDRATE 2 G: 40 INJECTION, SOLUTION INTRAVENOUS at 05:08

## 2025-08-04 RX ADMIN — PROPOFOL 30 MG: 10 INJECTION, EMULSION INTRAVENOUS at 11:08

## 2025-08-04 RX ADMIN — DEXMEDETOMIDINE 12 MCG: 100 INJECTION, SOLUTION, CONCENTRATE INTRAVENOUS at 11:08

## 2025-08-04 RX ADMIN — SODIUM CHLORIDE: 9 INJECTION, SOLUTION INTRAVENOUS at 11:08

## 2025-08-04 RX ADMIN — PANTOPRAZOLE SODIUM 40 MG: 40 TABLET, DELAYED RELEASE ORAL at 08:08

## 2025-08-04 NOTE — ANESTHESIA POSTPROCEDURE EVALUATION
Anesthesia Post Evaluation    Patient: Vidya Vazquez    Procedure(s) Performed: Procedure(s) (LRB):  ERCP (ENDOSCOPIC RETROGRADE CHOLANGIOPANCREATOGRAPHY) (N/A)  ULTRASOUND, UPPER GI TRACT, ENDOSCOPIC (N/A)    Final Anesthesia Type: general      Patient location during evaluation: PACU  Patient participation: Yes- Able to Participate  Level of consciousness: awake and alert and oriented  Post-procedure vital signs: reviewed and stable  Pain management: adequate  Airway patency: patent    PONV status at discharge: No PONV  Anesthetic complications: no      Cardiovascular status: blood pressure returned to baseline and hemodynamically stable  Respiratory status: unassisted, spontaneous ventilation and room air  Hydration status: euvolemic  Follow-up not needed.              Vitals Value Taken Time   /78 08/04/25 12:32   Temp See discharge vitals   08/04/25 13:40   Pulse 70 08/04/25 12:45   Resp 21 08/04/25 12:45   SpO2 97 % 08/04/25 12:45         Event Time   Out of Recovery 12:46:25         Pain/Donald Score: Pain Rating Prior to Med Admin: 7 (8/4/2025  9:22 AM)  Pain Rating Post Med Admin: 0 (8/3/2025  8:42 PM)  Donald Score: 10 (8/4/2025 12:32 PM)

## 2025-08-04 NOTE — ANESTHESIA PREPROCEDURE EVALUATION
[facility]  Anesthesia Pre-Operative Evaluation     Patient Name: Vidya Vazquez  YOB: 1950  MRN: [id number]  CSN: [id number]      Code Status: Full Code   Date of Procedure: 8/5/2025  Anesthesia: Choice Procedure: Procedure(s) (LRB):  CHOLECYSTECTOMY, LAPAROSCOPIC (N/A)  Pre-Operative Diagnosis: Acute biliary pancreatitis without infection or necrosis [K85.10]  Proceduralist: Surgeons and Role:     * Nadeem Morse MD - Primary          SUBJECTIVE:     Pre-operative evaluation for Procedure(s) (LRB):  CHOLECYSTECTOMY, LAPAROSCOPIC (N/A)     08/04/2025    Vidya Vazquez is a 75 y.o. female with hx of GERD, DM2 and past hiatal hernia repair approx 10 yrs ago who presented to the ED with chest/abdominal pain, persistent nausea/diarrhea, 10lb weight loss worsening over the past week.     Patient now presents for the above procedure(s).       Anticoagulants   Medication Route Frequency        ________________________________________  Results for orders placed during the hospital encounter of 04/24/22    Echo    Interpretation Summary  · The left ventricle is normal in size with concentric remodeling and normal systolic function. The estimated ejection fraction is 65%.  · Normal right ventricular size with normal right ventricular systolic function.  · Grade II left ventricular diastolic dysfunction.  · Mild left atrial enlargement.  · Pulmonary arterial systolic pressure is greater than 38 mmHg.    ________________________________________    Prev airway:     Intubation     Date/Time: 4/26/2022 1:42 PM  Performed by: Khai Stanton CRNA  Authorized by: Leonardo Koehler MD      Intubation:     Induction:  Intravenous    Intubated:  Postinduction    Mask Ventilation:  N/a    Attempts:  1    Attempted By:  CRNA    Method of Intubation:  Video laryngoscopy    Blade:  Encinas 3    Laryngeal View Grade: Grade I - full view of cords      Difficult Airway Encountered?: No      Complications:   None    Airway Device:  Oral endotracheal tube    Airway Device Size:  7.5    Style/Cuff Inflation:  Cuffed (inflated to minimal occlusive pressure)    Tube secured:  21    Secured at:  The lips    Placement Verified By:  Capnometry    Complicating Factors:  None    Findings Post-Intubation:  BS equal bilateral and atraumatic/condition of teeth unchanged            LDA:   Peripheral IV Single Lumen 08/04/25 0915 20 G 1 in Left;Lateral Wrist (Active)   Site Assessment Clean;Dry;Intact;No redness;No swelling;No warmth;No drainage 08/04/25 1230   Line Securement Device Secured with sutureless device 08/04/25 1230   Extremity Assessment Distal to IV No abnormal discoloration;No redness;No warmth;No swelling 08/04/25 1230   Line Status Saline locked 08/04/25 1230   Dressing Status Clean;Dry;Intact 08/04/25 1230   Dressing Intervention Integrity maintained 08/04/25 1230   Number of days: 0       Drips: None documented.      Problem List[1]    Review of patient's allergies indicates:  No Known Allergies    Current Inpatient Medications:    aspirin  81 mg Oral Daily    losartan  25 mg Oral Daily    pantoprazole  40 mg Oral Daily       Medications Ordered Prior to Encounter[2]    Past Surgical History:   Procedure Laterality Date    ESOPHAGOGASTRODUODENOSCOPY N/A 4/26/2022    Procedure: EGD (ESOPHAGOGASTRODUODENOSCOPY);  Surgeon: David Mike Jr., MD;  Location: [address];  Service: General;  Laterality: N/A;    ESOPHAGOGASTRODUODENOSCOPY N/A 4/25/2022    Procedure: EGD (ESOPHAGOGASTRODUODENOSCOPY);  Surgeon: Cynthia Sumner MD;  Location: [facility];  Service: Gastroenterology;  Laterality: N/A;    LAPAROSCOPIC TOUPET FUNDOPLICATION N/A 4/26/2022    Procedure: FUNDOPLICATION, LAPAROSCOPIC, TOUPET;  Surgeon: David Mike Jr., MD;  Location: [address];  Service: General;  Laterality: N/A;       Social History:  Tobacco Use: Low Risk  (8/4/2025)    Patient History     Smoking Tobacco Use: Never      Smokeless Tobacco Use: Never     Passive Exposure: Not on file       Alcohol Use: Not At Risk (8/4/2025)    AUDIT-C     Frequency of Alcohol Consumption: Never     Average Number of Drinks: Patient does not drink     Frequency of Binge Drinking: Never       OBJECTIVE:     Vital Signs Range:  BMI Readings from Last 1 Encounters:   08/03/25 28.46 kg/m²       Temp:  [36.4 °C (97.5 °F)-37.4 °C (99.3 °F)]   Pulse:  [68-91]   Resp:  [14-21]   BP: (110-178)/(65-92)   SpO2:  [97 %-100 %]        Significant Labs:        Component Value Date/Time    WBC 6.31 08/04/2025 0321    HGB 11.2 (L) 08/04/2025 0321    HGB 10.4 (L) 05/01/2022 0336    HCT 34.9 (L) 08/04/2025 0321    HCT 32.2 (L) 05/01/2022 0336     08/04/2025 0321     05/01/2022 0336     08/04/2025 0321     05/01/2022 0336    K 3.3 (L) 08/04/2025 0321    K 4.1 05/01/2022 0336     08/04/2025 0321     05/01/2022 0336    CO2 24 08/04/2025 0321    CO2 24 05/01/2022 0336    GLU 94 08/04/2025 0321     05/01/2022 0336    BUN 7 (L) 08/04/2025 0321    CREATININE 0.6 08/04/2025 0321    MG 1.4 (L) 08/04/2025 0321    PHOS 3.4 05/01/2022 0336    CALCIUM 9.4 08/04/2025 0321    CALCIUM 8.6 (L) 05/01/2022 0336    ALBUMIN 3.4 (L) 08/04/2025 0321    ALBUMIN 2.6 (L) 05/01/2022 0336    PROT 7.0 08/04/2025 0321    PROT 5.9 (L) 05/01/2022 0336    ALKPHOS 483 (H) 08/04/2025 0321    ALKPHOS 56 05/01/2022 0336    BILITOT 1.3 (H) 08/04/2025 0321    BILITOT 0.6 05/01/2022 0336     (H) 08/04/2025 0321    AST 21 05/01/2022 0336     (H) 08/04/2025 0321    ALT 12 05/01/2022 0336    HGBA1C 6.5 (H) 08/03/2025 0656    HGBA1C 6.4 (H) 04/24/2022 0825        Please see Results Review for additional labs.     Diagnostic Studies: No relevant studies.    EKG:   Results for orders placed or performed during the hospital encounter of 08/03/25   EKG 12-lead    Collection Time: 08/03/25  5:57 AM   Result Value Ref Range    QRS Duration 88 ms    [facility] QTC  Calculation 469 ms    Narrative    Test Reason : R07.9,    Vent. Rate : 102 BPM     Atrial Rate : 102 BPM     P-R Int : 150 ms          QRS Dur :  88 ms      QT Int : 360 ms       P-R-T Axes : 119 121  99 degrees    QTcB Int : 469 ms    Limb lead reversal  Sinus tachycardia  Abnormal ECG  When compared with ECG of 28-Apr-2023 07:51,  Limb lead reversal Now present  Confirmed by Swati Blakely ([id number]) on 8/4/2025 10:18:30 AM    Referred By: NADIR DAVILA           Confirmed By: Swati Blakely       ECHO:  See subjective, if available.      ASSESSMENT/PLAN:           Pre-op Assessment    I have reviewed the Patient Summary Reports.     I have reviewed the Nursing Notes. I have reviewed the NPO Status.   I have reviewed the Medications.     Review of Systems  Anesthesia Hx:               Denies Personal Hx of Anesthesia complications.                    Cardiovascular:     Hypertension    Dysrhythmias       hyperlipidemia                         Hypertension         Hepatic/GI:    Hiatal Hernia,           Hernia, Hiatal Hernia      Neurological:    Neuromuscular Disease,                                 Neuromuscular Disease   Endocrine:  Diabetes    Diabetes                          Physical Exam  General: Well nourished, Cooperative, Alert and Oriented    Airway:  Mallampati: III   Mouth Opening: Normal  TM Distance: Normal  Tongue: Normal  Neck ROM: Normal ROM    Dental:  Dentures        Anesthesia Plan  Type of Anesthesia, risks & benefits discussed:    Anesthesia Type: Gen ETT  Intra-op Monitoring Plan: Standard ASA Monitors  Post Op Pain Control Plan: multimodal analgesia and IV/PO Opioids PRN  Induction:  IV  Airway Plan: Direct and Video, Post-Induction  Informed Consent: Informed consent signed with the Patient and all parties understand the risks and agree with anesthesia plan.  All questions answered.   ASA Score: 3  Day of Surgery Review of History & Physical: H&P Update referred to the surgeon/provider.    Ready  For Surgery From Anesthesia Perspective.     .           [1]   Patient Active Problem List  Diagnosis    Gastric outlet obstruction    Hiatal hernia    Hypokalemia    Diabetes mellitus type II, controlled    HTN (hypertension), benign    Gastric volvulus    SVT (supraventricular tachycardia)    Acute biliary pancreatitis    HLD (hyperlipidemia)   [2]   No current facility-administered medications on file prior to encounter.     Current Outpatient Medications on File Prior to Encounter   Medication Sig Dispense Refill    rosuvastatin (CRESTOR) 40 MG Tab Take 40 mg by mouth once daily.      aspirin (ECOTRIN) 81 MG EC tablet Take 81 mg by mouth.      losartan (COZAAR) 25 MG tablet Take 25 mg by mouth.      omeprazole (PRILOSEC) 40 MG capsule Take 40 mg by mouth.      OZEMPIC 0.25 mg or 0.5 mg(2 mg/1.5 mL) pen injector Inject 0.5 mg into the skin every 7 days.      traZODone (DESYREL) 50 MG tablet Take 50 mg by mouth every evening.

## 2025-08-04 NOTE — ANESTHESIA PREPROCEDURE EVALUATION
08/04/2025  Vidya Vazquez is a 75 y.o., female Pre-operative evaluation for Procedure(s) (LRB):  ERCP (ENDOSCOPIC RETROGRADE CHOLANGIOPANCREATOGRAPHY) (N/A)  ULTRASOUND, UPPER GI TRACT, ENDOSCOPIC (N/A)    Vidya Vazquez is a 75 y.o. female presenting for ERCP/EUS with acute biliary pancreatitis.     Problem List[1]    Review of patient's allergies indicates:  No Known Allergies    Medications Ordered Prior to Encounter[2]    Past Surgical History:   Procedure Laterality Date    ESOPHAGOGASTRODUODENOSCOPY N/A 4/26/2022    Procedure: EGD (ESOPHAGOGASTRODUODENOSCOPY);  Surgeon: David Mike Jr., MD;  Location: [facility] OR [address] [place] [address];  Service: General;  Laterality: N/A;    ESOPHAGOGASTRODUODENOSCOPY N/A 4/25/2022    Procedure: EGD (ESOPHAGOGASTRODUODENOSCOPY);  Surgeon: Cynthia Sumner MD;  Location: [facility];  Service: Gastroenterology;  Laterality: N/A;    LAPAROSCOPIC TOUPET FUNDOPLICATION N/A 4/26/2022    Procedure: FUNDOPLICATION, LAPAROSCOPIC, TOUPET;  Surgeon: David Mike Jr., MD;  Location: [facility] OR [address] [place] [address];  Service: General;  Laterality: N/A;       Social History[3]      CBC:   Recent Labs     08/03/25  0656 08/04/25  0321   WBC 6.08 6.31   RBC 4.53 4.05   HGB 12.4 11.2*   HCT 38.2 34.9*    257   MCV 84 86   MCH 27.4 27.7   MCHC 32.5 32.1       CMP:   Recent Labs     08/03/25  0656 08/03/25  1116 08/04/25  0321     --  140   K 3.4*  --  3.3*     --  105   CO2 22*  --  24   BUN 10  --  7*   CREATININE 0.8  --  0.6   *  --  94   MG  --  1.4* 1.4*   CALCIUM 9.3  --  9.4   ALBUMIN 3.9  --  3.4*   PROT 8.2  --  7.0   ALKPHOS 563*  --  483*   *  --  498*   *  --  283*   BILITOT 2.3*  --  1.3*       Pre-op Assessment    I have reviewed the Patient Summary Reports.     I have reviewed the Nursing Notes. I have reviewed the NPO Status.   I have reviewed the  Medications.     Review of Systems  Anesthesia Hx:  No problems with previous Anesthesia   History of prior surgery of interest to airway management or planning:          Denies Family Hx of Anesthesia complications.    Denies Personal Hx of Anesthesia complications.                    Social:  No Alcohol Use, Non-Smoker       Hematology/Oncology:  Hematology Normal   Oncology Normal                                   EENT/Dental:  EENT/Dental Normal           Cardiovascular:  Exercise tolerance: good   Hypertension                                          Pulmonary:  Pulmonary Normal                       Renal/:  Renal/ Normal                 Hepatic/GI:    Hiatal Hernia,                 Neurological:  Neurology Normal                                      Endocrine:  Diabetes, well controlled, type 2           Psych:  Psychiatric Normal                    Physical Exam  General: Well nourished, Cooperative, Alert and Oriented    Airway:  Mallampati: II   Mouth Opening: Normal  TM Distance: Normal  Tongue: Normal  Neck ROM: Normal ROM    Dental:  Edentulous    Chest/Lungs:  Clear to auscultation, Normal Respiratory Rate    Heart:  Rate: Normal  Rhythm: Regular Rhythm        Anesthesia Plan  Type of Anesthesia, risks & benefits discussed:    Anesthesia Type: Gen Natural Airway  Intra-op Monitoring Plan: Standard ASA Monitors  Post Op Pain Control Plan: multimodal analgesia and IV/PO Opioids PRN  Induction:  IV  Airway Plan: Direct  Informed Consent: Informed consent signed with the Patient and all parties understand the risks and agree with anesthesia plan.  All questions answered. Patient consented to blood products? No  ASA Score: 3  Day of Surgery Review of History & Physical: H&P Update referred to the surgeon/provider.    Ready For Surgery From Anesthesia Perspective.     .           [1]   Patient Active Problem List  Diagnosis    Gastric outlet obstruction    Hiatal hernia    Hypokalemia    Diabetes  mellitus type II, controlled    HTN (hypertension), benign    Gastric volvulus    SVT (supraventricular tachycardia)    Acute biliary pancreatitis    HLD (hyperlipidemia)   [2]   No current facility-administered medications on file prior to encounter.     Current Outpatient Medications on File Prior to Encounter   Medication Sig Dispense Refill    rosuvastatin (CRESTOR) 40 MG Tab Take 40 mg by mouth once daily.      aspirin (ECOTRIN) 81 MG EC tablet Take 81 mg by mouth.      losartan (COZAAR) 25 MG tablet Take 25 mg by mouth.      omeprazole (PRILOSEC) 40 MG capsule Take 40 mg by mouth.      OZEMPIC 0.25 mg or 0.5 mg(2 mg/1.5 mL) pen injector Inject 0.5 mg into the skin every 7 days.      traZODone (DESYREL) 50 MG tablet Take 50 mg by mouth every evening.     [3]   Social History  Socioeconomic History    Marital status:    Tobacco Use    Smoking status: Never    Smokeless tobacco: Never

## 2025-08-04 NOTE — TRANSFER OF CARE
"Anesthesia Transfer of Care Note    Patient: Vidya Vazquez    Procedure(s) Performed: Procedure(s) (LRB):  ERCP (ENDOSCOPIC RETROGRADE CHOLANGIOPANCREATOGRAPHY) (N/A)  ULTRASOUND, UPPER GI TRACT, ENDOSCOPIC (N/A)    Patient location: PACU    Anesthesia Type: general    Transport from OR: Transported from OR on 6-10 L/min O2 by face mask with adequate spontaneous ventilation    Post pain: adequate analgesia    Post assessment: tolerated procedure well and no apparent anesthetic complications    Post vital signs: stable    Level of consciousness: awake and alert    Nausea/Vomiting: no nausea/vomiting    Complications: none    Transfer of care protocol was followed      Last vitals: Visit Vitals  BP (!) 178/92 (Patient Position: Lying)   Pulse 91   Temp 37.4 °C (99.3 °F) (Temporal)   Resp 16   Ht 5' 5" (1.651 m)   Wt 77.6 kg (171 lb)   SpO2 98%   Breastfeeding No   BMI 28.46 kg/m²     "

## 2025-08-05 ENCOUNTER — ANESTHESIA (OUTPATIENT)
Dept: SURGERY | Facility: HOSPITAL | Age: 75
End: 2025-08-05
Payer: MEDICARE

## 2025-08-05 PROBLEM — I10 HTN (HYPERTENSION), BENIGN: Chronic | Status: ACTIVE | Noted: 2022-04-24

## 2025-08-05 PROBLEM — E78.5 HLD (HYPERLIPIDEMIA): Chronic | Status: ACTIVE | Noted: 2025-08-03

## 2025-08-05 PROBLEM — E11.9 DIABETES MELLITUS TYPE II, CONTROLLED: Chronic | Status: ACTIVE | Noted: 2022-04-24

## 2025-08-05 LAB
ABSOLUTE EOSINOPHIL (OHS): 0.31 K/UL
ABSOLUTE MONOCYTE (OHS): 0.42 K/UL (ref 0.3–1)
ABSOLUTE NEUTROPHIL COUNT (OHS): 4.44 K/UL (ref 1.8–7.7)
ALBUMIN SERPL BCP-MCNC: 3.2 G/DL (ref 3.5–5.2)
ALP SERPL-CCNC: 394 UNIT/L (ref 40–150)
ALT SERPL W/O P-5'-P-CCNC: 337 UNIT/L (ref 0–55)
ANION GAP (OHS): 11 MMOL/L (ref 8–16)
AST SERPL-CCNC: 131 UNIT/L (ref 0–50)
BASOPHILS # BLD AUTO: 0.03 K/UL
BASOPHILS NFR BLD AUTO: 0.5 %
BILIRUB SERPL-MCNC: 0.9 MG/DL (ref 0.1–1)
BUN SERPL-MCNC: 6 MG/DL (ref 8–23)
CALCIUM SERPL-MCNC: 9 MG/DL (ref 8.7–10.5)
CHLORIDE SERPL-SCNC: 106 MMOL/L (ref 95–110)
CO2 SERPL-SCNC: 23 MMOL/L (ref 23–29)
CREAT SERPL-MCNC: 0.7 MG/DL (ref 0.5–1.4)
ERYTHROCYTE [DISTWIDTH] IN BLOOD BY AUTOMATED COUNT: 15.3 % (ref 11.5–14.5)
GFR SERPLBLD CREATININE-BSD FMLA CKD-EPI: >60 ML/MIN/1.73/M2
GLUCOSE SERPL-MCNC: 97 MG/DL (ref 70–110)
HCT VFR BLD AUTO: 33.4 % (ref 37–48.5)
HGB BLD-MCNC: 10.7 GM/DL (ref 12–16)
IMM GRANULOCYTES # BLD AUTO: 0.02 K/UL (ref 0–0.04)
IMM GRANULOCYTES NFR BLD AUTO: 0.3 % (ref 0–0.5)
LYMPHOCYTES # BLD AUTO: 1.43 K/UL (ref 1–4.8)
MAGNESIUM SERPL-MCNC: 1.7 MG/DL (ref 1.6–2.6)
MCH RBC QN AUTO: 27.4 PG (ref 27–31)
MCHC RBC AUTO-ENTMCNC: 32 G/DL (ref 32–36)
MCV RBC AUTO: 85 FL (ref 82–98)
NUCLEATED RBC (/100WBC) (OHS): 0 /100 WBC
PLATELET # BLD AUTO: 270 K/UL (ref 150–450)
PMV BLD AUTO: 11.2 FL (ref 9.2–12.9)
POCT GLUCOSE: 101 MG/DL (ref 70–110)
POCT GLUCOSE: 120 MG/DL (ref 70–110)
POCT GLUCOSE: 127 MG/DL (ref 70–110)
POCT GLUCOSE: 149 MG/DL (ref 70–110)
POTASSIUM SERPL-SCNC: 3.2 MMOL/L (ref 3.5–5.1)
PROT SERPL-MCNC: 6.8 GM/DL (ref 6–8.4)
RBC # BLD AUTO: 3.91 M/UL (ref 4–5.4)
RELATIVE EOSINOPHIL (OHS): 4.7 %
RELATIVE LYMPHOCYTE (OHS): 21.5 % (ref 18–48)
RELATIVE MONOCYTE (OHS): 6.3 % (ref 4–15)
RELATIVE NEUTROPHIL (OHS): 66.7 % (ref 38–73)
SODIUM SERPL-SCNC: 140 MMOL/L (ref 136–145)
WBC # BLD AUTO: 6.65 K/UL (ref 3.9–12.7)

## 2025-08-05 PROCEDURE — 37000008 HC ANESTHESIA 1ST 15 MINUTES: Performed by: STUDENT IN AN ORGANIZED HEALTH CARE EDUCATION/TRAINING PROGRAM

## 2025-08-05 PROCEDURE — 36000709 HC OR TIME LEV III EA ADD 15 MIN: Performed by: STUDENT IN AN ORGANIZED HEALTH CARE EDUCATION/TRAINING PROGRAM

## 2025-08-05 PROCEDURE — 88304 TISSUE EXAM BY PATHOLOGIST: CPT | Mod: TC | Performed by: STUDENT IN AN ORGANIZED HEALTH CARE EDUCATION/TRAINING PROGRAM

## 2025-08-05 PROCEDURE — 25000003 PHARM REV CODE 250: Performed by: HOSPITALIST

## 2025-08-05 PROCEDURE — 63600175 PHARM REV CODE 636 W HCPCS: Performed by: STUDENT IN AN ORGANIZED HEALTH CARE EDUCATION/TRAINING PROGRAM

## 2025-08-05 PROCEDURE — 63600175 PHARM REV CODE 636 W HCPCS: Performed by: HOSPITALIST

## 2025-08-05 PROCEDURE — 99232 SBSQ HOSP IP/OBS MODERATE 35: CPT | Mod: ,,,

## 2025-08-05 PROCEDURE — 0FT44ZZ RESECTION OF GALLBLADDER, PERCUTANEOUS ENDOSCOPIC APPROACH: ICD-10-PCS | Performed by: STUDENT IN AN ORGANIZED HEALTH CARE EDUCATION/TRAINING PROGRAM

## 2025-08-05 PROCEDURE — 36000708 HC OR TIME LEV III 1ST 15 MIN: Performed by: STUDENT IN AN ORGANIZED HEALTH CARE EDUCATION/TRAINING PROGRAM

## 2025-08-05 PROCEDURE — 99900035 HC TECH TIME PER 15 MIN (STAT)

## 2025-08-05 PROCEDURE — 25000003 PHARM REV CODE 250: Performed by: STUDENT IN AN ORGANIZED HEALTH CARE EDUCATION/TRAINING PROGRAM

## 2025-08-05 PROCEDURE — 83735 ASSAY OF MAGNESIUM: CPT | Performed by: HOSPITALIST

## 2025-08-05 PROCEDURE — 85025 COMPLETE CBC W/AUTO DIFF WBC: CPT | Performed by: HOSPITALIST

## 2025-08-05 PROCEDURE — 88304 TISSUE EXAM BY PATHOLOGIST: CPT | Mod: 26,,, | Performed by: PATHOLOGY

## 2025-08-05 PROCEDURE — 37000009 HC ANESTHESIA EA ADD 15 MINS: Performed by: STUDENT IN AN ORGANIZED HEALTH CARE EDUCATION/TRAINING PROGRAM

## 2025-08-05 PROCEDURE — 63600175 PHARM REV CODE 636 W HCPCS: Performed by: NURSE ANESTHETIST, CERTIFIED REGISTERED

## 2025-08-05 PROCEDURE — 27000221 HC OXYGEN, UP TO 24 HOURS

## 2025-08-05 PROCEDURE — 82962 GLUCOSE BLOOD TEST: CPT | Performed by: STUDENT IN AN ORGANIZED HEALTH CARE EDUCATION/TRAINING PROGRAM

## 2025-08-05 PROCEDURE — 21400001 HC TELEMETRY ROOM

## 2025-08-05 PROCEDURE — 71000033 HC RECOVERY, INTIAL HOUR: Performed by: STUDENT IN AN ORGANIZED HEALTH CARE EDUCATION/TRAINING PROGRAM

## 2025-08-05 PROCEDURE — 94761 N-INVAS EAR/PLS OXIMETRY MLT: CPT

## 2025-08-05 PROCEDURE — 84075 ASSAY ALKALINE PHOSPHATASE: CPT | Performed by: HOSPITALIST

## 2025-08-05 PROCEDURE — 47562 LAPAROSCOPIC CHOLECYSTECTOMY: CPT | Mod: ,,, | Performed by: STUDENT IN AN ORGANIZED HEALTH CARE EDUCATION/TRAINING PROGRAM

## 2025-08-05 PROCEDURE — 25000003 PHARM REV CODE 250: Performed by: NURSE ANESTHETIST, CERTIFIED REGISTERED

## 2025-08-05 PROCEDURE — 27201423 OPTIME MED/SURG SUP & DEVICES STERILE SUPPLY: Performed by: STUDENT IN AN ORGANIZED HEALTH CARE EDUCATION/TRAINING PROGRAM

## 2025-08-05 PROCEDURE — 71000015 HC POSTOP RECOV 1ST HR: Performed by: STUDENT IN AN ORGANIZED HEALTH CARE EDUCATION/TRAINING PROGRAM

## 2025-08-05 PROCEDURE — 36415 COLL VENOUS BLD VENIPUNCTURE: CPT | Performed by: HOSPITALIST

## 2025-08-05 RX ORDER — BUPIVACAINE HYDROCHLORIDE 2.5 MG/ML
INJECTION, SOLUTION EPIDURAL; INFILTRATION; INTRACAUDAL; PERINEURAL
Status: DISCONTINUED | OUTPATIENT
Start: 2025-08-05 | End: 2025-08-05 | Stop reason: HOSPADM

## 2025-08-05 RX ORDER — DEXAMETHASONE SODIUM PHOSPHATE 4 MG/ML
INJECTION, SOLUTION INTRA-ARTICULAR; INTRALESIONAL; INTRAMUSCULAR; INTRAVENOUS; SOFT TISSUE
Status: DISCONTINUED | OUTPATIENT
Start: 2025-08-05 | End: 2025-08-05

## 2025-08-05 RX ORDER — HALOPERIDOL LACTATE 5 MG/ML
0.5 INJECTION, SOLUTION INTRAMUSCULAR EVERY 10 MIN PRN
Status: DISCONTINUED | OUTPATIENT
Start: 2025-08-05 | End: 2025-08-05 | Stop reason: HOSPADM

## 2025-08-05 RX ORDER — SODIUM CHLORIDE, SODIUM LACTATE, POTASSIUM CHLORIDE, CALCIUM CHLORIDE 600; 310; 30; 20 MG/100ML; MG/100ML; MG/100ML; MG/100ML
INJECTION, SOLUTION INTRAVENOUS CONTINUOUS
Status: ACTIVE | OUTPATIENT
Start: 2025-08-05 | End: 2025-08-06

## 2025-08-05 RX ORDER — KETOROLAC TROMETHAMINE 10 MG/1
10 TABLET, FILM COATED ORAL EVERY 6 HOURS
Status: COMPLETED | OUTPATIENT
Start: 2025-08-05 | End: 2025-08-06

## 2025-08-05 RX ORDER — LOSARTAN POTASSIUM 25 MG/1
25 TABLET ORAL ONCE
Status: COMPLETED | OUTPATIENT
Start: 2025-08-05 | End: 2025-08-05

## 2025-08-05 RX ORDER — ONDANSETRON HYDROCHLORIDE 2 MG/ML
INJECTION, SOLUTION INTRAVENOUS
Status: DISCONTINUED | OUTPATIENT
Start: 2025-08-05 | End: 2025-08-05

## 2025-08-05 RX ORDER — LABETALOL HYDROCHLORIDE 5 MG/ML
INJECTION, SOLUTION INTRAVENOUS
Status: DISCONTINUED | OUTPATIENT
Start: 2025-08-05 | End: 2025-08-05

## 2025-08-05 RX ORDER — HYDROMORPHONE HYDROCHLORIDE 1 MG/ML
0.5 INJECTION, SOLUTION INTRAMUSCULAR; INTRAVENOUS; SUBCUTANEOUS EVERY 4 HOURS PRN
Status: DISCONTINUED | OUTPATIENT
Start: 2025-08-05 | End: 2025-08-06

## 2025-08-05 RX ORDER — PROPOFOL 10 MG/ML
VIAL (ML) INTRAVENOUS
Status: DISCONTINUED | OUTPATIENT
Start: 2025-08-05 | End: 2025-08-05

## 2025-08-05 RX ORDER — OXYCODONE AND ACETAMINOPHEN 7.5; 325 MG/1; MG/1
1 TABLET ORAL EVERY 4 HOURS PRN
Refills: 0 | Status: DISCONTINUED | OUTPATIENT
Start: 2025-08-05 | End: 2025-08-06 | Stop reason: HOSPADM

## 2025-08-05 RX ORDER — OXYCODONE HYDROCHLORIDE 5 MG/1
5 TABLET ORAL EVERY 4 HOURS PRN
Status: DISCONTINUED | OUTPATIENT
Start: 2025-08-05 | End: 2025-08-06 | Stop reason: HOSPADM

## 2025-08-05 RX ORDER — KETAMINE HCL IN 0.9 % NACL 50 MG/5 ML
SYRINGE (ML) INTRAVENOUS
Status: DISCONTINUED | OUTPATIENT
Start: 2025-08-05 | End: 2025-08-05

## 2025-08-05 RX ORDER — MAGNESIUM SULFATE HEPTAHYDRATE 40 MG/ML
2 INJECTION, SOLUTION INTRAVENOUS ONCE
Status: DISCONTINUED | OUTPATIENT
Start: 2025-08-05 | End: 2025-08-06 | Stop reason: HOSPADM

## 2025-08-05 RX ORDER — POTASSIUM CHLORIDE 7.45 MG/ML
10 INJECTION INTRAVENOUS
Status: DISPENSED | OUTPATIENT
Start: 2025-08-05 | End: 2025-08-05

## 2025-08-05 RX ORDER — HYDRALAZINE HYDROCHLORIDE 25 MG/1
25 TABLET, FILM COATED ORAL EVERY 8 HOURS PRN
Status: DISCONTINUED | OUTPATIENT
Start: 2025-08-05 | End: 2025-08-06 | Stop reason: HOSPADM

## 2025-08-05 RX ORDER — LIDOCAINE HYDROCHLORIDE 20 MG/ML
INJECTION INTRAVENOUS
Status: DISCONTINUED | OUTPATIENT
Start: 2025-08-05 | End: 2025-08-05

## 2025-08-05 RX ORDER — FENTANYL CITRATE 50 UG/ML
INJECTION, SOLUTION INTRAMUSCULAR; INTRAVENOUS
Status: DISCONTINUED | OUTPATIENT
Start: 2025-08-05 | End: 2025-08-05

## 2025-08-05 RX ORDER — MIDAZOLAM HYDROCHLORIDE 1 MG/ML
INJECTION INTRAMUSCULAR; INTRAVENOUS
Status: DISCONTINUED | OUTPATIENT
Start: 2025-08-05 | End: 2025-08-05

## 2025-08-05 RX ORDER — ROCURONIUM BROMIDE 10 MG/ML
INJECTION, SOLUTION INTRAVENOUS
Status: DISCONTINUED | OUTPATIENT
Start: 2025-08-05 | End: 2025-08-05

## 2025-08-05 RX ORDER — HYDROMORPHONE HYDROCHLORIDE 1 MG/ML
0.2 INJECTION, SOLUTION INTRAMUSCULAR; INTRAVENOUS; SUBCUTANEOUS EVERY 5 MIN PRN
Status: DISCONTINUED | OUTPATIENT
Start: 2025-08-05 | End: 2025-08-05 | Stop reason: HOSPADM

## 2025-08-05 RX ORDER — LOSARTAN POTASSIUM 50 MG/1
50 TABLET ORAL DAILY
Status: DISCONTINUED | OUTPATIENT
Start: 2025-08-06 | End: 2025-08-06 | Stop reason: HOSPADM

## 2025-08-05 RX ORDER — GLUCAGON 1 MG
1 KIT INJECTION
Status: DISCONTINUED | OUTPATIENT
Start: 2025-08-05 | End: 2025-08-05 | Stop reason: HOSPADM

## 2025-08-05 RX ADMIN — SODIUM CHLORIDE, SODIUM GLUCONATE, SODIUM ACETATE, POTASSIUM CHLORIDE, MAGNESIUM CHLORIDE, SODIUM PHOSPHATE, DIBASIC, AND POTASSIUM PHOSPHATE: .53; .5; .37; .037; .03; .012; .00082 INJECTION, SOLUTION INTRAVENOUS at 09:08

## 2025-08-05 RX ADMIN — HYDRALAZINE HYDROCHLORIDE 25 MG: 25 TABLET ORAL at 09:08

## 2025-08-05 RX ADMIN — HYDROMORPHONE HYDROCHLORIDE 0.2 MG: 1 INJECTION, SOLUTION INTRAMUSCULAR; INTRAVENOUS; SUBCUTANEOUS at 01:08

## 2025-08-05 RX ADMIN — ONDANSETRON 4 MG: 2 INJECTION INTRAMUSCULAR; INTRAVENOUS at 01:08

## 2025-08-05 RX ADMIN — LABETALOL HYDROCHLORIDE 5 MG: 5 INJECTION, SOLUTION INTRAVENOUS at 10:08

## 2025-08-05 RX ADMIN — ONDANSETRON 4 MG: 2 INJECTION INTRAMUSCULAR; INTRAVENOUS at 10:08

## 2025-08-05 RX ADMIN — Medication 6 MG: at 09:08

## 2025-08-05 RX ADMIN — LABETALOL HYDROCHLORIDE 5 MG: 5 INJECTION, SOLUTION INTRAVENOUS at 09:08

## 2025-08-05 RX ADMIN — FENTANYL CITRATE 100 MCG: 50 INJECTION, SOLUTION INTRAMUSCULAR; INTRAVENOUS at 08:08

## 2025-08-05 RX ADMIN — LOSARTAN POTASSIUM 25 MG: 25 TABLET, FILM COATED ORAL at 03:08

## 2025-08-05 RX ADMIN — OXYCODONE HYDROCHLORIDE AND ACETAMINOPHEN 1 TABLET: 7.5; 325 TABLET ORAL at 01:08

## 2025-08-05 RX ADMIN — HYDROMORPHONE HYDROCHLORIDE 0.2 MG: 1 INJECTION, SOLUTION INTRAMUSCULAR; INTRAVENOUS; SUBCUTANEOUS at 11:08

## 2025-08-05 RX ADMIN — HYDROMORPHONE HYDROCHLORIDE 0.5 MG: 1 INJECTION, SOLUTION INTRAMUSCULAR; INTRAVENOUS; SUBCUTANEOUS at 09:08

## 2025-08-05 RX ADMIN — DEXAMETHASONE SODIUM PHOSPHATE 4 MG: 4 INJECTION, SOLUTION INTRAMUSCULAR; INTRAVENOUS at 08:08

## 2025-08-05 RX ADMIN — HYDROMORPHONE HYDROCHLORIDE 0.5 MG: 1 INJECTION, SOLUTION INTRAMUSCULAR; INTRAVENOUS; SUBCUTANEOUS at 03:08

## 2025-08-05 RX ADMIN — SODIUM CHLORIDE: 0.9 INJECTION, SOLUTION INTRAVENOUS at 08:08

## 2025-08-05 RX ADMIN — HYDRALAZINE HYDROCHLORIDE 25 MG: 25 TABLET ORAL at 01:08

## 2025-08-05 RX ADMIN — LOSARTAN POTASSIUM 25 MG: 25 TABLET, FILM COATED ORAL at 11:08

## 2025-08-05 RX ADMIN — POTASSIUM CHLORIDE 10 MEQ: 7.46 INJECTION, SOLUTION INTRAVENOUS at 06:08

## 2025-08-05 RX ADMIN — KETOROLAC TROMETHAMINE 10 MG: 10 TABLET, FILM COATED ORAL at 07:08

## 2025-08-05 RX ADMIN — SUGAMMADEX 400 MG: 100 INJECTION, SOLUTION INTRAVENOUS at 10:08

## 2025-08-05 RX ADMIN — SODIUM CHLORIDE, POTASSIUM CHLORIDE, SODIUM LACTATE AND CALCIUM CHLORIDE: 600; 310; 30; 20 INJECTION, SOLUTION INTRAVENOUS at 03:08

## 2025-08-05 RX ADMIN — HALOPERIDOL LACTATE 0.5 MG: 5 INJECTION, SOLUTION INTRAMUSCULAR at 10:08

## 2025-08-05 RX ADMIN — Medication 10 MG: at 09:08

## 2025-08-05 RX ADMIN — DEXTROSE 2 G: 50 INJECTION, SOLUTION INTRAVENOUS at 08:08

## 2025-08-05 RX ADMIN — ROCURONIUM BROMIDE 50 MG: 10 INJECTION, SOLUTION INTRAVENOUS at 08:08

## 2025-08-05 RX ADMIN — PROPOFOL 110 MG: 10 INJECTION, EMULSION INTRAVENOUS at 08:08

## 2025-08-05 RX ADMIN — MIDAZOLAM HYDROCHLORIDE 1 MG: 2 INJECTION, SOLUTION INTRAMUSCULAR; INTRAVENOUS at 08:08

## 2025-08-05 RX ADMIN — ROCURONIUM BROMIDE 20 MG: 10 INJECTION, SOLUTION INTRAVENOUS at 10:08

## 2025-08-05 RX ADMIN — Medication 10 MG: at 08:08

## 2025-08-05 RX ADMIN — LIDOCAINE HYDROCHLORIDE 80 MG: 20 INJECTION INTRAVENOUS at 08:08

## 2025-08-05 RX ADMIN — FENTANYL CITRATE 50 MCG: 50 INJECTION, SOLUTION INTRAMUSCULAR; INTRAVENOUS at 09:08

## 2025-08-05 RX ADMIN — Medication 10 MG: at 10:08

## 2025-08-05 RX ADMIN — ROCURONIUM BROMIDE 20 MG: 10 INJECTION, SOLUTION INTRAVENOUS at 09:08

## 2025-08-05 NOTE — TRANSFER OF CARE
"Anesthesia Transfer of Care Note    Patient: Vidya Vazquez    Procedure(s) Performed: Procedure(s) (LRB):  CHOLECYSTECTOMY, LAPAROSCOPIC (N/A)    Patient location: PACU    Anesthesia Type: general    Transport from OR: Transported from OR on 6-10 L/min O2 by face mask with adequate spontaneous ventilation    Post pain: adequate analgesia    Post assessment: tolerated procedure well and no apparent anesthetic complications    Post vital signs: stable    Level of consciousness: sedated and responds to stimulation    Nausea/Vomiting: no nausea/vomiting    Complications: none    Transfer of care protocol was followedComments: NIBP remains elevated after Labetolol worked in. Dr. Jimenez called to the bedside    Last vitals: Visit Vitals  BP (!) 164/86 (BP Location: Right arm, Patient Position: Lying)   Pulse 69   Temp 36.2 °C (97.2 °F) (Tympanic)   Resp 20   Ht 5' 5" (1.651 m)   Wt 77.6 kg (171 lb 1.2 oz)   SpO2 97%   Breastfeeding No   BMI 28.47 kg/m²     "

## 2025-08-05 NOTE — ANESTHESIA PROCEDURE NOTES
Intubation    Date/Time: 8/5/2025 8:46 AM    Performed by: Gabriela Mathur CRNA  Authorized by: Edgar Jimenez MD    Intubation:     Induction:  Intravenous    Intubated:  Postinduction    Mask Ventilation:  Easy mask    Attempts:  1    Attempted By:  CRNA    Method of Intubation:  Video laryngoscopy    Blade:  Encinas 3    Laryngeal View Grade: Grade I - full view of cords      Difficult Airway Encountered?: No      Complications:  None    Airway Device:  Oral endotracheal tube    Airway Device Size:  7.5    Style/Cuff Inflation:  Cuffed (inflated to minimal occlusive pressure)    Tube secured:  21    Secured at:  The lips    Placement Verified By:  Capnometry    Complicating Factors:  None    Findings Post-Intubation:  BS equal bilateral and atraumatic/condition of teeth unchanged

## 2025-08-05 NOTE — ANESTHESIA POSTPROCEDURE EVALUATION
Anesthesia Post Evaluation    Patient: Vidya Vazquez    Procedure(s) Performed: Procedure(s) (LRB):  CHOLECYSTECTOMY, LAPAROSCOPIC (N/A)    Final Anesthesia Type: general      Patient location during evaluation: floor  Patient participation: Yes- Able to Participate  Level of consciousness: awake and alert and oriented  Post-procedure vital signs: reviewed and stable  Pain management: adequate  Airway patency: patent  TIFFANY mitigation strategies: Multimodal analgesia, Extubation while patient is awake, Verification of full reversal of neuromuscular block and Extubation and recovery carried out in lateral, semiupright, or other nonsupine position  PONV status at discharge: No PONV  Anesthetic complications: no      Cardiovascular status: blood pressure returned to baseline and hemodynamically stable  Respiratory status: unassisted, spontaneous ventilation and room air  Hydration status: euvolemic  Follow-up not needed.              Vitals Value Taken Time   /91 08/05/25 12:04   Temp 36.4 °C (97.5 °F) 08/05/25 12:04   Pulse 61 08/05/25 15:04   Resp 16 08/05/25 13:52   SpO2 97 % 08/05/25 12:04         Event Time   Out of Recovery 08/05/2025 11:15:00         Pain/Donald Score: Pain Rating Prior to Med Admin: 10 (8/5/2025  1:52 PM)  Donald Score: 9 (8/5/2025 11:15 AM)

## 2025-08-06 VITALS
BODY MASS INDEX: 28.5 KG/M2 | RESPIRATION RATE: 18 BRPM | OXYGEN SATURATION: 96 % | WEIGHT: 171.06 LBS | HEIGHT: 65 IN | TEMPERATURE: 99 F | DIASTOLIC BLOOD PRESSURE: 86 MMHG | SYSTOLIC BLOOD PRESSURE: 162 MMHG | HEART RATE: 94 BPM

## 2025-08-06 LAB
ABSOLUTE EOSINOPHIL (OHS): 0.18 K/UL
ABSOLUTE MONOCYTE (OHS): 0.45 K/UL (ref 0.3–1)
ABSOLUTE NEUTROPHIL COUNT (OHS): 5.59 K/UL (ref 1.8–7.7)
ALBUMIN SERPL BCP-MCNC: 3.1 G/DL (ref 3.5–5.2)
ALP SERPL-CCNC: 340 UNIT/L (ref 40–150)
ALT SERPL W/O P-5'-P-CCNC: 244 UNIT/L (ref 0–55)
ANION GAP (OHS): 13 MMOL/L (ref 8–16)
AST SERPL-CCNC: 89 UNIT/L (ref 0–50)
BASOPHILS # BLD AUTO: 0.05 K/UL
BASOPHILS NFR BLD AUTO: 0.7 %
BILIRUB SERPL-MCNC: 0.7 MG/DL (ref 0.1–1)
BUN SERPL-MCNC: 5 MG/DL (ref 8–23)
CALCIUM SERPL-MCNC: 8.8 MG/DL (ref 8.7–10.5)
CHLORIDE SERPL-SCNC: 103 MMOL/L (ref 95–110)
CO2 SERPL-SCNC: 23 MMOL/L (ref 23–29)
CREAT SERPL-MCNC: 0.6 MG/DL (ref 0.5–1.4)
ERYTHROCYTE [DISTWIDTH] IN BLOOD BY AUTOMATED COUNT: 15.5 % (ref 11.5–14.5)
GFR SERPLBLD CREATININE-BSD FMLA CKD-EPI: >60 ML/MIN/1.73/M2
GLUCOSE SERPL-MCNC: 83 MG/DL (ref 70–110)
HCT VFR BLD AUTO: 36.5 % (ref 37–48.5)
HGB BLD-MCNC: 11.4 GM/DL (ref 12–16)
HOLD SPECIMEN: NORMAL
IMM GRANULOCYTES # BLD AUTO: 0.02 K/UL (ref 0–0.04)
IMM GRANULOCYTES NFR BLD AUTO: 0.3 % (ref 0–0.5)
LYMPHOCYTES # BLD AUTO: 1.37 K/UL (ref 1–4.8)
MAGNESIUM SERPL-MCNC: 1.7 MG/DL (ref 1.6–2.6)
MCH RBC QN AUTO: 26.9 PG (ref 27–31)
MCHC RBC AUTO-ENTMCNC: 31.2 G/DL (ref 32–36)
MCV RBC AUTO: 86 FL (ref 82–98)
NUCLEATED RBC (/100WBC) (OHS): 0 /100 WBC
PLATELET # BLD AUTO: 253 K/UL (ref 150–450)
PMV BLD AUTO: 11.6 FL (ref 9.2–12.9)
POTASSIUM SERPL-SCNC: 4.1 MMOL/L (ref 3.5–5.1)
PROT SERPL-MCNC: 7 GM/DL (ref 6–8.4)
RBC # BLD AUTO: 4.24 M/UL (ref 4–5.4)
RELATIVE EOSINOPHIL (OHS): 2.3 %
RELATIVE LYMPHOCYTE (OHS): 17.9 % (ref 18–48)
RELATIVE MONOCYTE (OHS): 5.9 % (ref 4–15)
RELATIVE NEUTROPHIL (OHS): 72.9 % (ref 38–73)
SODIUM SERPL-SCNC: 139 MMOL/L (ref 136–145)
WBC # BLD AUTO: 7.66 K/UL (ref 3.9–12.7)

## 2025-08-06 PROCEDURE — 63600175 PHARM REV CODE 636 W HCPCS: Performed by: HOSPITALIST

## 2025-08-06 PROCEDURE — 36415 COLL VENOUS BLD VENIPUNCTURE: CPT | Performed by: HOSPITALIST

## 2025-08-06 PROCEDURE — 25000003 PHARM REV CODE 250: Performed by: STUDENT IN AN ORGANIZED HEALTH CARE EDUCATION/TRAINING PROGRAM

## 2025-08-06 PROCEDURE — 25000003 PHARM REV CODE 250: Performed by: HOSPITALIST

## 2025-08-06 PROCEDURE — 83735 ASSAY OF MAGNESIUM: CPT | Performed by: HOSPITALIST

## 2025-08-06 PROCEDURE — 85025 COMPLETE CBC W/AUTO DIFF WBC: CPT | Performed by: HOSPITALIST

## 2025-08-06 PROCEDURE — 80053 COMPREHEN METABOLIC PANEL: CPT | Performed by: HOSPITALIST

## 2025-08-06 RX ORDER — HYDROMORPHONE HYDROCHLORIDE 1 MG/ML
0.5 INJECTION, SOLUTION INTRAMUSCULAR; INTRAVENOUS; SUBCUTANEOUS EVERY 4 HOURS PRN
Status: DISCONTINUED | OUTPATIENT
Start: 2025-08-06 | End: 2025-08-06 | Stop reason: HOSPADM

## 2025-08-06 RX ORDER — LOSARTAN POTASSIUM 25 MG/1
50 TABLET ORAL DAILY
Qty: 180 TABLET | Refills: 2 | Status: SHIPPED | OUTPATIENT
Start: 2025-08-06

## 2025-08-06 RX ORDER — ONDANSETRON 4 MG/1
4 TABLET, ORALLY DISINTEGRATING ORAL EVERY 6 HOURS PRN
Qty: 20 TABLET | Refills: 0 | Status: SHIPPED | OUTPATIENT
Start: 2025-08-06

## 2025-08-06 RX ORDER — HYDROCODONE BITARTRATE AND ACETAMINOPHEN 7.5; 325 MG/1; MG/1
1 TABLET ORAL EVERY 6 HOURS PRN
Qty: 12 TABLET | Refills: 0 | Status: SHIPPED | OUTPATIENT
Start: 2025-08-06 | End: 2025-08-09

## 2025-08-06 RX ADMIN — HYDROMORPHONE HYDROCHLORIDE 0.5 MG: 1 INJECTION, SOLUTION INTRAMUSCULAR; INTRAVENOUS; SUBCUTANEOUS at 03:08

## 2025-08-06 RX ADMIN — ASPIRIN 81 MG: 81 TABLET, DELAYED RELEASE ORAL at 09:08

## 2025-08-06 RX ADMIN — KETOROLAC TROMETHAMINE 10 MG: 10 TABLET, FILM COATED ORAL at 11:08

## 2025-08-06 RX ADMIN — KETOROLAC TROMETHAMINE 10 MG: 10 TABLET, FILM COATED ORAL at 12:08

## 2025-08-06 RX ADMIN — PANTOPRAZOLE SODIUM 40 MG: 40 TABLET, DELAYED RELEASE ORAL at 09:08

## 2025-08-06 RX ADMIN — LOSARTAN POTASSIUM 50 MG: 50 TABLET, FILM COATED ORAL at 09:08

## 2025-08-06 RX ADMIN — KETOROLAC TROMETHAMINE 10 MG: 10 TABLET, FILM COATED ORAL at 06:08

## 2025-08-07 LAB
ESTROGEN SERPL-MCNC: NORMAL PG/ML
INSULIN SERPL-ACNC: NORMAL U[IU]/ML
LAB AP CLINICAL INFORMATION: NORMAL
LAB AP GROSS DESCRIPTION: NORMAL
LAB AP PERFORMING LOCATION(S): NORMAL
LAB AP REPORT FOOTNOTES: NORMAL

## 2025-08-08 ENCOUNTER — PATIENT OUTREACH (OUTPATIENT)
Dept: ADMINISTRATIVE | Facility: CLINIC | Age: 75
End: 2025-08-08
Payer: MEDICARE

## 2025-08-19 RX ORDER — DILTIAZEM HYDROCHLORIDE 30 MG/1
30 TABLET, FILM COATED ORAL EVERY 6 HOURS
COMMUNITY
Start: 2025-05-02

## 2025-08-19 RX ORDER — ROSUVASTATIN CALCIUM 20 MG/1
20 TABLET, COATED ORAL
COMMUNITY
Start: 2025-03-06

## 2025-08-19 RX ORDER — SEMAGLUTIDE 1.34 MG/ML
1 INJECTION, SOLUTION SUBCUTANEOUS
COMMUNITY
Start: 2025-07-29

## 2025-08-19 RX ORDER — NYSTATIN 100000 U/G
CREAM TOPICAL 2 TIMES DAILY
COMMUNITY
Start: 2025-03-06

## 2025-08-20 ENCOUNTER — OFFICE VISIT (OUTPATIENT)
Dept: SURGERY | Facility: CLINIC | Age: 75
End: 2025-08-20
Payer: MEDICARE

## 2025-08-20 VITALS
DIASTOLIC BLOOD PRESSURE: 87 MMHG | WEIGHT: 171.5 LBS | HEIGHT: 65 IN | HEART RATE: 93 BPM | SYSTOLIC BLOOD PRESSURE: 134 MMHG | TEMPERATURE: 99 F | OXYGEN SATURATION: 100 % | BODY MASS INDEX: 28.57 KG/M2

## 2025-08-20 DIAGNOSIS — Z90.49 S/P LAPAROSCOPIC CHOLECYSTECTOMY: Primary | ICD-10-CM

## 2025-08-20 PROCEDURE — 3079F DIAST BP 80-89 MM HG: CPT | Mod: CPTII,S$GLB,, | Performed by: STUDENT IN AN ORGANIZED HEALTH CARE EDUCATION/TRAINING PROGRAM

## 2025-08-20 PROCEDURE — 99024 POSTOP FOLLOW-UP VISIT: CPT | Mod: S$GLB,,, | Performed by: STUDENT IN AN ORGANIZED HEALTH CARE EDUCATION/TRAINING PROGRAM

## 2025-08-20 PROCEDURE — 3075F SYST BP GE 130 - 139MM HG: CPT | Mod: CPTII,S$GLB,, | Performed by: STUDENT IN AN ORGANIZED HEALTH CARE EDUCATION/TRAINING PROGRAM

## 2025-08-20 PROCEDURE — 1159F MED LIST DOCD IN RCRD: CPT | Mod: CPTII,S$GLB,, | Performed by: STUDENT IN AN ORGANIZED HEALTH CARE EDUCATION/TRAINING PROGRAM

## 2025-08-20 PROCEDURE — 3044F HG A1C LEVEL LT 7.0%: CPT | Mod: CPTII,S$GLB,, | Performed by: STUDENT IN AN ORGANIZED HEALTH CARE EDUCATION/TRAINING PROGRAM

## 2025-08-20 PROCEDURE — 1126F AMNT PAIN NOTED NONE PRSNT: CPT | Mod: CPTII,S$GLB,, | Performed by: STUDENT IN AN ORGANIZED HEALTH CARE EDUCATION/TRAINING PROGRAM

## 2025-08-20 PROCEDURE — 1101F PT FALLS ASSESS-DOCD LE1/YR: CPT | Mod: CPTII,S$GLB,, | Performed by: STUDENT IN AN ORGANIZED HEALTH CARE EDUCATION/TRAINING PROGRAM

## 2025-08-20 PROCEDURE — 99999 PR PBB SHADOW E&M-EST. PATIENT-LVL III: CPT | Mod: PBBFAC,,, | Performed by: STUDENT IN AN ORGANIZED HEALTH CARE EDUCATION/TRAINING PROGRAM

## 2025-08-20 PROCEDURE — 3288F FALL RISK ASSESSMENT DOCD: CPT | Mod: CPTII,S$GLB,, | Performed by: STUDENT IN AN ORGANIZED HEALTH CARE EDUCATION/TRAINING PROGRAM

## 2025-08-20 PROCEDURE — 4010F ACE/ARB THERAPY RXD/TAKEN: CPT | Mod: CPTII,S$GLB,, | Performed by: STUDENT IN AN ORGANIZED HEALTH CARE EDUCATION/TRAINING PROGRAM

## 2025-08-20 RX ORDER — TIZANIDINE 4 MG/1
4 TABLET ORAL 3 TIMES DAILY
COMMUNITY
Start: 2025-08-19

## (undated) DEVICE — BAG TISS RETRV MONARCH 10MM

## (undated) DEVICE — TRAY MINOR GEN SURG OMC

## (undated) DEVICE — DRAPE ABDOMINAL TIBURON 14X11

## (undated) DEVICE — TROCAR ENDOPATH XCEL 12MM 10CM

## (undated) DEVICE — SUT SURGIDAC ENDO STITCH2-0

## (undated) DEVICE — SCISSOR 5MMX35CM DIRECT DRIVE

## (undated) DEVICE — TROCAR SPACEMAKER BLUNT 10MM

## (undated) DEVICE — SUT MCRYL PLUS 4-0 PS2 27IN

## (undated) DEVICE — DRAPE CORETEMP FLD WRM 56X62IN

## (undated) DEVICE — ENDOSTITCH INSTRUMENT

## (undated) DEVICE — TROCAR ENDOPATH XCEL 5X75MM

## (undated) DEVICE — CANNULA ENDOPATH XCEL 5X100MM

## (undated) DEVICE — PENCIL ROCKER SWITCH 10FT CORD

## (undated) DEVICE — SUT VICRYL+ 27 UR-6 VIOL

## (undated) DEVICE — KIT ANTIFOG W/SPONG & FLUID

## (undated) DEVICE — TROCAR ENDOPATH XCEL 5MM 7.5CM

## (undated) DEVICE — SHEARS HARMONIC 5CM 36CM

## (undated) DEVICE — ELECTRODE REM PLYHSV RETURN 9

## (undated) DEVICE — DEVICE TK TI-KNOT 5MM REPL DEV

## (undated) DEVICE — Device

## (undated) DEVICE — ADHESIVE DERMABOND ADVANCED

## (undated) DEVICE — TROCAR ENDOPATH XCEL 5X100MM

## (undated) DEVICE — SUT ENDOLOOP PDSII 18 LIGA

## (undated) DEVICE — SYS SMOKE EVACUATION LAP

## (undated) DEVICE — IRRIGATOR ENDOSCOPY DISP.

## (undated) DEVICE — TRAY MINOR GEN SURG

## (undated) DEVICE — TUBING HF INSUFFLATION W/ FLTR

## (undated) DEVICE — NDL HYPO REG 25G X 1 1/2

## (undated) DEVICE — SYR 10CC LUER LOCK

## (undated) DEVICE — LUBRICANT SURGILUBE 2 OZ

## (undated) DEVICE — ELECTRODE MEGADYNE RETURN DUAL

## (undated) DEVICE — SUT TK QUIK LOAD

## (undated) DEVICE — TROCAR ENDOPATH XCEL 12X100MM

## (undated) DEVICE — LOOP VESSEL BLUE MAXI

## (undated) DEVICE — APPLIER CLIP ENDO LIGAMAX 5MM

## (undated) DEVICE — BLADE SURG CARBON STEEL SZ11

## (undated) DEVICE — SUT 0 VICRYL / UR6 (J603)

## (undated) DEVICE — BOWL STERILE LARGE 32OZ